# Patient Record
Sex: MALE | Race: ASIAN | Employment: OTHER | URBAN - METROPOLITAN AREA
[De-identification: names, ages, dates, MRNs, and addresses within clinical notes are randomized per-mention and may not be internally consistent; named-entity substitution may affect disease eponyms.]

---

## 2017-03-16 ENCOUNTER — ANESTHESIA EVENT (OUTPATIENT)
Dept: SURGERY | Age: 67
End: 2017-03-16
Payer: MEDICARE

## 2017-03-17 ENCOUNTER — ANESTHESIA (OUTPATIENT)
Dept: SURGERY | Age: 67
End: 2017-03-17
Payer: MEDICARE

## 2017-03-17 ENCOUNTER — SURGERY (OUTPATIENT)
Age: 67
End: 2017-03-17

## 2017-03-17 ENCOUNTER — HOSPITAL ENCOUNTER (OUTPATIENT)
Age: 67
Setting detail: OUTPATIENT SURGERY
Discharge: HOME | End: 2017-03-17
Attending: ORTHOPAEDIC SURGERY | Admitting: ORTHOPAEDIC SURGERY
Payer: MEDICARE

## 2017-03-17 ENCOUNTER — APPOINTMENT (OUTPATIENT)
Dept: GENERAL RADIOLOGY | Age: 67
End: 2017-03-17
Attending: ORTHOPAEDIC SURGERY
Payer: MEDICARE

## 2017-03-17 VITALS
HEIGHT: 73 IN | WEIGHT: 237.66 LBS | RESPIRATION RATE: 23 BRPM | HEART RATE: 112 BPM | SYSTOLIC BLOOD PRESSURE: 149 MMHG | TEMPERATURE: 98 F | DIASTOLIC BLOOD PRESSURE: 86 MMHG | BODY MASS INDEX: 31.5 KG/M2 | OXYGEN SATURATION: 97 %

## 2017-03-17 PROCEDURE — 77030020268 HC MISC GENERAL SUPPLY: Performed by: ORTHOPAEDIC SURGERY

## 2017-03-17 PROCEDURE — 76060000062 HC AMB SURG ANES 1 TO 1.5 HR: Performed by: ORTHOPAEDIC SURGERY

## 2017-03-17 PROCEDURE — 77030031383 HC PEG RAD LOK GEMNS SKEL -B: Performed by: ORTHOPAEDIC SURGERY

## 2017-03-17 PROCEDURE — 77030035363 HC PEG THRD LOK GEMNS TI SKEL -B: Performed by: ORTHOPAEDIC SURGERY

## 2017-03-17 PROCEDURE — 74011000250 HC RX REV CODE- 250

## 2017-03-17 PROCEDURE — 77030035360 HC BIT DRL SKEL -B: Performed by: ORTHOPAEDIC SURGERY

## 2017-03-17 PROCEDURE — 74011250636 HC RX REV CODE- 250/636: Performed by: ANESTHESIOLOGY

## 2017-03-17 PROCEDURE — C1769 GUIDE WIRE: HCPCS | Performed by: ORTHOPAEDIC SURGERY

## 2017-03-17 PROCEDURE — 77030031388 HC WRE K SKEL -B: Performed by: ORTHOPAEDIC SURGERY

## 2017-03-17 PROCEDURE — 74011250636 HC RX REV CODE- 250/636: Performed by: ORTHOPAEDIC SURGERY

## 2017-03-17 PROCEDURE — 77030010777 HC CRDL FT DERY -B: Performed by: ORTHOPAEDIC SURGERY

## 2017-03-17 PROCEDURE — 77030035361 HC BIT DRL SLD PA GEMNS SKEL -B: Performed by: ORTHOPAEDIC SURGERY

## 2017-03-17 PROCEDURE — 74011250636 HC RX REV CODE- 250/636

## 2017-03-17 PROCEDURE — 77030020782 HC GWN BAIR PAWS FLX 3M -B

## 2017-03-17 PROCEDURE — 77030034343 HC PEG THRD NLOK GEMNS TI SKEL -B: Performed by: ORTHOPAEDIC SURGERY

## 2017-03-17 PROCEDURE — 76210000034 HC AMBSU PH I REC 0.5 TO 1 HR: Performed by: ORTHOPAEDIC SURGERY

## 2017-03-17 PROCEDURE — 77030003601 HC NDL NRV BLK BBMI -A

## 2017-03-17 PROCEDURE — 77030031139 HC SUT VCRL2 J&J -A: Performed by: ORTHOPAEDIC SURGERY

## 2017-03-17 PROCEDURE — 76000 FLUOROSCOPY <1 HR PHYS/QHP: CPT

## 2017-03-17 PROCEDURE — 77030002933 HC SUT MCRYL J&J -A: Performed by: ORTHOPAEDIC SURGERY

## 2017-03-17 PROCEDURE — 77030020754 HC CUF TRNQT 2BLA STRY -B: Performed by: ORTHOPAEDIC SURGERY

## 2017-03-17 PROCEDURE — 77030021122 HC SPLNT MAT FST BSNM -A: Performed by: ORTHOPAEDIC SURGERY

## 2017-03-17 PROCEDURE — 77030032490 HC SLV COMPR SCD KNE COVD -B: Performed by: ORTHOPAEDIC SURGERY

## 2017-03-17 PROCEDURE — 77030031381 HC PLT RAD VOLR GEMNS SKEL -F: Performed by: ORTHOPAEDIC SURGERY

## 2017-03-17 PROCEDURE — 77030036550 HC SLNG ARM PCH S2SG -A

## 2017-03-17 PROCEDURE — 77030020143 HC AIRWY LARYN INTUB CGAS -A: Performed by: ANESTHESIOLOGY

## 2017-03-17 PROCEDURE — 76030000019 HC AMB SURG 1 TO 1.5 HR INTENSV-TIER 1: Performed by: ORTHOPAEDIC SURGERY

## 2017-03-17 PROCEDURE — 76210000050 HC AMBSU PH II REC 0.5 TO 1 HR: Performed by: ORTHOPAEDIC SURGERY

## 2017-03-17 PROCEDURE — 77030035133 HC SCR VOLR CORT NLOK TI SKEL -B: Performed by: ORTHOPAEDIC SURGERY

## 2017-03-17 DEVICE — PEG BNE FIX L22MM DIA2MM DST RAD TI SMOOTH FOR VOLAR: Type: IMPLANTABLE DEVICE | Site: WRIST | Status: FUNCTIONAL

## 2017-03-17 DEVICE — SCR BNE CORT NLCK 3.5X11MM -- GEMINUS: Type: IMPLANTABLE DEVICE | Site: WRIST | Status: FUNCTIONAL

## 2017-03-17 DEVICE — IMPLANTABLE DEVICE: Type: IMPLANTABLE DEVICE | Site: WRIST | Status: FUNCTIONAL

## 2017-03-17 DEVICE — PEG BNE FIX L16MM DIA2MM DST RAD TI SMOOTH FOR VOLAR: Type: IMPLANTABLE DEVICE | Site: WRIST | Status: FUNCTIONAL

## 2017-03-17 DEVICE — SCREW BNE L13MM DIA3.5MM CORT DST RAD VOLAR TI NONLOCKING: Type: IMPLANTABLE DEVICE | Site: WRIST | Status: FUNCTIONAL

## 2017-03-17 DEVICE — PEG BNE FIX L19MM DIA2MM DST RAD TI SMOOTH FOR VOLAR: Type: IMPLANTABLE DEVICE | Site: WRIST | Status: FUNCTIONAL

## 2017-03-17 DEVICE — PEG BNE FIX L20MM DIA2MM DST RAD TI SMOOTH FOR VOLAR: Type: IMPLANTABLE DEVICE | Site: WRIST | Status: FUNCTIONAL

## 2017-03-17 RX ORDER — CETIRIZINE HYDROCHLORIDE 5 MG/1
5 TABLET ORAL 2 TIMES DAILY
COMMUNITY
End: 2017-03-28

## 2017-03-17 RX ORDER — HYDROMORPHONE HYDROCHLORIDE 1 MG/ML
.25-1 INJECTION, SOLUTION INTRAMUSCULAR; INTRAVENOUS; SUBCUTANEOUS
Status: DISCONTINUED | OUTPATIENT
Start: 2017-03-17 | End: 2017-03-17 | Stop reason: HOSPADM

## 2017-03-17 RX ORDER — SODIUM CHLORIDE 0.9 % (FLUSH) 0.9 %
5-10 SYRINGE (ML) INJECTION AS NEEDED
Status: DISCONTINUED | OUTPATIENT
Start: 2017-03-17 | End: 2017-03-17 | Stop reason: HOSPADM

## 2017-03-17 RX ORDER — LIDOCAINE HYDROCHLORIDE 10 MG/ML
0.1 INJECTION, SOLUTION EPIDURAL; INFILTRATION; INTRACAUDAL; PERINEURAL AS NEEDED
Status: DISCONTINUED | OUTPATIENT
Start: 2017-03-17 | End: 2017-03-17 | Stop reason: HOSPADM

## 2017-03-17 RX ORDER — ONDANSETRON 2 MG/ML
INJECTION INTRAMUSCULAR; INTRAVENOUS AS NEEDED
Status: DISCONTINUED | OUTPATIENT
Start: 2017-03-17 | End: 2017-03-17 | Stop reason: HOSPADM

## 2017-03-17 RX ORDER — OXYCODONE AND ACETAMINOPHEN 5; 325 MG/1; MG/1
TABLET ORAL
Qty: 100 TAB | Refills: 0 | Status: SHIPPED | OUTPATIENT
Start: 2017-03-17 | End: 2017-03-28

## 2017-03-17 RX ORDER — ONDANSETRON 8 MG/1
8 TABLET, ORALLY DISINTEGRATING ORAL
Qty: 20 TAB | Refills: 2 | Status: SHIPPED | OUTPATIENT
Start: 2017-03-17 | End: 2017-03-28

## 2017-03-17 RX ORDER — LIDOCAINE HYDROCHLORIDE 20 MG/ML
INJECTION, SOLUTION EPIDURAL; INFILTRATION; INTRACAUDAL; PERINEURAL AS NEEDED
Status: DISCONTINUED | OUTPATIENT
Start: 2017-03-17 | End: 2017-03-17 | Stop reason: HOSPADM

## 2017-03-17 RX ORDER — SODIUM CHLORIDE 0.9 % (FLUSH) 0.9 %
5-10 SYRINGE (ML) INJECTION EVERY 8 HOURS
Status: DISCONTINUED | OUTPATIENT
Start: 2017-03-17 | End: 2017-03-17 | Stop reason: HOSPADM

## 2017-03-17 RX ORDER — HYDROCODONE BITARTRATE AND ACETAMINOPHEN 5; 325 MG/1; MG/1
1 TABLET ORAL
COMMUNITY
End: 2017-03-17

## 2017-03-17 RX ORDER — CEFAZOLIN SODIUM IN 0.9 % NACL 2 G/50 ML
2 INTRAVENOUS SOLUTION, PIGGYBACK (ML) INTRAVENOUS ONCE
Status: COMPLETED | OUTPATIENT
Start: 2017-03-17 | End: 2017-03-17

## 2017-03-17 RX ORDER — ROPIVACAINE HYDROCHLORIDE 5 MG/ML
INJECTION, SOLUTION EPIDURAL; INFILTRATION; PERINEURAL AS NEEDED
Status: DISCONTINUED | OUTPATIENT
Start: 2017-03-17 | End: 2017-03-17 | Stop reason: HOSPADM

## 2017-03-17 RX ORDER — ASCORBIC ACID 500 MG
500 TABLET ORAL DAILY
Qty: 42 TAB | Refills: 0 | Status: SHIPPED | OUTPATIENT
Start: 2017-03-17 | End: 2017-03-28

## 2017-03-17 RX ORDER — SODIUM CHLORIDE, SODIUM LACTATE, POTASSIUM CHLORIDE, CALCIUM CHLORIDE 600; 310; 30; 20 MG/100ML; MG/100ML; MG/100ML; MG/100ML
100 INJECTION, SOLUTION INTRAVENOUS CONTINUOUS
Status: DISCONTINUED | OUTPATIENT
Start: 2017-03-17 | End: 2017-03-17 | Stop reason: HOSPADM

## 2017-03-17 RX ORDER — DEXAMETHASONE SODIUM PHOSPHATE 4 MG/ML
INJECTION, SOLUTION INTRA-ARTICULAR; INTRALESIONAL; INTRAMUSCULAR; INTRAVENOUS; SOFT TISSUE AS NEEDED
Status: DISCONTINUED | OUTPATIENT
Start: 2017-03-17 | End: 2017-03-17 | Stop reason: HOSPADM

## 2017-03-17 RX ORDER — MIDAZOLAM HYDROCHLORIDE 1 MG/ML
INJECTION, SOLUTION INTRAMUSCULAR; INTRAVENOUS AS NEEDED
Status: DISCONTINUED | OUTPATIENT
Start: 2017-03-17 | End: 2017-03-17 | Stop reason: HOSPADM

## 2017-03-17 RX ORDER — FENTANYL CITRATE 50 UG/ML
INJECTION, SOLUTION INTRAMUSCULAR; INTRAVENOUS AS NEEDED
Status: DISCONTINUED | OUTPATIENT
Start: 2017-03-17 | End: 2017-03-17 | Stop reason: HOSPADM

## 2017-03-17 RX ORDER — PROPOFOL 10 MG/ML
INJECTION, EMULSION INTRAVENOUS AS NEEDED
Status: DISCONTINUED | OUTPATIENT
Start: 2017-03-17 | End: 2017-03-17 | Stop reason: HOSPADM

## 2017-03-17 RX ADMIN — DEXAMETHASONE SODIUM PHOSPHATE 8 MG: 4 INJECTION, SOLUTION INTRA-ARTICULAR; INTRALESIONAL; INTRAMUSCULAR; INTRAVENOUS; SOFT TISSUE at 11:29

## 2017-03-17 RX ADMIN — SODIUM CHLORIDE, POTASSIUM CHLORIDE, SODIUM LACTATE AND CALCIUM CHLORIDE: 600; 310; 30; 20 INJECTION, SOLUTION INTRAVENOUS at 12:13

## 2017-03-17 RX ADMIN — ONDANSETRON 4 MG: 2 INJECTION INTRAMUSCULAR; INTRAVENOUS at 11:29

## 2017-03-17 RX ADMIN — FENTANYL CITRATE 50 MCG: 50 INJECTION, SOLUTION INTRAMUSCULAR; INTRAVENOUS at 09:45

## 2017-03-17 RX ADMIN — MIDAZOLAM HYDROCHLORIDE 2 MG: 1 INJECTION, SOLUTION INTRAMUSCULAR; INTRAVENOUS at 11:21

## 2017-03-17 RX ADMIN — FENTANYL CITRATE 50 MCG: 50 INJECTION, SOLUTION INTRAMUSCULAR; INTRAVENOUS at 11:21

## 2017-03-17 RX ADMIN — CEFAZOLIN 2 G: 1 INJECTION, POWDER, FOR SOLUTION INTRAMUSCULAR; INTRAVENOUS; PARENTERAL at 11:28

## 2017-03-17 RX ADMIN — LIDOCAINE HYDROCHLORIDE 80 MG: 20 INJECTION, SOLUTION EPIDURAL; INFILTRATION; INTRACAUDAL; PERINEURAL at 11:21

## 2017-03-17 RX ADMIN — MIDAZOLAM HYDROCHLORIDE 1 MG: 1 INJECTION, SOLUTION INTRAMUSCULAR; INTRAVENOUS at 09:47

## 2017-03-17 RX ADMIN — SODIUM CHLORIDE, POTASSIUM CHLORIDE, SODIUM LACTATE AND CALCIUM CHLORIDE: 600; 310; 30; 20 INJECTION, SOLUTION INTRAVENOUS at 11:18

## 2017-03-17 RX ADMIN — ROPIVACAINE HYDROCHLORIDE 30 ML: 5 INJECTION, SOLUTION EPIDURAL; INFILTRATION; PERINEURAL at 09:53

## 2017-03-17 RX ADMIN — MIDAZOLAM HYDROCHLORIDE 2 MG: 1 INJECTION, SOLUTION INTRAMUSCULAR; INTRAVENOUS at 09:45

## 2017-03-17 RX ADMIN — PROPOFOL 200 MG: 10 INJECTION, EMULSION INTRAVENOUS at 11:21

## 2017-03-17 NOTE — IP AVS SNAPSHOT
Lashaegarrison Sellers 
 
 
 [address] [facility] [address] [facility] 
[phone] Patient: Mikaela Chavez MRN: [id number] H:[birth year] You are allergic to the following Allergen Reactions Chantix (Varenicline) Rash Very itchy rash on back Recent Documentation Height Weight BMI Smoking Status 1.854 m 107.8 kg 31.35 kg/m2 Current Every Day Smoker Emergency Contacts Name Discharge Info Relation Home Work Mobile [address] [facility] CAREGIVER [3] Spouse [3] [phone] About your hospitalization You were admitted on:  March 17, 2017 You last received care in the:  [facility] You were discharged on:  March 17, 2017 Unit phone number:  [phone] Why you were hospitalized Your primary diagnosis was:  Not on File Providers Seen During Your Hospitalizations Provider Role Specialty Primary office phone Awilda Tai MD Attending Provider Orthopedic Surgery [phone] Your Primary Care Physician (PCP) Primary Care Physician Office Phone Office Fax OTHER, PHYS ** None ** ** None ** Follow-up Information Follow up With Details Comments Contact Info Awilda Tai MD Follow up in 1 week(s)  [address] [facility] [address] 
[phone] Demetrio Grove MD   Patient can only remember the practice name and not the physician Current Discharge Medication List  
  
START taking these medications Dose & Instructions Dispensing Information Comments Morning Noon Evening Bedtime  
 ascorbic acid (vitamin C) 500 mg tablet Commonly known as:  VITAMIN C Your last dose was: Your next dose is:    
   
   
 Dose:  500 mg Take 1 Tab by mouth daily for 42 days. Quantity:  42 Tab Refills:  0  
     
   
   
   
  
 docusate sodium 50 mg capsule Commonly known as:  Derek Pierce  
   
 Your last dose was: Your next dose is: Take two tabs twice daily for two weeks, then twice a day as needed thereafter. Hold for loose stools. Quantity:  60 Cap Refills:  2  
     
   
   
   
  
 ondansetron 8 mg disintegrating tablet Commonly known as:  ZOFRAN ODT Your last dose was: Your next dose is:    
   
   
 Dose:  8 mg Take 1 Tab by mouth every eight (8) hours as needed for Nausea. Quantity:  20 Tab Refills:  2  
     
   
   
   
  
 oxyCODONE-acetaminophen 5-325 mg per tablet Commonly known as:  PERCOCET Your last dose was: Your next dose is:    
   
   
 1-2 tabs every 4hrs as needed for pain. Maximum daily dose 12 tablets. Quantity:  100 Tab Refills:  0 CONTINUE these medications which have NOT CHANGED Dose & Instructions Dispensing Information Comments Morning Noon Evening Bedtime  
 cetirizine 5 mg tablet Commonly known as:  ZYRTEC Your last dose was: Your next dose is:    
   
   
 Dose:  5 mg Take 5 mg by mouth two (2) times a day. Refills:  0 STOP taking these medications HYDROcodone-acetaminophen 5-325 mg per tablet Commonly known as:  Minda Arauz Where to Get Your Medications Information on where to get these meds will be given to you by the nurse or doctor. ! Ask your nurse or doctor about these medications  
  ascorbic acid (vitamin C) 500 mg tablet  
 docusate sodium 50 mg capsule  
 ondansetron 8 mg disintegrating tablet  
 oxyCODONE-acetaminophen 5-325 mg per tablet Discharge Instructions Upper Extremity Surgery Discharge Instructions Dr. Sade Abbott Please take the time to review the following instructions before you leave the hospital and use them as guidelines during your recovery from surgery. If you have any questions, you may contact my office at [phone]. Wound Care / Dressing Change Do NOT remove your dressing or get them wet. Meggan Beasley / Deloris Gomez May bathe/shower as long as dressing/splint/cast is kept dry. Sling You are not required to wear a sling and should do so only as needed for comfort. You may remove your sling once the block wears off, which may be anywhere from 8-48 hrs after surgery. Activity Please begin using fingers immediately after surgery, working to improve motion of straightening and flexing your fingers several times per day. No lifting with your affected hand. Otherwise, you may proceed with activity as tolerated. No driving until you receive further notice otherwise. Ice and Elevation Keep your hand elevated continuously for 48 hours after surgery using the pillow provided. Your hand/wrist should always be above the level of your heart. Sleep with your arm elevated to minimize swelling and discomfort. Continue ice consistently for 48 hours after surgery. After 48 hours, you should ice 3 times per day for 20 minutes at a time for the next 5 days. After 1 week from surgery, you may use ice as needed for pain. Diet You may advance your regular diet as tolerated. Increase your clear liquid intake for the next 2-3 days. Medications 1. You will be given prescriptions for pain medication, inflammation, and nausea when you are discharged from the hospital. Please use the medications as prescribed. Pain medications may cause constipation  over the counter Colace or Milk of Magnesia may be used as needed. Other possible side effects of pain medications are dizziness, headache, nausea, vomiting, and urinary retention. Discontinue the pain medication if you develop itching, rash, shortness of breath, or difficulties swallowing.  If these symptoms become severe or arent relieved by discontinuing the medication, you should seek immediate medical attention. 2. Refills of pain medication are authorized during office hours only (8AM  5PM Monday through Friday) 3. If you were prescribed Percocet/Oxycodone or Dilaudid/Hydromorphone you must have a written prescription. These medications cannot be leagally called into the pharmacy. 4. Do not take Tylenol/Acetaminophen in addition to your pain medication, as most pain medications already contain this. Do not exceed 4000mg of Tylenol/Acetaminophen per day. 5. You may resume the medication you were taking prior to your surgery. Pain medication may change the effects of any antidepressant medication you may be taking. If you have any questions about possible interactions between your regular medication and the pain medication, you should consult the physician who prescribes your regular medications. 6. You were prescribed a nausea medication. It is only necessary to fill this if you are experiencing nausea. Please call the office at [phone] if you have any increasing numbness or tingling, increasing drainage on your dressing, fever greater than 100.5 degrees F or pain not controlled by medications. If you are experiencing chest pain or shortness of breath, please alert your primary care physician immediately. DISCHARGE SUMMARY from Your Nurse PATIENT INSTRUCTIONS: 
 
AFTER ANESTHESIA & SEDATION, and WHILE TAKING PAIN MEDICINE After general anesthesia / intravenous sedation and the 24 hours following, and/or while taking prescription Opiates: · Limit your activities · Do not drive and operate hazardous machinery until you have been of all narcotics and sedatives for over 24 hours · Do not make important personal or business decisions · Do  not drink alcoholic beverages · If you have not urinated within 8 hours after discharge, please contact your surgeon on call.  
 
 
SIGNS OF INFECTION 
 Report the following Signs of Infection or General Problems after surgery to your surgeon: 
· Excessive pain, swelling, redness or odor of or around the surgical area · Temperature over 101; Temperature over 100 if on medications that affect your ability to fight infections · Nausea and vomiting lasting longer than 4 hours or if unable to take medications · Any signs of decreased circulation or nerve impairment to extremity: change in color, persistent  numbness, tingling, coldness or increase pain · If you have any questions. [address] Breathing deep and coughing are very important exercises to do after surgery. Deep breathing and coughing open the little air tubes and air sacks in your lungs. You take deep breaths every day. You may not even notice - it is just something you do when you sigh or yawn. It is a natural exercise you do to keep these air passages open. After surgery, take deep breaths and cough, on purpose. Coughing and deep breathing help prevent bronchitis and pneumonia after surgery. If you had chest or belly surgery, use a pillow as a \"hug jose antonio\" and hold it tightly to your chest or belly when you cough. DIRECTIONS: 
1. Take 10 to 15 slow deep breaths every hour while awake. 2. Breathe in deeply, and hold it for 2 seconds. 3. Exhale slowly through puckered lips, like blowing up a balloon. 4. After every 4th or 5th deep breath, hug your pillow to your chest or belly and give a hard, deep cough. Yes, it will probably hurt. But doing this exercise is very important part of healing after surgery. Take your pain medicine to help you do this exercise without too much pain. ANKLE PUMPS Ankle pumps increase the circulation of oxygenated blood to your lower extremities and decrease your risk for circulation problems such as blood clots.  They also stretch the muscles, tendons and ligaments in your foot and ankle, and prevent joint contracture in the ankle and foot, especially after surgeries on the legs. It is important to do ankle pump exercises regularly after surgery because immobility increases your risk for developing a blood clot. Your doctor may also have you take an Aspirin for the next few days as well. If your doctor did not ask you to take an Aspirin, consult with him before starting Aspirin therapy on your own. The exercise is quite simple. · Slowly point your foot forward, feeling the muscles on the top of your lower leg stretch, and hold this position for 5 seconds. · Next, pull your foot back toward you as far as possible, stretching the calf muscles, and hold that position for 5 seconds. · Repeat with the other foot. · Perform 10 repetitions every hour while awake for both ankles if possible (down and then up with the foot once is one repetition). You should feel gentle stretching of the muscles in your lower leg when doing this exercise. If you feel pain, or your range of motion is limited, don't push too hard. Only go the limit your joint and muscles will let you go. If you have increasing pain, progressively worsening leg warmth or swelling, STOP the exercise and call your doctor. Other Wound Care information: 
 
 
 
               
Below is information on the medication(s) your doctor is prescribing for you: The maximum daily dose of acetaminophen was discussed with the patient. He was encouraged not to exceed 3,000 mg of acetaminophen during a 24 hour period and was asked to keep in mind that acetaminophen can also be found in many over-the-counter cold medications as well as narcotics that may be given for pain. The patient expresses understanding of these issues and questions were answered. 4 THINGS ABOUT PAIN MEDICINE I ALWAYS TALK ABOUT: 
There are 4 side effects I always talk about for pain medications. 1. They make you sleepy and drowsy. Do not drive a car or operate machines while taking pain medication. Do not make any major decisions. Take a nap. Relax. Let your body recover from the affects of anesthesia and surgery. 2. Some people have quite a problem with itching and. .. 3. Nausea or vomiting. I mention these together because research tends to suggest there is a gene-related issue. While some have a hard time with these problems, others do not. These are expected and know side effects. Over-the-counter Benadryl® (the drug name is diphenhydramine) can help with the itching. Your doctor may also have given you some medicine for nausea. IF HE DID NOT, CALL HIM/HER. 4. Last but not least is the problem of constipation. All pain medicine can slow down the movement of food through the gut. The slower it goes, the worse it can be. Frankly, this means very hard poop. Hard poop just adds insult to the injury of surgery. And if you had tummy surgery, like having your gall bladder removed or a hernia repair, YOU DO NOT WANT THIS PROBLEM. There are 4 things I recommend. · Drink lots of fluids. For healthy people with no heart problems, this means at least 64 ounces of liquids or more per day. For example, a \"Big Gulp®\" from [employer] is 32 ounces. So you need to drink at least 2  \"Big Gulp®\" worth of fluids every day. If you have heart problems you may not be able to do this. Talk to your doctor about what you should do to prevent constipation. · Drinking fruit juice like apple, pear, or prune juice gives you extra \"BANG\" for your beverage. These drinks are high in natural fiber. If you are a diabetic, drink sugar-free fluids with fiber additives (see next 2 points.)  Avoid drinking extra fruit juice unless this is a regular part of your diet plan. · Eat extra fresh fruits and vegetables. · Add extra fiber-products.   Fiber products like Otila Pacini or Benefiber® can help. These products are over-the-counter and you do not need a prescription from your doctor. If you have followed these recommendations and still have some difficulty having a good poop, take and over-the-counter stimulant like Dulcolax® (biscodyl)  or Senokot® (senna concentrate). These may help get things moving. [address] EFFECT GUIDE The [address] EFFECT GUIDE was provided to the PATIENT AND CARE PROVIDER. Information provided includes instruction about drug purpose and common side effects for the following medications:  
· Percocet · Zofran · Vitamin C 
· Docusate Medication information added to discharge record on March 17, 2017 at 12:58 PM. Going Home After A Peripheral Nerve Block Patient Information The anesthesiology team has provided for your pain control through a technique called regional anesthesia. As the name implies, anesthesia (decreased or no pain, sensation, or motor control) has been provided to a specific region, whether that be an arm or a leg. How does this happen?  you might ask. While you were sleepy, the anesthesia provider provided medicine to a specific group of nerves either in the neck/shoulder region or in the groin and/or buttock region, similar to the way a dentist might numb a tooth (teeth.)  They typically use an ultrasound machine to know where the medicine is placed in relation to the nerves they wish to numb up or block.   What this means is that for the next few hours, you should expect to have a numb limb. Below are some things we wish for you to read and be familiar with concerning your anesthetized limb. Caring For a Blocked Body Part General Considerations: ? The numbness may last up to 24 hours ? You must protect your arm or leg. The blocked extremity is numb, weak, and difficult for your brain to locate and communicate with.   To do this you should: 
o Pay attention to the position of the blocked limb at all times. o Be very careful when placing hot or cod items on a numb limb. You could cause tissue damage like burns or frostbite if you are unable to feel temperature. Carefully follow your discharge instructions regarding the use of these therapies in you post-operative care. o Carefully pad the affected limb. Normally we continually move and adjust the position of our bodies without thinking about it. This movement and continuous repositioning helps to prevent injuries from immobility. When a limb is numb it still requires this care 
o Be extremely careful not to bump or hit the numb body part. This can result in an unrecognized injury, at lease until the blocked limb wakes up. It can also result in worse pain of your already post-surgical limb. Arm/Shoulder Blocks: 
? You may experience a droopy eyelid, nasal stuffiness, and redness of the eye after receiving an arm/shoulder block. This is called Marquiss Syndrome, and is very common. There is no need for concern. You may also experience mild hoarseness, but all of these symptoms should resolve within 24 hours. ? Some patients may experience mild shortness of breath. A sitting position will help alleviate this and it should resolve within 24 hours. If you experience significant or progressive worsening of the shortness of breath, seek medical attention immediately. Contact Phone Numbers CALL 911 IN CASE OF AN EMERGENCY. For all other non-emergency inquiries call the [facility]  at [phone] and ask for the anesthesiologist on call to be paged. Some information we wish all of our patients to be familiar with and General Information for Healthy Lifestyle choices: · Make a list of your current medications with your Primary Care Provider.  
· Update this list whenever your medications are discontinued, doses are changed, or new medications (including over-the-counter products like ibuprofen, vitamins, or herbal remedies) are added. · Carry medication information at all times in case of emergency situations No smoking / No tobacco products / Avoid exposure to second hand smoke Surgeon General's Warning:  Quitting smoking now greatly reduces serious risk to your health. Obesity, smoking, and sedentary lifestyle greatly increases your risk for illness. A healthy diet, regular physical exercise & weight monitoring are important for maintaining a healthy lifestyle. A Note About Congestive Heart Failure: You may be retaining fluid if you have a history of heart failure or if you experience any of the following symptoms:   
 
· Weight gain of 3 pounds or more overnight or 5 pounds in a week · Increased swelling in our hands or feet · Shortness of breath while lying flat in bed Please call your doctor as soon as you notice any of these symptoms; do not wait until your next office visit. A Note About Strokes: 
Recognize signs and symptoms of STROKE. The simple mnemonic, F.A.S.T., can help you remember signs of a stroke and what to do if you suspect a stroke is occuring to you or someone you are with: 
 
F - Face looks uneven A - Arms unable to move, or move evenly S - Speech is slurred or non-existent T - Time - CALL 911 as soon as signs and symptoms begin - DO NOT go to bed or wait to see if you get better - TIME IS BRAIN. Warning Signs of HEART ATTACK Call 911 if you have these symptoms: 
 
? Chest discomfort. Most heart attacks involve discomfort in the center of the chest that lasts more than a few minutes, or that goes away and comes back. It can feel like uncomfortable pressure, squeezing, fullness, or pain. ? Discomfort in other areas of the upper body. Symptoms can include pain or discomfort in one or both arms, the back, neck, jaw, or stomach. ? Shortness of breath with or without chest discomfort. ? Other signs may include breaking out in a cold sweat, nausea, or lightheadedness. Don't wait more than five minutes to call [address]! Fast action can save your life. Calling 911 is almost always the fastest way to get lifesaving treatment. Emergency Medical Services staff can begin treatment when they arrive  up to an hour sooner than if someone gets to the hospital by car. AT THE COMPLETION OF DISCHARGE INSTRUCTION REVIEW, WE VERIFY: 
The discharge information has been reviewed with the patient and caregiver. Questions have been asked and answered meeting patient and caregiver expectations. The patient and caregiver verbalized understanding. Your discharge nurse was Svetlana RESTREPO, RN-BC Board Certified - Pain Management Other information found in your discharge envelope: PRESCRIPTIONS PHYSICAL THERAPY PRESCRIPTION 
     APPOINTMENT CARDS Regional Anesthesia Pamphlet for block or block with On-Q Catheter from Anesthesia  Service Medical device information sheets/pamphlets from their  School/work excuse note. /parent work excuse note. The following personal items collected during your admission for safe keeping are returned to you:  
 
Dental Appliance: Dental Appliances: Uppers Vi luis eduardo:   
Hearing Aid:   
Jewelry:   
Clothing: Clothing:  (clothing to locker. denies valuables) Other Valuables:   
Valuables sent to safe:   
 
 
Discharge Orders None Introducing [facility]! Melody Alfaro introduces [employer] patient portal. Now you can access parts of your medical record, email your doctor's office, and request medication refills online. 1. In your internet browser, go to [email or web address] [email or web address]. "[email or web address] 2. Click on the First Time User? Click Here link in the Sign In box. You will see the New Member Sign Up page. 3. Enter your [employer] Access Code exactly as it appears below. You will not need to use this code after youve completed the sign-up process. If you do not sign up before the expiration date, you must request a new code. · [employer] Access Code: [email or web address] Expires: 6/13/2017  2:59 PM 
 
4. Enter the last four digits of your Social Security Number (xxxx) and Date of Birth (mm/dd/yyyy) as indicated and click Submit. You will be taken to the next sign-up page. 5. Create a [employer] ID. This will be your [employer] login ID and cannot be changed, so think of one that is secure and easy to remember. 6. Create a [employer] password. You can change your password at any time. 7. Enter your Password Reset Question and Answer. This can be used at a later time if you forget your password. 8. Enter your e-mail address. You will receive e-mail notification when new information is available in [address] 9. Click Sign Up. You can now view and download portions of your medical record. 10. Click the Download Summary menu link to download a portable copy of your medical information. If you have questions, please visit the Frequently Asked Questions section of the [employer] website. Remember, [employer] is NOT to be used for urgent needs. For medical emergencies, dial 911. Now available from your iPhone and Android! General Information Please provide this summary of care documentation to your next provider. Patient Signature:  ____________________________________________________________ Date:  ____________________________________________________________  
  
Adelaida Dozier Provider Signature:  ____________________________________________________________ Date:  ____________________________________________________________

## 2017-03-17 NOTE — DISCHARGE INSTRUCTIONS
Upper Extremity Surgery Discharge Instructions  Dr. Jennifer Armstrong    Please take the time to review the following instructions before you leave the hospital and use them as guidelines during your recovery from surgery. If you have any questions, you may contact my office at [phone]. Wound Care / Dressing Change    Do NOT remove your dressing or get them wet. [place] / Bathing    May bathe/shower as long as dressing/splint/cast is kept dry. Sling    You are not required to wear a sling and should do so only as needed for comfort. You may remove your sling once the block wears off, which may be anywhere from 8-48 hrs after surgery. Activity    Please begin using fingers immediately after surgery, working to improve motion of straightening and flexing your fingers several times per day. No lifting with your affected hand. Otherwise, you may proceed with activity as tolerated. No driving until you receive further notice otherwise. Ice and Elevation    Keep your hand elevated continuously for 48 hours after surgery using the pillow provided. Your hand/wrist should always be above the level of your heart. Sleep with your arm elevated to minimize swelling and discomfort. Continue ice consistently for 48 hours after surgery. After 48 hours, you should ice 3 times per day for 20 minutes at a time for the next 5 days. After 1 week from surgery, you may use ice as needed for pain. Diet    You may advance your regular diet as tolerated. Increase your clear liquid intake for the next 2-3 days. Medications    1. You will be given prescriptions for pain medication, inflammation, and nausea when you are discharged from the hospital. Please use the medications as prescribed. Pain medications may cause constipation - over the counter Colace or Milk of Magnesia may be used as needed.  Other possible side effects of pain medications are dizziness, headache, nausea, vomiting, and urinary retention. Discontinue the pain medication if you develop itching, rash, shortness of breath, or difficulties swallowing. If these symptoms become severe or arent relieved by discontinuing the medication, you should seek immediate medical attention. 2. Refills of pain medication are authorized during office hours only (8AM - 5PM Monday through Friday)  3. If you were prescribed Percocet/Oxycodone or Dilaudid/Hydromorphone you must have a written prescription. These medications cannot be leagally called into the pharmacy. 4. Do not take Tylenol/Acetaminophen in addition to your pain medication, as most pain medications already contain this. Do not exceed 4000mg of Tylenol/Acetaminophen per day. 5. You may resume the medication you were taking prior to your surgery. Pain medication may change the effects of any antidepressant medication you may be taking. If you have any questions about possible interactions between your regular medication and the pain medication, you should consult the physician who prescribes your regular medications. 6. You were prescribed a nausea medication. It is only necessary to fill this if you are experiencing nausea. Please call the office at [phone] if you have any increasing numbness or tingling, increasing drainage on your dressing, fever greater than 100.5 degrees F or pain not controlled by medications. If you are experiencing chest pain or shortness of breath, please alert your primary care physician immediately.             DISCHARGE SUMMARY from Your Nurse    PATIENT INSTRUCTIONS:    AFTER ANESTHESIA & SEDATION, and WHILE TAKING PAIN MEDICINE  After general anesthesia / intravenous sedation and the 24 hours following, and/or while taking prescription Opiates:  · Limit your activities  · Do not drive and operate hazardous machinery until you have been of all narcotics and sedatives for over 24 hours  · Do not make important personal or business decisions  · Do  not drink alcoholic beverages  · If you have not urinated within 8 hours after discharge, please contact your surgeon on call. SIGNS OF INFECTION  Report the following Signs of Infection or General Problems after surgery to your surgeon:  · Excessive pain, swelling, redness or odor of or around the surgical area  · Temperature over 101; Temperature over 100 if on medications that affect your ability to fight infections  · Nausea and vomiting lasting longer than 4 hours or if unable to take medications  · Any signs of decreased circulation or nerve impairment to extremity: change in color, persistent  numbness, tingling, coldness or increase pain  · If you have any questions. COUGH AND DEEP BREATHE  Breathing deep and coughing are very important exercises to do after surgery. Deep breathing and coughing open the little air tubes and air sacks in your lungs. You take deep breaths every day. You may not even notice - it is just something you do when you sigh or yawn. It is a natural exercise you do to keep these air passages open. After surgery, take deep breaths and cough, on purpose. Coughing and deep breathing help prevent bronchitis and pneumonia after surgery. If you had chest or belly surgery, use a pillow as a \"hug buddy\" and hold it tightly to your chest or belly when you cough. DIRECTIONS:  1. Take 10 to 15 slow deep breaths every hour while awake. 2. Breathe in deeply, and hold it for 2 seconds. 3. Exhale slowly through puckered lips, like blowing up a balloon. 4. After every 4th or 5th deep breath, hug your pillow to your chest or belly and give a hard, deep cough. Yes, it will probably hurt. But doing this exercise is very important part of healing after surgery. Take your pain medicine to help you do this exercise without too much pain.       ANKLE PUMPS    Ankle pumps increase the circulation of oxygenated blood to your lower extremities and decrease your risk for circulation problems such as blood clots. They also stretch the muscles, tendons and ligaments in your foot and ankle, and prevent joint contracture in the ankle and foot, especially after surgeries on the legs. It is important to do ankle pump exercises regularly after surgery because immobility increases your risk for developing a blood clot. Your doctor may also have you take an Aspirin for the next few days as well. If your doctor did not ask you to take an Aspirin, consult with him before starting Aspirin therapy on your own. The exercise is quite simple. · Slowly point your foot forward, feeling the muscles on the top of your lower leg stretch, and hold this position for 5 seconds. · Next, pull your foot back toward you as far as possible, stretching the calf muscles, and hold that position for 5 seconds. · Repeat with the other foot. · Perform 10 repetitions every hour while awake for both ankles if possible (down and then up with the foot once is one repetition). You should feel gentle stretching of the muscles in your lower leg when doing this exercise. If you feel pain, or your range of motion is limited, don't push too hard. Only go the limit your joint and muscles will let you go. If you have increasing pain, progressively worsening leg warmth or swelling, STOP the exercise and call your doctor. Other Wound Care information:                        Below is information on the medication(s) your doctor is prescribing for you: The maximum daily dose of acetaminophen was discussed with the patient. He was encouraged not to exceed 3,000 mg of acetaminophen during a 24 hour period and was asked to keep in mind that acetaminophen can also be found in many over-the-counter cold medications as well as narcotics that may be given for pain.  The patient expresses understanding of these issues and questions were answered. 4 THINGS ABOUT PAIN MEDICINE I ALWAYS TALK ABOUT:  There are 4 side effects I always talk about for pain medications. 1. They make you sleepy and drowsy. Do not drive a car or operate machines while taking pain medication. Do not make any major decisions. Take a nap. Relax. Let your body recover from the affects of anesthesia and surgery. 2. Some people have quite a problem with itching and. ..  3. Nausea or vomiting. I mention these together because research tends to suggest there is a gene-related issue. While some have a hard time with these problems, others do not. These are expected and know side effects. Over-the-counter Benadryl® (the drug name is diphenhydramine) can help with the itching. Your doctor may also have given you some medicine for nausea. IF HE DID NOT, CALL HIM/HER. 4. Last but not least is the problem of constipation. All pain medicine can slow down the movement of food through the gut. The slower it goes, the worse it can be. Frankly, this means very hard poop. Hard poop just adds insult to the injury of surgery. And if you had tummy surgery, like having your gall bladder removed or a hernia repair, YOU DO NOT WANT THIS PROBLEM. There are 4 things I recommend. · Drink lots of fluids. For healthy people with no heart problems, this means at least 64 ounces of liquids or more per day. For example, a \"Big Gulp®\" from [employer] is 32 ounces. So you need to drink at least 2  \"Big Gulp®\" worth of fluids every day. If you have heart problems you may not be able to do this. Talk to your doctor about what you should do to prevent constipation. · Drinking fruit juice like apple, pear, or prune juice gives you extra \"BANG\" for your beverage. These drinks are high in natural fiber.   If you are a diabetic, drink sugar-free fluids with fiber additives (see next 2 points.)  Avoid drinking extra fruit juice unless this is a regular part of your diet plan.    · Eat extra fresh fruits and vegetables. · Add extra fiber-products. Fiber products like Metamucil®, Citrucel® or Benefiber® can help. These products are over-the-counter and you do not need a prescription from your doctor. If you have followed these recommendations and still have some difficulty having a good poop, take and over-the-counter stimulant like Dulcolax® (biscodyl)  or Senokot® (senna concentrate). These may help get things moving. Abhishek Daily MEDICATION AND   SIDE EFFECT GUIDE    The [facility] MEDICATION AND SIDE EFFECT GUIDE was provided to the PATIENT AND CARE PROVIDER. Information provided includes instruction about drug purpose and common side effects for the following medications:   · Percocet  · Zofran  · Vitamin C  · Docusate        Medication information added to discharge record on March 17, 2017 at 12:58 PM.                 Going Home After A  Peripheral Nerve Block    Patient Information    The anesthesiology team has provided for your pain control through a technique called regional anesthesia. As the name implies, anesthesia (decreased or no pain, sensation, or motor control) has been provided to a specific region, whether that be an arm or a leg. How does this happen?  you might ask. While you were sleepy, the anesthesia provider provided medicine to a specific group of nerves either in the neck/shoulder region or in the groin and/or buttock region, similar to the way a dentist might numb a tooth (teeth.)  They typically use an ultrasound machine to know where the medicine is placed in relation to the nerves they wish to numb up or block.   What this means is that for the next few hours, you should expect to have a numb limb. Below are some things we wish for you to read and be familiar with concerning your anesthetized limb.     Caring For a Blocked Body Part    General Considerations:   The numbness may last up to 24 hours   You must protect your arm or leg. The blocked extremity is numb, weak, and difficult for your brain to locate and communicate with. To do this you should:  o Pay attention to the position of the blocked limb at all times. o Be very careful when placing hot or cod items on a numb limb. You could cause tissue damage like burns or frostbite if you are unable to feel temperature. Carefully follow your discharge instructions regarding the use of these therapies in you post-operative care. o Carefully pad the affected limb. Normally we continually move and adjust the position of our bodies without thinking about it. This movement and continuous repositioning helps to prevent injuries from immobility. When a limb is numb it still requires this care  o Be extremely careful not to bump or hit the numb body part. This can result in an unrecognized injury, at lease until the blocked limb wakes up. It can also result in worse pain of your already post-surgical limb. Arm/Shoulder Blocks:   You may experience a droopy eyelid, nasal stuffiness, and redness of the eye after receiving an arm/shoulder block. This is called Marquiss Syndrome, and is very common. There is no need for concern. You may also experience mild hoarseness, but all of these symptoms should resolve within 24 hours.  Some patients may experience mild shortness of breath. A sitting position will help alleviate this and it should resolve within 24 hours. If you experience significant or progressive worsening of the shortness of breath, seek medical attention immediately. Contact Phone Numbers    CALL 911 IN CASE OF AN EMERGENCY. For all other non-emergency inquiries call the [facility]  at [phone] and ask for the anesthesiologist on call to be paged.         Some information we wish all of our patients to be familiar with and General Information for Healthy Lifestyle choices:    · Make a list of your current medications with your Primary Care Provider. · Update this list whenever your medications are discontinued, doses are changed, or new medications (including over-the-counter products like ibuprofen, vitamins, or herbal remedies) are added. · Carry medication information at all times in case of emergency situations      No smoking / No tobacco products / Avoid exposure to second hand smoke    Surgeon General's Warning:  Quitting smoking now greatly reduces serious risk to your health. Obesity, smoking, and sedentary lifestyle greatly increases your risk for illness. A healthy diet, regular physical exercise & weight monitoring are important for maintaining a healthy lifestyle. A Note About Congestive Heart Failure: You may be retaining fluid if you have a history of heart failure or if you experience any of the following symptoms:      · Weight gain of 3 pounds or more overnight or 5 pounds in a week  · Increased swelling in our hands or feet  · Shortness of breath while lying flat in bed      Please call your doctor as soon as you notice any of these symptoms; do not wait until your next office visit. A Note About Strokes:  Recognize signs and symptoms of STROKE. The simple mnemonic, F.A.S.T., can help you remember signs of a stroke and what to do if you suspect a stroke is occuring to you or someone you are with:    F - Face looks uneven  A - Arms unable to move, or move evenly  S - Speech is slurred or non-existent  T - Time - CALL 911 as soon as signs and symptoms begin - DO NOT go to bed or wait to see if you get better - TIME IS BRAIN. Warning Signs of HEART ATTACK   Call 911 if you have these symptoms:     Chest discomfort. Most heart attacks involve discomfort in the center of the chest that lasts more than a few minutes, or that goes away and comes back. It can feel like uncomfortable pressure, squeezing, fullness, or pain.  Discomfort in other areas of the upper body.  Symptoms can include pain or discomfort in one or both arms, the back, neck, jaw, or stomach.  Shortness of breath with or without chest discomfort.  Other signs may include breaking out in a cold sweat, nausea, or lightheadedness. Don't wait more than five minutes to call 911 - MINUTES MATTER! Fast action can save your life. Calling 911 is almost always the fastest way to get lifesaving treatment. Emergency Medical Services staff can begin treatment when they arrive -- up to an hour sooner than if someone gets to the hospital by car. AT THE COMPLETION OF DISCHARGE INSTRUCTION REVIEW, WE VERIFY:  The discharge information has been reviewed with the patient and caregiver. Questions have been asked and answered meeting patient and caregiver expectations. The patient and caregiver verbalized understanding. Your discharge nurse was Lashaun RESTREPO, RN-BC       Board Certified - Pain Management      Other information found in your discharge envelope:  [x]     PRESCRIPTIONS  []     PHYSICAL THERAPY PRESCRIPTION  []     APPOINTMENT CARDS  []     Regional Anesthesia Pamphlet for block or block with On-Q Catheter from Anesthesia  Service  []     Medical device information sheets/pamphlets from their    []     School/work excuse note. []     /parent work excuse note. The following personal items collected during your admission for safe keeping are returned to you:     Dental Appliance: Dental Appliances: Uppers  Vi luis eduardo:    Hearing Aid:    Jewelry:    Clothing: Clothing:  (clothing to locker.  denies valuables)  Other Valuables:    Valuables sent to safe:

## 2017-03-17 NOTE — ANESTHESIA PROCEDURE NOTES
Peripheral Block    Start time: 3/17/2017 9:45 AM  End time: 3/17/2017 9:53 AM  Performed by: Pallavi Shelby  Authorized by: Pallavi Shelby       Pre-procedure:    Indications: at surgeon's request and primary anesthetic    Preanesthetic Checklist: patient identified, risks and benefits discussed, site marked, timeout performed, anesthesia consent given and patient being monitored    Timeout Time: 09:45          Block Type:   Block Type:  Supraclavicular  Laterality:  Left  Monitoring:  Continuous pulse ox, frequent vital sign checks, heart rate, responsive to questions and oxygen  Injection Technique:  Single shot  Procedures: ultrasound guided and nerve stimulator    Patient Position: supine  Prep: chlorhexidine    Location:  Supraclavicular  Needle Type:  Stimuplex  Needle Gauge:  22 G  Needle Localization:  Anatomical landmarks and ultrasound guidance  Medication Injected:  0.5%  ropivacaine  Volume (mL):  30    Assessment:  Number of attempts:  1  Injection Assessment:  Incremental injection every 5 mL, local visualized surrounding nerve on ultrasound, negative aspiration for blood, no paresthesia and no intravascular symptoms  Patient tolerance:  Patient tolerated the procedure well with no immediate complications

## 2017-03-17 NOTE — PROGRESS NOTES
POST ANESTHESIA CARE DISCHARGE NOTE    Dionicio Benitez was   discharged     via    wheel chair     To  private vehicle    . Patient was escorted by    nurse  . Patient verbalized   appreciation and was very pleased with care received throughout their stay. Patient was discharged in   pleasant mood     Pain at discharge/transfer was    0 /10. Discharge, medication and follow-up instructions were verbalized as understood prior to discharge. All personal belongings have been returned to patient, and patient/family verbally confirm receiving belongings as all present.     Rik LIUN RN-BC

## 2017-03-17 NOTE — OP NOTES
OPERATIVE NOTE    PREOPERATIVE DIAGNOSIS: Left comminuted intra-articular distal radius fracture     POSTOPERATIVE DIAGNOSIS: Left comminuted intra-articular distal radius fracture    PROCEDURE:   1. Open reduction internal fixation of left comminuted intra-articular distal radius fracture  2. Left brachioradialis lengthening    IMPLANT: [employer] Geminus system    SURGEON: Jennifer Armstrong MD    ANESTHESIA: Regional    BLOOD LOSS: Minimal.     COMPLICATIONS: None. SPECIMENS: None. INDICATIONS: The patient is a 75 year olf male who presented with a  Left  distal radius fracture that was unstable based on criteria addressed in history and physical. After discussion of risks and benefits, including risks of bleeding, infection, damage to surrounding structures, failure to heal, further fracture, persistent pain, stiffness, and loss of function, risks of anesthesia, and other risks, the patient  elected to proceed with the above procedure and signed operative consent. DESCRIPTION OF PROCEDURE:  The patient was seen and identified in the preanesthesia care unit. The operative site was marked. Preoperative questions were invited and answered. Risks and benefits of the procedure were again reviewed. The patient was then evaluated by the anesthesia team and brought to the operative suite on a stretcher and transferred to the operating room table in the supine position. Light sedation was induced. A well padded tourniquet was then placed high on the patient's operative extremity. The patient was then prepped and draped in the usual sterile fashion. Preoperative [ Yesenia Saver ] was given. A timeout was completed confirming the appropriate site, side, and procedure. DVT prophylaxis was not needed intraoperatively secondary to the duration of the case. The operative extremity was then exsanguinated using an Esmarch bandage, and the tourniquet was inflated to 250 mmHg.  A standard volar approach to the wrist was performed. A longitudinal incision was made over the FCR tendon, angled distally toward the radial styloid. The FCR sheath was identified, and this was divided longitudinally with care taken to place incision over the radial aspect of the tendon sheath. The FCR tendon was retracted ulnarly, and the floor of the sheath was similarly divided. Flexor tendons and median nerve were retracted ulnarly and protected. Pronator quadratus muscle was released from distal radius in standard L fashion. Fracture lines were visualized. The brachioradialis was then divided in a step-cut fashion in order to obtain reduction. Fracture was reduced and provisionally stabilized . Satisfactory reduction was then confirmed using fluoroscopy. An appropriately sized volar locking plate was applied to the volar surface of the distal radius and after appropriate position was confirmed after placement of the distal K wires through the plate/K-wire guide, this was secured to the plate using a cortical screw. After appropriate position and trajectory of the distal locking fixation was confirmed using the distal jig and K-wires, the distal fixation was inserted. The remaining diaphyseal fixation was then inserted. Provisional fixation was then removed. Satisfactory reduction and position of the hardware was then confirmed using fluoroscopy, and multiple views were taken including 20 degree lateral view confirming absence of screws in the radiocarpal and distal radioulnar joints. Tourniquet deflated, and hemostasis was achieved using bipolar cautery. Brachioradialis was repaired in the lengthened fashion using 0 Vicryl. The wound was irrigated. Incision closed with vicryl in the subcutaneous layer and [ ]monocryl in the skin. Sterile dressing was applied. Wrist immobilized in a well padded plaster splint. The patient was transferred to the recovery room in stable condition after all counts were correct.     POSTOPERATIVE PLAN: The patient will return for wound check and transition to aremovable splint. Anticipate beginning wrist ROM at two weeks post op. In the meantime, the patient will starting aggressive finger range of motion exercises without resistance. Vitamin C for six weeks post-operatively.

## 2017-03-17 NOTE — H&P
Orthopedic Admission History and Physical        NAME: Donna Hopkins       :  1950       MRN:  [id number]      Subjective:     Patient is a 75 y.o. male who presents with history of L DRF. Presents today for surgical treatment. There are no active problems to display for this patient. Past Medical History:   Diagnosis Date    Ill-defined condition     Eczema      History reviewed. No pertinent surgical history. Prior to Admission medications    Medication Sig Start Date End Date Taking? Authorizing Provider   HYDROcodone-acetaminophen (NORCO) 5-325 mg per tablet Take 1 Tab by mouth every six (6) hours as needed for Pain. Yes Historical Provider   cetirizine (ZYRTEC) 5 mg tablet Take 5 mg by mouth two (2) times a day.    Yes Historical Provider     Current Facility-Administered Medications   Medication Dose Route Frequency    lidocaine (PF) (XYLOCAINE) 10 mg/mL (1 %) injection 0.1 mL  0.1 mL SubCUTAneous PRN    lactated ringers infusion  100 mL/hr IntraVENous CONTINUOUS    sodium chloride (NS) flush 5-10 mL  5-10 mL IntraVENous Q8H    sodium chloride (NS) flush 5-10 mL  5-10 mL IntraVENous PRN    sodium chloride (NS) flush 5-10 mL  5-10 mL IntraVENous Q8H    sodium chloride (NS) flush 5-10 mL  5-10 mL IntraVENous PRN    ceFAZolin in 0.9% NS (ANCEF) IVPB soln 2 g  2 g IntraVENous ONCE     Facility-Administered Medications Ordered in Other Encounters   Medication Dose Route Frequency    midazolam (VERSED) injection   IntraVENous PRN    fentaNYL citrate (PF) injection    PRN    ropivacaine (PF) (NAROPIN) 5 mg/mL (0.5 %) injection   Peripheral Nerve Block PRN      Allergies   Allergen Reactions    Chantix [Varenicline] Rash     Very itchy rash on back      Social History   Substance Use Topics    Smoking status: Current Every Day Smoker     Packs/day: 0.50     Years: 50.00    Smokeless tobacco: Former User     Quit date: 2017      Comment: cold turkey 2.5 wks ago after attempt Chantix developed allergy    Alcohol use Not on file      Comment: around holidatys      History reviewed. No pertinent family history. Review of Systems  A comprehensive review of systems was negative except for that written in the HPI. Objective:     Patient Vitals for the past 8 hrs:   BP Temp Pulse Resp SpO2 Height Weight   17 1033 (!) (P) 161/98 - (P) 99 (P) 23 (P) 100 % - -   17 1028 (!) 140/102 - - - - - -   17 1023 109/85 - 92 19 98 % - -   17 1016 145/90 - - - - - -   17 1012 (!) 151/93 - 94 19 97 % - -   17 1010 (!) 163/108 - 96 19 96 % - -   17 0935 - - 99 - - - -   17 0900 (!) 188/97 98.3 °F (36.8 °C) - 19 100 % - -   17 0845 - - - - - 6' 1\" (1.854 m) 107.8 kg (237 lb 10.5 oz)     Temp (24hrs), Av.3 °F (36.8 °C), Min:98.3 °F (36.8 °C), Max:98.3 °F (36.8 °C)      Physical Exam:  General appearance: alert, cooperative, no distress, appears stated age  Lungs: No use of accessory breathing muscles. Breathing unlabored. Cardiac: Regular rate. Abdomen: soft, non-tender, non-distended  Extremities: As per prior exam.     Labs: No results found for this or any previous visit (from the past 24 hour(s)). Assessment:   No medical contraindications to proceeding with planned surgery. Please see initial office note for full discussion of risks, benefits, and alternatives to surgery. There are no active problems to display for this patient. Plan:   Proceed with surgery  Pt. stable  Pt.  NPO x meds

## 2017-03-17 NOTE — ANESTHESIA POSTPROCEDURE EVALUATION
Post-Anesthesia Evaluation and Assessment    Patient: Mohini Rueda MRN: [id number]  SSN: [id number]    YOB: 1950  Age: 75 y.o. Sex: male       Cardiovascular Function/Vital Signs  Visit Vitals    BP (!) 171/91    Pulse (!) 106    Temp 36.6 °C (97.8 °F)    Resp 19    Ht 6' 1\" (1.854 m)    Wt 107.8 kg (237 lb 10.5 oz)    SpO2 100%    BMI 31.35 kg/m2       Patient is status post general anesthesia for Procedure(s):  ORIF LEFT DISTAL RADIUS FRACTURE. Nausea/Vomiting: None    Postoperative hydration reviewed and adequate. Pain:  Pain Scale 1: Adult Nonverbal Pain Scale (03/17/17 1240)  Pain Intensity 1: 0 (03/17/17 1240)   Managed    Neurological Status:   Neuro (WDL): Exceptions to WDL (03/17/17 1240)  Neuro  Neurologic State: Drowsy; Appropriate for age (03/17/17 1253)   At baseline    Mental Status and Level of Consciousness: Arousable    Pulmonary Status:   O2 Device: Nasal cannula (03/17/17 1247)   Adequate oxygenation and airway patent    Complications related to anesthesia: None    Post-anesthesia assessment completed. Spoke to patient about need for follow-up with PCP re: HTN.     Signed By: Austin Kussmaul, MD     March 17, 2017

## 2017-03-17 NOTE — ANESTHESIA PREPROCEDURE EVALUATION
Anesthetic History   No history of anesthetic complications            Review of Systems / Medical History  Patient summary reviewed    Pulmonary          Smoker         Neuro/Psych   Within defined limits           Cardiovascular  Within defined limits                Exercise tolerance: >4 METS     GI/Hepatic/Renal  Within defined limits              Endo/Other  Within defined limits           Other Findings            Physical Exam    Airway  Mallampati: III  TM Distance: 4 - 6 cm  Neck ROM: decreased range of motion   Mouth opening: Normal     Cardiovascular    Rhythm: regular  Rate: normal         Dental    Dentition: Full upper dentures     Pulmonary  Breath sounds clear to auscultation               Abdominal         Other Findings            Anesthetic Plan    ASA: 2  Anesthesia type: general      Post-op pain plan if not by surgeon: peripheral nerve block single      Anesthetic plan and risks discussed with: Patient

## 2017-03-17 NOTE — PERIOP NOTES
PACU IN REPORT FROM ANESTHESIA    Verbal report received from [address] [place] [address]   following Regional for Procedure(s) (LRB):  ORIF LEFT DISTAL RADIUS FRACTURE (Left). Note the anesthesia record for medications given intraoperatively. Brief Initial Visual Assessment:    Airway is:   Patent   Respiratory pattern is:    Even, Non-labored. Patient is: alert and oriented x 0 (Sedated.)   Skin is:   Pink, Warm and Dry. Membranes are:    Pink and Moist.    Patient is in:    No Acute Discomfort. 0 /10 pain using   A.N.V. Scale. - Note E-MAR for medications administered. Note assessments documented in flowsheets;any assessment variants to be found in comments or narrative perioperative nurse notes.        Post-anesthesia care now assumed, record signed by Veronica RESTREPO, RN-BC

## 2017-03-28 ENCOUNTER — APPOINTMENT (OUTPATIENT)
Dept: CT IMAGING | Age: 67
DRG: 061 | End: 2017-03-28
Attending: EMERGENCY MEDICINE
Payer: MEDICARE

## 2017-03-28 ENCOUNTER — HOSPITAL ENCOUNTER (INPATIENT)
Age: 67
LOS: 7 days | Discharge: NURSING, REHAB OR LONG-TERM CARE | DRG: 061 | End: 2017-04-04
Attending: EMERGENCY MEDICINE | Admitting: INTERNAL MEDICINE
Payer: MEDICARE

## 2017-03-28 ENCOUNTER — APPOINTMENT (OUTPATIENT)
Dept: CT IMAGING | Age: 67
DRG: 061 | End: 2017-03-28
Attending: INTERNAL MEDICINE
Payer: MEDICARE

## 2017-03-28 DIAGNOSIS — R47.9 SPEECH DISTURBANCE, UNSPECIFIED TYPE: Primary | ICD-10-CM

## 2017-03-28 DIAGNOSIS — I10 ESSENTIAL HYPERTENSION: ICD-10-CM

## 2017-03-28 PROBLEM — I63.9 CVA (CEREBRAL VASCULAR ACCIDENT) (HCC): Status: ACTIVE | Noted: 2017-03-28

## 2017-03-28 PROBLEM — R47.01 APHASIA: Status: ACTIVE | Noted: 2017-03-28

## 2017-03-28 PROBLEM — F17.200 TOBACCO USE DISORDER: Status: ACTIVE | Noted: 2017-03-28

## 2017-03-28 PROBLEM — G93.40 ACUTE ENCEPHALOPATHY: Status: ACTIVE | Noted: 2017-03-28

## 2017-03-28 PROBLEM — S52.502A FRACTURE OF RADIUS, DISTAL, LEFT, CLOSED: Status: ACTIVE | Noted: 2017-03-28

## 2017-03-28 PROBLEM — Z92.82 RECEIVED INTRAVENOUS TISSUE PLASMINOGEN ACTIVATOR (TPA) IN EMERGENCY DEPARTMENT: Status: ACTIVE | Noted: 2017-03-28

## 2017-03-28 PROBLEM — I61.9: Status: ACTIVE | Noted: 2017-03-28

## 2017-03-28 LAB
ABO + RH BLD: NORMAL
ALBUMIN SERPL BCP-MCNC: 3.7 G/DL (ref 3.5–5)
ALBUMIN/GLOB SERPL: 1 {RATIO} (ref 1.1–2.2)
ALP SERPL-CCNC: 85 U/L (ref 45–117)
ALT SERPL-CCNC: 36 U/L (ref 12–78)
ANION GAP BLD CALC-SCNC: 9 MMOL/L (ref 5–15)
APTT PPP: 26.6 SEC (ref 22.1–32.5)
AST SERPL W P-5'-P-CCNC: 19 U/L (ref 15–37)
BASOPHILS # BLD AUTO: 0 K/UL (ref 0–0.1)
BASOPHILS # BLD: 1 % (ref 0–1)
BILIRUB SERPL-MCNC: 0.4 MG/DL (ref 0.2–1)
BLOOD GROUP ANTIBODIES SERPL: NORMAL
BUN SERPL-MCNC: 12 MG/DL (ref 6–20)
BUN/CREAT SERPL: 12 (ref 12–20)
CALCIUM SERPL-MCNC: 8.9 MG/DL (ref 8.5–10.1)
CHLORIDE SERPL-SCNC: 103 MMOL/L (ref 97–108)
CHOLEST SERPL-MCNC: 191 MG/DL
CO2 SERPL-SCNC: 27 MMOL/L (ref 21–32)
CREAT SERPL-MCNC: 1.02 MG/DL (ref 0.7–1.3)
EOSINOPHIL # BLD: 0.4 K/UL (ref 0–0.4)
EOSINOPHIL NFR BLD: 5 % (ref 0–7)
ERYTHROCYTE [DISTWIDTH] IN BLOOD BY AUTOMATED COUNT: 13.1 % (ref 11.5–14.5)
EST. AVERAGE GLUCOSE BLD GHB EST-MCNC: 177 MG/DL
FIBRINOGEN PPP-MCNC: 349 MG/DL (ref 200–475)
GLOBULIN SER CALC-MCNC: 3.6 G/DL (ref 2–4)
GLUCOSE BLD STRIP.AUTO-MCNC: 179 MG/DL (ref 65–100)
GLUCOSE SERPL-MCNC: 202 MG/DL (ref 65–100)
HBA1C MFR BLD: 7.8 % (ref 4.2–6.3)
HCT VFR BLD AUTO: 43.9 % (ref 36.6–50.3)
HDLC SERPL-MCNC: 42 MG/DL
HDLC SERPL: 4.5 {RATIO} (ref 0–5)
HGB BLD-MCNC: 15 G/DL (ref 12.1–17)
INR PPP: 1 (ref 0.9–1.1)
INR PPP: 1.1 (ref 0.9–1.1)
LDLC SERPL CALC-MCNC: 92.6 MG/DL (ref 0–100)
LIPID PROFILE,FLP: ABNORMAL
LYMPHOCYTES # BLD AUTO: 30 % (ref 12–49)
LYMPHOCYTES # BLD: 2.3 K/UL (ref 0.8–3.5)
MCH RBC QN AUTO: 30.2 PG (ref 26–34)
MCHC RBC AUTO-ENTMCNC: 34.2 G/DL (ref 30–36.5)
MCV RBC AUTO: 88.5 FL (ref 80–99)
MONOCYTES # BLD: 0.7 K/UL (ref 0–1)
MONOCYTES NFR BLD AUTO: 9 % (ref 5–13)
NEUTS SEG # BLD: 4.4 K/UL (ref 1.8–8)
NEUTS SEG NFR BLD AUTO: 55 % (ref 32–75)
PLATELET # BLD AUTO: 289 K/UL (ref 150–400)
POTASSIUM SERPL-SCNC: 3.6 MMOL/L (ref 3.5–5.1)
PROT SERPL-MCNC: 7.3 G/DL (ref 6.4–8.2)
PROTHROMBIN TIME: 10.7 SEC (ref 9–11.1)
PROTHROMBIN TIME: 9.8 SEC (ref 9–11.1)
RBC # BLD AUTO: 4.96 M/UL (ref 4.1–5.7)
SERVICE CMNT-IMP: ABNORMAL
SODIUM SERPL-SCNC: 139 MMOL/L (ref 136–145)
SPECIMEN EXP DATE BLD: NORMAL
THERAPEUTIC RANGE,PTTT: NORMAL SECS (ref 58–77)
TRIGL SERPL-MCNC: 282 MG/DL (ref ?–150)
TROPONIN I SERPL-MCNC: <0.04 NG/ML
VLDLC SERPL CALC-MCNC: 56.4 MG/DL
WBC # BLD AUTO: 7.8 K/UL (ref 4.1–11.1)

## 2017-03-28 PROCEDURE — 74011250636 HC RX REV CODE- 250/636: Performed by: NURSE PRACTITIONER

## 2017-03-28 PROCEDURE — 70450 CT HEAD/BRAIN W/O DYE: CPT

## 2017-03-28 PROCEDURE — P9012 CRYOPRECIPITATE EACH UNIT: HCPCS | Performed by: PSYCHIATRY & NEUROLOGY

## 2017-03-28 PROCEDURE — 74011250636 HC RX REV CODE- 250/636: Performed by: INTERNAL MEDICINE

## 2017-03-28 PROCEDURE — 77030029131 HC ADMN ST IV BLD N DEHP ICUM -B

## 2017-03-28 PROCEDURE — 85730 THROMBOPLASTIN TIME PARTIAL: CPT | Performed by: PSYCHIATRY & NEUROLOGY

## 2017-03-28 PROCEDURE — 85384 FIBRINOGEN ACTIVITY: CPT | Performed by: PSYCHIATRY & NEUROLOGY

## 2017-03-28 PROCEDURE — 74011000250 HC RX REV CODE- 250: Performed by: NURSE PRACTITIONER

## 2017-03-28 PROCEDURE — 99285 EMERGENCY DEPT VISIT HI MDM: CPT

## 2017-03-28 PROCEDURE — 36415 COLL VENOUS BLD VENIPUNCTURE: CPT | Performed by: INTERNAL MEDICINE

## 2017-03-28 PROCEDURE — 74011000250 HC RX REV CODE- 250: Performed by: EMERGENCY MEDICINE

## 2017-03-28 PROCEDURE — 84484 ASSAY OF TROPONIN QUANT: CPT | Performed by: NURSE PRACTITIONER

## 2017-03-28 PROCEDURE — 30233R1 TRANSFUSION OF NONAUTOLOGOUS PLATELETS INTO PERIPHERAL VEIN, PERCUTANEOUS APPROACH: ICD-10-PCS | Performed by: PSYCHIATRY & NEUROLOGY

## 2017-03-28 PROCEDURE — 74011000250 HC RX REV CODE- 250: Performed by: PSYCHIATRY & NEUROLOGY

## 2017-03-28 PROCEDURE — 74011250636 HC RX REV CODE- 250/636: Performed by: PSYCHIATRY & NEUROLOGY

## 2017-03-28 PROCEDURE — 86900 BLOOD TYPING SEROLOGIC ABO: CPT | Performed by: INTERNAL MEDICINE

## 2017-03-28 PROCEDURE — 82962 GLUCOSE BLOOD TEST: CPT

## 2017-03-28 PROCEDURE — 85610 PROTHROMBIN TIME: CPT | Performed by: NURSE PRACTITIONER

## 2017-03-28 PROCEDURE — 83036 HEMOGLOBIN GLYCOSYLATED A1C: CPT | Performed by: NURSE PRACTITIONER

## 2017-03-28 PROCEDURE — 36430 TRANSFUSION BLD/BLD COMPNT: CPT

## 2017-03-28 PROCEDURE — 74011000258 HC RX REV CODE- 258: Performed by: EMERGENCY MEDICINE

## 2017-03-28 PROCEDURE — 85025 COMPLETE CBC W/AUTO DIFF WBC: CPT | Performed by: EMERGENCY MEDICINE

## 2017-03-28 PROCEDURE — 74011250636 HC RX REV CODE- 250/636: Performed by: EMERGENCY MEDICINE

## 2017-03-28 PROCEDURE — 65610000006 HC RM INTENSIVE CARE

## 2017-03-28 PROCEDURE — 96361 HYDRATE IV INFUSION ADD-ON: CPT

## 2017-03-28 PROCEDURE — 74011636320 HC RX REV CODE- 636/320

## 2017-03-28 PROCEDURE — P9035 PLATELET PHERES LEUKOREDUCED: HCPCS | Performed by: PSYCHIATRY & NEUROLOGY

## 2017-03-28 PROCEDURE — 96374 THER/PROPH/DIAG INJ IV PUSH: CPT

## 2017-03-28 PROCEDURE — 96375 TX/PRO/DX INJ NEW DRUG ADDON: CPT

## 2017-03-28 PROCEDURE — 74011250636 HC RX REV CODE- 250/636

## 2017-03-28 PROCEDURE — 93005 ELECTROCARDIOGRAM TRACING: CPT

## 2017-03-28 PROCEDURE — 80061 LIPID PANEL: CPT | Performed by: NURSE PRACTITIONER

## 2017-03-28 PROCEDURE — 3E03317 INTRODUCTION OF OTHER THROMBOLYTIC INTO PERIPHERAL VEIN, PERCUTANEOUS APPROACH: ICD-10-PCS | Performed by: EMERGENCY MEDICINE

## 2017-03-28 PROCEDURE — 70496 CT ANGIOGRAPHY HEAD: CPT

## 2017-03-28 PROCEDURE — 80053 COMPREHEN METABOLIC PANEL: CPT | Performed by: NURSE PRACTITIONER

## 2017-03-28 RX ORDER — PROCHLORPERAZINE EDISYLATE 5 MG/ML
10 INJECTION INTRAMUSCULAR; INTRAVENOUS
Status: DISCONTINUED | OUTPATIENT
Start: 2017-03-28 | End: 2017-04-04 | Stop reason: HOSPADM

## 2017-03-28 RX ORDER — SODIUM CHLORIDE 0.9 % (FLUSH) 0.9 %
5-10 SYRINGE (ML) INJECTION EVERY 8 HOURS
Status: DISCONTINUED | OUTPATIENT
Start: 2017-03-28 | End: 2017-04-04 | Stop reason: HOSPADM

## 2017-03-28 RX ORDER — ONDANSETRON 2 MG/ML
4 INJECTION INTRAMUSCULAR; INTRAVENOUS
Status: DISCONTINUED | OUTPATIENT
Start: 2017-03-28 | End: 2017-04-04 | Stop reason: HOSPADM

## 2017-03-28 RX ORDER — DIPHENHYDRAMINE HYDROCHLORIDE 50 MG/ML
25 INJECTION, SOLUTION INTRAMUSCULAR; INTRAVENOUS
Status: ACTIVE | OUTPATIENT
Start: 2017-03-28 | End: 2017-03-29

## 2017-03-28 RX ORDER — ONDANSETRON 2 MG/ML
INJECTION INTRAMUSCULAR; INTRAVENOUS
Status: COMPLETED
Start: 2017-03-28 | End: 2017-03-28

## 2017-03-28 RX ORDER — SODIUM CHLORIDE 0.9 % (FLUSH) 0.9 %
5 SYRINGE (ML) INJECTION EVERY 8 HOURS
Status: DISCONTINUED | OUTPATIENT
Start: 2017-03-28 | End: 2017-03-31 | Stop reason: SDUPTHER

## 2017-03-28 RX ORDER — SODIUM CHLORIDE 9 MG/ML
250 INJECTION, SOLUTION INTRAVENOUS AS NEEDED
Status: DISCONTINUED | OUTPATIENT
Start: 2017-03-28 | End: 2017-04-04 | Stop reason: HOSPADM

## 2017-03-28 RX ORDER — SODIUM CHLORIDE 0.9 % (FLUSH) 0.9 %
5-10 SYRINGE (ML) INJECTION AS NEEDED
Status: DISCONTINUED | OUTPATIENT
Start: 2017-03-28 | End: 2017-04-04 | Stop reason: HOSPADM

## 2017-03-28 RX ORDER — LABETALOL HYDROCHLORIDE 5 MG/ML
10 INJECTION, SOLUTION INTRAVENOUS
Status: DISCONTINUED | OUTPATIENT
Start: 2017-03-28 | End: 2017-03-31

## 2017-03-28 RX ORDER — LABETALOL HYDROCHLORIDE 5 MG/ML
20 INJECTION, SOLUTION INTRAVENOUS
Status: COMPLETED | OUTPATIENT
Start: 2017-03-28 | End: 2017-03-28

## 2017-03-28 RX ORDER — SODIUM CHLORIDE 0.9 % (FLUSH) 0.9 %
5 SYRINGE (ML) INJECTION AS NEEDED
Status: DISCONTINUED | OUTPATIENT
Start: 2017-03-28 | End: 2017-03-31 | Stop reason: SDUPTHER

## 2017-03-28 RX ADMIN — ONDANSETRON 4 MG: 2 INJECTION INTRAMUSCULAR; INTRAVENOUS at 17:54

## 2017-03-28 RX ADMIN — ALTEPLASE 9 MG: KIT at 15:28

## 2017-03-28 RX ADMIN — LABETALOL HYDROCHLORIDE 20 MG: 5 INJECTION, SOLUTION INTRAVENOUS at 15:09

## 2017-03-28 RX ADMIN — SODIUM CHLORIDE 5 MG/MIN: 900 INJECTION, SOLUTION INTRAVENOUS at 19:18

## 2017-03-28 RX ADMIN — SODIUM CHLORIDE 5 MG/HR: 900 INJECTION, SOLUTION INTRAVENOUS at 19:21

## 2017-03-28 RX ADMIN — SODIUM CHLORIDE 2 MG/MIN: 900 INJECTION, SOLUTION INTRAVENOUS at 20:18

## 2017-03-28 RX ADMIN — LABETALOL HYDROCHLORIDE 10 MG: 5 INJECTION, SOLUTION INTRAVENOUS at 15:53

## 2017-03-28 RX ADMIN — SODIUM CHLORIDE 2 MG/MIN: 900 INJECTION, SOLUTION INTRAVENOUS at 17:18

## 2017-03-28 RX ADMIN — IOPAMIDOL 97 ML: 755 INJECTION, SOLUTION INTRAVENOUS at 16:11

## 2017-03-28 RX ADMIN — Medication 10 ML: at 21:39

## 2017-03-28 RX ADMIN — ALTEPLASE 81 MG: KIT at 15:37

## 2017-03-28 RX ADMIN — SODIUM CHLORIDE 1000 ML: 900 INJECTION, SOLUTION INTRAVENOUS at 16:15

## 2017-03-28 RX ADMIN — Medication 5 ML: at 21:39

## 2017-03-28 RX ADMIN — PROCHLORPERAZINE EDISYLATE 10 MG: 5 INJECTION INTRAMUSCULAR; INTRAVENOUS at 18:33

## 2017-03-28 NOTE — ED PROVIDER NOTES
HPI Comments: The pt is a 75year old male who presents for evaluation having been referred by Orthopedic Specialist for difficulty following directions, expressing himself, and generalized altered mental status during PT this afternoon. His wife states that he was in usual state of health when he awoke at noon. She states that she first noticed a change around 1230 when he was unable to determine how to turn off the bathroom sink. He then became more confused during transport to PT. Wife states that he has no PMH but he has not been to see a PCP in 20 years. He was employed as a [employer]  but is currently retired. She denies pt complaining of fevers, chills, night sweats, chest pain, pressure, SOB, GONZALEZ, PND, orthopnea, abdominal pain, n/v/d, melena, hematuria, dysuria, constipation, HA, dizziness, and syncope prior to onset of current symptoms. He has been working with PT for left wrist fracture after he fell. Past Medical History:  No date: Ill-defined condition      Comment: Eczema    No past surgical history on file. Patient is a 75 y.o. male presenting with altered mental status. Altered mental status           Past Medical History:   Diagnosis Date    Ill-defined condition     Eczema       No past surgical history on file. No family history on file. Social History     Social History    Marital status:      Spouse name: N/A    Number of children: N/A    Years of education: N/A     Occupational History    Not on file.      Social History Main Topics    Smoking status: Current Every Day Smoker     Packs/day: 0.50     Years: 50.00    Smokeless tobacco: Former User     Quit date: 2/28/2017      Comment: cold turkey 2.5 wks ago after attempt Chantix developed allergy    Alcohol use Not on file      Comment: around holidatys    Drug use: No    Sexual activity: Not on file     Other Topics Concern    Not on file     Social History Narrative         ALLERGIES: Chantix [varenicline]    Review of Systems   Unable to perform ROS: Mental status change       Vitals:    03/28/17 1424   BP: (!) 177/95   Pulse: 94   Resp: 16   Temp: 98 °F (36.7 °C)   SpO2: 94%   Weight: 104.3 kg (230 lb)   Height: 6' (1.829 m)            Physical Exam   Constitutional: He appears well-developed and well-nourished. No distress. HENT:   Head: Atraumatic. Nose: Nose normal.   Mouth/Throat: No oropharyngeal exudate. Eyes: Conjunctivae and EOM are normal. Right eye exhibits no discharge. Left eye exhibits no discharge. No scleral icterus. Neck: Normal range of motion. Neck supple. No JVD present. No tracheal deviation present. No thyromegaly present. Cardiovascular: Normal rate, regular rhythm, normal heart sounds, intact distal pulses and normal pulses. PMI is not displaced. Exam reveals no gallop and no friction rub. No murmur heard. Pulmonary/Chest: Breath sounds normal. No accessory muscle usage or stridor. No respiratory distress. He has no decreased breath sounds. He has no wheezes. He has no rhonchi. He has no rales. He exhibits no tenderness. Abdominal: Soft. Bowel sounds are normal. He exhibits no distension and no mass. There is no hepatosplenomegaly. There is no tenderness. There is no rigidity, no rebound, no guarding, no CVA tenderness, no tenderness at McBurney's point and negative Silver's sign. Musculoskeletal: Normal range of motion. He exhibits no edema or tenderness. Lymphadenopathy:     He has no cervical adenopathy. Neurological: He is alert. He has normal strength. No cranial nerve deficit or sensory deficit. He displays a negative Romberg sign. Coordination normal. GCS eye subscore is 4. GCS verbal subscore is 4. GCS motor subscore is 6. Cranial nerves II - XII are grossly intact  Sensation is intact  Plantars are downgoing  Pt ambulatory without gait disturbance  + aphasia    Skin: Skin is warm and dry. He is not diaphoretic.    Psychiatric: He has a normal mood and affect. His behavior is normal.   Nursing note and vitals reviewed. MDM  Number of Diagnoses or Management Options  Speech disturbance, unspecified type:   Diagnosis management comments:    * Routine laboratory data and UA   * Ct head   * EKG   * Consult to Neurology   * Labetalol   * Labetalol infusion   * tPA   * Consult to Hospitalist        Amount and/or Complexity of Data Reviewed  Clinical lab tests: ordered and reviewed  Tests in the radiology section of CPT®: ordered and reviewed  Discussion of test results with the performing providers: yes  Review and summarize past medical records: yes  Discuss the patient with other providers: yes    Risk of Complications, Morbidity, and/or Mortality  General comments:    - stable, ambulatory pt in NAD   - Total critical care time spend exclusive of procedures:  60 minutes    Critical Care  Total time providing critical care: 30-74 minutes    Patient Progress  Patient progress: stable    ED Course       Procedures    Chief Complaint   Patient presents with    Altered mental status       3:27 PM  The patients presenting problems have been discussed, and they are in agreement with the care plan formulated and outlined with them. I have encouraged them to ask questions as they arise throughout their visit.     MEDICATIONS GIVEN:  Medications   sodium chloride 0.9 % bolus infusion 1,000 mL (not administered)   sodium chloride (NS) flush 5 mL (not administered)   sodium chloride (NS) flush 5 mL (not administered)   alteplase (ACTIVASE) bolus dose (not administered)   alteplase (ACTIVASE) infusion 81 mg (not administered)   labetalol (NORMODYNE;TRANDATE) injection 10 mg (not administered)   labetalol (NORMODYNE;TRANDATE) injection 20 mg (20 mg IntraVENous Given 3/28/17 1509)       LABS REVIEWED:  Labs Reviewed   METABOLIC PANEL, COMPREHENSIVE - Abnormal; Notable for the following:        Result Value    Glucose 202 (*)     A-G Ratio 1.0 (*)     All other components within normal limits   GLUCOSE, POC - Abnormal; Notable for the following:     Glucose (POC) 179 (*)     All other components within normal limits   CBC WITH AUTOMATED DIFF   SAMPLES BEING HELD   TROPONIN I   PROTHROMBIN TIME + INR   LIPID PANEL   HEMOGLOBIN A1C WITH EAG   URINALYSIS W/MICROSCOPIC   POC GLUCOSE   POC PT/INR       RADIOLOGY RESULTS:  The following have been ordered and reviewed:  _____________________________________________________________________  _____________________________________________________________________    EKG interpretation: (Preliminary)  Rhythm: normal sinus rhythm; and regular . Rate (approx.): 88; Axis: normal; P wave: normal; QRS interval: normal ; ST/T wave: normal; Negative acute significant segmental elevations      CONSULT NOTE:   2:45 PM  Frank John NP spoke with Dr. Leonid Cain MD,   Specialty: TeleNeurology  Discussed pt's hx, disposition, and available diagnostic and imaging results. Reviewed care plans. Consultant agrees with plans as outlined. tPA then CTA head and neck. Keep SBP < 180. Frank John NP      CONSULT NOTE:   5:03 PM  Frank John NP spoke with Dr. Jessica Hahn MD,   Specialty: Hospitalist  Discussed pt's hx, disposition, and available diagnostic and imaging results. Reviewed care plans. Consultant agrees with plans as outlined. Admit to inpatient. Frank John NP        5:05 PM  Patient is being admitted to the hospital.  The results of their tests and reasons for their admission have been discussed with them and/or available family. They convey agreement and understanding for the need to be admitted and for their admission diagnosis. Consultation has been made with the inpatient physician specialist for hospitalization.     LABORATORY TESTS:  Recent Results (from the past 12 hour(s))   GLUCOSE, POC    Collection Time: 03/28/17  2:31 PM   Result Value Ref Range    Glucose (POC) 179 (H) 65 - 100 mg/dL    Performed by Sariah Banda    CBC WITH AUTOMATED DIFF    Collection Time: 03/28/17  2:52 PM   Result Value Ref Range    WBC 7.8 4.1 - 11.1 K/uL    RBC 4.96 4.10 - 5.70 M/uL    HGB 15.0 12.1 - 17.0 g/dL    HCT 43.9 36.6 - 50.3 %    MCV 88.5 80.0 - 99.0 FL    MCH 30.2 26.0 - 34.0 PG    MCHC 34.2 30.0 - 36.5 g/dL    RDW 13.1 11.5 - 14.5 %    PLATELET 861 108 - 102 K/uL    NEUTROPHILS 55 32 - 75 %    LYMPHOCYTES 30 12 - 49 %    MONOCYTES 9 5 - 13 %    EOSINOPHILS 5 0 - 7 %    BASOPHILS 1 0 - 1 %    ABS. NEUTROPHILS 4.4 1.8 - 8.0 K/UL    ABS. LYMPHOCYTES 2.3 0.8 - 3.5 K/UL    ABS. MONOCYTES 0.7 0.0 - 1.0 K/UL    ABS. EOSINOPHILS 0.4 0.0 - 0.4 K/UL    ABS. BASOPHILS 0.0 0.0 - 0.1 K/UL   TROPONIN I    Collection Time: 03/28/17  2:52 PM   Result Value Ref Range    Troponin-I, Qt. <0.04 <5.16 ng/mL   METABOLIC PANEL, COMPREHENSIVE    Collection Time: 03/28/17  2:52 PM   Result Value Ref Range    Sodium 139 136 - 145 mmol/L    Potassium 3.6 3.5 - 5.1 mmol/L    Chloride 103 97 - 108 mmol/L    CO2 27 21 - 32 mmol/L    Anion gap 9 5 - 15 mmol/L    Glucose 202 (H) 65 - 100 mg/dL    BUN 12 6 - 20 MG/DL    Creatinine 1.02 0.70 - 1.30 MG/DL    BUN/Creatinine ratio 12 12 - 20      GFR est AA >60 >60 ml/min/1.73m2    GFR est non-AA >60 >60 ml/min/1.73m2    Calcium 8.9 8.5 - 10.1 MG/DL    Bilirubin, total 0.4 0.2 - 1.0 MG/DL    ALT (SGPT) 36 12 - 78 U/L    AST (SGOT) 19 15 - 37 U/L    Alk.  phosphatase 85 45 - 117 U/L    Protein, total 7.3 6.4 - 8.2 g/dL    Albumin 3.7 3.5 - 5.0 g/dL    Globulin 3.6 2.0 - 4.0 g/dL    A-G Ratio 1.0 (L) 1.1 - 2.2     PROTHROMBIN TIME + INR    Collection Time: 03/28/17  2:52 PM   Result Value Ref Range    INR 1.0 0.9 - 1.1      Prothrombin time 9.8 9.0 - 11.1 sec       IMAGING RESULTS:  CTA CODE NEURO HEAD AND NECK W CONT   Final Result      CT CODE NEURO HEAD WO CONTRAST   Final Result        Cta Code Neuro Head And Neck W Cont    Result Date: 3/28/2017  CLINICAL HISTORY: Aphasia, status post TPA INDICATION: Aphasia, status post TPA EXAMINATION:  CT ANGIOGRAPHY HEAD AND NECK COMPARISON: Correlation with CT head Noncontrast head CT was performed. TECHNIQUE:  Following the uneventful administration of Isovue-370 contrast material, axial CT angiography of the head and neck was performed. Coronal and sagittal reconstructions were obtained. Manual postprocessing of images was performed. 3-D  Sagittal maximal intensity projection images were obtained. 3-D Coronal maximal intensity projections were obtained. CT dose reduction was achieved through use of a standardized protocol tailored for this examination and automatic exposure control for dose modulation. FINDINGS: Small remote lacunar infarction in the left basal ganglia. Scattered hypodensities in the cerebral white matter consistent with chronic microvascular ischemic change. Mild sulcal and ventricular prominence. There is no acute intra or extra-axial hemorrhage. .  The basal cisterns are clear. The paranasal sinuses are clear. Multilevel canal and foraminal stenoses in the cervical spine. CTA NECK: There is 0% stenosis in the right internal carotid artery utilizing NASCET criteria. There is 0% stenosis in the right internal carotid artery utilizing NASCET criteria. There is conventional three vessel arch anatomy. The origins of the innominate, left common carotid and left subclavian arteries are normal.  The common carotid arteries are normal.  The right internal carotid artery is normal.  The left internal carotid artery is normal.  The right vertebral artery is patent. The left vertebral artery is patent. The vertebral arteries are codominant. The soft tissues of the neck are unremarkable. There are degenerative changes of the cervical spine. The lung apices are clear. CTA HEAD: The vertebral arteries are codominant. The basilar artery and its branches are normal. The internal carotid, anterior cerebral, and middle cerebral arteries are patent.  There is no flow-limiting intracranial stenosis. There is no aneurysm. There is a small left posterior communicating arteries. IMPRESSION: [No aneurysm, dissection, major vessel occlussion or significant stenosis] No acute intracranial process is identified. Chronic microvascular ischemic change is mild as is cerebral atrophy. Results were discussed with Dr. Kayley Bliss. Ct Code Neuro Head Wo Contrast    Result Date: 3/28/2017  HEAD CT WITHOUT CONTRAST: 3/28/2017 2:38 PM INDICATION: Code stroke. HISTORY (per electronic medical record): Altered mental status: Woke up normally today at noon and has since been experiencing confusion, difficulty with directions, and repeating words. COMPARISON: None. PROCEDURE: Axial images of the head were obtained without contrast. Coronal and sagittal reformats were performed. CT dose reduction was achieved through use of a standardized protocol tailored for this examination and automatic exposure control for dose modulation. FINDINGS: Incidental note is made of small lacunar infarctions or enlarged perivascular spaces in the left internal capsule and right corona radiata immediately superior to the internal capsule. The ventricles and sulci are appropriate in size and configuration for age. No loss of gray-white differentiation to suggest late acute or early subacute infarction. No mass effect or intracranial hemorrhage. IMPRESSION: No acute intracranial abnormality.       MEDICATIONS GIVEN:  Medications   sodium chloride 0.9 % bolus infusion 1,000 mL (1,000 mL IntraVENous New Bag 3/28/17 1615)   sodium chloride (NS) flush 5 mL ( IntraVENous Canceled Entry 3/28/17 1513)   sodium chloride (NS) flush 5 mL (not administered)   labetalol (NORMODYNE;TRANDATE) injection 10 mg (10 mg IntraVENous Given 3/28/17 1553)   alteplase (ACTIVASE) 100 mg infusion (not administered)   diphenhydrAMINE (BENADRYL) injection 25 mg (not administered)   labetalol (NORMODYNE;TRANDATE) 300 mg in 0.9% sodium chloride 150 mL (2 mg / 1 mL) infusion (not administered)   labetalol (NORMODYNE;TRANDATE) injection 20 mg (20 mg IntraVENous Given 3/28/17 1509)   alteplase (ACTIVASE) bolus dose (9 mg IntraVENous Given 3/28/17 1528)   alteplase (ACTIVASE) infusion 81 mg (81 mg IntraVENous Given 3/28/17 1537)   iopamidol (ISOVUE-370) 76 % injection (97 mL  Given 3/28/17 1611)       IMPRESSION:  1. Speech disturbance, unspecified type    2. Essential hypertension        PLAN:  1.  Admit to [address] [place] [address], NP  5:05 PM

## 2017-03-28 NOTE — PROGRESS NOTES
[facility]: MED RECONCILIATION    Comments/Recommendations:   Per nurse Sandro Oreilly)- patient does not take any medications. Patient stopped his Chantix about 2 weeks ago, and has resumed smoking since then. Information obtained from: Spouse to RN    Significant PMH/Disease States:   Past Medical History:   Diagnosis Date    Ill-defined condition     Eczema       Chief Complaint for this Admission:   Chief Complaint   Patient presents with    Altered mental status         Allergies: Chantix [varenicline]    Prior to Admission Medications:     Medication Documentation Review Audit       Reviewed by Angelika Hare PHARMD (Pharmacist) on 03/28/17 at 1724         Medication Sig Documenting Provider Last Dose Status Taking?                **No Medications to Display**                                      Angelika Hare [place]   Contact: [phone]

## 2017-03-28 NOTE — ED TRIAGE NOTES
Pt brought to ER by Roxana Michael for AMS. Wife states that patient woke up normally today at noon and has since started to experience confusion, difficulty with directions, and repeating words. CODE S called at 1430. FSBS obtained by charge RN and then taken directly to CT. Wife at bedside.

## 2017-03-28 NOTE — ED NOTES
TRANSFER - OUT REPORT:    Verbal report given to [facility] (name) on Raquel Chandler  being transferred to ICU - 12 (unit) for routine progression of care       Report consisted of patients Situation, Background, Assessment and   Recommendations(SBAR). Information from the following report(s) SBAR, ED Summary, Procedure Summary, Intake/Output, MAR, Recent Results and Cardiac Rhythm NSR was reviewed with the receiving nurse. Lines:   Peripheral IV 03/28/17 Right Hand (Active)   Site Assessment Clean, dry, & intact 3/28/2017  3:05 PM   Phlebitis Assessment 0 3/28/2017  3:05 PM   Infiltration Assessment 0 3/28/2017  3:05 PM   Dressing Status Clean, dry, & intact; New 3/28/2017  3:05 PM   Dressing Type Transparent 3/28/2017  3:05 PM   Hub Color/Line Status Pink;Patent 3/28/2017  3:05 PM   Action Taken Blood drawn 3/28/2017  3:05 PM       Peripheral IV 03/28/17 Right; Inner; Upper Forearm (Active)   Site Assessment Clean, dry, & intact 3/28/2017  3:45 PM   Phlebitis Assessment 0 3/28/2017  3:45 PM   Infiltration Assessment 0 3/28/2017  3:45 PM   Dressing Status Clean, dry, & intact; New 3/28/2017  3:45 PM   Dressing Type Transparent 3/28/2017  3:45 PM   Hub Color/Line Status Pink;Flushed 3/28/2017  3:45 PM   Action Taken Blood drawn 3/28/2017  3:45 PM        Opportunity for questions and clarification was provided.       Patient transported with:   Monitor  Registered Nurse

## 2017-03-28 NOTE — ED NOTES
Bedside and Verbal shift change report given to Efren Taylor (oncoming nurse) by Martina Hopson (offgoing nurse). Report included the following information SBAR, ED Summary, Intake/Output, MAR and Recent Results.

## 2017-03-28 NOTE — IP AVS SNAPSHOT
[address] 
[phone] Patient: Mimi Roberts MRN: [id number] ZRD:5[birth year]3/[birth year]778 You are allergic to the following Allergen Reactions Chantix (Varenicline) Rash Very itchy rash on back Recent Documentation Height Weight BMI Smoking Status 1.829 m 106.5 kg 31.84 kg/m2 Current Every Day Smoker Emergency Contacts Name Discharge Info Relation Home Work Mobile [address] [facility] CAREGIVER [3] Spouse [3] [phone] About your hospitalization You were admitted on:  March 28, 2017 You last received care in the:  [facility] 4M POST SURG ORT 2 You were discharged on:  April 4, 2017 Unit phone number:  [phone] Why you were hospitalized Your primary diagnosis was:  Not on File Your diagnoses also included:  Acute Encephalopathy, Aphasia, Htn (Hypertension), Malignant, Fracture Of Radius, Distal, Left, Closed, Tobacco Use Disorder, Received Intravenous Tissue Plasminogen Activator (Tpa) In Emergency Department, Hemorrhage, Intracerebral (Hcc), Type Ii Diabetes Mellitus (Hcc) Providers Seen During Your Hospitalizations Provider Role Specialty Primary office phone Cassidy Miller MD Attending Provider Emergency Medicine [phone] Brando Dewey MD Attending Provider Internal Medicine [phone] Jessica Pereira DO Attending Provider Internal Medicine [phone] Your Primary Care Physician (PCP) Primary Care Physician Office Phone Office Fax OTHER, PHYS ** None ** ** None ** Follow-up Information Follow up With Details Comments Contact Info Demetrio Grove MD   Patient can only remember the practice name and not the physician Demario Muñiz MD Schedule an appointment as soon as possible for a visit in 4 weeks  [address] [facility] 99 [phone] Current Discharge Medication List  
  
START taking these medications Dose & Instructions Dispensing Information Comments Morning Noon Evening Bedtime  
 amLODIPine 10 mg tablet Commonly known as:  Barabara Puls Your next dose is:  17 at 9am  
   
 Dose:  10 mg Take 1 Tab by mouth daily. Quantity:  30 Tab Refills:  0  
     
   
   
   
  
 famotidine 20 mg tablet Commonly known as:  PEPCID Your next dose is:  17 at 6pm  
   
 Dose:  20 mg Take 1 Tab by mouth two (2) times a day. Quantity:  30 Tab Refills:  0  
     
   
   
   
  
 lisinopril 20 mg tablet Commonly known as:  Luis Leys Your next dose is:  17 at 9am  
   
 Dose:  20 mg Take 1 Tab by mouth daily. Quantity:  30 Tab Refills:  0  
     
   
   
   
  
 metFORMIN 500 mg tablet Commonly known as:  GLUCOPHAGE Your next dose is:  17 at 5pm  
   
 Dose:  500 mg Take 1 Tab by mouth two (2) times daily (with meals). Quantity:  60 Tab Refills:  0  
     
   
   
   
  
 pravastatin 40 mg tablet Commonly known as:  PRAVACHOL Your next dose is:  17 at 10pm  
   
 Dose:  40 mg Take 1 Tab by mouth nightly. Indications: prevention of cerebrovascular accident Quantity:  30 Tab Refills:  0 Where to Get Your Medications Information on where to get these meds will be given to you by the nurse or doctor. ! Ask your nurse or doctor about these medications  
  amLODIPine 10 mg tablet  
 famotidine 20 mg tablet  
 lisinopril 20 mg tablet  
 metFORMIN 500 mg tablet  
 pravastatin 40 mg tablet Discharge Instructions Patient Discharge Instructions Denia Garcia / [id number] : 1950 Admitted 3/28/2017 Discharged: 2017 Primary Diagnoses Problem List as of 2017  Date Reviewed: 3/29/2017 Codes Class Noted - Resolved Type II diabetes mellitus ([facility] ICD-10-CM: E11.9 ICD-9-CM: 250.00  3/29/2017 - Present Acute encephalopathy ICD-10-CM: G93.40 ICD-9-CM: 348.30  3/28/2017 - Present Aphasia ICD-10-CM: R47.01 
ICD-9-CM: 784.3  3/28/2017 - Present HTN (hypertension), malignant ICD-10-CM: I10 
ICD-9-CM: 401.0  3/28/2017 - Present Fracture of radius, distal, left, closed ICD-10-CM: B75.756M ICD-9-CM: 813.42  3/28/2017 - Present Tobacco use disorder ICD-10-CM: F17.200 ICD-9-CM: 305.1  3/28/2017 - Present Received intravenous tissue plasminogen activator (tPA) in emergency department ICD-10-CM: V15.08 ICD-9-CM: V45.88  3/28/2017 - Present Hemorrhage, intracerebral ([facility] ICD-10-CM: I61.9 ICD-9-CM: 457  3/28/2017 - Present Take Home Medications · It is important that you take the medication exactly as they are prescribed. · Keep your medication in the bottles provided by the pharmacist and keep a list of the medication names, dosages, and times to be taken in your wallet. · Do not take other medications without consulting your doctor. What to do at [facility] Recommended diet: Mechanical Soft, Thin Liquids per SLP Recommended activity: Activity as tolerated If you experience facial drooping, slurred speech, or focal weakness, please follow up with nearest ER. Follow-up with your PCP in 2 week and with Neurology within 4 weeks Information obtained by : 
I understand that if any problems occur once I am at home I am to contact my physician. I understand and acknowledge receipt of the instructions indicated above. Physician's or R.N.'s Signature                                                                  Date/Time Patient or Representative Signature                                                          Date/Time Learning About Certified Diabetes Educators What is a certified diabetes educator? Certified diabetes educators are health professionals who have special training to help you manage your diabetes. They may be: · Registered nurses. · Registered dietitians. · Pharmacists. · Social workers. · Doctors. Your diabetes educator will give you tips and help with daily diabetes care. He or she also may teach classes. These classes give you a chance to learn from and connect with others who have diabetes. Why see a diabetes educator? Your doctor wants you to get the personal support and help that a diabetes educator can give. And most insurance plans will cover part or all of the cost. 
Learning is a key part of living with diabetes. A diabetes educator teaches about the most important parts of your care. You will learn about: 
· Eating healthy meals. · Being active. · Taking medicine. · Checking your blood sugar. · Dealing with your feelings about having diabetes. He or she can help you find ways to live better with diabetes. And your diabetes educator can show you small changes that can make a big difference in your daily routine and your health. If you need to make a big change, he or she will be able to answer your questions and guide you though each step. When should you see one? It can be helpful to see a diabetes educator at certain points in your care. He or she can: · Get you started when you're first diagnosed with diabetes. · Check in once a year for a review of your health and daily routine. · Show you how to handle a new health problem along with your diabetes. · Help you work with a new health care team. 
Follow-up care is a key part of your treatment and safety.  Be sure to make and go to all appointments, and call your doctor if you are having problems. It's also a good idea to know your test results and keep a list of the medicines you take. Where can you learn more? Go to http://veto-jose.info/. Enter Z597 in the search box to learn more about \"Learning About Certified Diabetes Educators. \" Current as of: October 26, 2016 Content Version: 11.2 © 0761-0704 [employer]-Commerce. Care instructions adapted under license by [employer] (which disclaims liability or warranty for this information). If you have questions about a medical condition or this instruction, always ask your healthcare professional. [place] [address] any warranty or liability for your use of this information. Learning About Metformin for Type 2 Diabetes Introduction Metformin (such as Glucophage) is a medicine used to treat type 2 diabetes. It helps keep blood sugar levels on target. You may have tried to eat a healthy diet, lose weight, and get more exercise to keep your blood sugar in your target range. If those things do not help, you may take a medicine called metformin. It helps your body use insulin. This can help you control your blood sugar. You might take it on its own or with other medicines. When taken on its own, metformin should not cause low blood sugar or weight gain. Example · Metformin (Glucophage) Possible side effects Common side effects include: · Short-term nausea. · Not feeling hungry. · Diarrhea. · Increased gas in your belly. · A metallic taste. You may have side effects or reactions not listed here. Check the information that comes with your medicine. What to know about taking this medicine · Metformin does not usually cause low blood sugar. But you may get a low blood sugar when you take metformin and you exercise hard, drink alcohol, or you do not eat enough food. · Sometimes metformin is combined with other diabetes medicine. Some of these can cause low blood sugar. · If you need a test that uses a dye or you need to have surgery, be sure to tell all of your doctors that you take metformin. You may have to stop taking it before and after the test or surgery. · Be safe with medicines. Take your medicines exactly as prescribed. Call your doctor if you think you are having a problem with your medicine. · Check with your doctor or pharmacist before you use any other medicines. This includes over-the-counter medicines. Make sure your doctor knows all of the medicines, vitamins, herbal products, and supplements you take. Taking some medicines together can cause problems. Where can you learn more? Go to http://veto-jose.info/. Enter Paola Dubin in the search box to learn more about \"Learning About Metformin for Type 2 Diabetes. \" Current as of: August 3, 2016 Content Version: 11.2 © 5697-2688 [employer]UM. Care instructions adapted under license by [employer] (which disclaims liability or warranty for this information). If you have questions about a medical condition or this instruction, always ask your healthcare professional. [place] [address] any warranty or liability for your use of this information. Discharge Instructions Attachments/References STROKE (ENGLISH) STROKE REHABILITATION: GENERAL INFO (ENGLISH) TPA FOR STROKE: FIBRINOLYTIC THERAPY: GENERAL INFO (ENGLISH) Discharge Orders Procedure Order Date Status Priority Quantity Spec Type Associated Dx CT HEAD WO CONT 04/03/17 1010 Future Routine 1 Comments:  Perform if in Rehab or before Neuro appt Bring order to CT scan Schedule Instructions:  --> Call [phone] to schedule CT head if pt discharged from rehab by date when CT head is needed. Questions: Is Patient Allergic to Contrast Dye?:  Unknown MyChart Announcement  We are excited to announce that we are making your provider's discharge notes available to you in Terressentia. You will see these notes when they are completed and signed by the physician that discharged you from your recent hospital stay. If you have any questions or concerns about any information you see in Terressentia, please call the Health Information Department where you were seen or reach out to your Primary Care Provider for more information about your plan of care. Introducing [facility] [place] [facility]! [facility] introduces Terressentia patient portal. Now you can access parts of your medical record, email your doctor's office, and request medication refills online. 1. In your internet browser, go to [email or web address] 2. Click on the First Time User? Click Here link in the Sign In box. You will see the New Member Sign Up page. 3. Enter your Terressentia Access Code exactly as it appears below. You will not need to use this code after youve completed the sign-up process. If you do not sign up before the expiration date, you must request a new code. · Terressentia Access Code: [email or web address] Expires: 6/13/2017  2:59 PM 
 
4. Enter the last four digits of your Social Security Number (xxxx) and Date of Birth (mm/dd/yyyy) as indicated and click Submit. You will be taken to the next sign-up page. 5. Create a Terressentia ID. This will be your Terressentia login ID and cannot be changed, so think of one that is secure and easy to remember. 6. Create a Terressentia password. You can change your password at any time. 7. Enter your Password Reset Question and Answer. This can be used at a later time if you forget your password. 8. Enter your e-mail address. You will receive e-mail notification when new information is available in [address] 9. Click Sign Up. You can now view and download portions of your medical record. 10. Click the Download Summary menu link to download a portable copy of your medical information. If you have questions, please visit the Frequently Asked Questions section of the RentMonitor website. Remember, RentMonitor is NOT to be used for urgent needs. For medical emergencies, dial 911. Now available from your iPhone and Android! General Information Please provide this summary of care documentation to your next provider. Patient Signature:  ____________________________________________________________ Date:  ____________________________________________________________  
  
Chandler Khan Provider Signature:  ____________________________________________________________ Date:  ____________________________________________________________ More Information Stroke: Care Instructions Your Care Instructions You have had a stroke. This means that the blood flow to a part of your brain was blocked for some time, which damages the nerve cells in that part of the brain. The part of your body controlled by that part of your brain may not function properly now. The brain is an amazing organ that can heal itself to some degree. The stroke you had damaged part of your brain. But other parts of your brain may take over in some way for the damaged areas. You have already started this process. Your doctor will talk with you about what you can do to prevent another stroke. High blood pressure, high cholesterol, and diabetes are all risk factors for stroke. If you have any of these conditions, work with your doctor to make sure they are under control. Other risk factors for stroke include being overweight, smoking, and not getting regular exercise. Going home may be hard for you and your family. The more you can try to do for yourself, the better. Remember to take each day one at a time. Follow-up care is a key part of your treatment and safety.  Be sure to make and go to all appointments, and call your doctor if you are having problems. It's also a good idea to know your test results and keep a list of the medicines you take. How can you care for yourself at home? · Enter a stroke rehabilitation (rehab) program, if your doctor recommends it. Physical, speech, and occupational therapies can help you manage bathing, dressing, eating, and other basics of daily living. · Do not drive until your doctor says it is okay. · It is normal to feel sad or depressed after a stroke. If these feelings last, talk to your doctor. · If you are having problems with urine leakage, go to the bathroom at regular times, including when you first wake up and at bedtime. Also, limit fluids after dinner. · If you are constipated, drink plenty of fluids, enough so that your urine is light yellow or clear like water. If you have kidney, heart, or liver disease and have to limit fluids, talk with your doctor before you increase the amount of fluids you drink. Set up a regular time for using the toilet. If you continue to have constipation, your doctor may suggest using a bulking agent, such as Metamucil, or a stool softener, laxative, or enema. Medicines · Take your medicines exactly as prescribed. Call your doctor if you think you are having a problem with your medicine. You may be taking several medicines. ACE (angiotensin-converting enzyme) inhibitors, angiotensin II receptor blockers (ARBs), beta-blockers, diuretics (water pills), and calcium channel blockers control your blood pressure. Statins help lower cholesterol. Your doctor may also prescribe medicines for depression, pain, sleep problems, anxiety, or agitation. · If your doctor has given you a blood thinner to prevent another stroke, be sure you get instructions about how to take your medicine safely. Blood thinners can cause serious bleeding problems.  
· Do not take any over-the-counter medicines or herbal products without talking to your doctor first. 
 · If you take birth control pills or hormone therapy, talk to your doctor about whether they are right for you. For family members and caregivers · Make the home safe. Set up a room so that your loved one does not have to climb stairs. Be sure the bathroom is on the same floor. Move throw rugs and furniture that could cause falls. Make sure that the lighting is good. Put grab bars and seats in tubs and showers. · Find out what your loved one can do and what he or she needs help with. Try not to do things for your loved one that your loved one can do on his or her own. Help him or her learn and practice new skills. · Visit and talk with your loved one often. Try doing activities together that you both enjoy, such as playing cards or board games. Keep in touch with your loved one's friends as much as you can. Encourage them to visit. · Take care of yourself. Do not try to do everything yourself. Ask other family members to help. Eat well, get enough rest, and take time to do things that you enjoy. Keep up with your own doctor visits, and make sure to take your medicines regularly. Get out of the house as much as you can. Join a local support group. Find out if you qualify for home health care visits to help with rehab or for adult day care. When should you call for help? Call 911 anytime you think you may need emergency care. For example, call if: 
· You have signs of another stroke. These may include: 
¨ Sudden numbness, tingling, weakness, or loss of movement in your face, arm, or leg, especially on only one side of your body. ¨ Sudden vision changes. ¨ Sudden trouble speaking. ¨ Sudden confusion or trouble understanding simple statements. ¨ Sudden problems with walking or balance. ¨ A sudden, severe headache that is different from past headaches. Call 911 even if these symptoms go away in a few minutes. Call your doctor now or seek immediate medical care if: · You have new symptoms that may be related to your stroke, such as falls or trouble swallowing. Watch closely for changes in your health, and be sure to contact your doctor if you have any problems. Where can you learn more? Go to http://veto-jose.info/. Enter V677 in the search box to learn more about \"Stroke: Care Instructions. \" Current as of: June 4, 2016 Content Version: 11.2 © 7571-5320 Clinkle. Care instructions adapted under license by [employer] (which disclaims liability or warranty for this information). If you have questions about a medical condition or this instruction, always ask your healthcare professional. [address] any warranty or liability for your use of this information. Learning About Stroke Rehabilitation What is stroke rehabilitation? Stroke rehabilitation (rehab) is training and therapy to help you relearn to do everyday things you have not been able to do since your stroke. The focus will depend on how the stroke has affected your ability to do the things you want and need to do. Rehab begins in the hospital. It starts as soon as you are able. You will have a team of doctors, nurses, and therapists to help you relearn daily activities. This can include eating, bathing, and dressing. You also may need help to learn how to walk or talk again. If the stroke damaged your memory, you will learn ways to improve it. You will get better faster if you begin rehab soon after the stroke. But even with rehab, you may not be able to do all the things you could before the stroke. You may recover the most in the first few weeks or months after your stroke. But you can keep getting better for years. It just may happen more slowly. And it may take a long time and a lot of hard work. Don't give up hope.  
After the hospital, you may go to a rehab facility or a nursing home for a while. Or you may go home. Wherever you go, keep working on your rehab and do a little every day. It's going to be important for you to get the support you need. Let your loved ones help you. Involve them in your treatment. Talk to others who have had a stroke, and find out how they handled ups and downs. How can stroke rehab specialists help? Your stroke rehab team will include doctors and nurses who specialize in stroke rehab, as well as other professionals. Each team member will help you in specific ways. The team may include the following professionals. Rehab doctor A rehab doctor is a specialist in charge of your rehab program. He or she may also work on special problems, such as muscle cramps and spasms. Rehab nurses Rehab nurses can help you relearn basic activities of daily life. For example, they can help you learn how to: · Take care of your health, including a schedule for medicine. · Get from your bed to a wheelchair. · Bathe. · Control your bowels or bladder. Physical therapist 
A stroke often takes away a person's ability to move in certain ways. A physical therapist helps you get back as much movement, balance, and coordination as possible. Physical therapy usually includes exercises. The exercises can help you get back your ability to walk and move as much as possible. It's important to practice these exercises over and over again. Your therapist may also help you learn to use a wheelchair or walker. And he or she may teach you how to use stairs safely. Occupational therapist 
An occupational therapist helps you practice daily tasks like eating, bathing, dressing, and writing. For example, he or she may help you learn how to: · Prepare meals and clean your house. · Drive your car. · Use tools and devices that can help if you no longer have full use of both hands. For example, velcro can replace buttons on clothing. · Get grab bars for your bathroom. · Make your home safe if you have strength, balance, or vision problems. Speech therapist 
A speech therapist can help you relearn how to talk or find new ways to express yourself. Swallowing is sometimes a problem after a stroke. This therapist can help you improve your ability to swallow. A speech therapist can also help you work on reading and writing skills. Psychologist or counselor Emotions like fear, anxiety, anger, sadness, frustration, and grief are common after a stroke. A psychologist or counselor can help you deal with your emotions. They can also help you get treatment if you have depression. Vocational counselor Stroke can leave you with disabilities that make it hard to do your job. A vocational counselor can help you return to your job or find a new one. He or she can help you: 
· Identify your current skills and prepare a new resume. · Search for a job. · Understand the laws that protect disabled workers. Recreational therapist 
A recreational therapist helps you return to doing things you enjoy. This may include the arts, hobbies, sports, or leisure activities. Follow-up care is a key part of your treatment and safety. Be sure to make and go to all appointments, and call your doctor if you are having problems. It's also a good idea to know your test results and keep a list of the medicines you take. Where can you learn more? Go to http://veto-jose.info/. Enter H049 in the search box to learn more about \"Learning About Stroke Rehabilitation. \" Current as of: November 10, 2016 Content Version: 11.2 © 7802-8092 [employer]. Care instructions adapted under license by [employer] (which disclaims liability or warranty for this information). If you have questions about a medical condition or this instruction, always ask your healthcare professional. [address] any warranty or liability for your use of this information. Learning About TPA Treatment for Stroke What is TPA? Tissue plasminogen activator, or TPA, is a medicine that dissolves blood clots fast. Most strokes are caused by a blood clot. This kind of stroke is called an ischemic (say \"lox-KST-iawk\") stroke. For an ischemic stroke, TPA can dissolve the clot quickly and help blood to flow normally again. This can help limit damage to the brain. TPA is given through a vein in your hand or arm. It travels through the bloodstream to the clot. This treatment is most often done in the hospital. 
Doctors try to give TPA within 3 hours after stroke symptoms start. Some people may be helped if they are able to get this medicine within 4½ hours of their first symptoms. How does TPA help during a stroke? When a blood clot moves to a blood vessel in the brain, it blocks blood flow to that area and causes a stroke. Without blood and the oxygen it carries, that part of the brain starts to die. If blood supply isn't restored, permanent damage usually occurs. The body parts controlled by those damaged cells can't work, or function, as they should. This loss of function may be mild or severe. It may be short-term or lifelong. Treatment with TPA can improve recovery from a stroke, especially if it's given as soon as possible after the stroke happens. It can limit brain damage from a stroke by dissolving the blood clot. Without TPA to dissolve it, a blood clot in your brain is more likely to cause serious brain damage. In general, the less damage there is to the brain tissue, the less disability a stroke causes. And with less damage to brain tissue, you are more likely to recover from the stroke. No treatment can guarantee a full recovery from a stroke. But TPA does improve your chances of having less or no disability after a stroke. What are the risks of TPA? The main risk of TPA is that it can cause serious bleeding in the body.  If bleeding happens in the brain, it can cause worse stroke symptoms or even death. Doctors are very careful about using TPA. For example, a brain scan is done before TPA treatment to make sure you have no signs of bleeding. (Treatment with TPA would make any bleeding worse.) And after treatment, you are closely watched for some time to make sure you have no internal bleeding. Is there another treatment choice? When your doctor tells you that TPA treatment is a choice for you, it's because he or she believes that TPA offers you the best chance of recovery from stroke. But because TPA also has risks, it is up to you to choose it or not. Based on your condition, your doctor will explain what other choices you have for treatment. They may include other medicines that stop the clot from getting bigger. With or without TPA treatment, you will have care to help you get better. And you'll have medicine to prevent blood clots and control symptoms. Having a stroke can make it hard to make quick and complex decisions. Feeling afraid or anxious can make it even harder to think clearly. Your hospital staff understands this. They will explain your choices, answer your questions, and help you decide about your treatment. Follow-up care is a key part of your treatment and safety. Be sure to make and go to all appointments, and call your doctor if you are having problems. It's also a good idea to know your test results and keep a list of the medicines you take. Where can you learn more? Go to http://veto-jose.info/. Enter A956 in the search box to learn more about \"Learning About TPA Treatment for Stroke. \" Current as of: August 22, 2016 Content Version: 11.2 © 6279-2763 [employer]. Care instructions adapted under license by [employer] (which disclaims liability or warranty for this information).  If you have questions about a medical condition or this instruction, always ask your healthcare professional. [place] [address] any warranty or liability for your use of this information.

## 2017-03-28 NOTE — ED NOTES
Patient vomited large amt yellowish-tan emesis x 3. Hygiene provided, linens changed, and patient turned and repositioned on stretcher.

## 2017-03-28 NOTE — IP AVS SNAPSHOT
Current Discharge Medication List  
  
START taking these medications Dose & Instructions Dispensing Information Comments Morning Noon Evening Bedtime  
 amLODIPine 10 mg tablet Commonly known as:  Heydi Fast Your next dose is:  4/5/17 at 9am  
   
 Dose:  10 mg Take 1 Tab by mouth daily. Quantity:  30 Tab Refills:  0  
     
   
   
   
  
 famotidine 20 mg tablet Commonly known as:  PEPCID Your next dose is:  4/4/17 at 6pm  
   
 Dose:  20 mg Take 1 Tab by mouth two (2) times a day. Quantity:  30 Tab Refills:  0  
     
   
   
   
  
 lisinopril 20 mg tablet Commonly known as:  [place] Phillip Your next dose is:  4/5/17 at 9am  
   
 Dose:  20 mg Take 1 Tab by mouth daily. Quantity:  30 Tab Refills:  0  
     
   
   
   
  
 metFORMIN 500 mg tablet Commonly known as:  GLUCOPHAGE Your next dose is:  4/4/17 at 5pm  
   
 Dose:  500 mg Take 1 Tab by mouth two (2) times daily (with meals). Quantity:  60 Tab Refills:  0  
     
   
   
   
  
 pravastatin 40 mg tablet Commonly known as:  PRAVACHOL Your next dose is:  4/4/17 at 10pm  
   
 Dose:  40 mg Take 1 Tab by mouth nightly. Indications: prevention of cerebrovascular accident Quantity:  30 Tab Refills:  0 Where to Get Your Medications Information on where to get these meds will be given to you by the nurse or doctor. ! Ask your nurse or doctor about these medications  
  amLODIPine 10 mg tablet  
 famotidine 20 mg tablet  
 lisinopril 20 mg tablet  
 metFORMIN 500 mg tablet  
 pravastatin 40 mg tablet

## 2017-03-28 NOTE — H&P
[address]  [phone]    Admission History and Physical      NAME:              Carissa Che   :   1950   MRN:  [id number]     PCP:  Wing Lynne MD     Date:     3/28/2017     Chief  Complaint: Acute confusion    History Of Presenting Illness:       Mr. Andres Watson is a 75 y.o. male who is being admitted for a suspected CVA. Mr. Andres Watson presented to our Emergency Department today after he developed a progressive confusion earlier in the afternoon. He had been brought to the orthopedic physician's office for a follow up after a recent surgery where he was noted to be confused, he could not find their car knob to open. Earlier, while at home, he could not recall on how to operate a bathroom sink. At the ED, he had a stat head CT head which was normal. He was reviewed by tele neurology who upon review recommended tPA. A post infusion head CT was also unremarkable. At the time of review, he was minimally responsive but could open his eyes and move his extremities. His wife says he was recently found to have an elevated BP and was due to see Dr Cornelia Comer as a PCP and had not been started on any medications. At the ED, his initial BP was 177/95 but eren to 208/135. At the time of review, he was beginning to receive a labetalol gtt. He will be admitted to the ICU for further management. Allergies   Allergen Reactions    Chantix [Varenicline] Rash     Very itchy rash on back       Prior to Admission medications    None       Past Medical History:   Diagnosis Date    Ill-defined condition     Eczema      No past surgical history on file.     Social History   Substance Use Topics    Smoking status: Current Every Day Smoker     Packs/day: 0.50     Years: 50.00    Smokeless tobacco: Former User     Quit date: 2017      Comment: cold turkey 2.5 wks ago after attempt Chantix developed allergy    Alcohol use No      Comment: around Hospitals in [place]        Family History   Problem Relation Age of Onset    Diabetes Mother     Cancer Father       Review of Systems: as per the wife    Constitutional ROS: no fever, chills, rigors or night sweats  Respiratory ROS: no cough, sputum, hemoptysis, dyspnea or pleuritic pain. Cardiovascular ROS: no chest pain, palpitations, orthopnea, PND or syncope  Endocrine ROS: no polydispsia, polyuria, heat or cold intolerance or major weight change. Gastrointestinal ROS: no dysphagia, abdominal pain, nausea, vomiting, diarrhea or any bleeding   Genito-Urinary ROS: no dysuria, frequency, hematuria, retention or flank pain  Musculoskeletal ROS: no joint pain, swelling or muscular tenderness  Neurological ROS: + agitation and restless but no focal weakness   Psychiatric ROS: no depression, anxiety, mood swings  Dermatological ROS: no rash, pruritis, or urticaria    Examination:    Vital signs:    Visit Vitals    BP (!) 180/95    Pulse 90    Temp 98 °F (36.7 °C)    Resp 26    Ht 6' (1.829 m)    Wt 104.3 kg (230 lb)    SpO2 92%    BMI 31.19 kg/m2         Physical Examination:    General:  Weak and ill looking patient in no acute distress  Eyes: Pink conjunctivae, PERRLA with no discharge. Ear, Nose & Throat: No ottorrhea, rhinorrhea and has dry mucous membranes  Respiratory:  No accessory muscle use, clear breath sounds without crackles or wheezes  Cardiovascular:  No JVD or murmurs, regular and normal S1, S2 without thrills, bruits or peripheral edema  GI & :  Soft abdomen, non-tender, non-distended, normoactive bowel sounds with no palpable organomegaly  Musculoskeletal:  No cyanosis, clubbing, atrophy or deformities. Splinted left wrist  Skin:  No rashes, bruising or ulcers   Neurological: Lethargic but opens eyes to commands. No facial droop. Moving all extremities, Has expressive aphasia.  Pupils symmetrical.   Psychiatric:  Has little insight to illness   ________________________________________________________________________    Data Review:    Labs:    Recent Labs      03/28/17   1452   WBC  7.8   HGB  15.0   HCT  43.9   PLT  289     Recent Labs      03/28/17   1452   NA  139   K  3.6   CL  103   CO2  27   GLU  202*   BUN  12   CREA  1.02   CA  8.9   ALB  3.7   SGOT  19   ALT  36     No components found for: GLPOC  No results for input(s): PH, PCO2, PO2, HCO3, FIO2 in the last 72 hours. Recent Labs      03/28/17   1452   INR  1.0       Radiological Studies: All radiological studies have been reviewed    Other Medical tests:    Personally reviewed EKG: Normal rate, rhythm, axis and intervals. and No acute changes suggestive of ischemia    I have reviewed old medical records available. Assessment & Impression:     Mr. Tab Huerta is a 75 y.o. male being evaluated for:     Active Problems:    CVA (cerebral vascular accident) ([facility] [address] (3/28/2017)      Acute encephalopathy (3/28/2017)      Aphasia (3/28/2017)      HTN (hypertension), malignant (3/28/2017)      Fracture of radius, distal, left, closed (3/28/2017)      Tobacco use disorder (3/28/2017)       Plan of management:    Acute encephalopathy (3/28/2017)/ ? CVA (cerebral vascular accident) ([facility] (3/28/2017)/ Aphasia (3/28/2017): seen by tele neurology. Initial CT head unremarkable and he is sp tPA. Admit to ICU. Start post tPA stroke protocol. Follow up head CT tomorrow unless indicated earlier. I have discussed with Dr Nisreen Herrera who will review him later today. Echo, lipid panel, PT, OT when stable. Wife understands severity and critical nature of his illness and risk of bleeding. HTN (hypertension), malignant (3/28/2017): likely has hx of HTN. Not been on medications. Start IV labetalol gtt    Hyperglycemia POA: no prior hx of DM. Will check A1c when appropriate    Fracture of radius, distal, left, closed (3/28/2017): recently had surgery.  Out patient follow up    Tobacco use disorder (3/28/2017): will need counseling once more awake and stable     Code Status:  Full    Surrogate decision maker: Family    Risk of deterioration: high      Total time spent for the care of the patient: 75 Kushal Shah 1951 discussed with: Patient, Family, Nursing Staff , ED physician and Dr Amol Perez    Discussed:  Code Status, Care Plan and D/C Planning    Prophylaxis:  not indictaed for next 24 hrs    Probable Disposition:   PT, OT, RN vs [facility]/Rehab           ___________________________________________________    Attending Physician: Nichol Urbina MD

## 2017-03-29 ENCOUNTER — APPOINTMENT (OUTPATIENT)
Dept: CT IMAGING | Age: 67
DRG: 061 | End: 2017-03-29
Attending: INTERNAL MEDICINE
Payer: MEDICARE

## 2017-03-29 PROBLEM — I61.9 HEMORRHAGIC STROKE (HCC): Status: ACTIVE | Noted: 2017-03-28

## 2017-03-29 PROBLEM — E11.9 TYPE II DIABETES MELLITUS (HCC): Status: ACTIVE | Noted: 2017-03-29

## 2017-03-29 LAB
APPEARANCE UR: CLEAR
ATRIAL RATE: 86 BPM
BACTERIA SPEC CULT: NORMAL
BACTERIA SPEC CULT: NORMAL
BACTERIA URNS QL MICRO: NEGATIVE /HPF
BILIRUB UR QL: NEGATIVE
BLD PROD TYP BPU: NORMAL
BPU ID: NORMAL
CALCULATED P AXIS, ECG09: 53 DEGREES
CALCULATED R AXIS, ECG10: 41 DEGREES
CALCULATED T AXIS, ECG11: 35 DEGREES
COLOR UR: ABNORMAL
DIAGNOSIS, 93000: NORMAL
EPITH CASTS URNS QL MICRO: ABNORMAL /LPF
GLUCOSE UR STRIP.AUTO-MCNC: 250 MG/DL
HGB UR QL STRIP: NEGATIVE
HYALINE CASTS URNS QL MICRO: ABNORMAL /LPF (ref 0–5)
KETONES UR QL STRIP.AUTO: NEGATIVE MG/DL
LEUKOCYTE ESTERASE UR QL STRIP.AUTO: NEGATIVE
NITRITE UR QL STRIP.AUTO: NEGATIVE
P-R INTERVAL, ECG05: 140 MS
PH UR STRIP: 6.5 [PH] (ref 5–8)
PROT UR STRIP-MCNC: NEGATIVE MG/DL
Q-T INTERVAL, ECG07: 364 MS
QRS DURATION, ECG06: 72 MS
QTC CALCULATION (BEZET), ECG08: 435 MS
RBC #/AREA URNS HPF: ABNORMAL /HPF (ref 0–5)
SERVICE CMNT-IMP: NORMAL
SP GR UR REFRACTOMETRY: 1.01 (ref 1–1.03)
STATUS OF UNIT,%ST: NORMAL
UNIT DIVISION, %UDIV: 0
UROBILINOGEN UR QL STRIP.AUTO: 1 EU/DL (ref 0.2–1)
VENTRICULAR RATE, ECG03: 86 BPM
WBC URNS QL MICRO: ABNORMAL /HPF (ref 0–4)

## 2017-03-29 PROCEDURE — 74011000250 HC RX REV CODE- 250: Performed by: PSYCHIATRY & NEUROLOGY

## 2017-03-29 PROCEDURE — 70450 CT HEAD/BRAIN W/O DYE: CPT

## 2017-03-29 PROCEDURE — 81001 URINALYSIS AUTO W/SCOPE: CPT | Performed by: NURSE PRACTITIONER

## 2017-03-29 PROCEDURE — 93880 EXTRACRANIAL BILAT STUDY: CPT

## 2017-03-29 PROCEDURE — 74011250636 HC RX REV CODE- 250/636: Performed by: PSYCHIATRY & NEUROLOGY

## 2017-03-29 PROCEDURE — 51798 US URINE CAPACITY MEASURE: CPT

## 2017-03-29 PROCEDURE — 92610 EVALUATE SWALLOWING FUNCTION: CPT | Performed by: SPEECH-LANGUAGE PATHOLOGIST

## 2017-03-29 PROCEDURE — 93306 TTE W/DOPPLER COMPLETE: CPT

## 2017-03-29 PROCEDURE — 65610000006 HC RM INTENSIVE CARE

## 2017-03-29 PROCEDURE — 77010033678 HC OXYGEN DAILY

## 2017-03-29 PROCEDURE — 74011000250 HC RX REV CODE- 250: Performed by: INTERNAL MEDICINE

## 2017-03-29 RX ORDER — GUAIFENESIN 100 MG/5ML
81 LIQUID (ML) ORAL DAILY
Status: DISCONTINUED | OUTPATIENT
Start: 2017-03-29 | End: 2017-03-29 | Stop reason: ALTCHOICE

## 2017-03-29 RX ORDER — PRAVASTATIN SODIUM 20 MG/1
40 TABLET ORAL
Status: DISCONTINUED | OUTPATIENT
Start: 2017-03-29 | End: 2017-04-04 | Stop reason: HOSPADM

## 2017-03-29 RX ADMIN — Medication 5 ML: at 21:23

## 2017-03-29 RX ADMIN — SODIUM CHLORIDE 9 MG/HR: 900 INJECTION, SOLUTION INTRAVENOUS at 09:33

## 2017-03-29 RX ADMIN — Medication 5 ML: at 05:34

## 2017-03-29 RX ADMIN — SODIUM CHLORIDE 5 MG/HR: 900 INJECTION, SOLUTION INTRAVENOUS at 00:17

## 2017-03-29 RX ADMIN — Medication 10 ML: at 13:08

## 2017-03-29 RX ADMIN — Medication 10 ML: at 05:34

## 2017-03-29 RX ADMIN — Medication 10 ML: at 21:24

## 2017-03-29 RX ADMIN — SODIUM CHLORIDE 9 MG/HR: 900 INJECTION, SOLUTION INTRAVENOUS at 16:57

## 2017-03-29 RX ADMIN — SODIUM CHLORIDE 15 MG/HR: 900 INJECTION, SOLUTION INTRAVENOUS at 23:46

## 2017-03-29 RX ADMIN — SODIUM CHLORIDE 7 MG/HR: 900 INJECTION, SOLUTION INTRAVENOUS at 05:53

## 2017-03-29 RX ADMIN — FAMOTIDINE 20 MG: 10 INJECTION, SOLUTION INTRAVENOUS at 21:22

## 2017-03-29 RX ADMIN — FAMOTIDINE 20 MG: 10 INJECTION, SOLUTION INTRAVENOUS at 11:40

## 2017-03-29 RX ADMIN — SODIUM CHLORIDE 13 MG/HR: 900 INJECTION, SOLUTION INTRAVENOUS at 19:32

## 2017-03-29 RX ADMIN — Medication 5 ML: at 13:08

## 2017-03-29 RX ADMIN — SODIUM CHLORIDE 15 MG/HR: 900 INJECTION, SOLUTION INTRAVENOUS at 21:54

## 2017-03-29 RX ADMIN — SODIUM CHLORIDE 7 MG/HR: 900 INJECTION, SOLUTION INTRAVENOUS at 13:11

## 2017-03-29 NOTE — PROGRESS NOTES
TRANSFER - IN REPORT:    Verbal report received from Rachid Courser [facility] (name) on Yasmeen Frost  being received from ED (unit) for routine progression of care      Report consisted of patients Situation, Background, Assessment and   Recommendations(SBAR). Information from the following report(s) SBAR, Intake/Output, MAR and Recent Results was reviewed with the receiving nurse. Opportunity for questions and clarification was provided. Assessment completed upon patients arrival to unit and care assumed. 2000: Patient arrived to unit. Cardene infusing at 5 mg/min and Labetolol 2 mg/min. VS checked and within normal limits. Patient assessed at this time. Primary Nurse Edie Batista RN and Rosalia Muller RN performed a dual skin assessment on this patient Impairment noted- Left wrist broken previously with cast.  Joseph score is 15     2100: Spoke to DR. Macario about stopping Labetolol due to lower blood pressure. He said it was okay as long as blood pressure stays below 195 systolicly. At this time he said it was okay to go ahead and give 20 of cryo and recheck labs an hour post.  2147: 20 Units of Cryo infused. VS remain stable. 2245: 1 Unit of platelet infused. VS remain stable. Patient oriented to self but disoriented to situation and time. 0490: Patient attempting to get out of bed to void. Assisted patient back to bed. Patient unable to void. Patient bladder scanned for >500 ml. Called Dr. Dimitri Payne and spoke to her about patient's inability to pee and need for a sitter. No rowland order at this time due to Post tPA. Obtained order for sitter. Sitter at the bedside. VS remain stable. 0130: Every 30 minute Vitals completed. VS remain stable. Patient no able to form clear words, and follows commands. 8530: Patient very restless and attempting to get out of bed. Patient keeps stating \"I have to pee\", but is unable to go. Called and spoke to Dr. Dimitri Payne who said it was okay to place a rowland at this time.   0400: Rowland Catheter inserted at this time. Patient immediately voided 700 ml of clear yellow urine. No signs of bleeding at this time. Patient stated \"feel better\" after rowland catheter placement. 0630: Dr. Johnson at bedside. Patient able to follow some commands, but not all. Bedside and Verbal shift change report given to Tess Murphy RN (oncoming nurse) by Ronni Dominique RN (offgoing nurse). Report included the following information SBAR, Kardex, Intake/Output, MAR and Recent Results.

## 2017-03-29 NOTE — PROCEDURES
[facility]  *** FINAL REPORT ***    Name: Julia Bnaks  MRN: [id number]    Inpatient  : 1950  HIS Order #: [id number]  [address] [facility] Visit #: [id number]  Date: 29 Mar 2017    TYPE OF TEST: Cerebrovascular Duplex    REASON FOR TEST  Cerebrovascular accident    Right Carotid:-             Proximal               Mid                 Distal  cm/s  Systolic  Diastolic  Systolic  Diastolic  Systolic  Diastolic  CCA:     68.5       8.5                            83.6       9.8  Bulb:  ECA:     13.4       7.4  ICA:     78.5       7.4       53.7      11.0       50.3      10.1  ICA/CCA:  0.9       0.8    ICA Stenosis: Normal    Right Vertebral:-  Finding: Antegrade  Sys:       44.1  Daniella:        7.5    Right Subclavian:    Left Carotid:-            Proximal                Mid                 Distal  cm/s  Systolic  Diastolic  Systolic  Diastolic  Systolic  Diastolic  CCA:    306.9      13.0                           139.2       9.1  Bulb:  ECA:    154.8       0.0  ICA:     87.5       7.2       55.4       7.7       53.7       9.1  ICA/CCA:  0.6       0.8    ICA Stenosis: <50%    Left Vertebral:-  Finding: Not visualized  Sys:  Daniella:    Left Subclavian:    INTERPRETATION/FINDINGS  PROCEDURE:  Evaluation of the extracranial cerebrovascular arteries  with ultrasound (B-mode imaging, pulsed Doppler, color Doppler). Includes the common carotid, internal carotid, external carotid, and  vertebral arteries. FINDINGS: Technically difficult study due to patients altered mental  status. Intimal thickening observed in the CCA bilaterally. Hypoechoic   plaque noted in the left bulb and ICA. IMPRESSION: Findings are consistent with 0-49% stenosis of the right  internal carotid and 0-49% stenosis of the left internal carotid. Vertebrals are patent with antegrade flow. ADDITIONAL COMMENTS    I have personally reviewed the data relevant to the interpretation of  this  study.     TECHNOLOGIST: Adenike Salvador, RDCS  Signed: 3/29/2017 10:27:19 AM    PHYSICIAN: Michela Christy.  Lisandro Mahan MD  Signed: 3/30/2017 11:44:53 AM

## 2017-03-29 NOTE — PROGRESS NOTES
0700 - Bedside and Verbal shift change report given to Merna Gomez RN (oncoming nurse) by Shaan Muñiz RN (offgoing nurse). Report included the following information SBAR, Kardex, Intake/Output, MAR, Recent Results and Cardiac Rhythm sinus rhythm. 0730 - Dr. Charis Crowell at bedside and updated. 6052 - GREG Cuba NP at bedside and updated. Neuro assessments performed, intermittent agitation/irritation noted. 8236 - Speech Therapist at bedside and swallow eval completed. ST recommendations for full liquid diet. 1032 - Dr. Joe Loo at bedside and updated. 1130 - Echo completed at bedside. 1204 - PT & OT at bedside and updated. Deferment to eval pt r/t agitation and not following commands. Will eval tomorrow. 1420 - Stroke Education provided to spouse/SO and the following topics were discussed    1. Patients personal risk factors for stroke are smoking    2. Warning signs of Stroke:        * Sudden numbness or weakness of the face, arm or leg, especially on one side of          The body            * Sudden confusion, trouble speaking or understanding        * Sudden trouble seeing in one or both eyes        * Sudden trouble walking, dizziness, loss of balance or coordination        * Sudden severe headache with no known cause      3. Importance of activation Emergency Medical Services ( 9-1-1 ) immediately if experience any warning signs of stroke. 4. Be sure and schedule a follow-up appointment with your primary care doctor or any specialists as instructed. 5. You must take medicine every day to treat your risk factors for stroke. Be sure to take your medicines exactly as your doctor tells you: no more, no less. Know what your medicines are for , what they do. Anti-thrombotics /anticoagulants can help prevent strokes. You are taking the following medicine(s)      6.  Smoking and second-hand smoke greatly increase your risk of stroke, cardiovascular disease and death.  Smoking history cigarettes,1 pack per day    7. Information provided was [facility] Stroke Education Binder, Stroke Handouts or Verbal Education    8. Documentation of teaching completed in Patient Education Activity and on Care Plan with teaching response noted? yes      1532 - Telephone readback order placed for MRI brain without contrast by Minnie Burroughs NP. MRI screening form reviewed and completed. Pt's wife answered questions and signed on pt's behalf.  [phone] - 24hr post TPA NIH score - 15. Hourly neuro checks per protocol have been completed at 1730.  1905 - Bedside and Verbal shift change report given to Prateek Holly RN (oncoming nurse) by Luciano Humphreys RN (offgoing nurse). Report included the following information SBAR, Kardex, Intake/Output, MAR, Recent Results, Med Rec Status and Cardiac Rhythm sinus tach.

## 2017-03-29 NOTE — PROGRESS NOTES
[facility]  [address] [facility], [address] [place] [address]  [phone]      Medical Progress Note      NAME:         Bijal Mascorro   :        1950  MRM:        [id number]    Date:          3/29/2017      Subjective: Patient has been seen and examined. Chart, labs, diagnostics reviewed. He is more alert but still lethargic. He says he has headaches. Generally feeling weak. Has had episodes of diarrhea which is non bloody. Had urinary retention needing a rowland cath insertion. I have discussed with Shaan Escobar, his night niurse for collaborative hx    Objective:    Vital Signs:    Visit Vitals    /82    Pulse (!) 110    Temp 98.2 °F (36.8 °C)    Resp 18    Ht 6' (1.829 m)    Wt 108.7 kg (239 lb 9.6 oz)    SpO2 96%    BMI 32.5 kg/m2        Intake/Output Summary (Last 24 hours) at 17 0731  Last data filed at 17 0414   Gross per 24 hour   Intake              683 ml   Output              700 ml   Net              -17 ml        Physical Examination:    General:   Weak and still critically ill male patient, not in distress   Eyes:   pink conjunctivae, PERRLA with no discharge. ENT:   no ottorrhea or rhinorrhea with dry mucous membranes  Neck: no masses, thyroid non-tender and trachea central.  Pulm:  no accessory muscle use, clear breath sounds without crackles or wheezes  Card:  no JVD or murmurs, has regular and normal S1, S2 without thrills, bruits or peripheral edema  Abd:  Soft, non-tender, non-distended, normoactive bowel sounds with no palpable organomegaly  Musc:  No cyanosis, clubbing, atrophy or deformities. Skin:  No rashes, bruising or ulcers. Neuro: Awake and alert. No facial asymmetry. Moves al extremities with generalized decreased power. Does follow some commands.    Psych:  Has little insight to illness     Current Facility-Administered Medications   Medication Dose Route Frequency    sodium chloride (NS) flush 5 mL  5 mL IntraVENous Q8H    sodium chloride (NS) flush 5 mL  5 mL IntraVENous PRN    labetalol (NORMODYNE;TRANDATE) injection 10 mg  10 mg IntraVENous Q10MIN PRN    sodium chloride (NS) flush 5-10 mL  5-10 mL IntraVENous Q8H    sodium chloride (NS) flush 5-10 mL  5-10 mL IntraVENous PRN    ondansetron (ZOFRAN) injection 4 mg  4 mg IntraVENous Q6H PRN    prochlorperazine (COMPAZINE) injection 10 mg  10 mg IntraVENous Q4H PRN    0.9% sodium chloride infusion 250 mL  250 mL IntraVENous PRN    niCARdipine (CARDENE) 25 mg in 0.9% sodium chloride 250 mL infusion  0-15 mg/hr IntraVENous TITRATE        Laboratory data and review:    Recent Labs      03/28/17   1452   WBC  7.8   HGB  15.0   HCT  43.9   PLT  289     Recent Labs      03/28/17   2250  03/28/17   1452   NA   --   139   K   --   3.6   CL   --   103   CO2   --   27   GLU   --   202*   BUN   --   12   CREA   --   1.02   CA   --   8.9   ALB   --   3.7   SGOT   --   19   ALT   --   36   INR  1.1  1.0     No components found for: [place]    Other Diagnostics:    CT head - post lytics - Development of acute left corona radiata/thalamic hemorrhage and small amount of  hemorrhage either in the occipital horn of the lateral ventricle or the adjacent parenchyma/sulci of the left occipital lobe.     Telemetry reviewed:   normal sinus rhythm    Assessment and Plan:    Hemorrhage, intracerebral ([address] (3/28/2017): occurred post tPA for an acute ischemic CVA. I had discussed with Dr Nanette Gould 3/28 after discussing with radiology and he recommended administering IV Cryo and platelets. He recommended keeping Mr Anabela Colbert here rather than transferring him to [facility]. His team is primarily managing this. Follow up head CT later today. Acute CVA (cerebral vascular accident) ([address] (3/28/2017)/ Aphasia (3/28/2017): seen by tele neurology in the ED. Initial CT head unremarkable and he is sp tPA given 3/28. Complicated by [address] as above.  Complete work up for CVA    HTN (hypertension), malignant (3/28/2017): likely has hx of HTN. Not been on medications. BP well controlled now. Continue IV Cardene     Hyperglycemia POA: no prior hx of DM. Will check A1c when appropriate     Fracture of radius, distal, left, closed (3/28/2017): recently had surgery. Out patient follow up    Diarrhea: new. He is afebrile. Non bloody. Monitor for now    Acute urinary retention: likely related to CVA. Had a rowland catheter inserted. Non bloody. Monitor     Tobacco use disorder (3/28/2017): will need counseling once more awake and stable CVA (cerebral vascular accident) ([facility] (3/28/2017)    Total time spent for the patient's care: 35 Minutes Critical care.                  Care Plan discussed with: Patient, Nursing Staff and Consultant/Specialist    Discussed:  Care Plan    Prophylaxis:  SCD's    Anticipated Disposition:  [facility]/Rehab           ___________________________________________________    Attending Physician:   Juni Felder MD

## 2017-03-29 NOTE — PROGRESS NOTES
[address]       INTENSIVIST DAILY PROGRESS NOTE  Name: Raquel Chandler   : 1950   MRN: [id number]   Date: 3/29/2017 10:49 AM     I have reviewed the flowsheet and previous days notes. The patient is unable to give any meaningful history or review of systems because the patient is critically ill with encephalopathy. Wife notes that he woke up and was able to make some one word verbal responses with some appropriate words, some inappropriate words. Attempted to get out of bed, was redirected. Overnight events reviewed:  · Nicardipine drip 7mg/hr, BP at goal  · Received PLT x1, Cryo x2  · Fibrinogen 339  · UA neg for bacteria  · Carotid duplex this AM with <49% stenoses bl    Vital Signs:    Visit Vitals    /67    Pulse (!) 114    Temp 98.2 °F (36.8 °C)    Resp 22    Ht 6' (1.829 m)    Wt 239 lb 9.6 oz (108.7 kg)    SpO2 97%    BMI 32.5 kg/m2       O2 Device: Nasal cannula   O2 Flow Rate (L/min): 2 l/min   Temp (24hrs), Av.9 °F (36.6 °C), Min:97.3 °F (36.3 °C), Max:98.3 °F (36.8 °C)       Intake/Output:   Last shift:         Last 3 shifts:  1901 -  0700  In: 683 [I.V.:683]  Out: 700 [Urine:700]    Intake/Output Summary (Last 24 hours) at 17 1049  Last data filed at 17 0414   Gross per 24 hour   Intake              683 ml   Output              700 ml   Net              -17 ml          Physical Exam:  General:  Sleeping comofrtably   Head:  Normocephalic, without obvious abnormality, atraumatic. Eyes:  Conjunctivae/corneas clear. PERRL. Nose: Nares normal. Septum midline. Mucosa normal. No drainage. Throat: Lips, mucosa, and tongue normal. Teeth and gums normal.   Neck: Supple, symmetrical, trachea midline, no adenopathy, no carotid bruit, and no JVD   Lungs:   Clear to auscultation bilaterally. Heart:  Regular rate and rhythm, S1, S2 normal, no murmur, click, rub or gallop. Abdomen:   Soft, non-tender.  Bowel sounds normal. No masses,  No organomegaly. Extremities: Extremities normal, atraumatic, no cyanosis or edema. Skin: Skin color, texture, turgor normal.   Neurologic: Grossly nonfocal     DATA:   Current Facility-Administered Medications   Medication Dose Route Frequency    pravastatin (PRAVACHOL) tablet 40 mg  40 mg Oral QHS    famotidine (PF) (PEPCID) 20 mg in sodium chloride 0.9 % 10 mL injection  20 mg IntraVENous Q12H    sodium chloride (NS) flush 5 mL  5 mL IntraVENous Q8H    sodium chloride (NS) flush 5 mL  5 mL IntraVENous PRN    labetalol (NORMODYNE;TRANDATE) injection 10 mg  10 mg IntraVENous Q10MIN PRN    sodium chloride (NS) flush 5-10 mL  5-10 mL IntraVENous Q8H    sodium chloride (NS) flush 5-10 mL  5-10 mL IntraVENous PRN    ondansetron (ZOFRAN) injection 4 mg  4 mg IntraVENous Q6H PRN    prochlorperazine (COMPAZINE) injection 10 mg  10 mg IntraVENous Q4H PRN    0.9% sodium chloride infusion 250 mL  250 mL IntraVENous PRN    niCARdipine (CARDENE) 25 mg in 0.9% sodium chloride 250 mL infusion  0-15 mg/hr IntraVENous TITRATE             Labs:  Recent Labs      03/28/17   1452   WBC  7.8   HGB  15.0   HCT  43.9   PLT  289     Recent Labs      03/28/17   2250  03/28/17   1452   NA   --   139   K   --   3.6   CL   --   103   CO2   --   27   GLU   --   202*   BUN   --   12   CREA   --   1.02   CA   --   8.9   ALB   --   3.7   SGOT   --   19   ALT   --   36   INR  1.1  1.0     No results for input(s): PH, PCO2, PO2, HCO3, FIO2 in the last 72 hours. Imaging:  I have personally reviewed the patients radiographs and reports.        IMPRESSION:   · ICH s/p tPA for ischemic stroke presenting w/ aphasia  · Hypertension  · Encephalopathy  · Diabetes T2  · L wrist fracture PTA   PLAN:   · Nicardipine drip to titrate BP, goal < 140/90  · Watch fibrinogen, goal > 100  · Repeat CT head today 1400  · Replete electrolytes as needed  · Incentive spirometry  · Daily PT/OT  · Tight glycemic control  · Nutrition: full liquids per Speech   GLOBAL ISSUES:   · Head of bed elevated  · GI Prophylaxis: none currently  · DVT Prophylaxis: SCDs  · Lines: 20G R hand, 20G R forearm  · Medical Decision Maker: wife currently     The pt is critically ill.     See my orders for details    My assessment/plan was discussed with: Dr. Taylor Alexis (attending physician), nurse, pt's family      Juarez Bermudez MD  Family Medicine Resident

## 2017-03-29 NOTE — PROGRESS NOTES
Physical Therapy:  Orders acknowledged, chart reviewed, and spoke with team in rounds. Patient currently having ECHO done at this time and further testing scheduled for today. Patient is restless in bed, pulling at lines, and not following commands. MD recommending OT/PT hold evaluations today and continue to follow.   Romel Armando PT,DPT

## 2017-03-29 NOTE — PROGRESS NOTES
[facility]  [address] [phone]     Inpatient Neurology Progress Note  VERONICA RushingP-BC    Name:   Elizabeth Beckford   Medical record #: [id number]  Admission Date: 3/28/2017  Date:   03/29/17  _____________________________________________________________________________    Subjective:  CC:  Pt cannot provide ROS with acuity of illness  ·  Cardene gtt continues 7mcg/hr  · Off and on agitation  · No overnight events  Denies:   Couldn't provide ROS  _____________________________________________________________________________  Objective  Patient Vitals for the past 12 hrs:   Temp Pulse Resp BP SpO2   03/29/17 0730 - (!) 116 - 144/79 97 %   03/29/17 0700 98.2 °F (36.8 °C) - - - -   03/29/17 0630 - (!) 110 - 136/82 -   03/29/17 0530 - (!) 114 - 143/71 96 %   03/29/17 0430 - 92 - 130/68 -   03/29/17 0337 - 92 - 128/86 -   03/29/17 0230 - 93 - 128/89 -   03/29/17 0130 - 94 - 114/62 -   03/29/17 0100 - 92 - 108/56 -   03/29/17 0030 - - - 110/52 -   03/29/17 0000 98.3 °F (36.8 °C) 96 18 118/62 94 %   03/28/17 2334 - 98 - - -   03/28/17 2330 - 96 - 108/56 -   03/28/17 2300 98.3 °F (36.8 °C) - - 98/62 -   03/28/17 2230 - 96 - 122/86 -   03/28/17 2226 97.7 °F (36.5 °C) 96 18 114/74 96 %   03/28/17 2211 97.7 °F (36.5 °C) 96 18 114/72 96 %   03/28/17 2200 - 96 18 114/72 96 %   03/28/17 2141 97.8 °F (36.6 °C) 96 25 110/70 -   03/28/17 2130 - 93 - 110/70 -   03/28/17 2115 - 93 - 115/74 -   03/28/17 2100 97.7 °F (36.5 °C) 87 19 107/67 91 %   03/28/17 2030 - 90 - 140/62 -     Allergies:    Allergies   Allergen Reactions    Chantix [Varenicline] Rash     Very itchy rash on back     Inpatient Meds    Current Facility-Administered Medications:     pravastatin (PRAVACHOL) tablet 40 mg, 40 mg, Oral, QHS, Tanmay Vallejo NP    aspirin chewable tablet 81 mg, 81 mg, Oral, DAILY, Tanmay Vallejo NP    sodium chloride (NS) flush 5 mL, 5 mL, IntraVENous, Q8H, Jaymie Merritt MD, 5 mL at 03/29/17 0534    sodium chloride (NS) flush 5 mL, 5 mL, IntraVENous, PRN, Isabela Ventura MD    labetalol (NORMODYNE;TRANDATE) injection 10 mg, 10 mg, IntraVENous, Q10MIN PRN, Isabela Ventura MD, 10 mg at 03/28/17 1553    sodium chloride (NS) flush 5-10 mL, 5-10 mL, IntraVENous, Q8H, Kateryna Phillip MD, 10 mL at 03/29/17 0534    sodium chloride (NS) flush 5-10 mL, 5-10 mL, IntraVENous, PRN, Kateryna Phillip MD    ondansetron [facility]) injection 4 mg, 4 mg, IntraVENous, Q6H PRN, Kateryna Phillip MD, 4 mg at 03/28/17 1754    prochlorperazine (COMPAZINE) injection 10 mg, 10 mg, IntraVENous, Q4H PRN, Kateryna Phillip MD, 10 mg at 03/28/17 1833    0.9% sodium chloride infusion 250 mL, 250 mL, IntraVENous, PRN, Madelyn Rodriguez MD    niCARdipine (CARDENE) 25 mg in 0.9% sodium chloride 250 mL infusion, 0-15 mg/hr, IntraVENous, TITRATE, Madelyn Rodriguez MD, Last Rate: 90 mL/hr at 03/29/17 0743, 9 mg/hr at 03/29/17 0743  Labs Reviewed  Recent Results (from the past 12 hour(s))   FIBRINOGEN    Collection Time: 03/28/17 10:50 PM   Result Value Ref Range    Fibrinogen 349 200 - 475 mg/dL   PROTHROMBIN TIME + INR    Collection Time: 03/28/17 10:50 PM   Result Value Ref Range    INR 1.1 0.9 - 1.1      Prothrombin time 10.7 9.0 - 11.1 sec   PTT    Collection Time: 03/28/17 10:50 PM   Result Value Ref Range    aPTT 26.6 22.1 - 32.5 sec    aPTT, therapeutic range     58.0 - 77.0 SECS   URINALYSIS W/MICROSCOPIC    Collection Time: 03/29/17  3:59 AM   Result Value Ref Range    Color YELLOW/STRAW      Appearance CLEAR CLEAR      Specific gravity 1.010 1.003 - 1.030      pH (UA) 6.5 5.0 - 8.0      Protein NEGATIVE  NEG mg/dL    Glucose 250 (A) NEG mg/dL    Ketone NEGATIVE  NEG mg/dL    Bilirubin NEGATIVE  NEG      Blood NEGATIVE  NEG      Urobilinogen 1.0 0.2 - 1.0 EU/dL    Nitrites NEGATIVE  NEG      Leukocyte Esterase NEGATIVE  NEG      WBC 0-4 0 - 4 /hpf    RBC 0-5 0 - 5 /hpf    Epithelial cells FEW FEW /lpf    Bacteria NEGATIVE NEG /hpf    Hyaline cast 0-2 0 - 5 /lpf       Imaging  Reviewed:   CT Results (recent):    Results from Hospital Encounter encounter on 03/28/17   CT HEAD WO CONT   Narrative INDICATION:   Change in clinical condition post tPA    EXAM:  HEAD CT WITHOUT CONTRAST    COMPARISON:  CT earlier today    TECHNIQUE:  Routine noncontrast axial head CT was performed. Sagittal and  coronal reconstructions were generated. CT dose reduction was achieved through use of a standardized protocol tailored  for this examination and automatic exposure control for dose modulation. FINDINGS:    Ventricles:  Midline, no hydrocephalus. Intracranial Hemorrhage:  Development of a acute left corona radiata/thalamic  hematoma which measures 2.0 x 1.6 x 1.6 cm (volume approximately 6 cubic cm). Small amount of extension into the dependent portion of the left lateral  ventricle versus in the left occipital lobe parenchyma/sulci. There is small  amount of IV contrast material present related to CT enterography earlier today. Brain Parenchyma/Brainstem:  Patchy supratentorial white matter hypodensities. Basal Cisterns:  Normal.   Paranasal Sinuses:  Visualized sinuses are clear. Additional Comments:  N/A. Impression IMPRESSION:    Development of acute left corona radiata/thalamic hemorrhage and small amount of  hemorrhage either in the occipital horn of the lateral ventricle or the adjacent  parenchyma/sulci of the left occipital lobe. Findings discussed wall patient was on the table with Dr. Brielle Kilgore 1900 hours. MRI Results (recent):  No results found for this or any previous visit.     Physical Exam  General:   male in NAD  Chest:  Regular rhythm and tachycardic rate, clear BBS  Neurologic:  Appears tired, LOZANO passively, reflexes throughout +2  Eyes:  Would not open eyes for NP, pupils pinpoint NR  Speech:   Minimal verbal output, stating words \"yes, cold, no, go away\"  Mentation:  Drowsy and agitated with more verbal stimulation  Commands: Follows minimal commands, 1 step only  Strength: Equal bilat.,  proximally & distally  5/5 other than --->Reduced R proximally  4/5  Tremor: none  Sensation:  Equal bilaterally, proximally and distally with LT and temp     _____________________________________________________________________________  Assessment:   1.  L hemispheric intracerebral hemorrhage s/p TPA: L almeida Radiata  2. Encephalopathy  3. HTN  4. Tobacco abuse    Plan  · Repeat head CT at 1600 today and carotids as well  · BP goals < 140/90---continue Cardene gtt  · ST saw and recommended thin liquids if alert enough to eat  · Does better with less stimulation in room  · SCDS, LDL goal < 70  ·  Testing results discussed with patient and/or family --- any questions were answered. My collaborating care team physician may have further recommendations.     On DVT Prophylaxis yes no   Continue  SCDs while inpatient x      Care Plan discussed with:  Patient x   Family- wife x   RN x   Care Manager    Consultant/Specialist:     Patient will be discussed with Dr. Sarah Her Problems  Date Reviewed: 3/29/2017          Codes Class Noted POA    Type II diabetes mellitus ([facility] ICD-10-CM: E11.9  ICD-9-CM: 250.00  3/29/2017 Yes        Acute encephalopathy ICD-10-CM: G93.40  ICD-9-CM: 348.30  3/28/2017 Yes        Aphasia ICD-10-CM: R47.01  ICD-9-CM: 784.3  3/28/2017 Yes        HTN (hypertension), malignant ICD-10-CM: I10  ICD-9-CM: 401.0  3/28/2017 Yes        Fracture of radius, distal, left, closed ICD-10-CM: S52.502A  ICD-9-CM: 813.42  3/28/2017 Yes        Tobacco use disorder ICD-10-CM: F17.200  ICD-9-CM: 305.1  3/28/2017 Yes        Received intravenous tissue plasminogen activator (tPA) in emergency department ICD-10-CM: Z92.82  ICD-9-CM: V45.88  3/28/2017 Yes        Hemorrhage, intracerebral ([address] ICD-10-CM: I61.9  ICD-9-CM: 620  3/28/2017 Yes            ________________________________________________  - Lashae Naidu [facility]  03/29/17  ================================================    ADDENDUM--> Collaborating Care Team Physician:

## 2017-03-29 NOTE — PROGRESS NOTES
2100: Spoke to DR. Macario about stopping Labetolol due to lower blood pressure. He said it was okay as long as blood pressure stays below 416 systolicly. At this time he said it was okay to go ahead and give 20 of cryo and recheck labs an hour post.  2147: 20 Units of Cryo infused. VS remain stable. 2245: 1 Unit of platelet infused. VS remain stable. Patient oriented to self but disoriented to situation and time.

## 2017-03-29 NOTE — PROGRESS NOTES
Problem: Dysphagia (Adult)  Goal: *Acute Goals and Plan of Care (Insert Text)  Initiated 3/29/2017  1. Patient will tolerate full liquid diet, free of s/s of aspiration within 7 days. 2. Patient will tolerate solid trials free of difficulty within 7 days. SPEECH LANGUAGE PATHOLOGY BEDSIDE SWALLOW EVALUATION  Patient: Lara Rae (45 y.o. male)  Date: 3/29/2017  Primary Diagnosis: CVA (cerebral vascular accident) [facility])        Precautions: fall         ASSESSMENT :  Based on the objective data described below, the patient presents with moderate oral dysphagia and suspected functional pharyngeal swallow. Oral swallow limited by cognitive status versus apraxia. Patient awake and intermittently responding verbally, though he did not follow commands. Occasional eye opening, but not on command. Intermittent agitation, yelling and trying to get out of bed. When calm, he was unable to suck via straw, but did close lips around the end of the straw to passively accept a bolus. No oral leakage with sips of thins via cup. He did not attempt to bite a solid or masticate it. Solid was removed from his mouth. No oral residue with thins or purees. Educated patient's wife regarding aspiration risks and diet recommendations. She verbalized understanding. Patient will benefit from skilled intervention to address the above impairments. Patients rehabilitation potential is considered to be Fair  Factors which may influence rehabilitation potential include:   [ ]            None noted  [X]            Mental ability/status  [X]            Medical condition  [ ]            Home/family situation and support systems  [ ]            Safety awareness  [ ]            Pain tolerance/management  [ ]            Other:        PLAN :  Recommendations and Planned Interventions:  Cautiously recommend full liquid diet, medications crushed in puree  Given mental status, would not expect him to take in full nutrition via PO.    Will continue to follow for diet tolerance/advancement/family education. Frequency/Duration: Patient will be followed by speech-language pathology 4 times a week to address goals. Discharge Recommendations: To Be Determined       SUBJECTIVE:   Patient stated No, no, no Intermittently agitated. OBJECTIVE:       Past Medical History:   Diagnosis Date    Hemorrhagic stroke ([facility] 3/28/2017     S/p TPA    Ill-defined condition       Eczema    Type II diabetes mellitus ([facility] 3/29/2017   No past surgical history on file. Prior Level of Function/Home Situation:    Home Situation  Home Environment: Private residence  # Steps to Enter: 3  One/Two Story Residence: Two story  # of Interior Steps: 13  Living Alone: No  Support Systems: Family member(s)  Patient Expects to be Discharged to[de-identified] Private residence  Current DME Used/Available at Home: None  Diet prior to admission: regular/thins  Current Diet:  NPO   Cognitive and Communication Status:  Neurologic State: Drowsy, Irritable  Orientation Level: Disoriented to place, Disoriented to situation, Disoriented to time, Oriented to person  Cognition: Decreased command following     Perseveration: No perseveration noted     Oral Assessment:  Oral Assessment  Labial:  (difficult to assess given command following)  Dentition: Intact; Natural  Oral Hygiene: dry  Lingual:  (difficult to assess)  Velum: No impairment  Mandible: No impairment  P.O. Trials:  Patient Position: upright in bed  Vocal quality prior to P.O.: No impairment  Consistency Presented: Thin liquid; Solid;Puree  How Presented: SLP-fed/presented;Cup/sip;Spoon;Straw     Bolus Acceptance: Impaired (unable to suck via straw, no biting of solid)  Bolus Formation/Control: Impaired (absent manipulation of solid)     Propulsion: Delayed (# of seconds); Absent  Oral Residue: None  Initiation of Swallow: No impairment  Laryngeal Elevation: Functional  Aspiration Signs/Symptoms: None                 Oral Phase Severity: Moderate  Pharyngeal Phase Severity : No impairment     NOMS:   The NOMS functional outcome measure was used to quantify this patient's level of swallowing impairment. Based on the NOMS, the patient was determined to be at level 3 for swallow function      G Codes: In compliance with CMSs Claims Based Outcome Reporting, the following G-code set was chosen for this patient based the use of the NOMS functional outcome to quantify this patient's level of swallowing impairment. Using the NOMS, the patient was determined to be at level 3 for swallow function which correlates with the CL= 60-79% level of severity. Based on the objective assessment provided within this note, the current, goal, and discharge g-codes are as follows:     Swallow  Swallowing:   Swallow Current Status CL= 60-79%   Swallow Goal Status CJ= 20-39%         NOMS Swallowing Levels:  Level 1 (CN): NPO  Level 2 (CM): NPO but takes consistency in therapy  Level 3 (CL): Takes less than 50% of nutrition p.o. and continues with nonoral feedings; and/or safe with mod cues; and/or max diet restriction  Level 4 (CK): Safe swallow but needs mod cues; and/or mod diet restriction; and/or still requires some nonoral feeding/supplements  Level 5 (CJ): Safe swallow with min diet restriction; and/or needs min cues  Level 6 (CI): Independent with p.o.; rare cues; usually self cues; may need to avoid some foods or needs extra time  Level 7 ([address] [place] [address]): Independent for all p.o.  LUKAS. (2003). National Outcomes Measurement System (NOMS): Adult Speech-Language Pathology User's Guide.            Pain:  Pain Scale 1: Numeric (0 - 10)  Pain Intensity 1: 0     After treatment:   [ ]            Patient left in no apparent distress sitting up in chair  [X]            Patient left in no apparent distress in bed  [X]            Call bell left within reach  [X]            Nursing notified  [X]            Caregiver present  [ ]            Bed alarm activated COMMUNICATION/EDUCATION:   The patients plan of care including recommendations, planned interventions, and recommended diet changes were discussed with: Registered Nurse and NP. Patient was educated regarding SLP recommendations and plan for management of diet. [ ]            Posted safety precautions in patient's room. [ ]            Patient/family have participated as able in goal setting and plan of care. [X]            family agree to work toward stated goals and plan of care. [ ]            Patient understands intent and goals of therapy, but is neutral about his/her participation. [X]            Patient is unable to participate in goal setting and plan of care.      Thank you for this referral.  PRIYANK Kelly  Time Calculation: 15 mins

## 2017-03-29 NOTE — PROGRESS NOTES
Occupational Therapy Note:  Orders acknowledged, chart reviewed, and spoke with team in rounds. Patient currently having ECHO done at this time and further testing scheduled for today. Patient is restless in bed, pulling at lines, and not following commands. MD recommending OT/PT hold evaluations today and continue to follow.   Marge Haddad, OTR/L

## 2017-03-29 NOTE — PROGRESS NOTES
I met with pt 's son as an introductory visit to begin discharge disposition planning. When wife returns to the unit,I will meet with her also. I gave son the Stroke Program Patient Education book to give to pt.'s wife. Pt resides with his wife @ the address on demographics. He recently had surgery (left arm) in the outpatient setting. CVA has impacted his right side. Per neuro,pt had a left hemispheric intracerebral hemorrhage s/p TPA ,left corona radiata. At times,pt is restless and agitated. He is currently sleeping. I discussed with son the likely need for inpatient rehab. 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 Clinicals faxed to [facility]. Once PT and OT work with pt ,I will notify [facility]. Thank you.   Karen Waggoner

## 2017-03-29 NOTE — CONSULTS
Sonny Zambrano MD Physician Signed Neurology Consults Date of Service: 17         []Hide copied text  []Hover for attribution information  [address]   [address] [facility], [facility]  Name: Jace Hall   MR#: [id number]   : 1950   Account #: [de-identified] Date of Consultation: 2017   Date of Adm: 2017         REQUESTING PHYSICIAN: Dr. Laure Kruger: Thanks for asking us to see the   patient in neurologic consultation regarding acute stroke. I have   discussed the case with Dr. Amber Marinelli and also with Dr. Christelle Costa in the   ED. I discussed with his wife at the bedside          ____________________________________________________________________    *   Please refer to the consult performed, at above date and time.       CHATO Tatum  ~ Neurology NP ~

## 2017-03-29 NOTE — CONSULTS
[address] [address]   [address] [facility], [address]   [address] [facility] [address]       Name:  Anita Solano   MR#:  [id number]   :  1950   Account #:  [de-identified]    Date of Consultation:  2017   Date of Adm:  2017       REQUESTING PHYSICIAN: Dr. Shoshana Zapata: Thanks for asking us to see the   patient in neurologic consultation regarding acute stroke. I have   discussed the case with Dr. Adam Kern and also with Dr. Nelly Payne in the   ED. I discussed with his wife at the bedside. HISTORY OF PRESENT ILLNESS: The patient is a 26-year-old   gentleman who was getting therapy as I understand it today for his   broken wrist. He apparently was in his usual state and around 12:30,   she notes that he became confused while going to PT. When he   arrived at PT, he was felt to be confused and basically was not giving   any verbal output. He was taken to the emergency department, had a   head CT that demonstrated nothing acute. He was seen by Julia Damon of Teleneurology, who felt that he was aphasic. He was   within the window for tPA. His blood pressure was a little high and this   was brought into range for tPA administration. He was given tPA. A   little while after, the patient started to vomit. He was taken to head CT,   where he had an emergent noncontrast head CT demonstrating a left   thalamic corona radiata hematoma. He has had some elevated   pressures and those have been treated with labetalol and I have   ordered Cardene. I have also ordered cryo and platelets to reverse the   tPA. Discussion was had with Dr. Adam Kern and Dr. Nelly Payne about   maintaining the patient here at [address], given that this is a deep   bleed and certainly we have our comprehensive center at [facility]   where he could be transferred should he need neurosurgical   intervention. PAST MEDICAL HISTORY: Ilsa Samuelsns that he has actually been a very   healthy gentleman.  The only surgery he had was after he broke his   wrist. He lost his balance and fell a few weeks ago. MEDICATIONS: He takes no medicines at home. ALLERGIES: CHANTIX. SOCIAL HISTORY: Finds he stopped smoking in February. No other   substances. He is . Has a son that lives down in [place]. REVIEW OF SYSTEMS: Unable. PHYSICAL EXAMINATION   GENERAL: On exam, he is lying on a stretcher, comfortable   appearing. VITAL SIGNS: He has a temperature 97.3, pulse 92, blood pressure   140/58, oxygen saturation 93% on 2 liters. HEENT: Anicteric. HEART: Regular. I do not hear a bruit. EXTREMITIES: No edema. NEUROLOGIC: He is awake. He has his eyes closed. He will open   them to voice. He will mumble a few unintelligible words. He does not   follow commands. He will not protrude his tongue. Does not track. He   will move his upper and lower extremities volitionally. Again, does not   follow command to do so. Reflexes are without asymmetry and his   toes withdraw bilaterally. Head CT again as stated. He had a CTA of the head and neck which   did not demonstrate any significant stenosis. LABORATORY DATA: Lipid panel was done showing an LDL of 92.6. Coags fine. Metabolic panel unremarkable. CBC unremarkable. Glucose on arrival 179. IMPRESSION: A 59-year-old gentleman with left hemispheric infarct,   status post tissue plasminogen activator with post-tissue plasminogen   activator bleed, thalamic and corona radiata. This is deep. At this point,   I think he would be safe to stay here at [facility], control his blood   pressure. Certainly, if he decompensates or has intraventricular   extension, then we could certainly transfer him to our comprehensive   center. I have ordered 10 bags of cryoprecipitate as well as 1 pack of   platelets. An hour after the cryoprecipitate, he needs to have another   fibrinogen checked and the goal is to keep fibrinogen over 100.  If it is   less than 100, we will give him 10 more bags of cryoprecipitate. Judicious blood pressure control. I have ordered a Cardene drip and   we will need to keep his pressure under 140. Discussed with nursing   staff. Discussed with physicians as stated above. Discussed with his   wife. I explained the bleed to her. Explained that she did make the right   decision, as she was beating herself somewhat about this. Group will continue to follow. Thanks for your request for consultation.         MD HIREN Goddard / Herlinda Crook   D:  03/28/2017   20:01   T:  03/28/2017   21:41   Job #:  [id number]

## 2017-03-29 NOTE — DIABETES MGMT
DTC Progress Note    Recommendations/ Comments: If appropriate, please consider adding insulin correction scale and POC glucoses after TPA restrictions lifted. Pt's A1c is elevated and no DM meds PTA listed. May benefit from Metformin on discharge. Chart reviewed on Carissa Che. Patient is a 75 y.o. male with known  Type 2 Diabetes on diet at home. A1c:   Lab Results   Component Value Date/Time    Hemoglobin A1c 7.8 03/28/2017 02:52 PM       Recent Glucose Results:   Lab Results   Component Value Date/Time     (H) 03/28/2017 02:52 PM    GLUCPOC 179 (H) 03/28/2017 02:31 PM        Lab Results   Component Value Date/Time    Creatinine 1.02 03/28/2017 02:52 PM       Active Orders   Diet    DIET NPO        PO intake: No data found. Current hospital DM medication: none    Will continue to follow as needed.     Thank you    Paramjit Key RD, CDE

## 2017-03-29 NOTE — PROGRESS NOTES
Problem: Falls - Risk of  Goal: *Absence of falls  Outcome: Progressing Towards Goal  Variance: Patient Condition  Comments: Bed alarm on. Sitter at bedside. Side Rails x 3.

## 2017-03-29 NOTE — PROGRESS NOTES
[facility] [address]   [place] [address]   [place], [address]    [id number]    Fax         Acute CVA with aphasia s/p TPA with post TPA ICH left thalamus    D/w Nurse Ileana events of last pm  S/p 1 unit plt and cryo  Fibrinogen 349  INR nml  req rowland  More awake this am  On Cardene gtt    Visit Vitals    /71    Pulse (!) 114    Temp 98.3 °F (36.8 °C)    Resp 18    Ht 6' (1.829 m)    Wt 108.7 kg (239 lb 9.6 oz)    SpO2 96%    BMI 32.5 kg/m2     Awake, and more alert  Mixed aphasia  Moves all extrems     Imp/plan  Cont close monitoring  Check doppler  SCD  Repeat CT head this pm    Team to follow up later this am

## 2017-03-29 NOTE — CONSULTS
[facility]  Pulmonary, Critical Care, and Sleep Medicine  Name: Julianne Fritz MRN: [id number]   : 1950 Hospital: [address] [facility]   Date: 3/29/2017        Impression Plan   1. Encephalopathy  2. Stroke like sxs s/p TPA  3. Acute head bleed  4. HTN  5. S/p left arm fracture?- seeing Ortho  6. Tobacco abuse               · Nicardipine gtt with tight parameters on BP  · q1h Neuro checks  · Neuro following  · No AC  · SCDs  · Famotidine  · NPO       Pt is critically ill and at risk for neurologic decompensation. Critical care time spent with pt exclusive of procedures was 37 minutes    Radiology  ( personally reviewed) No chest imaging   ABG No results for input(s): PHI, PO2I, PCO2I in the last 72 hours. Subjective     Patient is a 75 y.o. male with PMHx of wrist fracture presenting with acute onset confusion when visiting his orthpedic office. Code Neuro initiated and TPA given. Yesterday evening had increasing headaches and vomiting. HCT repeated and now showing acute bleed. Pt altered and answers yes to every question. Does not follow commands. Review of Systems:  Review of systems not obtained due to patient factors. Past Medical History:   Diagnosis Date    Hemorrhagic stroke ([facility] 3/28/2017    S/p TPA    Ill-defined condition     Eczema    Type II diabetes mellitus ([facility] 3/29/2017      No past surgical history on file.    Prior to Admission medications    Not on File     Current Facility-Administered Medications   Medication Dose Route Frequency    pravastatin (PRAVACHOL) tablet 40 mg  40 mg Oral QHS    sodium chloride (NS) flush 5 mL  5 mL IntraVENous Q8H    sodium chloride (NS) flush 5-10 mL  5-10 mL IntraVENous Q8H    niCARdipine (CARDENE) 25 mg in 0.9% sodium chloride 250 mL infusion  0-15 mg/hr IntraVENous TITRATE     Allergies   Allergen Reactions    Chantix [Varenicline] Rash     Very itchy rash on back      Social History   Substance Use Topics    Smoking status: Current Every Day Smoker     Packs/day: 0.50     Years: 50.00    Smokeless tobacco: Former User     Quit date: 2/28/2017      Comment: cold turkey 2.5 wks ago after attempt Chantix developed allergy    Alcohol use No      Comment: around puneet      Family History   Problem Relation Age of Onset    Diabetes Mother     Cancer Father           Laboratory: I have personally reviewed the critical care flowsheet and labs. Recent Labs      03/28/17   1452   WBC  7.8   HGB  15.0   HCT  43.9   PLT  289     Recent Labs      03/28/17   2250  03/28/17   1452   NA   --   139   K   --   3.6   CL   --   103   CO2   --   27   GLU   --   202*   BUN   --   12   CREA   --   1.02   CA   --   8.9   ALB   --   3.7   SGOT   --   19   ALT   --   36   INR  1.1  1.0       Objective:     Mode Rate Tidal Volume Pressure FiO2 PEEP                    Vital Signs:     TMAX(24)      Intake/Output:   Last shift:         Last 3 shifts:  RRIOLAST3  Intake/Output Summary (Last 24 hours) at 03/29/17 1033  Last data filed at 03/29/17 0414   Gross per 24 hour   Intake              683 ml   Output              700 ml   Net              -17 ml     EXAM:   GENERAL: eyes closed, agitated and moving all extremeties, HEENT:  PERRL, EOMI, no alar flaring or epistaxis, oral mucosa moist without cyanosis, NECK:  no jugular vein distention, no retractions, no thyromegaly or masses, LUNGS: CTA, no w/r/r HEART:  Regular rate and rhythm with no MGR; no edema is present, ABDOMEN:  soft with no tenderness, bowel sounds present, EXTREMITIES:  warm with no cyanosis, SKIN:  no jaundice or ecchymosis and NEUROLOGIC:  Sleepy, moving all extremeites. Answering \"yes\" to every question.     Carlita Duarte MD  [facility]

## 2017-03-29 NOTE — PROGRESS NOTES
Problem: Falls - Risk of  Goal: *Knowledge of fall prevention  Outcome: Progressing Towards Goal  Variance: Patient slowly responding  Comments: Sitter at bedside. Bed alarm on. Side rails time X 3.

## 2017-03-29 NOTE — PROGRESS NOTES
D/w nursing  1 hour after cryo infused check fibrinogen  If fibrinogen is less than 50 give 20 more bags  If greater than 50 and less than 100, then give another 10 bags cryo  Platelets coming  BP doing well

## 2017-03-30 LAB
ANION GAP BLD CALC-SCNC: 11 MMOL/L (ref 5–15)
BASOPHILS # BLD AUTO: 0 K/UL (ref 0–0.1)
BASOPHILS # BLD: 0 % (ref 0–1)
BUN SERPL-MCNC: 19 MG/DL (ref 6–20)
BUN/CREAT SERPL: 19 (ref 12–20)
CALCIUM SERPL-MCNC: 8.4 MG/DL (ref 8.5–10.1)
CHLORIDE SERPL-SCNC: 105 MMOL/L (ref 97–108)
CO2 SERPL-SCNC: 23 MMOL/L (ref 21–32)
CREAT SERPL-MCNC: 0.98 MG/DL (ref 0.7–1.3)
EOSINOPHIL # BLD: 0 K/UL (ref 0–0.4)
EOSINOPHIL NFR BLD: 0 % (ref 0–7)
ERYTHROCYTE [DISTWIDTH] IN BLOOD BY AUTOMATED COUNT: 13.5 % (ref 11.5–14.5)
GLUCOSE BLD STRIP.AUTO-MCNC: 155 MG/DL (ref 65–100)
GLUCOSE BLD STRIP.AUTO-MCNC: 226 MG/DL (ref 65–100)
GLUCOSE BLD STRIP.AUTO-MCNC: 228 MG/DL (ref 65–100)
GLUCOSE SERPL-MCNC: 205 MG/DL (ref 65–100)
HCT VFR BLD AUTO: 37.5 % (ref 36.6–50.3)
HGB BLD-MCNC: 13.5 G/DL (ref 12.1–17)
LYMPHOCYTES # BLD AUTO: 13 % (ref 12–49)
LYMPHOCYTES # BLD: 1.6 K/UL (ref 0.8–3.5)
MCH RBC QN AUTO: 32 PG (ref 26–34)
MCHC RBC AUTO-ENTMCNC: 36 G/DL (ref 30–36.5)
MCV RBC AUTO: 88.9 FL (ref 80–99)
MONOCYTES # BLD: 0.7 K/UL (ref 0–1)
MONOCYTES NFR BLD AUTO: 5 % (ref 5–13)
NEUTS SEG # BLD: 10.7 K/UL (ref 1.8–8)
NEUTS SEG NFR BLD AUTO: 82 % (ref 32–75)
PLATELET # BLD AUTO: 284 K/UL (ref 150–400)
POTASSIUM SERPL-SCNC: 3.5 MMOL/L (ref 3.5–5.1)
RBC # BLD AUTO: 4.22 M/UL (ref 4.1–5.7)
SERVICE CMNT-IMP: ABNORMAL
SODIUM SERPL-SCNC: 139 MMOL/L (ref 136–145)
TSH SERPL DL<=0.05 MIU/L-ACNC: 0.04 UIU/ML (ref 0.36–3.74)
WBC # BLD AUTO: 13 K/UL (ref 4.1–11.1)

## 2017-03-30 PROCEDURE — 84443 ASSAY THYROID STIM HORMONE: CPT | Performed by: INTERNAL MEDICINE

## 2017-03-30 PROCEDURE — 83036 HEMOGLOBIN GLYCOSYLATED A1C: CPT | Performed by: INTERNAL MEDICINE

## 2017-03-30 PROCEDURE — 97530 THERAPEUTIC ACTIVITIES: CPT

## 2017-03-30 PROCEDURE — 74011250636 HC RX REV CODE- 250/636: Performed by: PSYCHIATRY & NEUROLOGY

## 2017-03-30 PROCEDURE — 74011636637 HC RX REV CODE- 636/637: Performed by: INTERNAL MEDICINE

## 2017-03-30 PROCEDURE — 85025 COMPLETE CBC W/AUTO DIFF WBC: CPT | Performed by: INTERNAL MEDICINE

## 2017-03-30 PROCEDURE — 36415 COLL VENOUS BLD VENIPUNCTURE: CPT | Performed by: INTERNAL MEDICINE

## 2017-03-30 PROCEDURE — 97163 PT EVAL HIGH COMPLEX 45 MIN: CPT

## 2017-03-30 PROCEDURE — 80048 BASIC METABOLIC PNL TOTAL CA: CPT | Performed by: INTERNAL MEDICINE

## 2017-03-30 PROCEDURE — 77010033678 HC OXYGEN DAILY

## 2017-03-30 PROCEDURE — 65610000006 HC RM INTENSIVE CARE

## 2017-03-30 PROCEDURE — C1758 CATHETER, URETERAL: HCPCS

## 2017-03-30 PROCEDURE — 74011250637 HC RX REV CODE- 250/637: Performed by: INTERNAL MEDICINE

## 2017-03-30 PROCEDURE — 74011250637 HC RX REV CODE- 250/637: Performed by: NURSE PRACTITIONER

## 2017-03-30 PROCEDURE — 74011000250 HC RX REV CODE- 250: Performed by: PSYCHIATRY & NEUROLOGY

## 2017-03-30 PROCEDURE — 97165 OT EVAL LOW COMPLEX 30 MIN: CPT

## 2017-03-30 PROCEDURE — 92526 ORAL FUNCTION THERAPY: CPT | Performed by: SPEECH-LANGUAGE PATHOLOGIST

## 2017-03-30 PROCEDURE — 82962 GLUCOSE BLOOD TEST: CPT

## 2017-03-30 PROCEDURE — 74011000250 HC RX REV CODE- 250: Performed by: INTERNAL MEDICINE

## 2017-03-30 RX ORDER — MAGNESIUM SULFATE 100 %
4 CRYSTALS MISCELLANEOUS AS NEEDED
Status: DISCONTINUED | OUTPATIENT
Start: 2017-03-30 | End: 2017-04-04 | Stop reason: HOSPADM

## 2017-03-30 RX ORDER — INSULIN LISPRO 100 [IU]/ML
INJECTION, SOLUTION INTRAVENOUS; SUBCUTANEOUS EVERY 6 HOURS
Status: DISCONTINUED | OUTPATIENT
Start: 2017-03-30 | End: 2017-03-30

## 2017-03-30 RX ORDER — AMLODIPINE BESYLATE 5 MG/1
5 TABLET ORAL DAILY
Status: DISCONTINUED | OUTPATIENT
Start: 2017-03-30 | End: 2017-03-31

## 2017-03-30 RX ORDER — INSULIN LISPRO 100 [IU]/ML
INJECTION, SOLUTION INTRAVENOUS; SUBCUTANEOUS
Status: DISCONTINUED | OUTPATIENT
Start: 2017-03-30 | End: 2017-04-04 | Stop reason: HOSPADM

## 2017-03-30 RX ORDER — DEXTROSE 50 % IN WATER (D50W) INTRAVENOUS SYRINGE
12.5-25 AS NEEDED
Status: DISCONTINUED | OUTPATIENT
Start: 2017-03-30 | End: 2017-04-04 | Stop reason: HOSPADM

## 2017-03-30 RX ADMIN — SODIUM CHLORIDE 13 MG/HR: 900 INJECTION, SOLUTION INTRAVENOUS at 05:59

## 2017-03-30 RX ADMIN — Medication 10 ML: at 21:01

## 2017-03-30 RX ADMIN — SODIUM CHLORIDE 11 MG/HR: 900 INJECTION, SOLUTION INTRAVENOUS at 08:10

## 2017-03-30 RX ADMIN — Medication 5 ML: at 07:04

## 2017-03-30 RX ADMIN — SODIUM CHLORIDE 15 MG/HR: 900 INJECTION, SOLUTION INTRAVENOUS at 04:07

## 2017-03-30 RX ADMIN — Medication 5 ML: at 13:26

## 2017-03-30 RX ADMIN — FAMOTIDINE 20 MG: 10 INJECTION, SOLUTION INTRAVENOUS at 21:00

## 2017-03-30 RX ADMIN — Medication 10 ML: at 13:26

## 2017-03-30 RX ADMIN — SODIUM CHLORIDE 11 MG/HR: 900 INJECTION, SOLUTION INTRAVENOUS at 01:45

## 2017-03-30 RX ADMIN — SODIUM CHLORIDE 7.5 MG/HR: 900 INJECTION, SOLUTION INTRAVENOUS at 08:34

## 2017-03-30 RX ADMIN — AMLODIPINE BESYLATE 5 MG: 5 TABLET ORAL at 10:55

## 2017-03-30 RX ADMIN — SODIUM CHLORIDE 5 MG/HR: 900 INJECTION, SOLUTION INTRAVENOUS at 08:46

## 2017-03-30 RX ADMIN — FAMOTIDINE 20 MG: 10 INJECTION, SOLUTION INTRAVENOUS at 09:45

## 2017-03-30 RX ADMIN — PRAVASTATIN SODIUM 40 MG: 20 TABLET ORAL at 21:00

## 2017-03-30 RX ADMIN — INSULIN LISPRO 3 UNITS: 100 INJECTION, SOLUTION INTRAVENOUS; SUBCUTANEOUS at 17:41

## 2017-03-30 RX ADMIN — Medication 10 ML: at 07:04

## 2017-03-30 NOTE — PROGRESS NOTES
Bedside and Verbal shift change report given to Yazmin Washington, RN & Mari Gale RN  (oncoming nurse) by Ivana Marin RN (offgoing nurse). Report included the following information SBAR, Kardex, Intake/Output, MAR and Recent Results. 1930: Spoke to Dr. Dilia Garza, and he said to go ahead and cancel MRI and go on with CT of the head without contrast.   2000: Patient taken down to CT with RN and Student. 2200: VS remain stable. Cardene adjusted to maintain BP below 284 Systolic per MD.  6489: VS stable. Patient turns self. Patient oriented to himself but not to situation, place, or time. Patient showing no signs of pain or discomfort at this time. 0300: Labs drawn. VS Stable. No change. 0500: VS Stable. Cardene titrated to maintain BP. No signs or symptoms of discomfort at this time. Bedside and Verbal shift change report given to Willard Quintero RN  (oncoming nurse) by Yazmin Washington RN & Juan Wilson RN  (offgoing nurse). Report included the following information SBAR, Kardex, Intake/Output, MAR and Recent Results.

## 2017-03-30 NOTE — PROGRESS NOTES
Problem: Falls - Risk of  Goal: *Absence of falls  In progress. Goal: *Knowledge of fall prevention  In progress. Family aware        Problem: Patient Education: Go to Patient Education Activity  Goal: Patient/Family Education  Pt and wife educated'    Problem: Pressure Ulcer - Risk of  Goal: *Prevention of pressure ulcer  In progress. Problem: Patient Education: Go to Patient Education Activity  Goal: Patient/Family Education  In progress. Problem: Patient Education: Go to Patient Education Activity  Goal: Patient/Family Education  In progress. Problem: Ischemic Stroke: 0-24 hours  Goal: Activity/Safety  In progress. Goal: Consults, if ordered  In progress. Goal: Diagnostic Test/Procedures  In progress. Goal: Nutrition/Diet  In progress. Goal: Discharge Planning  In progress. Goal: Medications  In progress. Goal: Respiratory  In progress. Goal: Treatments/Interventions/Procedures  In progress. Goal: Psychosocial  In progress. Goal: *Hemodynamically stable  In progress. Goal: *Neurologically stable  Absence of additional neurological deficits   In progress. Goal: *Absence of Signs of Aspiration on Current Diet  In progress. Goal: *Absence of deep venous thrombosis signs and symptoms(Stroke Metric)  In progress. Goal: *Ability to perform ADLs and demonstrates progressive mobility and function  In progress. Goal: *Patient received antithrombotic(Stroke Metric)  In progress. Goal: *Stroke education started(Stroke Metric)  In progress. Goal: *Dysphagia screen performed(Stroke Metric)  In progress. Goal: *Rehab consulted(Stroke Metric)  In progress. Problem: Ischemic Stroke: Day 2  Goal: Activity/Safety  In progress. Goal: Diagnostic Test/Procedures  In progress. Goal: Nutrition/Diet  In progress. Goal: Discharge Planning  In progress. Goal: Medications  In progress. Goal: Respiratory  In progress. Goal: Treatments/Interventions/Procedures  In progress.   Goal: Psychosocial  In progress. Goal: *Verbalizes anxiety and depression are reduced or absent  In progress. Goal: *Absence of aspiration  In progress. Goal: *Absence of deep venous thrombosis signs and symptoms(Stroke Metric)  In progress. Goal: *Optimal pain control at patients stated goal  In progress. Goal: *Tolerating diet  In progress. Goal: *Ability to perform ADLs and demonstrates progressive mobility and function  In progress. Goal: *Patient received antithrombotic(Stroke Metric)  In progress. Goal: *Stroke education continued(Stroke Metric)  In progress.

## 2017-03-30 NOTE — PROGRESS NOTES
I received notification from pt.'s outpatient  that pt is out of network for inpatient rehab @ [facility]. He is in-network for inpatient rehab @ [facility]. For SNF choices,pt is in-network with [facility] [facility]. I met with pt.'s wife to inform her of the above. A relative was in [employer] and did very well @ their rehab. Wife is very pleased with this choice for inpatient rehabs. In talking with PT and OT,their recommendations are for inpatient rehab. Clinicals faxed to inpatient rehab @ [facility].   Kaila Rodriguez

## 2017-03-30 NOTE — PROGRESS NOTES
Problem: Self Care Deficits Care Plan (Adult)  Goal: *Acute Goals and Plan of Care (Insert Text)  Occupational Therapy Goals  Initiated 3/30/2017  1. Patient will perform grooming with minimum assistance using B UE with best technique within 7 day(s). 2. Patient will perform self feeding with supervision/setup using B UE with best technique within 7 days. 3. Patient will perform upper body dressing and bathing with minimum assistance with best technique within 7 day(s). 4. Patient will perform lower body dressing and bathing with maximum assistance with best technique within 7 day(s). 5. Patient will perform toilet transfers with moderate assistance to [facility] using best technique within 7 day(s). 6. Patient will participate in upper extremity therapeutic exercise/activities with minimal assistance with focus on B UE strength and coordination within 7 day(s). 7. Patient will use R, dominant, UE 50% of the time as fine manipulator with no cuing to increase independence with all ADL tasks within 7 days. 8. Patient will follow 75% of simple, one-step commands accurately during functional tasks within 7 days. 9.  Patient will engage/participate in full Fugl-Stevenson Assessment of Upper Extremity within 7 days. OCCUPATIONAL THERAPY EVALUATION  Patient: Raquel Chandler (79 y.o. male)  Date: 3/30/2017  Primary Diagnosis: CVA (cerebral vascular accident) ([facility] [address]        Precautions:  Fall,NWB Left wrist,ICH BP paramaters      ASSESSMENT :  Based on the objective data described below, the patient presents with significantly decreased activity tolerance in completing ADLs and functional mobility. Patient was admitted on 3/28 for slurred speech and confusion. Patient was sent to the ED after onset of symptoms occurred during outpatient PT tx for L distal radius fracture + repair (due to fall).   After admission, patient received tPA and neuro work-up revealed left intercranial hemorrhage at thalamus and small subarachnoid hemorrhage in the occipital lobe (repeat CT). Patient was received in bed initially lethargic however able to maintain arousal with constant stimuli. Patient attempts to follow ~50% of commands, although accuracy limited by multiple factors (apraxia + aphasia). Patient tends to physically initiate with non-dominant, L UE, indicating inattention to R (dominant). Additionally, patient is limited by B UE/LE weakness (R>L), impaired B UE/LE coordination/motor apraxia, global aphasia, impaired balance, and limited use of L UE distally due to prior radial fx (splint in place). Patient required mod A x2 for both bed mobility and OOB transfers; Heolerated sit-stand transfer and able to take several steps to [facility] today. Patient required direct assistance with midline orientation + weight shifting to take several steps to [facility]. Patient currently requires min A for feeding, max A for grooming and dressing, and total A for toileting and bathing. Patient's wife was present to provide background information. PTA, patient was independent with all self care and did not require equipment for mobility; She does reference several falls, including fall that resulted in wrist fracture, mostly occurring while toileting or within kitchen. Educated wife on how to provide adequate stimulus throughout the day, proper cuing (verbal + tactile) and facilitate safe, controlled movements as able. Patient responds best to simple, one step verbal commands or tactile cuing for improved carryover. Patient would benefit from continued skilled OT to progress towards goals and improve overall independence. Unable to perform full Fugl-Stevenson Assessment of Upper Extremity due to apraxia + global aphasia. Will continue to attempt in future OT txs. Patient will benefit from skilled intervention to address the above impairments.   Patients rehabilitation potential is considered to be Good  Factors which may influence rehabilitation potential include:   [ ]             None noted  [ ]             Mental ability/status  [X]             Medical condition  [ ]             Home/family situation and support systems  [ ]             Safety awareness  [ ]             Pain tolerance/management  [ ]             Other:        PLAN :  Recommendations and Planned Interventions:  [X]               Self Care Training                  [X]        Therapeutic Activities  [X]               Functional Mobility Training    [X]        Cognitive Retraining  [X]               Therapeutic Exercises           [X]        Endurance Activities  [X]               Balance Training                   [X]        Neuromuscular Re-Education  [X]               Visual/Perceptual Training     [X]   Home Safety Training  [X]               Patient Education                 [X]        Family Training/Education  [ ]               Other (comment):     Frequency/Duration: Patient will be followed by occupational therapy 5-6 times a week to address goals. Discharge Recommendations: Inpatient Rehab  Further Equipment Recommendations for Discharge: none at this time       SUBJECTIVE:   Patient stated Lencho son. ..son...son. ...   Patient attempting to verbalize his name. Patient's wife reports his middle name is \"Son\" and indicates she feels he is attempting to say Joshua Oliva; Patient's first and middle name. OBJECTIVE DATA SUMMARY:   HISTORY:   Past Medical History:   Diagnosis Date    Hemorrhagic stroke ([facility] [address] 3/28/2017     S/p TPA    Ill-defined condition       Eczema    Type II diabetes mellitus ([facility] [address] 3/29/2017   No past surgical history on file. Prior Level of Function/Home Situation: Patient lives with his wife. See assessment section for PLOF information.      Home Situation  Home Environment: Private residence  # Steps to Enter: 4  Rails to Enter: Yes  Hand Rails : Bilateral  One/Two Story Residence: Two story  # of Interior Steps: 12  Lift Chair Available: No  Living Alone: No  Support Systems: Family member(s)  Patient Expects to be Discharged to[de-identified] Private residence  Current DME Used/Available at Home: None  [X]  Right hand dominant             [ ]  Left hand dominant     EXAMINATION OF PERFORMANCE DEFICITS:  Cognitive/Behavioral Status:  Neurologic State:  (initially lethargic;arousal maintained w/constant stimuli)  Orientation Level:  (unable to accurately able to verbalize-aphasia)  Cognition: Decreased command following;Decreased attention/concentration;Poor safety awareness (attempts to follow ~50% of commands)  Perception: Tactile;Verbal;Visual  Perseveration: No perseveration noted  Safety/Judgement: Decreased awareness of environment     Skin: Intact in the uppers     Edema: None noted in the uppers     Hearing: Auditory  Auditory Impairment: None     Vision/Perceptual:    Tracking: Unable to test secondary due to decreased visual attention (able to track movement R + L across laterally)    Saccades: Unable to test secondary to decreased visual attention    Convergence: Unable to test secondary due to decreased visual attention       Range of Motion:  AROM: Generally decreased, functional with formal assessment however displayed full range with unintentional movements  PROM: Within functional limits     Strength:  Strength: Generally decreased, functional in the uppers     Coordination:  Coordination: Grossly decreased, non-functional  Fine Motor Skills-Upper: Left Impaired;Right Impaired    Gross Motor Skills-Upper: Left Impaired;Right Impaired     Tone & Sensation:  Tone: Normal  Sensation: Intact      Balance:  Sitting: Impaired;High guard  Sitting - Static: Fair (occasional)  Sitting - Dynamic: Fair (occasional)  Standing: Impaired  Standing - Static: Poor  Standing - Dynamic : Poor     Functional Mobility and Transfers for ADLs:  Bed Mobility:  Supine to Sit: Moderate assistance  Sit to Supine: Moderate assistance;Assist x2     Transfers:  Sit to Stand:  Moderate assistance;Assist x2  Stand to Sit: Moderate assistance;Assist x2     ADL Assessment:  Feeding: Minimum assistance     Oral Facial Hygiene/Grooming: Maximum assistance     Bathing: Total assistance     Upper Body Dressing: Maximum assistance     Lower Body Dressing: Maximum assistance     Toileting: Total assistance     Cognitive Retraining  Safety/Judgement: Decreased awareness of environment        Functional Measure:  Barthel Index:      Bathin  Bladder: 0  Bowels: 5  Groomin  Dressin  Feedin  Mobility: 0  Stairs: 0  Toilet Use: 0  Transfer (Bed to Chair and Back): 5  Total: 15         Barthel and G-code impairment scale:  Percentage of impairment CH  0% CI  1-19% CJ  20-39% CK  40-59% CL  60-79% CM  80-99% CN  100%   Barthel Score 0-100 100 99-80 79-60 59-40 20-39 1-19    0   Barthel Score 0-20 20 17-19 13-16 9-12 5-8 1-4 0      The Barthel ADL Index: Guidelines  1. The index should be used as a record of what a patient does, not as a record of what a patient could do. 2. The main aim is to establish degree of independence from any help, physical or verbal, however minor and for whatever reason. 3. The need for supervision renders the patient not independent. 4. A patient's performance should be established using the best available evidence. Asking the patient, friends/relatives and nurses are the usual sources, but direct observation and common sense are also important. However direct testing is not needed. 5. Usually the patient's performance over the preceding 24-48 hours is important, but occasionally longer periods will be relevant. 6. Middle categories imply that the patient supplies over 50 per cent of the effort. 7. Use of aids to be independent is allowed. Cassia Burns., Barthel, D.W. (1352). Functional evaluation: the Barthel Index. [facility] (14)2. DILSHAD Rdz, Karthik Bella., Fabian Johnson., Richa Mosquera, [address] [place] [address] ().  Measuring the change indisability after inpatient rehabilitation; comparison of the responsiveness of the Barthel Index and Functional [place] Measure. Journal of Neurology, Neurosurgery, and Psychiatry, 66(4), 989-936. CATHERINE Littlejohn.A, ANIG Govea, & Alta Quispe M.A. (2004.) Assessment of post-stroke quality of life in cost-effectiveness studies: The usefulness of the Barthel Index and the EuroQoL-5D. Quality of Life Research, 13, 373-05         G codes: In compliance with CMSs Claims Based Outcome Reporting, the following G-code set was chosen for this patient based on their primary functional limitation being treated: The outcome measure chosen to determine the severity of the functional limitation was the Barthel Index with a score of 15/100 which was correlated with the impairment scale. · Self Care:               - CURRENT STATUS:    CM - 80%-99% impaired, limited or restricted               - GOAL STATUS:           CL - 60%-79% impaired, limited or restricted               - D/C STATUS:                       ---------------To be determined---------------      Occupational Therapy Evaluation Charge Determination   History Examination Decision-Making   LOW Complexity : Brief history review  HIGH Complexity : 5 or more performance deficits relating to physical, cognitive , or psychosocial skils that result in activity limitations and / or participation restrictions HIGH Complexity : Patient presents with comorbidities that affect occupational performance. Signifigant modification of tasks or assistance (eg, physical or verbal) with assessment (s) is necessary to enable patient to complete evaluation       Based on the above components, the patient evaluation is determined to be of the following complexity level: LOW   Pain:  Pain Scale 1: Adult Nonverbal Pain Scale  Pain Intensity 1: 0     Activity Tolerance:   Patient with fair activity tolerance.     Please refer to the flowsheet for vital signs taken during this treatment. After treatment:   [ ] Patient left in no apparent distress sitting up in chair  [X] Patient left in no apparent distress in bed in chair position   [X] Call bell left within reach  [X] Nursing notified  [X] Caregiver present-wife  [ ] Bed alarm activated      COMMUNICATION/EDUCATION:   The patients plan of care was discussed with: Physical Therapist, Registered Nurse and patient and wife.  [X] Home safety education was provided and the patient/caregiver indicated understanding. [X] Patient/family have participated as able in goal setting and plan of care. [X] Patient/family agree to work toward stated goals and plan of care. [ ] Patient understands intent and goals of therapy, but is neutral about his/her participation. [ ] Patient is unable to participate in goal setting and plan of care. This patients plan of care is appropriate for delegation to [facility].      Thank you for this referral.  Melquiades Cole, OTR/L  Time Calculation: 44 mins

## 2017-03-30 NOTE — PROGRESS NOTES
[facility]rology  [address] [place] [address]  [phone]     Inpatient Neurology Progress Note  ERENDIRA Dennison    Name:   Libby Wei record #: [id number]  Admission Date: 3/28/2017  Date:   03/30/17  _____________________________________________________________________________    Subjective:  CC:  Pt cannot provide ROS with acuity of illness  ·  Cardene gtt being weaned and oral agents started  · Less anxious today  · What is your name ? Warm pt stated  · No overnight events  Denies:   Couldn't provide ROS  _____________________________________________________________________________  Objective  Patient Vitals for the past 12 hrs:   Temp Pulse Resp BP SpO2   03/30/17 0715 98.7 °F (37.1 °C) - - - -   03/30/17 0701 - (!) 106 - - -   03/30/17 0645 - (!) 103 18 138/68 95 %   03/30/17 0600 - (!) 114 8 119/55 97 %   03/30/17 0530 - (!) 112 21 144/71 95 %   03/30/17 0500 - (!) 106 17 152/71 94 %   03/30/17 0430 - (!) 105 (!) 7 133/65 95 %   03/30/17 0400 - (!) 113 30 173/77 95 %   03/30/17 0300 - (!) 110 9 149/81 -   03/30/17 0200 - (!) 111 20 139/49 -   03/30/17 0100 - (!) 112 19 135/49 95 %   03/30/17 0000 98.7 °F (37.1 °C) (!) 107 12 134/55 93 %   03/29/17 2300 - (!) 113 21 121/45 97 %   03/29/17 2258 - (!) 115 - - -   03/29/17 2200 - (!) 110 16 153/75 97 %   03/29/17 2130 98.8 °F (37.1 °C) - - - -   03/29/17 2100 - (!) 109 30 140/69 96 %     Allergies:    Allergies   Allergen Reactions    Chantix [Varenicline] Rash     Very itchy rash on back     Inpatient Meds    Current Facility-Administered Medications:     pravastatin (PRAVACHOL) tablet 40 mg, 40 mg, Oral, QHS, Lashae Naidu NP    famotidine (PF) (PEPCID) 20 mg in sodium chloride 0.9 % 10 mL injection, 20 mg, IntraVENous, Q12H, Ghanshyam Jordan MD, 20 mg at 03/29/17 2122    sodium chloride (NS) flush 5 mL, 5 mL, IntraVENous, Q8H, Selma Sullivan MD, 5 mL at 03/30/17 0704    sodium chloride (NS) flush 5 mL, 5 mL, IntraVENous, PRN, Cassidy Miller MD    labetalol (NORMODYNE;TRANDATE) injection 10 mg, 10 mg, IntraVENous, Q10MIN PRN, Cassidy Miller MD, 10 mg at 03/28/17 1553    sodium chloride (NS) flush 5-10 mL, 5-10 mL, IntraVENous, Q8H, Brando Dewey MD, 10 mL at 03/30/17 0704    sodium chloride (NS) flush 5-10 mL, 5-10 mL, IntraVENous, PRN, Brando Dewey MD    ondansetron [facility]) injection 4 mg, 4 mg, IntraVENous, Q6H PRN, Brando Dewey MD, 4 mg at 03/28/17 1754    prochlorperazine (COMPAZINE) injection 10 mg, 10 mg, IntraVENous, Q4H PRN, Brando Dewey MD, 10 mg at 03/28/17 1833    0.9% sodium chloride infusion 250 mL, 250 mL, IntraVENous, PRN, Demario Muñiz MD    niCARdipine (CARDENE) 25 mg in 0.9% sodium chloride 250 mL infusion, 0-15 mg/hr, IntraVENous, TITRATE, Demario Muñiz MD, Last Rate: 110 mL/hr at 03/30/17 0810, 11 mg/hr at 03/30/17 0810  Labs Reviewed  Recent Results (from the past 12 hour(s))   CBC WITH AUTOMATED DIFF    Collection Time: 03/30/17  2:47 AM   Result Value Ref Range    WBC 13.0 (H) 4.1 - 11.1 K/uL    RBC 4.22 4.10 - 5.70 M/uL    HGB 13.5 12.1 - 17.0 g/dL    HCT 37.5 36.6 - 50.3 %    MCV 88.9 80.0 - 99.0 FL    MCH 32.0 26.0 - 34.0 PG    MCHC 36.0 30.0 - 36.5 g/dL    RDW 13.5 11.5 - 14.5 %    PLATELET 572 824 - 905 K/uL    NEUTROPHILS 82 (H) 32 - 75 %    LYMPHOCYTES 13 12 - 49 %    MONOCYTES 5 5 - 13 %    EOSINOPHILS 0 0 - 7 %    BASOPHILS 0 0 - 1 %    ABS. NEUTROPHILS 10.7 (H) 1.8 - 8.0 K/UL    ABS. LYMPHOCYTES 1.6 0.8 - 3.5 K/UL    ABS. MONOCYTES 0.7 0.0 - 1.0 K/UL    ABS. EOSINOPHILS 0.0 0.0 - 0.4 K/UL    ABS.  BASOPHILS 0.0 0.0 - 0.1 K/UL   METABOLIC PANEL, BASIC    Collection Time: 03/30/17  2:47 AM   Result Value Ref Range    Sodium 139 136 - 145 mmol/L    Potassium 3.5 3.5 - 5.1 mmol/L    Chloride 105 97 - 108 mmol/L    CO2 23 21 - 32 mmol/L    Anion gap 11 5 - 15 mmol/L    Glucose 205 (H) 65 - 100 mg/dL    BUN 19 6 - 20 MG/DL    Creatinine 0.98 0.70 - 1.30 MG/DL BUN/Creatinine ratio 19 12 - 20      GFR est AA >60 >60 ml/min/1.73m2    GFR est non-AA >60 >60 ml/min/1.73m2    Calcium 8.4 (L) 8.5 - 10.1 MG/DL   TSH 3RD GENERATION    Collection Time: 03/30/17  2:47 AM   Result Value Ref Range    TSH 0.04 (L) 0.36 - 3.74 uIU/mL       Imaging  Reviewed:   CT Results (recent):    Results from [place] encounter on 03/28/17   CT HEAD WO CONT   Narrative EXAM:  CT HEAD WO CONT    INDICATION:   Acute intracranial hemorrhage. Evaluate interval change    COMPARISON: None. TECHNIQUE: Unenhanced CT of the head was performed using 5 mm images. Brain and  bone windows were generated. CT dose reduction was achieved through use of a  standardized protocol tailored for this examination and automatic exposure  control for dose modulation. FINDINGS:  Left thalamic hemorrhage is again noted now measuring 2.4 x 1.0 x 1.8 cm (volume  1.4 cc) small amount of interventricular hemorrhage is again noted. This has  increased slightly. Ventricular size unchanged. Low density within the  periventricular mild matter unchanged. Small amount of subarachnoid hemorrhage  overlies the occipital lobes. .         Impression IMPRESSION:     Left thalamic hemorrhage with minimal increase in intraventricular and  subarachnoid hemorrhage. MRI Results (recent):  No results found for this or any previous visit. Physical Exam  General:   male in NAD  Chest:  Regular rhythm and tachycardic rate, clear BBS  Neurologic:  Appears tired, LOZANO passively, reflexes throughout +2  Eyes:  Pinpoint pupils NR, eyes opening but not tracking with EOM, making eye contact, R eye ptosis  Speech:   Aphasia-expressive/receptive and apraxia (global)  Is your name Mr. Spangler All? Pt:  Warm  Is this your wife, yes or no? Pt:  warm  Mentation:  More arousable and cooperative today    Commands:   Follows minimal commands, 1 step only  Strength: LOZANO passively, R hand seems slightly weaker, moving L leg more than right  Sensation:  States warm then \"Brrr\" and shivers when arms and legs touched (with cold hands)    _____________________________________________________________________________  Assessment:   1.  L hemispheric intracerebral hemorrhage s/p TPA: L corona Radiata  2. Encephalopathy:  Post ICH and likely CVA  3. HTN  4. DMT2- new dx  5. Aphasia:  Expressive and receptive-- post ICH and likely CVA  6. Apraxia  7. Tobacco abuse    Plan  · Repeat head CT with small change since previous day but stable---strict BP control< 140/90---wean Cardene gtt and start oral agents per attending  · Continue to hold ASA due to [address] post TPA---will repeat CT or MRI brain to eval for CVA/admitting s/s in next day or 2 when patient less anxious  · Wait on transfer until tomorrow please    · Continue pravastatin  ·  results discussed with pt and wife --- questions were answered. My collaborating care team physician may have further recommendations.     On DVT Prophylaxis yes no   Continue  SCDs while inpatient x      Care Plan discussed with:  Patient x   Family- wife x   RN x   Care Manager    Consultant/Specialist:  x   Patient will be discussed with Dr. Emiliana Freitas Problems  Date Reviewed: 3/29/2017          Codes Class Noted POA    Type II diabetes mellitus ([facility] ICD-10-CM: E11.9  ICD-9-CM: 250.00  3/29/2017 Yes        Acute encephalopathy ICD-10-CM: G93.40  ICD-9-CM: 348.30  3/28/2017 Yes        Aphasia ICD-10-CM: R47.01  ICD-9-CM: 784.3  3/28/2017 Yes        HTN (hypertension), malignant ICD-10-CM: I10  ICD-9-CM: 401.0  3/28/2017 Yes        Fracture of radius, distal, left, closed ICD-10-CM: S52.502A  ICD-9-CM: 813.42  3/28/2017 Yes        Tobacco use disorder ICD-10-CM: F17.200  ICD-9-CM: 305.1  3/28/2017 Yes        Received intravenous tissue plasminogen activator (tPA) in emergency department ICD-10-CM: Z92.82  ICD-9-CM: V45.88  3/28/2017 Yes        Hemorrhage, intracerebral ([facility] [address] ICD-10-CM: I61.9  ICD-9-CM: 559 3/28/2017 Yes            ________________________________________________  - Enrrique Siu [facility]-BC  03/30/17  ================================================    ADDENDUM--> Collaborating Care Team Physician:                                              I have reviewed the documentation provided by the nurse practitioner, Ms. Tameka Garcia, and we have discussed her findings and the clinical impression. I have formulated with her the proposed management plans for this patient. Additionally,  I have personally evaluated the patient to verify the history and to confirm physical findings. Below are my additional comments:    Started on norvasc and pravachol today  No new issue  Speech cleared for clears  D/w nursing  BP has been doing well overall  Off cardene    Awake, and well appearing  Will attend  Says good   Mixed global aphasia  Moves all extrems    Continue with support  If BP remains controlled under 279 systolic overnight tfer to [facility] in am  Will f/u tomorrow    ART Macario MD

## 2017-03-30 NOTE — PROGRESS NOTES
CT reviewed  No significant change  Pt has been clinically stable  Cont support, BP control    ART Macario MD

## 2017-03-30 NOTE — PROGRESS NOTES
[facility]       INTENSIVIST DAILY PROGRESS NOTE  Name: Anne Quispe   : 1950   MRN: [id number]   Date: 3/30/2017 10:49 AM     I have reviewed the flowsheet and previous days notes. The patient is awake and noted to be interactive with his wife. She notes that he is recognizing her, holding her hand, and he kissed her this AM.  Still not speaking in long sentences, and also mostly resting his eyes, per his wife. Overnight events reviewed:  · Nicardipine drip 5mg/hr, BP at goal  · Repeat head CT shows 6cc hematoma--> 1.4cc, clinically insignificant increase per Neuro      Vital Signs:    Visit Vitals    /53    Pulse (!) 112    Temp 98.7 °F (37.1 °C)    Resp 15    Ht 6' (1.829 m)    Wt 238 lb 3.2 oz (108 kg)    SpO2 96%    BMI 32.31 kg/m2       O2 Device: Room air   O2 Flow Rate (L/min): 2 l/min   Temp (24hrs), Av °F (37.2 °C), Min:98.5 °F (36.9 °C), Max:100.6 °F (38.1 °C)       Intake/Output:   Last shift:       0701 -  1900  In: 146.7 [I.V.:146.7]  Out: 900 [Urine:900]  Last 3 shifts:  190 -  0700  In: 3411.3 [I.V.:3411.3]  Out: 1500 [Urine:1500]    Intake/Output Summary (Last 24 hours) at 17 0857  Last data filed at 17 0800   Gross per 24 hour   Intake             2627 ml   Output             1700 ml   Net              927 ml          Physical Exam:  General:  Awake, resting, NAD   Head:  Normocephalic, without obvious abnormality, atraumatic. Eyes:  Conjunctivae/corneas clear. PERRL. Nose: Nares normal. Septum midline. Mucosa normal. No drainage. Throat: Lips, mucosa, and tongue normal. Teeth and gums normal.   Neck: Supple, symmetrical, trachea midline, no adenopathy, no carotid bruit, and no JVD   Lungs:   Clear to auscultation bilaterally. Heart:  Regular rate and rhythm, S1, S2 normal, no murmur, click, rub or gallop. Abdomen:   Soft, non-tender. Bowel sounds normal. No masses,  No organomegaly. Extremities: Extremities normal, atraumatic, no cyanosis or edema. Skin: Skin color, texture, turgor normal.   Neurologic: Observe to purposefully move UE scratching face, holding wife's hand. Turns head and focuses eyes on examiner, tracking to voice. He is able to speak some words in mixed [place] and English to his wife. Spontaneously moving both LE. DATA:   Current Facility-Administered Medications   Medication Dose Route Frequency    pravastatin (PRAVACHOL) tablet 40 mg  40 mg Oral QHS    famotidine (PF) (PEPCID) 20 mg in sodium chloride 0.9 % 10 mL injection  20 mg IntraVENous Q12H    sodium chloride (NS) flush 5 mL  5 mL IntraVENous Q8H    sodium chloride (NS) flush 5 mL  5 mL IntraVENous PRN    labetalol (NORMODYNE;TRANDATE) injection 10 mg  10 mg IntraVENous Q10MIN PRN    sodium chloride (NS) flush 5-10 mL  5-10 mL IntraVENous Q8H    sodium chloride (NS) flush 5-10 mL  5-10 mL IntraVENous PRN    ondansetron (ZOFRAN) injection 4 mg  4 mg IntraVENous Q6H PRN    prochlorperazine (COMPAZINE) injection 10 mg  10 mg IntraVENous Q4H PRN    0.9% sodium chloride infusion 250 mL  250 mL IntraVENous PRN    niCARdipine (CARDENE) 25 mg in 0.9% sodium chloride 250 mL infusion  0-15 mg/hr IntraVENous TITRATE             Labs:  Recent Labs      03/30/17   0247  03/28/17   1452   WBC  13.0*  7.8   HGB  13.5  15.0   HCT  37.5  43.9   PLT  284  289     Recent Labs      03/30/17   0247  03/28/17   2250  03/28/17   1452   NA  139   --   139   K  3.5   --   3.6   CL  105   --   103   CO2  23   --   27   GLU  205*   --   202*   BUN  19   --   12   CREA  0.98   --   1.02   CA  8.4*   --   8.9   ALB   --    --   3.7   SGOT   --    --   19   ALT   --    --   36   INR   --   1.1  1.0     No results for input(s): PH, PCO2, PO2, HCO3, FIO2 in the last 72 hours. Imaging:  I have personally reviewed the patients radiographs and reports.        IMPRESSION:   · ICH s/p tPA for ischemic stroke presenting w/ aphasia  · Hypertension  · Encephalopathy  · Diabetes T2  · L wrist fracture PTA   PLAN:   · Nicardipine drip to titrate BP, goal < 140/90  · Replete electrolytes as needed  · Incentive spirometry  · Daily PT/OT  · Tight glycemic control  · Nutrition: full liquids per Speech   GLOBAL ISSUES:   · Head of bed elevated  · GI Prophylaxis: none currently  · DVT Prophylaxis: SCDs  · Lines: 20G R hand  · Medical Decision Maker: wife currently     The pt is critically ill.     See my orders for details    My assessment/plan was discussed with: Dr. Alanna Nicholas (attending physician), nurse, pt's family      Breanne Young MD  Family Medicine Resident

## 2017-03-30 NOTE — PROGRESS NOTES
Problem: Mobility Impaired (Adult and Pediatric)  Goal: *Acute Goals and Plan of Care (Insert Text)  Physical Therapy Goals  Initiated 3/30/2017  1. Patient will move from supine to sit and sit to supine in bed with minimal assistance/contact guard assist within 7 day(s). 2. Patient will transfer from bed to chair and chair to bed with minimal assistance/contact guard assist using the least restrictive device within 7 day(s). 3. Patient will perform sit to stand with moderate assistance x1 within 7 day(s). 4. Patient will ambulate with moderate assistance for 50 feet with the least restrictive device within 7 day(s). 5. Patient will improve Irving Balance score by 7 points within 7 days. PHYSICAL THERAPY EVALUATION- NEURO POPULATION     Patient: Shelia Martell (65 y.o. male)  Date: 3/30/2017  Primary Diagnosis: CVA (cerebral vascular accident) [facility])        Precautions:   Fall, Other (comment) (NWB Left wrist,ICH BP paramaters)      ASSESSMENT :  Based on the objective data described below, the patient presents with motor apraxia, decreased coordination and motor planning, decreased balance and right sided strength following admission for CVA. Patient was at an outpatient therapy session due to recent Left distal radius fracture with resulting surgical repair. Patient with slurred speech and sent to the ER. Patient received tPA, repeat CT shows Left intercranial hemorrhage at thalamus and small subarachnoid hemorrhage in the occipital lobe. Patient currently benefit from a low stimulus environment, is highly distractible, is able to follow 1-step commands with 50% accuracy. He has increased motor apraxia with increased perseveration at times. Patient favors the Left side, which is his non-dominant. He has continued decreased verbalization, minimal vocalization of yes and no. Continues to present with aphasia.  Patient demonstrated fair sitting balance, no loss of balance in static sitting, with dynamic sitting and increased posterior lean but not loss of balance. In standing required MOD A x2 for stabilization, no incidence of knee buckling, does have increased sequencing difficulties due to the motor sequencing/planning deficits and requires max tactile cuing at the Right LE for advancement and initiation of movement. MAX A for weight shift to stimulate automatic movement, but is able to carry over for subsequent steps with less tactile asstistance at the Leg (MOD A) Still required MOD A x2 for trunk stability and hand held assistance. Vitals were stable throughout all upright activity and within ICH parameters, see below  Patient would most benefit from skilled PT to address the above listed deficits and to maximize their functional independence with treatment interventions focused on neuroplasticity with increased repetition, intensity, and specificity to deficits. At discharge for continuing carryover of these principles and for maximal recovery of deficits recommend inpatient rehab. .     Patient will benefit from skilled intervention to address the above impairments.   Patients rehabilitation potential is considered to be Fair  Factors which may influence rehabilitation potential include:   [ ]           None noted  [X]           Mental ability/status  [X]           Medical condition  [ ]           Home/family situation and support systems  [ ]           Safety awareness  [ ]           Pain tolerance/management  [ ]           Other:        PLAN :  Recommendations and Planned Interventions:  [X]             Bed Mobility Training             [X]      Neuromuscular Re-Education  [X]             Transfer Training                   [ ]      Orthotic/Prosthetic Training  [X]             Gait Training                         [X]      Modalities  [X]             Therapeutic Exercises           [ ]      Edema Management/Control  [X]             Therapeutic Activities            [X]      Patient and Family Training/Education  [ ]             Other (comment):  Frequency/Duration: Patient will be followed by physical therapy 5 times a week to address goals. Discharge Recommendations: Inpatient Rehab  Further Equipment Recommendations for Discharge: TBD at rehab       SUBJECTIVE:   Patient stated bye.       OBJECTIVE DATA SUMMARY:   HISTORY:    Past Medical History:   Diagnosis Date    Hemorrhagic stroke ([facility] [address] 3/28/2017     S/p TPA    Ill-defined condition       Eczema    Type II diabetes mellitus ([facility] [address] 3/29/2017   No past surgical history on file. Prior Level of Function/Home Situation: see above  Personal factors and/or comorbidities impacting plan of care:      Home Situation  Home Environment: Private residence  # Steps to Enter: 4  Rails to Enter: Yes  Hand Rails : Bilateral  One/Two Story Residence: Two story  # of Interior Steps: 12  Lift Chair Available: No  Living Alone: No  Support Systems: Family member(s)  Patient Expects to be Discharged to[de-identified] Private residence  Current DME Used/Available at Home: None     . ptacm     EXAMINATION/PRESENTATION/DECISION MAKING:   Critical Behavior:  Neurologic State: Drowsy, Other (Comment) (opened eyes to command)  Orientation Level: Disoriented X4  Cognition: Decreased attention/concentration, Decreased command following  Safety/Judgement: Lack of insight into deficits, Decreased awareness of need for assistance, Decreased awareness of environment, Decreased awareness of need for safety  Hearing: Auditory  Auditory Impairment: None  Skin:  Left wrist in hard splint at distal radius, incision clean and dry without drainage.   Bruising noted at posterior proximal forearm from recent wrist surgery  Edema: none noted  Range Of Motion:  AROM: Generally decreased, functional           PROM: Within functional limits           Strength:    Strength: Generally decreased, functional        RLE Strength  R Hip Flexion: 3+  R Knee Flexion: 3+  R Knee Extension: 2+  R Ankle Dorsiflexion: 3  R Ankle Plantar Flexion: 3        LLE Strength  L Hip Flexion: 3+  L Knee Flexion: 3+  L Knee Extension: 3+  L Ankle Dorsiflexion: 3+  L Ankle Plantar Flexion: 3+  Tone & Sensation:   Tone: Normal              Sensation: Intact     RLE Sensation  Light Touch: No apparent deficit     LLE Sensation  Light Touch: No apparent deficit   Coordination:  Coordination: Grossly decreased, non-functional   Vitals:              Blood pressure heart rate (bpm)  Initial    138/73              104  Sitting   135/80              110  Post activity 143/70       106  Vision:      Functional Mobility:  Bed Mobility:     Supine to Sit: Moderate assistance  Sit to Supine: Moderate assistance;Assist x2     Transfers:  Sit to Stand: Moderate assistance;Assist x2  Stand to Sit: Moderate assistance;Assist x2                       Balance:   Sitting: Intact  Standing: Impaired  Standing - Static: Constant support  Standing - Dynamic : Poor  Ambulation/Gait Training:  Distance (ft): 3 Feet (ft)  Assistive Device: Gait belt (hand held assist)  Ambulation - Level of Assistance:  Moderate assistance;Assist x2     Gait Description (WDL): Exceptions to [facility]                                                    Functional Measure  Irving Balance Test:      Sitting to Standin  Standing Unsupported: 0  Sitting with Back Unsupported: 2  Standing to Sittin  Transfers: 0  Standing Unsupported with Eyes Closed: 0  Standing Unsupported with Feet Together: 0  Reach Forward with Outstretched Arm: 0   Object: 0  Turn to Look Over Shoulders: 0  Turn 360 Degrees: 0  Alternate Foot on Step/Stool: 0  Standing Unsupported One Foot in Front: 0  Stand on One Le  Total: 2             56=Maximum possible score;   0-20=High fall risk  21-40=Moderate fall risk   41-56=Low fall risk      Irving Balance Test and G-code impairment scale:  Percentage of Impairment CH     0%    CI     1-19% CJ     20-39% CK     40-59% CL     60-79% CM     80-99% CN      100%   Hatch   Score 0-56 56 45-55 34-44 23-33 12-22 1-11 0         G codes: In compliance with CMSs Claims Based Outcome Reporting, the following G-code set was chosen for this patient based on their primary functional limitation being treated: The outcome measure chosen to determine the severity of the functional limitation was the HATCH with a score of 2/56 which was correlated with the impairment scale. · Mobility - Walking and Moving Around:               - CURRENT STATUS:    CM - 80%-99% impaired, limited or restricted               - GOAL STATUS:           CL - 60%-79% impaired, limited or restricted               - D/C STATUS:                       ---------------To be determined---------------       Physical Therapy Evaluation Charge Determination   History Examination Presentation Decision-Making   HIGH Complexity :3+ comorbidities / personal factors will impact the outcome/ POC  HIGH Complexity : 4+ Standardized tests and measures addressing body structure, function, activity limitation and / or participation in recreation  HIGH Complexity : Unstable and unpredictable characteristics  Other outcome measures HATCH  HIGH       Based on the above components, the patient evaluation is determined to be of the following complexity level: HIGH      Therapeutic Exercises:      Pain:  Pain Scale 1: Adult Nonverbal Pain Scale  Pain Intensity 1: 0              Activity Tolerance:   Good- no compilcations  Please refer to the flowsheet for vital signs taken during this treatment. After treatment:   [ ]     Patient left in no apparent distress sitting up in chair  [X]     Patient left in no apparent distress in bed- in chair position  [X]     Call bell left within reach  [X]     Nursing notified  [X]     Caregiver present  [X]     Bed alarm activated      COMMUNICATION/EDUCATION:   The patients plan of care was discussed with: Registered Nurse.      Patient was educated regarding His deficit(s) of sequencing and coordination as this relates to His diagnosis of CVA. He demonstrated Fair understanding as evidenced by educated wife due to patients cognitive status. [X]  Fall prevention education was provided and the patient/caregiver indicated understanding. [X]  Patient/family have participated as able in goal setting and plan of care. [X]  Patient/family agree to work toward stated goals and plan of care. [ ]  Patient understands intent and goals of therapy, but is neutral about his/her participation. [ ]  Patient is unable to participate in goal setting and plan of care.      Thank you for this referral.  Dinesh Rebolledo, PT, DPT   Time Calculation: 38 mins

## 2017-03-30 NOTE — PROGRESS NOTES
Bedside and Verbal shift change report received from [facility]. Report received with SBAR, Kardex, ED Summary, OR Summary, Procedure Summary, Intake/Output, MAR, Accordion and Recent Results. Pt received asleep lying in the bed. Pt breaths well on room air. abd obese but soft. Faint bs noted. Pt does not follow any requested commonds, however Active Range of Motion seen on all four extremities. RFA, IV d/c by Nurse Brennan Klein. Cardene infusing at [facility] at 13mg. See doc flow sheet for detail assessments. 0830 NEw iv started by [facility] rn. Cardene weaning in progress. 0831  at the bed side. 0930 pt's wife at the bed side. Pt resting at this time. 0945 Pt feed by the nurse. Pt tolerated well. No problem with chewing or swallowing noted. 1030 PT/OT at the bed side. 1100 Participated in the IDRs. 1130 cardene weaned off at this time per MD order (Margarita Cameron). 1230 rowland cath removed, condom cath applied, pt tolerated well. Wife remains at the bed side. 1330 pt resting at this time. No facial droop noted. Wife remains at the bed side. [address] feeding the pt. Pt able to swallow food without difficulty. 1500 Condom cath came off, new condom cath applied. 1615 pt's  Wife remains at the bed side. 1800 pt's condom cath came off, linens are wet. Skin care done, linens changed. Purivac applied to monitor urine output.  at the bed side. Updates given. 1830 pt is resting well at this time. 1900 Bedside and Verbal shift change report given to Kathy Vasquez 44 (oncoming nurse) by cjp (offgoing nurse). Report given with SBAR, Kardex, ED Summary, OR Summary, Procedure Summary, Intake/Output, MAR, Accordion and Recent Results.

## 2017-03-30 NOTE — PROGRESS NOTES
Leonidas Lepe [facility]  [address] [facility], [place], [address] [place] [address]  [phone]      Medical Progress Note      NAME:         Ricardo Amin   :        1950  MRM:        [id number]    Date:          3/30/2017      Subjective: Patient has been seen and examined. Chart, labs, diagnostics reviewed. Mr Edgar Foote remains intermittently restless. No new symptoms. No fever or chills. BP overall labile but stable. I have discussed with his nurse for collaborative Hx. Objective:    Vital Signs:    Visit Vitals    /68    Pulse (!) 103    Temp 98.7 °F (37.1 °C)    Resp 18    Ht 6' (1.829 m)    Wt 108 kg (238 lb 3.2 oz)    SpO2 95%    BMI 32.31 kg/m2          Intake/Output Summary (Last 24 hours) at 17 0746  Last data filed at 17 0711   Gross per 24 hour   Intake              840 ml   Output             1500 ml   Net             -660 ml        Physical Examination:    General:   Weak and remains critical but overall stable. Eyes:   pink conjunctivae, PERRLA with no discharge. ENT:   no ottorrhea or rhinorrhea with dry mucous membranes  Neck: no masses, thyroid non-tender and trachea central.  Pulm: clear breath sounds without crackles or wheezes  Card:  no JVD or murmurs, has regular and normal S1, S2 without thrills, bruits or peripheral edema  Abd:  Soft, non-tender, non-distended, normoactive bowel sounds   Musc:  No cyanosis, clubbing, atrophy or deformities. Skin:  No rashes, bruising or ulcers. Neuro: Somnolent, opens eyes to commands and moves extremities to pain. Global decreased muscle power and tone.     Psych:  Has little insight to illness     Current Facility-Administered Medications   Medication Dose Route Frequency    pravastatin (PRAVACHOL) tablet 40 mg  40 mg Oral QHS    famotidine (PF) (PEPCID) 20 mg in sodium chloride 0.9 % 10 mL injection  20 mg IntraVENous Q12H    sodium chloride (NS) flush 5 mL  5 mL IntraVENous Q8H    sodium chloride (NS) flush 5 mL  5 mL IntraVENous PRN    labetalol (NORMODYNE;TRANDATE) injection 10 mg  10 mg IntraVENous Q10MIN PRN    sodium chloride (NS) flush 5-10 mL  5-10 mL IntraVENous Q8H    sodium chloride (NS) flush 5-10 mL  5-10 mL IntraVENous PRN    ondansetron (ZOFRAN) injection 4 mg  4 mg IntraVENous Q6H PRN    prochlorperazine (COMPAZINE) injection 10 mg  10 mg IntraVENous Q4H PRN    0.9% sodium chloride infusion 250 mL  250 mL IntraVENous PRN    niCARdipine (CARDENE) 25 mg in 0.9% sodium chloride 250 mL infusion  0-15 mg/hr IntraVENous TITRATE        Laboratory data and review:    Recent Labs      03/30/17 0247 03/28/17   1452   WBC  13.0*  7.8   HGB  13.5  15.0   HCT  37.5  43.9   PLT  284  289     Recent Labs      03/30/17   0247 03/28/17   2250  03/28/17   1452   NA  139   --   139   K  3.5   --   3.6   CL  105   --   103   CO2  23   --   27   GLU  205*   --   202*   BUN  19   --   12   CREA  0.98   --   1.02   CA  8.4*   --   8.9   ALB   --    --   3.7   SGOT   --    --   19   ALT   --    --   36   INR   --   1.1  1.0     No components found for: [place]    Other Diagnostics:    CT head - post lytics - Development of acute left corona radiata/thalamic hemorrhage and small amount of  hemorrhage either in the occipital horn of the lateral ventricle or the adjacent parenchyma/sulci of the left occipital lobe.     CT scan head - 24 hr post lytics - Left thalamic hemorrhage with minimal increase in intraventricular and  subarachnoid hemorrhage.     Telemetry reviewed:   normal sinus rhythm    Assessment and Plan:    Hemorrhage, intracerebral ([facility]) (3/28/2017): occurred post tPA for an acute ischemic CVA. Clinically unchanged since yesterday. Continue to monitor. Optimize BP control. Acute CVA (cerebral vascular accident) ([facility]) (3/28/2017)/ Aphasia (3/28/2017): seen by tele neurology in the ED. Initial CT head unremarkable and he is sp tPA given 3/28.  Complicated by ICH as above. LDL 92. On a liquid diet now. Start Lipitor when able to take orally. PT, PT once more stable     HTN (hypertension), malignant (3/28/2017): likely has hx of HTN. Not been on medications. BP variable but overall at goal. Continue IV Cardene until able to tolerate oral medications     Hyperglycemia POA: no prior hx of DM. A1c 7.8. Diabetic diet when able. Would hold off SSI until we determine he can take safely orally      Fracture of radius, distal, left, closed (3/28/2017): recently had surgery. Out patient follow up    Diarrhea: new. He is afebrile. Non bloody. Resolving. Acute urinary retention: likely related to CVA. Had a rowland catheter inserted. Non bloody. Continue to follow. DC it once more alert and ambuant     Tobacco use disorder (3/28/2017): will need counseling once more awake and stable CVA (cerebral vascular accident) ([address] (3/28/2017)    Total time spent for the patient's care: 30 Minutes Critical care.                  Care Plan discussed with: Patient, Nursing Staff and Consultant/Specialist    Discussed:  Care Plan    Prophylaxis:  SCD's    Anticipated Disposition:  [facility]/Rehab           ___________________________________________________    Attending Physician:   Jayshree Whaley MD

## 2017-03-30 NOTE — PROGRESS NOTES
Stroke Education provided to spouse/SO and the following topics were discussed    1. Patients personal risk factors for stroke are diabetes mellitus    2. Warning signs of Stroke:        * Sudden numbness or weakness of the face, arm or leg, especially on one side of          The body            * Sudden confusion, trouble speaking or understanding        * Sudden trouble seeing in one or both eyes        * Sudden trouble walking, dizziness, loss of balance or coordination        * Sudden severe headache with no known cause      3. Importance of activation Emergency Medical Services ( 9-1-1 ) immediately if experience any warning signs of stroke. 4. Be sure and schedule a follow-up appointment with your primary care doctor or any specialists as instructed. 5. You must take medicine every day to treat your risk factors for stroke. Be sure to take your medicines exactly as your doctor tells you: no more, no less. Know what your medicines are for , what they do. Anti-thrombotics /anticoagulants can help prevent strokes. You are taking the following medicine(s)  Amlodine, pravastatin. 6.  Smoking and second-hand smoke greatly increase your risk of stroke, cardiovascular disease and death. Smoking history never    7. Information provided was [facility] Stroke Education Binder    8. Documentation of teaching completed in Patient Education Activity and on Care Plan with teaching response noted?   yes

## 2017-03-30 NOTE — PROGRESS NOTES
[facility] [place]  Pulmonary, Critical Care, and Sleep Medicine  Name: Raquel Chandler MRN: [id number]   : 1950 Hospital: [facility]   Date: 3/30/2017        Impression Plan   1. Encephalopathy  2. Stroke sxs s/p TPA  3. Acute head bleed  4. HTN  5. Leukocytosis secondary to steroids  6. S/p left wrist fracture- seeing Ortho  7. Tobacco abuse               · Nicardipine gtt off  · frequent Neuro checks  · Neuro following  · No AC  · SCDs  · Famotidine  · NPO  · Will keep in ICU until tomorrow morning       Radiology  ( personally reviewed) No chest imaging   ABG No results for input(s): PHI, PO2I, PCO2I in the last 72 hours. Subjective     Overnight events:  Mental status unchanged. Answers \"yes\" to every question. Moving all extremeties. Past Medical History:   Diagnosis Date    Hemorrhagic stroke ([facility] 3/28/2017    S/p TPA    Ill-defined condition     Eczema    Type II diabetes mellitus ([facility] 3/29/2017      No past surgical history on file.    Prior to Admission medications    Not on File     Current Facility-Administered Medications   Medication Dose Route Frequency    pravastatin (PRAVACHOL) tablet 40 mg  40 mg Oral QHS    famotidine (PF) (PEPCID) 20 mg in sodium chloride 0.9 % 10 mL injection  20 mg IntraVENous Q12H    sodium chloride (NS) flush 5 mL  5 mL IntraVENous Q8H    sodium chloride (NS) flush 5-10 mL  5-10 mL IntraVENous Q8H    niCARdipine (CARDENE) 25 mg in 0.9% sodium chloride 250 mL infusion  0-15 mg/hr IntraVENous TITRATE     Allergies   Allergen Reactions    Chantix [Varenicline] Rash     Very itchy rash on back      Social History   Substance Use Topics    Smoking status: Current Every Day Smoker     Packs/day: 0.50     Years: 50.00    Smokeless tobacco: Former User     Quit date: 2017      Comment: cold turkey 2.5 wks ago after attempt Chantix developed allergy    Alcohol use No      Comment: around holidatys      Family History Problem Relation Age of Onset    Diabetes Mother     Cancer Father           Laboratory: I have personally reviewed the critical care flowsheet and labs. Recent Labs      03/30/17   0247  03/28/17   1452   WBC  13.0*  7.8   HGB  13.5  15.0   HCT  37.5  43.9   PLT  284  289     Recent Labs      03/30/17   0247  03/28/17   2250  03/28/17   1452   NA  139   --   139   K  3.5   --   3.6   CL  105   --   103   CO2  23   --   27   GLU  205*   --   202*   BUN  19   --   12   CREA  0.98   --   1.02   CA  8.4*   --   8.9   ALB   --    --   3.7   SGOT   --    --   19   ALT   --    --   36   INR   --   1.1  1.0       Objective:     Mode Rate Tidal Volume Pressure FiO2 PEEP                    Vital Signs:     TMAX(24)      Intake/Output:   Last shift:         Last 3 shifts: 03/30 0701 - 03/30 1900  In: 524.2 [P.O.:240; I.V.:284.2]  Out: 950 [Urine:950]RRIOLAST3    Intake/Output Summary (Last 24 hours) at 03/30/17 1019  Last data filed at 03/30/17 1000   Gross per 24 hour   Intake           3004.5 ml   Output             1750 ml   Net           1254.5 ml     EXAM:   GENERAL: eyes closed, protecting airway, HEENT:  PERRL, EOMI, no alar flaring or epistaxis, oral mucosa moist without cyanosis, NECK:  no jugular vein distention, no retractions, no thyromegaly or masses, LUNGS: CTA, no w/r/r HEART:  Regular rate and rhythm with no MGR; no edema is present, ABDOMEN:  soft with no tenderness, bowel sounds present, EXTREMITIES:  warm with no cyanosis, SKIN:  no jaundice or ecchymosis and NEUROLOGIC:  Sleepy, moving all extremeites. Answering \"yes\" to every question.     Jamee Nolen MD  [facility]

## 2017-03-30 NOTE — PROGRESS NOTES
Problem: Dysphagia (Adult)  Goal: *Acute Goals and Plan of Care (Insert Text)  Initiated 3/29/2017  1. Patient will tolerate full liquid diet, free of s/s of aspiration within 7 days. 2. Patient will tolerate solid trials free of difficulty within 7 days. SPEECH LANGUAGE PATHOLOGY DYSPHAGIA TREATMENT  Patient: Miguel Johnson (89 y.o. male)  Date: 3/30/2017  Diagnosis: CVA (cerebral vascular accident) [facility]) <principal problem not specified>       Precautions: Aspiration      ASSESSMENT:  Patient more alert and interactive today per RN and neurology. Patient's wife present. He initially did not need any cues to keep eyes open, but as tx went on, he needed cues to open eyes occasionally. Patient initially did not respond orally to presence of spoon, straw or cup. After multiple trials and tactile/verbal cues, then he only needed minimal cues for bolus acceptance with cup, spoon and straw. He consumed puree and thin liquids via cup/straw with no signs of pharyngeal dysphagia and good airway protection. There was delayed oral manipulation/propulsion but no oral residue. There was no oral manipulation of small ice chip. There was minimal verbal expression/following commands. Expression was perseverative and nodded head yes to all questions. He would benefit from communication evaluation when more appropriate. Progression toward goals:  [ ]         Improving appropriately and progressing toward goals  [X]         Improving slowly and progressing toward goals  [ ]         Not making progress toward goals and plan of care will be adjusted       PLAN:  Recommendations and Planned Interventions:  Cautiously recommend full liquid diet, medications crushed in puree  Given mental status, would not expect him to take in full nutrition via PO. Will continue to follow for diet tolerance/advancement/family education. Patient continues to benefit from skilled intervention to address the above impairments.   Continue treatment per established plan of care. Discharge Recommendations: To Be Determined       SUBJECTIVE:   Patient nodded head yes to all yes/no questions. OBJECTIVE:   Cognitive and Communication Status:  Neurologic State: Drowsy, Other (Comment) (opened eyes to command)  Orientation Level: Disoriented X4  Cognition: Decreased attention/concentration, Decreased command following     Perseveration: No perseveration noted  Safety/Judgement: Lack of insight into deficits, Decreased awareness of need for assistance, Decreased awareness of environment, Decreased awareness of need for safety  Dysphagia Treatment:  Oral Assessment:     P.O. Trials:  Patient Position: upright in bed  Vocal quality prior to P.O.: No impairment  Consistency Presented: Thin liquid;Puree; Ice chips  How Presented: Straw;Spoon;Cup/sip;SLP-fed/presented     Bolus Acceptance: Impaired (max cues initally, then minimal cues)  Bolus Formation/Control: Impaired (absent manipulation of solid)     Propulsion: Delayed (# of seconds)  Oral Residue: None  Initiation of Swallow: No impairment  Laryngeal Elevation: Functional  Aspiration Signs/Symptoms: None                 Oral Phase Severity: Moderate  Pharyngeal Phase Severity : No impairment  Exercises:  Laryngeal Exercises:                                                                                                                                   Pain:  Pain Scale 1: Adult Nonverbal Pain Scale  Pain Intensity 1: 0     After treatment:   [ ]              Patient left in no apparent distress sitting up in chair  [X]              Patient left in no apparent distress in bed  [X]              Call bell left within reach  [X]              Nursing notified  [X]              Caregiver present  [ ]              Bed alarm activated      COMMUNICATION/EDUCATION:   Patient was educated regarding His deficit(s) of dysphagia as this relates to His diagnosis of CVA.   He demonstrated Guarded understanding as evidenced by inability to communicate understanding of simple information/questions. The patients plan of care including recommendations, planned interventions, and recommended diet changes were discussed with: Registered Nurse. [ ]              Posted safety precautions in patient's room.      PRIYANK Austin  Time Calculation: 22 mins

## 2017-03-31 LAB
ALBUMIN SERPL BCP-MCNC: 3.6 G/DL (ref 3.5–5)
ALBUMIN/GLOB SERPL: 0.9 {RATIO} (ref 1.1–2.2)
ALP SERPL-CCNC: 71 U/L (ref 45–117)
ALT SERPL-CCNC: 25 U/L (ref 12–78)
ANION GAP BLD CALC-SCNC: 10 MMOL/L (ref 5–15)
APTT PPP: 28 SEC (ref 22.1–32.5)
AST SERPL W P-5'-P-CCNC: 18 U/L (ref 15–37)
BASOPHILS # BLD AUTO: 0 K/UL (ref 0–0.1)
BASOPHILS # BLD: 0 % (ref 0–1)
BILIRUB SERPL-MCNC: 0.7 MG/DL (ref 0.2–1)
BUN SERPL-MCNC: 13 MG/DL (ref 6–20)
BUN/CREAT SERPL: 17 (ref 12–20)
CALCIUM SERPL-MCNC: 8.9 MG/DL (ref 8.5–10.1)
CHLORIDE SERPL-SCNC: 102 MMOL/L (ref 97–108)
CO2 SERPL-SCNC: 27 MMOL/L (ref 21–32)
CREAT SERPL-MCNC: 0.78 MG/DL (ref 0.7–1.3)
EOSINOPHIL # BLD: 0.2 K/UL (ref 0–0.4)
EOSINOPHIL NFR BLD: 2 % (ref 0–7)
ERYTHROCYTE [DISTWIDTH] IN BLOOD BY AUTOMATED COUNT: 13.4 % (ref 11.5–14.5)
EST. AVERAGE GLUCOSE BLD GHB EST-MCNC: 186 MG/DL
FIBRINOGEN PPP-MCNC: 431 MG/DL (ref 200–475)
GLOBULIN SER CALC-MCNC: 3.8 G/DL (ref 2–4)
GLUCOSE BLD STRIP.AUTO-MCNC: 139 MG/DL (ref 65–100)
GLUCOSE BLD STRIP.AUTO-MCNC: 144 MG/DL (ref 65–100)
GLUCOSE BLD STRIP.AUTO-MCNC: 155 MG/DL (ref 65–100)
GLUCOSE BLD STRIP.AUTO-MCNC: 183 MG/DL (ref 65–100)
GLUCOSE SERPL-MCNC: 157 MG/DL (ref 65–100)
HBA1C MFR BLD: 8.1 % (ref 4.2–6.3)
HCT VFR BLD AUTO: 41.9 % (ref 36.6–50.3)
HGB BLD-MCNC: 14.3 G/DL (ref 12.1–17)
INR PPP: 1.1 (ref 0.9–1.1)
LYMPHOCYTES # BLD AUTO: 17 % (ref 12–49)
LYMPHOCYTES # BLD: 2.1 K/UL (ref 0.8–3.5)
MCH RBC QN AUTO: 30.7 PG (ref 26–34)
MCHC RBC AUTO-ENTMCNC: 34.1 G/DL (ref 30–36.5)
MCV RBC AUTO: 89.9 FL (ref 80–99)
MONOCYTES # BLD: 1 K/UL (ref 0–1)
MONOCYTES NFR BLD AUTO: 7 % (ref 5–13)
NEUTS SEG # BLD: 9.5 K/UL (ref 1.8–8)
NEUTS SEG NFR BLD AUTO: 74 % (ref 32–75)
PLATELET # BLD AUTO: 287 K/UL (ref 150–400)
POTASSIUM SERPL-SCNC: 3.3 MMOL/L (ref 3.5–5.1)
PROT SERPL-MCNC: 7.4 G/DL (ref 6.4–8.2)
PROTHROMBIN TIME: 10.9 SEC (ref 9–11.1)
RBC # BLD AUTO: 4.66 M/UL (ref 4.1–5.7)
SERVICE CMNT-IMP: ABNORMAL
SODIUM SERPL-SCNC: 139 MMOL/L (ref 136–145)
THERAPEUTIC RANGE,PTTT: NORMAL SECS (ref 58–77)
WBC # BLD AUTO: 12.9 K/UL (ref 4.1–11.1)

## 2017-03-31 PROCEDURE — 97535 SELF CARE MNGMENT TRAINING: CPT

## 2017-03-31 PROCEDURE — 85610 PROTHROMBIN TIME: CPT | Performed by: INTERNAL MEDICINE

## 2017-03-31 PROCEDURE — 74011250637 HC RX REV CODE- 250/637: Performed by: INTERNAL MEDICINE

## 2017-03-31 PROCEDURE — 97112 NEUROMUSCULAR REEDUCATION: CPT

## 2017-03-31 PROCEDURE — 74011000250 HC RX REV CODE- 250: Performed by: INTERNAL MEDICINE

## 2017-03-31 PROCEDURE — C1758 CATHETER, URETERAL: HCPCS

## 2017-03-31 PROCEDURE — 92526 ORAL FUNCTION THERAPY: CPT

## 2017-03-31 PROCEDURE — 36415 COLL VENOUS BLD VENIPUNCTURE: CPT | Performed by: INTERNAL MEDICINE

## 2017-03-31 PROCEDURE — 80053 COMPREHEN METABOLIC PANEL: CPT | Performed by: INTERNAL MEDICINE

## 2017-03-31 PROCEDURE — 74011250636 HC RX REV CODE- 250/636: Performed by: INTERNAL MEDICINE

## 2017-03-31 PROCEDURE — 85025 COMPLETE CBC W/AUTO DIFF WBC: CPT | Performed by: INTERNAL MEDICINE

## 2017-03-31 PROCEDURE — 65660000000 HC RM CCU STEPDOWN

## 2017-03-31 PROCEDURE — 74011636637 HC RX REV CODE- 636/637: Performed by: INTERNAL MEDICINE

## 2017-03-31 PROCEDURE — 74011250637 HC RX REV CODE- 250/637: Performed by: NURSE PRACTITIONER

## 2017-03-31 PROCEDURE — 82962 GLUCOSE BLOOD TEST: CPT

## 2017-03-31 PROCEDURE — 97116 GAIT TRAINING THERAPY: CPT

## 2017-03-31 RX ORDER — FAMOTIDINE 20 MG/1
20 TABLET, FILM COATED ORAL 2 TIMES DAILY
Status: DISCONTINUED | OUTPATIENT
Start: 2017-03-31 | End: 2017-04-04 | Stop reason: HOSPADM

## 2017-03-31 RX ORDER — LABETALOL HYDROCHLORIDE 5 MG/ML
10 INJECTION, SOLUTION INTRAVENOUS
Status: DISCONTINUED | OUTPATIENT
Start: 2017-03-31 | End: 2017-04-04 | Stop reason: HOSPADM

## 2017-03-31 RX ORDER — AMLODIPINE BESYLATE 5 MG/1
10 TABLET ORAL DAILY
Status: DISCONTINUED | OUTPATIENT
Start: 2017-04-01 | End: 2017-04-04 | Stop reason: HOSPADM

## 2017-03-31 RX ORDER — AMLODIPINE BESYLATE 5 MG/1
5 TABLET ORAL ONCE
Status: COMPLETED | OUTPATIENT
Start: 2017-03-31 | End: 2017-03-31

## 2017-03-31 RX ADMIN — FAMOTIDINE 20 MG: 10 INJECTION, SOLUTION INTRAVENOUS at 08:25

## 2017-03-31 RX ADMIN — INSULIN LISPRO 2 UNITS: 100 INJECTION, SOLUTION INTRAVENOUS; SUBCUTANEOUS at 08:25

## 2017-03-31 RX ADMIN — PRAVASTATIN SODIUM 40 MG: 20 TABLET ORAL at 21:54

## 2017-03-31 RX ADMIN — Medication 10 ML: at 03:46

## 2017-03-31 RX ADMIN — AMLODIPINE BESYLATE 5 MG: 5 TABLET ORAL at 08:25

## 2017-03-31 RX ADMIN — FAMOTIDINE 20 MG: 20 TABLET, FILM COATED ORAL at 17:20

## 2017-03-31 RX ADMIN — AMLODIPINE BESYLATE 5 MG: 5 TABLET ORAL at 10:38

## 2017-03-31 RX ADMIN — INSULIN LISPRO 2 UNITS: 100 INJECTION, SOLUTION INTRAVENOUS; SUBCUTANEOUS at 11:30

## 2017-03-31 RX ADMIN — INSULIN LISPRO 2 UNITS: 100 INJECTION, SOLUTION INTRAVENOUS; SUBCUTANEOUS at 17:32

## 2017-03-31 RX ADMIN — Medication 10 ML: at 21:55

## 2017-03-31 RX ADMIN — Medication 10 ML: at 14:30

## 2017-03-31 RX ADMIN — PROCHLORPERAZINE EDISYLATE 10 MG: 5 INJECTION INTRAMUSCULAR; INTRAVENOUS at 10:48

## 2017-03-31 NOTE — PROGRESS NOTES
Problem: Mobility Impaired (Adult and Pediatric)  Goal: *Acute Goals and Plan of Care (Insert Text)  Physical Therapy Goals  Initiated 3/30/2017  1. Patient will move from supine to sit and sit to supine in bed with minimal assistance/contact guard assist within 7 day(s). 2. Patient will transfer from bed to chair and chair to bed with minimal assistance/contact guard assist using the least restrictive device within 7 day(s). 3. Patient will perform sit to stand with moderate assistance x1 within 7 day(s). 4. Patient will ambulate with moderate assistance for 50 feet with the least restrictive device within 7 day(s). 5. Patient will improve Irving Balance score by 7 points within 7 days. PHYSICAL THERAPY TREATMENT  Patient: Yasmeen Frost (37 y.o. male)  Date: 3/31/2017  Diagnosis: CVA (cerebral vascular accident) [facility]) <principal problem not specified>       Precautions: Fall, Other (comment) (NWB Left wrist,ICH BP paramaters)      ASSESSMENT:  Patient still initially lethargic, required increased tactile and verbal cuing for alertness. Patient still with increased motor sequencing and motor planning deficits, requires initiation of movement by therapist to stimulate movement when command following is not performed. He is still at about 50% for command following. He is favoring is non-dominant Left UE with forward reaching tasks, and with increased apraxia. He was able to increase his ambulation distance to 30 feet with MOD A for path negotiation. He is not agitated global aphasia present. Patient will still most benefit from inpatient rehab at discharge. Progression toward goals:  [ ]    Improving appropriately and progressing toward goals  [X]    Improving slowly and progressing toward goals  [ ]    Not making progress toward goals and plan of care will be adjusted       PLAN:  Patient continues to benefit from skilled intervention to address the above impairments.   Continue treatment per established plan of care. Discharge Recommendations:  Inpatient Rehab  Further Equipment Recommendations for Discharge:  TBD at rehab       SUBJECTIVE:   Patient stated [facility] you.       OBJECTIVE DATA SUMMARY:   Critical Behavior:  Neurologic State: Alert, Confused  Orientation Level: Unable to verbalize  Cognition:  (y/n response 60% due to perseveration, comprehension issues)  Safety/Judgement: Lack of insight into deficits      Vitals              Blood pressure  Heart rate           Supine  114/64              98  Sitting   161/94              95  Standing 152/86                        86  Post activity 165/91       95  Functional Mobility Training:  Bed Mobility:  Rolling: Moderate assistance  Supine to Sit: Moderate assistance              Transfers:  Sit to Stand: Minimum assistance  Stand to Sit: Minimum assistance        Bed to Chair: Minimum assistance                    Balance:  Sitting - Static: Good (unsupported)  Sitting - Dynamic: Fair (occasional)  Standing: With support  Standing - Static: Constant support  Standing - Dynamic : Poor  Ambulation/Gait Training:  Distance (ft): 35 Feet (ft)  Assistive Device: Gait belt  Ambulation - Level of Assistance: Moderate assistance                                                          Stairs:                       Neuro Re-Education:  Seated: forward reaching tasks, alternating Right and Left UE- 50% accuracy with right/left discrimination  Able to tolerate sitting with Supervision to occasional CGA due to posterior lean, able to respond to tactile cuing     Therapeutic Exercises:      Pain:  Pain Scale 1: Adult Nonverbal Pain Scale  Pain Intensity 1: 0              Activity Tolerance:   Fair- blood pressure stable, no complications with upright activity  Please refer to the flowsheet for vital signs taken during this treatment.   After treatment:   [ ]    Patient left in no apparent distress sitting up in chair  [X]    Patient left in no apparent distress in bed  [X] Call bell left within reach  [X]    Nursing notified  [ ]    Caregiver present  [X]    Bed alarm activated      COMMUNICATION/COLLABORATION:   The patients plan of care was discussed with: Occupational Therapist and Registered Nurse     Fely Dubose, PT, DPT   Time Calculation: 40 mins

## 2017-03-31 NOTE — PROGRESS NOTES
Occupational Therapy Note:  Chart reviewed. Patient is currently transferring out of ICU to [facility]. Will follow-up later once available.     Anita Wick, OTR/L

## 2017-03-31 NOTE — PROGRESS NOTES
Leonidas Lepe [facility] [place] [address]  [address], [place], [address] [facility]  [phone]      Medical Progress Note      NAME:         Davey Campa   :        1950  MRM:        [id number]    Date:          3/31/2017      Subjective: Patient has been seen and examined. Chart, labs, diagnostics reviewed. Mr Crys Rodríguez is much more alert today although still dysphasic. No new symptoms. Minimal headaches. No fever or chills. I have discussed with his nurse for collaborative Hx. Objective:    Vital Signs:    Visit Vitals    BP (!) 138/103    Pulse 94    Temp 98.1 °F (36.7 °C)    Resp 21    Ht 6' (1.829 m)    Wt 106.6 kg (235 lb)    SpO2 96%    BMI 31.87 kg/m2          Intake/Output Summary (Last 24 hours) at 17 0906  Last data filed at 17 0647   Gross per 24 hour   Intake              880 ml   Output             1690 ml   Net             -810 ml        Physical Examination:    General:   Weak but better this AM and not in much distress     Eyes:   pink conjunctivae, PERRLA with no discharge. ENT:   no ottorrhea or rhinorrhea with dry mucous membranes  Pulm: clear breath sounds without crackles or wheezes  Card:  no JVD or murmurs, has regular and normal S1, S2 without thrills, bruits or peripheral edema  Abd:  Soft, non-tender, non-distended, normoactive bowel sounds   Musc:  No cyanosis, clubbing, atrophy or deformities. Skin:  No rashes, bruising or ulcers. Neuro: Awake and alert. Follows commands. Decreased generalized muscle power.      Psych:  Has little insight to illness     Current Facility-Administered Medications   Medication Dose Route Frequency    [START ON 2017] amLODIPine (NORVASC) tablet 10 mg  10 mg Oral DAILY    famotidine (PEPCID) tablet 20 mg  20 mg Oral BID    glucose chewable tablet 16 g  4 Tab Oral PRN    dextrose (D50W) injection syrg 12.5-25 g  12.5-25 g IntraVENous PRN    glucagon (GLUCAGEN) injection 1 mg  1 mg IntraMUSCular PRN    insulin lispro (HUMALOG) injection   SubCUTAneous AC&HS    pravastatin (PRAVACHOL) tablet 40 mg  40 mg Oral QHS    sodium chloride (NS) flush 5 mL  5 mL IntraVENous Q8H    sodium chloride (NS) flush 5 mL  5 mL IntraVENous PRN    labetalol (NORMODYNE;TRANDATE) injection 10 mg  10 mg IntraVENous Q10MIN PRN    sodium chloride (NS) flush 5-10 mL  5-10 mL IntraVENous Q8H    sodium chloride (NS) flush 5-10 mL  5-10 mL IntraVENous PRN    ondansetron (ZOFRAN) injection 4 mg  4 mg IntraVENous Q6H PRN    prochlorperazine (COMPAZINE) injection 10 mg  10 mg IntraVENous Q4H PRN    0.9% sodium chloride infusion 250 mL  250 mL IntraVENous PRN        Laboratory data and review:    Recent Labs      03/31/17   0355  03/30/17   0247  03/28/17   1452   WBC  12.9*  13.0*  7.8   HGB  14.3  13.5  15.0   HCT  41.9  37.5  43.9   PLT  287  284  289     Recent Labs      03/31/17   0355  03/30/17   0247  03/28/17   2250  03/28/17   1452   NA  139  139   --   139   K  3.3*  3.5   --   3.6   CL  102  105   --   103   CO2  27  23   --   27   GLU  157*  205*   --   202*   BUN  13  19   --   12   CREA  0.78  0.98   --   1.02   CA  8.9  8.4*   --   8.9   ALB  3.6   --    --   3.7   SGOT  18   --    --   19   ALT  25   --    --   36   INR  1.1   --   1.1  1.0     No components found for: [place]    Other Diagnostics:    CT head - post lytics - Development of acute left corona radiata/thalamic hemorrhage and small amount of  hemorrhage either in the occipital horn of the lateral ventricle or the adjacent parenchyma/sulci of the left occipital lobe.     CT scan head - 24 hr post lytics - Left thalamic hemorrhage with minimal increase in intraventricular and  subarachnoid hemorrhage.     Telemetry reviewed:   normal sinus rhythm    Assessment and Plan:    Hemorrhage, intracerebral ([address] (3/28/2017): occurred post tPA for an acute ischemic CVA. Follow up CT done 3/29 showed no worsening of bleed.  He is sp transfusion with platelets and cryo. Much better this morning. OK for transfer to telemetry if OK with neurology. PT, OT. Acute CVA (cerebral vascular accident) ([address] (3/28/2017)/ Aphasia (3/28/2017): seen by tele neurology in the ED. Initial CT head unremarkable and he is sp tPA given 3/28. Complicated by [address] as above. LDL 92. On a liquid diet now. Continue pravachol, PT, PT . Will likely need rehab. Neuro to decide on timing of further imaging ie MRI studies     HTN (hypertension), malignant (3/28/2017): likely has hx of HTN. Not been on medications. BP better. Increase Amlodipine. Goal is sBP <140      Hyperglycemia POA: no prior hx of DM. A1c 7.8. Diabetic diet when able. SSI per protocol.       Fracture of radius, distal, left, closed (3/28/2017): recently had surgery. Out patient follow up    Diarrhea: new. He is afebrile. Non bloody. Resolved      Acute urinary retention: likely related to CVA. Had a rowland catheter inserted. Non bloody. Continue to follow.  DC rowland     Tobacco use disorder (3/28/2017): will need counseling once more awake and stable CVA (cerebral vascular accident) ([address] (3/28/2017)    Total time spent for the patient's care: [address] discussed with: Patient, Nursing Staff and Consultant/Specialist    Discussed:  Care Plan    Prophylaxis:  SCD's    Anticipated Disposition:  [facility]/Rehab           ___________________________________________________    Attending Physician:   Juni Felder MD

## 2017-03-31 NOTE — PROGRESS NOTES
[address]   [place] [address]   [facility]    [facility]   [phone] Fax         Acute CVA with aphasia s/p TPA and subsequent left thalamic bleed  Transferred to floor  Worked with therapies  On po BP meds and chol meds  New dx DM--on SSI  D/w Ms Jefferson Rodriguez case mgt and she is trying to get him into a rehab facility his ins will accept  Has had some nausea  Taking po sparingly  BP has had a couple of elevated readings out of range  Wife at bedside and w discussed    He is awake and calm  He is able to follow one step with cues and at times without--cues for more complex  Will show me 2 fingers  Will raise arms and protrude tongue   Repeats  Tells me name is Coleman Mckenna  Says born in [place]  He will read a sentence from the newspaper with minor error  Resists well    Imp/plan  Progressing s/p TPA and sec ICH  Cont support and therapies  CVA teaching for family  cont to attempt to get to rehab  Discussed with wife to bring in a large print book and spend a few minutes every hour having him read  Encouraged him to read out loud and to attempt to converse  Keep SBP under 140--PRN labetalol   Defer ASA due to [address]  SCDs for DVT prophylaxis  Cont statin  Will follow up

## 2017-03-31 NOTE — PROGRESS NOTES
Bedside and Verbal shift change report given to Cayetano Winkler (oncoming nurse) by Yony Barron (offgoing nurse). Report included the following information SBAR, Kardex, ED Summary, Procedure Summary, Intake/Output, MAR, Accordion, Recent Results, Med Rec Status and Cardiac Rhythm NSR.

## 2017-03-31 NOTE — PROGRESS NOTES
Problem: Dysphagia (Adult)  Goal: *Acute Goals and Plan of Care (Insert Text)  Initiated 3/29/2017  1. Patient will tolerate full liquid diet, free of s/s of aspiration within 7 days. 2. Patient will tolerate solid trials free of difficulty within 7 days. SPEECH LANGUAGE PATHOLOGY DYSPHAGIA TREATMENT  Patient: Anne Quispe (60 y.o. male)  Date: 3/31/2017  Diagnosis: CVA (cerebral vascular accident) ([facility] <principal problem not specified>       Precautions: aspiration Fall, Other (comment) (NWB Left wrist,ICH BP paramaters)      ASSESSMENT:  Patient more alert, but vomiting today when eating breakfast and some issues with airway protection. Will need to watch closely for s/s aspiration. MOderate risks. Severe speech apraxia and receptive-expressive aphasia. Communication is really nonfunctional at this time. Progression toward goals:  [ ]         Improving appropriately and progressing toward goals  [X]         Improving slowly and progressing toward goals  [ ]         Not making progress toward goals and plan of care will be adjusted       PLAN:  Recommendations and Planned Interventions:  Continue full or clear liquid diet until not vomiting. THen may consider dysphagia 1, thins. Will need to watch closely for s/s aspiration. Patient continues to benefit from skilled intervention to address the above impairments. Continue treatment per established plan of care. Discharge Recommendations:  Inpatient Rehab       SUBJECTIVE:   Patient's wife anxious at bedside. Patient with flat affect. .      OBJECTIVE:   Cognitive and Communication Status:  Neurologic State: Alert, Confused  Orientation Level: Unable to verbalize  Cognition:  (y/n response 60% due to perseveration, comprehension issues)  Perception: Tactile, Verbal, Visual  Perseveration: No perseveration noted  Safety/Judgement: Lack of insight into deficits  Dysphagia Treatment:  Oral Assessment:  Oral Assessment  Labial: No impairment  Dentition: Upper & lower dentures (wife states he refuses to take dentures out)  Oral Hygiene: mildly dry. he needs more oral care  P.O. Trials:  Patient Position: upright in bed  Vocal quality prior to P.O.:  (minimally verbal)  Consistency Presented: Thin liquid;Puree      ORAL PHASE:   Bolus Acceptance:  (wife fed)  Bolus Formation/Control: Impaired (mild a-p transit issues)  Type of Impairment: Delayed; Anterior;Posterior  Propulsion: Discoordination  Oral Residue: Less than 10% of bolus   PHARYNGEAL PHASE:   Initiation of Swallow: Delayed (# of seconds) (mild)  Laryngeal Elevation: Functional  Aspiration Signs/Symptoms:  (none until he vomited. then he has some issues protecting his airway. his throat clearing was weak.  )      Pateint has moderate amounts of clear vomit with flecks of oatmeal in it. Hooked up sx, but he had cleared by the time the sx was attached. Exercises:  Laryngeal Exercises:                                                                                                                                   Pain:  Pain Scale 1: Adult Nonverbal Pain Scale  Pain Intensity 1: 0     After treatment:   [ ]              Patient left in no apparent distress sitting up in chair  [ ]              Patient left in no apparent distress in bed  [X]              Call bell left within reach  [X]              Nursing notified  [X]              Caregiver present  [ ]              Bed alarm activated      COMMUNICATION/EDUCATION:   Patient was educated regarding His deficit(s) of dysphagia, aphasia  as this relates to His diagnosis of CVA. He demonstrated Guarded understanding as evidenced by severe aphasia. The patients plan of care including recommendations, planned interventions, and recommended diet changes were discussed with: Physical Therapist, Occupational Therapist and Registered Nurse. [ ]              Posted safety precautions in patient's room.      Coit Query, SLP  Time Calculation: 30 mins

## 2017-03-31 NOTE — PROGRESS NOTES
TRANSFER - IN REPORT:    Verbal report received from ICU (name) on Mimi Roberts  being received from malina RN(unit) for routine progression of care      Report consisted of patients Situation, Background, Assessment and   Recommendations(SBAR). Information from the following report(s) SBAR, Kardex and Recent Results was reviewed with the receiving nurse. Opportunity for questions and clarification was provided. Assessment completed upon patients arrival to unit and care assumed. Primary Nurse Dona Herrera, RN and NAHED RN, RN performed a dual skin assessment on this patient No impairment noted  Joseph score is 19. .  Admission  assessment  Done. Gilda Garcia Family @ bedside. . Pt is confused. Gilda Garcia Restless. . C/o nausea with movement. . Medicated. Gilda Garcia Resting. .    1400- VSS. Family @ bedside. . Pt is reating. Gilda Garcia Bp- 160/99. .    1600- Resting. .    1730- OT/PT @ bedside. .. Working with him. ..    1800- VSS. . C/O nausea. . Medicated. .    1900- Bedside and Verbal shift change report given to  Gilberto Elias (oncoming nurse) by Esdras Zayas (offgoing nurse). Report included the following information SBAR, Kardex and Intake/Output.

## 2017-03-31 NOTE — PROGRESS NOTES
0710 - Bedside and Verbal shift change report given to Jame Farrell RN (oncoming nurse) by Selin Adkins RN (offgoing nurse). Report included the following information SBAR, Kardex, Intake/Output, MAR, Recent Results and Cardiac Rhythm sinus rhythm. 0815 - Pt resting comfortably in bed, VSS.  0900 - Speech therapy at bedside and updated. ST working with pt and breakfast tray. 1005 - TRANSFER - OUT REPORT:    Verbal report given to BURTON Morse(name) on Isabella [place]  being transferred to [facility](unit) for routine progression of care       Report consisted of patients Situation, Background, Assessment and   Recommendations(SBAR). Information from the following report(s) SBAR, Intake/Output, MAR, Recent Results, Med Rec Status and Cardiac Rhythm sinus rhythm was reviewed with the receiving nurse. Lines:   Peripheral IV 03/28/17 Right Hand (Active)   Site Assessment Clean, dry, & intact 3/31/2017  7:58 AM   Phlebitis Assessment 0 3/31/2017  7:58 AM   Infiltration Assessment 0 3/31/2017  7:58 AM   Dressing Status Clean, dry, & intact 3/31/2017  7:58 AM   Dressing Type Transparent 3/31/2017  7:58 AM   Hub Color/Line Status Pink;Flushed;Capped 3/31/2017  7:58 AM   Action Taken Open ports on tubing capped 3/31/2017  7:58 AM   Alcohol Cap Used Yes 3/31/2017  7:58 AM       Peripheral IV 03/30/17 Right Arm (Active)   Site Assessment Clean, dry, & intact 3/31/2017  7:58 AM   Phlebitis Assessment 0 3/31/2017  7:58 AM   Infiltration Assessment 0 3/31/2017  7:58 AM   Dressing Status Clean, dry, & intact 3/31/2017  7:58 AM   Dressing Type Transparent 3/31/2017  7:58 AM   Hub Color/Line Status Pink;Flushed;Capped 3/31/2017  7:58 AM   Action Taken Open ports on tubing capped 3/31/2017  7:58 AM   Alcohol Cap Used Yes 3/31/2017  7:58 AM        Opportunity for questions and clarification was provided.       Patient transported with:   Monitor  Registered Nurse  Tech

## 2017-03-31 NOTE — PROGRESS NOTES
1930-Bedside and Verbal shift change report given to Meri Awad RN (oncoming nurse) by Teresa Sandoval RN (offgoing nurse). Report included the following information SBAR, Kardex, Procedure Summary, Intake/Output, MAR, Recent Results and Cardiac Rhythm ST.     [id number]-Zjppvlvlfg completed. Pt following some simple commands with encouragement but definitely appears to have both expressive and receptive aphasia, with expressive being more severe. Mostly nonverbal, occasional mumble, but mostly nods head \"yes\" to all questions. VSS. Continuing to monitor. 2100-Pt incont of large amt of urine. Pt bathed, linens changed. New condom cath applied. 2200-Condom cath now draining clear yellow urine. Pt remains dry. 2330-Reassessment completed w/ no acute neuro changes. Pts condom cath off, incont of urine. Bathed, linens changed. New cath applied, secured and draining. No SSI given for . VSS. Continuing to monitor. 0130-Condom cath continues to drain, pt remains dry. Pt resting quietly in bed. VSS.     0499-1860-Fs acute changes. Pt remains nonverbal but is cooperative and responds better to visual que's with commands vs verbal.Pt appears to be resting comfortably and remains in a safe environment w/ SR up and bed alarm on. No attempts to get OOB or other safety concerns/impulsivity. Condom cath intact, draining cl yellow urine. Pt remains dry. Spoke w/ Dr. Marco A Su and new orders rec'd for labs. AM labs drawn. VSS. Continuing to monitor. 0650-Condom cath remained intact and pt remained dry overnight with 1300ml urine in canister, canister changed. 0715-Bedside and Verbal shift change report given to Zurdo Vazquez RN (oncoming nurse) by Meri Awad RN (offgoing nurse).  Report included the following information SBAR, Kardex, Procedure Summary, Intake/Output, MAR, Recent Results and Cardiac Rhythm SR-ST.

## 2017-03-31 NOTE — PROGRESS NOTES
Problem: Self Care Deficits Care Plan (Adult)  Goal: *Acute Goals and Plan of Care (Insert Text)  Occupational Therapy Goals  Initiated 3/30/2017  1. Patient will perform grooming with minimum assistance using B UE with best technique within 7 day(s). 2. Patient will perform self feeding with supervision/setup using B UE with best technique within 7 days. 3. Patient will perform upper body dressing and bathing with minimum assistance with best technique within 7 day(s). 4. Patient will perform lower body dressing and bathing with maximum assistance with best technique within 7 day(s). 5. Patient will perform toilet transfers with moderate assistance to [facility] using best technique within 7 day(s). 6. Patient will participate in upper extremity therapeutic exercise/activities with minimal assistance with focus on B UE strength and coordination within 7 day(s). 7. Patient will use R, dominant, UE 50% of the time as fine manipulator with no cuing to increase independence with all ADL tasks within 7 days. 8. Patient will follow 75% of simple, one-step commands accurately during functional tasks within 7 days. 9. Patient will engage/participate in full Fugl-Stevenson Assessment of Upper Extremity within 7 days. OCCUPATIONAL THERAPY TREATMENT  Patient: Yasmeen Frost (12 y.o. male)  Date: 3/31/2017  Diagnosis: CVA (cerebral vascular accident) [facility]) <principal problem not specified>       Precautions: Fall, NWB Left wrist,ICH BP paramaters      ASSESSMENT:  Mr. Vivi Cronin was received after transfer from ICU to [facility]. Patient's BP stable and within parameters (see doc flowsheets) and patient with no c/o of nausea. Patient continues to attempt following ~50% of commands; He responds best to simple, one step verbal commands, visual cues, and tactile cuing for initiation. Additionally, patient with limitations in motor planning + aphasia.   Unsupported sitting activity focused on command following, mirroring simple movements (motor planning), and ADL tasks. Patient required mod A for bed mobility. Min A required for toilet transfers and max A needed to full thorough hygiene + clothing management. Standing activities focused on improving balance, attention to R side, improved fine motor control (holding + manipulating marker), and motor planning. Patient continues to reach for objects with L, non-dominant, UE indicating inattention to the R; With tactile cuing, able to improve. Patient returned to bed at end of treatment with no complaints. Patient would benefit from continued skilled OT to progress towards goals and improve overall independence. Would continue to highly recommend inpatient rehab at discharge. Progression toward goals:  [X]       IInpatient Rehabroving appropriately and progressing toward goals  [ ]       Improving slowly and progressing toward goals  [ ]       Not making progress toward goals and plan of care will be adjusted       PLAN:  Patient continues to benefit from skilled intervention to address the above impairments. Continue treatment per established plan of care. Discharge Recommendations:  Inpatient rehab  Further Equipment Recommendations for Discharge:  None at this time       SUBJECTIVE:   Patient stated Okay.       OBJECTIVE DATA SUMMARY:   Cognitive/Behavioral Status:  Neurologic State: Alert (initially lethargic;maintains arousal w/ constant stimuli)  Orientation Level: Unable to verbalize  Cognition: Decreased command following;Poor safety awareness (attempting to follow ~ 50%)  Perception: Cues to attend to right side of body  Perseveration: No perseveration noted  Safety/Judgement: Decreased awareness of environment     Functional Mobility and Transfers for ADLs:  Bed Mobility:  Rolling:  Moderate assistance  Supine to Sit: Moderate assistance     Transfers:  Sit to Stand: Minimum assistance  Functional Transfers  Toilet Transfer : Minimum assistance     Balance:  Sitting: Impaired  Sitting - Static: Good (unsupported)  Sitting - Dynamic: Fair (occasional)  Standing: Impaired  Standing - Static: Poor  Standing - Dynamic : Poor     ADL Intervention:  Upper Body Dressing Assistance  Dressing Assistance: Moderate assistance  Shirt simulation with hospital gown: Moderate assistance (most difficulty managing R sleeve)  Cues: Tactile cues provided;Verbal cues provided;Visual cues provided     Lower Body Dressing Assistance  Dressing Assistance: Maximum assistance  Socks: Maximum assistance  Leg Crossed Method Used: Yes (assist to initiate movement into position, able to hold)  Position Performed: Seated edge of bed  Cues: Verbal cues provided; Tactile cues provided;Don;Visual cues provided     Toileting  Toileting Assistance: Maximum assistance  Bladder Hygiene:  (condom catheter in place)  Bowel Hygiene: Maximum assistance  Clothing Management: Maximum assistance  Cues: Verbal cues provided; Tactile cues provided;Visual cues provided     Cognitive Retraining  Safety/Judgement: Decreased awareness of environment     Neuro Re-Education:  Unsupported sitting activity focused on command following, mirroring simple movements (motor planning), and ADL tasks. Standing activities focused on improving balance, attention to R side, improved fine motor control (holding + manipulating marker), and motor planning. Pain:  Pain Scale 1: Adult Nonverbal Pain Scale  Pain Intensity 1: 0              Activity Tolerance:   Patient tolerated tx well. Please refer to the flowsheet for vital signs taken during this treatment. After treatment:   [ ] Patient left in no apparent distress sitting up in chair  [X] Patient left in no apparent distress in bed  [X] Call bell left within reach  [X] Nursing notified  [ ] Caregiver present  [X] Bed alarm activated      COMMUNICATION/COLLABORATION:   The patients plan of care was discussed with: Physical Therapist, Registered Nurse and patient.      PINO De/VENECIA  Time Calculation: 33 mins

## 2017-03-31 NOTE — PROGRESS NOTES
[facility] - inpatient rehab did not accept the patient PMR physician recommended a SNF. I called pt's insurance cm with [employer] Cris Mercado [phone]. Cris Bers stated that she would reach out to her physicians and contact [address] [place] [address] to do a peer to peer. Will touch base with her on Monday.  Thanks JAYLA Gibbs

## 2017-04-01 LAB
GLUCOSE BLD STRIP.AUTO-MCNC: 140 MG/DL (ref 65–100)
GLUCOSE BLD STRIP.AUTO-MCNC: 142 MG/DL (ref 65–100)
GLUCOSE BLD STRIP.AUTO-MCNC: 144 MG/DL (ref 65–100)
GLUCOSE BLD STRIP.AUTO-MCNC: 152 MG/DL (ref 65–100)
SERVICE CMNT-IMP: ABNORMAL

## 2017-04-01 PROCEDURE — 74011250636 HC RX REV CODE- 250/636: Performed by: INTERNAL MEDICINE

## 2017-04-01 PROCEDURE — 65660000000 HC RM CCU STEPDOWN

## 2017-04-01 PROCEDURE — 74011250637 HC RX REV CODE- 250/637: Performed by: NURSE PRACTITIONER

## 2017-04-01 PROCEDURE — 74011250637 HC RX REV CODE- 250/637: Performed by: INTERNAL MEDICINE

## 2017-04-01 PROCEDURE — 74011000250 HC RX REV CODE- 250: Performed by: PSYCHIATRY & NEUROLOGY

## 2017-04-01 PROCEDURE — 82962 GLUCOSE BLOOD TEST: CPT

## 2017-04-01 PROCEDURE — 74011636637 HC RX REV CODE- 636/637: Performed by: INTERNAL MEDICINE

## 2017-04-01 RX ADMIN — AMLODIPINE BESYLATE 10 MG: 5 TABLET ORAL at 10:05

## 2017-04-01 RX ADMIN — PRAVASTATIN SODIUM 40 MG: 20 TABLET ORAL at 22:02

## 2017-04-01 RX ADMIN — Medication 10 ML: at 05:27

## 2017-04-01 RX ADMIN — LABETALOL HYDROCHLORIDE 10 MG: 5 INJECTION, SOLUTION INTRAVENOUS at 01:57

## 2017-04-01 RX ADMIN — PROCHLORPERAZINE EDISYLATE 10 MG: 5 INJECTION INTRAMUSCULAR; INTRAVENOUS at 10:04

## 2017-04-01 RX ADMIN — INSULIN LISPRO 2 UNITS: 100 INJECTION, SOLUTION INTRAVENOUS; SUBCUTANEOUS at 17:30

## 2017-04-01 RX ADMIN — LABETALOL HYDROCHLORIDE 10 MG: 5 INJECTION, SOLUTION INTRAVENOUS at 05:22

## 2017-04-01 RX ADMIN — FAMOTIDINE 20 MG: 20 TABLET, FILM COATED ORAL at 18:12

## 2017-04-01 RX ADMIN — INSULIN LISPRO 2 UNITS: 100 INJECTION, SOLUTION INTRAVENOUS; SUBCUTANEOUS at 09:04

## 2017-04-01 RX ADMIN — FAMOTIDINE 20 MG: 20 TABLET, FILM COATED ORAL at 10:05

## 2017-04-01 RX ADMIN — Medication 10 ML: at 13:13

## 2017-04-01 RX ADMIN — INSULIN LISPRO 2 UNITS: 100 INJECTION, SOLUTION INTRAVENOUS; SUBCUTANEOUS at 12:16

## 2017-04-01 RX ADMIN — LABETALOL HYDROCHLORIDE 10 MG: 5 INJECTION, SOLUTION INTRAVENOUS at 06:07

## 2017-04-01 RX ADMIN — Medication 10 ML: at 22:04

## 2017-04-01 NOTE — PROGRESS NOTES
I received notification that [facility] with whom pt is in network has declined pt. Their physiatrist is recommending SNF. Pt is out of network for [facility]. I tried to visit pt.'s room but his wife was not present. I left a VM on her phone [phone] requesting her to contact me regarding SNF placement. The three SNFs in network for pt are [address] [place] [address]. Once I receive wife's choices for SNF,I will submit clinicals to the SNF of her choice. Albin Pike

## 2017-04-01 NOTE — PROGRESS NOTES
Leonidas Lepe debi [place] [address]  [address], [address] [place] [address]  [phone]      Medical Progress Note      NAME: Parminder Alvarado   :  1950  MRM:  [id number]    Date/Time: 2017  1:55 PM       Assessment and Plan:     Hemorrhage, intracerebral ([facility] [address] (3/28/2017): occurred post-tPA for an acute ischemic CVA. Follow up CT done 3/29 showed no worsening of bleed. He is s/p transfusion with platelets and cryo. Continues to improve with therapies.      Acute CVA (cerebral vascular accident) ([address] (3/28/2017)/ Aphasia (3/28/2017): seen by tele neurology in the ED. Initial CT head unremarkable and he is sp tPA given 3/28. Complicated by [address] as above. LDL 92. On a liquid diet, advance diet per speech. Continue pravachol, PT, PT . Needs rehab. Neuro to decide on timing of further imaging ie MRI studies      HTN (hypertension), malignant (3/28/2017): likely has hx of HTN. Not been on medications. Amlodipine increased to 10 mg. Goal is sBP <140, PRN labetalol for now. Will add second agent if necessary       Hyperglycemia POA: no prior hx of DM. A1c 7.8. Diabetic diet when able. SSI per protocol.        Fracture of radius, distal, left, closed (3/28/2017): recently had surgery. Out patient follow up     Diarrhea: Resolved       Acute urinary retention: likely related to CVA. Had a [place] catheter inserted. Non bloody. Continue to follow and rowland DC      Tobacco use disorder (3/28/2017): Will need counseling before DC       Subjective:     Chief Complaint:  Seen and examined. Feels like his speech is getting better. Feels okay    ROS:  (bold if positive, if negative)      Tolerating PT  Tolerating Diet        Objective:     Last 24hrs VS reviewed since prior progress note.  Most recent are:    Visit Vitals    /66 (BP 1 Location: Left arm, BP Patient Position: At rest)    Pulse (!) 102    Temp 99 °F (37.2 °C)    Resp 19    Ht 6' (1.829 m)    Wt 106.5 kg (234 lb 12.6 oz)    SpO2 95%    BMI 31.84 kg/m2     SpO2 Readings from Last 6 Encounters:   04/01/17 95%   03/17/17 97%    O2 Flow Rate (L/min): 2 l/min     Intake/Output Summary (Last 24 hours) at 04/01/17 1355  Last data filed at 04/01/17 0800   Gross per 24 hour   Intake              600 ml   Output              500 ml   Net              100 ml        Physical Exam:    Gen:  Well-developed, well-nourished, in no acute distress  HEENT:  Pink conjunctivae, PERRL, hearing intact to voice, moist mucous membranes  Neck:  Supple, without masses, thyroid non-tender  Resp:  No accessory muscle use, clear breath sounds without wheezes rales or rhonchi  Card:  No murmurs, normal S1, S2 without thrills, bruits or peripheral edema  Abd:  Soft, non-tender, non-distended, normoactive bowel sounds are present, no palpable organomegaly and no detectable hernias  Lymph:  No cervical or inguinal adenopathy  Musc:  No cyanosis or clubbing  Skin:  No rashes or ulcers, skin turgor is good  Neuro:  Cranial nerves are grossly intact, no focal motor weakness, follows commands appropriately  Psych:  Good insight, oriented to person, place and time, alert    Telemetry reviewed:   normal sinus rhythm  __________________________________________________________________  Medications Reviewed: (see below)  Medications:     Current Facility-Administered Medications   Medication Dose Route Frequency    amLODIPine (NORVASC) tablet 10 mg  10 mg Oral DAILY    famotidine (PEPCID) tablet 20 mg  20 mg Oral BID    labetalol (NORMODYNE;TRANDATE) injection 10 mg  10 mg IntraVENous Q10MIN PRN    glucose chewable tablet 16 g  4 Tab Oral PRN    dextrose (D50W) injection syrg 12.5-25 g  12.5-25 g IntraVENous PRN    glucagon (GLUCAGEN) injection 1 mg  1 mg IntraMUSCular PRN    insulin lispro (HUMALOG) injection   SubCUTAneous AC&HS    pravastatin (PRAVACHOL) tablet 40 mg  40 mg Oral QHS    sodium chloride (NS) flush 5-10 mL  5-10 mL IntraVENous Q8H    sodium chloride (NS) flush 5-10 mL  5-10 mL IntraVENous PRN    ondansetron (ZOFRAN) injection 4 mg  4 mg IntraVENous Q6H PRN    prochlorperazine (COMPAZINE) injection 10 mg  10 mg IntraVENous Q4H PRN    0.9% sodium chloride infusion 250 mL  250 mL IntraVENous PRN        Lab Data Reviewed: (see below)  Lab Review:     Recent Labs      03/31/17 0355 03/30/17 0247   WBC  12.9*  13.0*   HGB  14.3  13.5   HCT  41.9  37.5   PLT  287  284     Recent Labs      03/31/17 0355 03/30/17 0247   NA  139  139   K  3.3*  3.5   CL  102  105   CO2  27  23   GLU  157*  205*   BUN  13  19   CREA  0.78  0.98   CA  8.9  8.4*   ALB  3.6   --    TBILI  0.7   --    SGOT  18   --    ALT  25   --    INR  1.1   --      Lab Results   Component Value Date/Time    Glucose (POC) 142 04/01/2017 11:53 AM    Glucose (POC) 152 04/01/2017 08:07 AM    Glucose (POC) 139 03/31/2017 08:43 PM    Glucose (POC) 155 03/31/2017 04:16 PM    Glucose (POC) 183 03/31/2017 11:39 AM     No results for input(s): PH, PCO2, PO2, HCO3, FIO2 in the last 72 hours. Recent Labs      03/31/17 0355   INR  1.1     All Micro Results     Procedure Component Value Units Date/Time    MRSA CULTURE [[id number]] Collected:  03/28/17 2024    Order Status:  Completed Specimen:  Nares Updated:  03/29/17 2729     Special Requests: NO SPECIAL REQUESTS        Culture result: MRSA NOT PRESENT                     Screening of patient nares for MRSA is for surveillance purposes and, if positive, to facilitate isolation considerations in high risk settings. It is not intended for automatic decolonization interventions per se as regimens are not sufficiently effective to warrant routine use. I have reviewed notes of prior 24hr.     Other pertinent lab: None    Total time spent with patient: 35 min                  Care Plan discussed with: Patient, Family, Nursing Staff and >50% of time spent in counseling and coordination of care    Discussed:  Care Plan    Prophylaxis:  SCD's    Disposition: [facility]/Rehab           ___________________________________________________    Attending Physician: Mony Faustin DO

## 2017-04-01 NOTE — PROGRESS NOTES
Bedside and Verbal shift change report given to [address] [place] [address] (oncoming nurse) by Danelle Schilder RN (offgoing nurse). Report included the following information SBAR, Kardex and Recent Results. Pt  Is alert/ CONFUSED. . Calling out/ holding call bell but does not know what he wants. . Little restless. . Oriented times one, nods head. . NOT SURE FOLLOWING  COMMANDS OR NOT, try to come out bed. . If keep it doing NEED SITTER. .Explained. . Not sure how much absorbing. [place] One[place] 0930- Family @ bedside. ..    1000- Alert/ less confused. .    1200 VSS. More alert. . Try to eat himself, family is helping. .     1600- VSS. .    1800- Transfer to [facility]. .     1900- Bedside and Verbal shift change report given to [address] [place] [address] (oncoming nurse) by Amber Gonzalez (offgoing nurse). Report included the following information SBAR, Kardex and Med Rec Status.

## 2017-04-02 LAB
ANION GAP BLD CALC-SCNC: 13 MMOL/L (ref 5–15)
BASOPHILS # BLD AUTO: 0 K/UL (ref 0–0.1)
BASOPHILS # BLD: 0 % (ref 0–1)
BUN SERPL-MCNC: 20 MG/DL (ref 6–20)
BUN/CREAT SERPL: 22 (ref 12–20)
CALCIUM SERPL-MCNC: 9.1 MG/DL (ref 8.5–10.1)
CHLORIDE SERPL-SCNC: 101 MMOL/L (ref 97–108)
CO2 SERPL-SCNC: 24 MMOL/L (ref 21–32)
CREAT SERPL-MCNC: 0.9 MG/DL (ref 0.7–1.3)
EOSINOPHIL # BLD: 0.2 K/UL (ref 0–0.4)
EOSINOPHIL NFR BLD: 2 % (ref 0–7)
ERYTHROCYTE [DISTWIDTH] IN BLOOD BY AUTOMATED COUNT: 12.8 % (ref 11.5–14.5)
GLUCOSE BLD STRIP.AUTO-MCNC: 131 MG/DL (ref 65–100)
GLUCOSE BLD STRIP.AUTO-MCNC: 139 MG/DL (ref 65–100)
GLUCOSE BLD STRIP.AUTO-MCNC: 139 MG/DL (ref 65–100)
GLUCOSE BLD STRIP.AUTO-MCNC: 155 MG/DL (ref 65–100)
GLUCOSE BLD STRIP.AUTO-MCNC: 203 MG/DL (ref 65–100)
GLUCOSE SERPL-MCNC: 152 MG/DL (ref 65–100)
HCT VFR BLD AUTO: 46.4 % (ref 36.6–50.3)
HGB BLD-MCNC: 16.8 G/DL (ref 12.1–17)
LYMPHOCYTES # BLD AUTO: 21 % (ref 12–49)
LYMPHOCYTES # BLD: 2.1 K/UL (ref 0.8–3.5)
MAGNESIUM SERPL-MCNC: 2 MG/DL (ref 1.6–2.4)
MCH RBC QN AUTO: 31.5 PG (ref 26–34)
MCHC RBC AUTO-ENTMCNC: 36.2 G/DL (ref 30–36.5)
MCV RBC AUTO: 87.1 FL (ref 80–99)
MONOCYTES # BLD: 0.9 K/UL (ref 0–1)
MONOCYTES NFR BLD AUTO: 8 % (ref 5–13)
NEUTS SEG # BLD: 7.1 K/UL (ref 1.8–8)
NEUTS SEG NFR BLD AUTO: 69 % (ref 32–75)
PHOSPHATE SERPL-MCNC: 3.5 MG/DL (ref 2.6–4.7)
PLATELET # BLD AUTO: 326 K/UL (ref 150–400)
POTASSIUM SERPL-SCNC: 3.6 MMOL/L (ref 3.5–5.1)
RBC # BLD AUTO: 5.33 M/UL (ref 4.1–5.7)
SERVICE CMNT-IMP: ABNORMAL
SODIUM SERPL-SCNC: 138 MMOL/L (ref 136–145)
WBC # BLD AUTO: 10.3 K/UL (ref 4.1–11.1)

## 2017-04-02 PROCEDURE — 74011636637 HC RX REV CODE- 636/637: Performed by: INTERNAL MEDICINE

## 2017-04-02 PROCEDURE — 83735 ASSAY OF MAGNESIUM: CPT | Performed by: INTERNAL MEDICINE

## 2017-04-02 PROCEDURE — 65660000000 HC RM CCU STEPDOWN

## 2017-04-02 PROCEDURE — 84100 ASSAY OF PHOSPHORUS: CPT | Performed by: INTERNAL MEDICINE

## 2017-04-02 PROCEDURE — 74011250637 HC RX REV CODE- 250/637: Performed by: INTERNAL MEDICINE

## 2017-04-02 PROCEDURE — 82962 GLUCOSE BLOOD TEST: CPT

## 2017-04-02 PROCEDURE — 74011250637 HC RX REV CODE- 250/637: Performed by: NURSE PRACTITIONER

## 2017-04-02 PROCEDURE — 36415 COLL VENOUS BLD VENIPUNCTURE: CPT | Performed by: INTERNAL MEDICINE

## 2017-04-02 PROCEDURE — 80048 BASIC METABOLIC PNL TOTAL CA: CPT | Performed by: INTERNAL MEDICINE

## 2017-04-02 PROCEDURE — 85025 COMPLETE CBC W/AUTO DIFF WBC: CPT | Performed by: INTERNAL MEDICINE

## 2017-04-02 RX ORDER — HYDROCHLOROTHIAZIDE 25 MG/1
25 TABLET ORAL DAILY
Status: DISCONTINUED | OUTPATIENT
Start: 2017-04-02 | End: 2017-04-03

## 2017-04-02 RX ORDER — METFORMIN HYDROCHLORIDE 500 MG/1
500 TABLET ORAL 2 TIMES DAILY WITH MEALS
Status: DISCONTINUED | OUTPATIENT
Start: 2017-04-02 | End: 2017-04-04 | Stop reason: HOSPADM

## 2017-04-02 RX ORDER — ACETAMINOPHEN 325 MG/1
650 TABLET ORAL
Status: DISCONTINUED | OUTPATIENT
Start: 2017-04-02 | End: 2017-04-04 | Stop reason: HOSPADM

## 2017-04-02 RX ADMIN — METFORMIN HYDROCHLORIDE 500 MG: 500 TABLET ORAL at 17:28

## 2017-04-02 RX ADMIN — FAMOTIDINE 20 MG: 20 TABLET, FILM COATED ORAL at 17:28

## 2017-04-02 RX ADMIN — PRAVASTATIN SODIUM 40 MG: 20 TABLET ORAL at 21:57

## 2017-04-02 RX ADMIN — METFORMIN HYDROCHLORIDE 500 MG: 500 TABLET ORAL at 09:01

## 2017-04-02 RX ADMIN — Medication 10 ML: at 21:57

## 2017-04-02 RX ADMIN — AMLODIPINE BESYLATE 10 MG: 5 TABLET ORAL at 09:01

## 2017-04-02 RX ADMIN — FAMOTIDINE 20 MG: 20 TABLET, FILM COATED ORAL at 09:01

## 2017-04-02 RX ADMIN — HYDROCHLOROTHIAZIDE 25 MG: 25 TABLET ORAL at 09:01

## 2017-04-02 RX ADMIN — Medication 10 ML: at 14:30

## 2017-04-02 RX ADMIN — INSULIN LISPRO 2 UNITS: 100 INJECTION, SOLUTION INTRAVENOUS; SUBCUTANEOUS at 16:02

## 2017-04-02 RX ADMIN — INSULIN LISPRO 3 UNITS: 100 INJECTION, SOLUTION INTRAVENOUS; SUBCUTANEOUS at 12:20

## 2017-04-02 RX ADMIN — ACETAMINOPHEN 650 MG: 325 TABLET ORAL at 13:30

## 2017-04-02 NOTE — PROGRESS NOTES
Bedside and Verbal shift change report given to Deborah Orta RN (oncoming nurse) by Pretty Oden RN (offgoing nurse). Report included the following information SBAR, Kardex and Recent Results     Pt is more alert vs confused. . Following commands. Eating himself. BED ALARM is on. . Family @ bedside. ... 1200- VSS. 1636- TRANSFER - OUT REPORT:    Verbal report given to [facility] RN(name) on Elizabeth Beckford  being transferred to [facility](unit) for routine progression of care       Report consisted of patients Situation, Background, Assessment and   Recommendations(SBAR). Information from the following report(s) SBAR, Kardex and Recent Results was reviewed with the receiving nurse. Lines:   Peripheral IV 03/30/17 Right Arm (Active)   Site Assessment Clean, dry, & intact 4/2/2017  8:00 AM   Phlebitis Assessment 0 4/2/2017  8:00 AM   Infiltration Assessment 0 4/2/2017  8:00 AM   Dressing Status Clean, dry, & intact 4/2/2017  8:00 AM   Dressing Type Transparent 4/2/2017  8:00 AM   Hub Color/Line Status Pink;Capped;Flushed 4/2/2017  8:00 AM   Action Taken Open ports on tubing capped 4/2/2017  8:00 AM   Alcohol Cap Used Yes 4/2/2017  8:00 AM        Opportunity for questions and clarification was provided. Patient transported with:   Registered Nurse with bed. Yunier Gomez

## 2017-04-02 NOTE — PROGRESS NOTES
I discussed pt with neurologist.I am resubmitting clinicals to [facility] as pt has made progress since [address] [place] [address] made their determination not to accept pt. stating pt is more SNF appropriate. I am resubmitting clinicals to [facility] to see if they will reconsider. I also talked with Kristy Marte with [facility] who wants me to submit the clinicals to [facility] in case [address] [place] [address] still feels pt is more SNF appropriate. I discussed all of this with pt and his wife. Pt is talking clearly now ,awake,and has progressed greatly.     Olinda Alexis

## 2017-04-02 NOTE — PROGRESS NOTES
Spiritual Care Assessment/Progress Notes    Elizabeth Beckford [id number]  [id number]    1950  75 y.o.  male    Patient Telephone Number: [phone] (home)   Faith Affiliation: Arley Anaya   Language: English   Extended Emergency Contact Information  Primary Emergency Contact: [address] [place] [address] Phone: [phone]  Mobile Phone: [phone]  Relation: Spouse   Patient Active Problem List    Diagnosis Date Noted    Type II diabetes mellitus ([facility] [address] 03/29/2017    Acute encephalopathy 03/28/2017    Aphasia 03/28/2017    HTN (hypertension), malignant 03/28/2017    Fracture of radius, distal, left, closed 03/28/2017    Tobacco use disorder 03/28/2017    Received intravenous tissue plasminogen activator (tPA) in emergency department 03/28/2017    Hemorrhage, intracerebral ([facility] 03/28/2017        Date: 4/2/2017       Level of Faith/Spiritual Activity:  [x]         Involved in willa tradition/spiritual practice    []         Not involved in willa tradition/spiritual practice  [x]         Spiritually oriented    []         Claims no spiritual orientation    []         seeking spiritual identity  []         Feels alienated from Sikh practice/tradition  []         Feels angry about Sikh practice/tradition  [x]         Spirituality/Sikh tradition is a resource for coping at this time.   []         Not able to assess due to medical condition    Services Provided Today:  []         crisis intervention    []         reading Scriptures  [x]         spiritual assessment    [x]         prayer  [x]         empathic listening/emotional support  []         rites and rituals (cite in comments)  [x]         life review     []         Sikh support  []         theological development   []         advocacy  []         ethical dialog     [x]         blessing  []         bereavement support    []         support to family  []         anticipatory grief support   [x]         help with AMD  []         spiritual guidance    []         meditation      Spiritual Care Needs  [x]         Emotional Support  [x]         Spiritual/Confucianist Care  []         Loss/Adjustment  []         Advocacy/Referral                /Ethics  []         No needs expressed at               this time  []         Other: (note in               comments)  Spiritual Care Plan  []         Follow up visits with               pt/family  []         Provide materials  []         Schedule sacraments  []         Contact Community               Clergy  [x]         Follow up as needed  []         Other: (note in               comments)     Comments: Request by patient, through in basket order, to assist with advance medical directive. Consulted with patients nurse. Explained document to patient and wife, who were present in the room. Pt is currently still having some concerns related to memory and cognition, per pt and wife. As such, pt is not fully appropriate for completion at the time.  explained document and process for completion. Additionally,  engaged pt and wife about their spiritual life. Both attend [employer] in [place], [place]. Pt was raised in [employer] school, and deeply appreciates the ritual and Taoist of [address] [place] [address]. They are both involved in their [place] [employer] and love their Hoahaoism family. Both love the some of the traditional aspects of their Hoahaoism's traditional service. Their , Julio Goldstein, will be coming to visit with them today. Many members have called, texted, and brought many meals for pt and his wife. They feel well supported and blessed by God and their Hoahaoism. This grants this a sense of meaning and hope, even in spite of pt's current medical circumstances. Also, pt & wife discussed how they met and processed through how they came together and were . Pt and wife have an exceptional support system and identify their spirituality as a big part of this.  offered spiritual support through active listening, affirmation, conversation and prayer.      [id number] Michelle Rogers M.Div, M.S, Andres Goff3 available at [phone]([phone])

## 2017-04-02 NOTE — PROGRESS NOTES
[address]   [place] [address]   [facility] [place] [facility]    ZSIR    Fax         Acute CVA s/p TPA with left thalamic ICH    Awaiting rehab  D/w case mgt and she is trying to get him to rehab  Issue is with patient's insurance    Workup has found dyslipidemia--on statin now  Off ASA due to [address] and intracranial vasculature good  Echo was good    Some elevated BP readings    Wife at bedside  Pt has been carrying on conversations  He has been reading  He described people in pictures    He tells me he feels great except they wont let him get out of bed    Visit Vitals    BP (!) 161/101 (BP 1 Location: Right arm, BP Patient Position: At rest)    Pulse (!) 104    Temp 98.9 °F (37.2 °C)    Resp 20    Ht 6' (1.829 m)    Wt 106.5 kg (234 lb 12.6 oz)    SpO2 98%    BMI 31.84 kg/m2     He looks good  He is awake and alert  He describes his conversation with the   Answers questions  Asked why he has to have BG checked    Imp/plan  Doing well post CVA/TPA/ICH  Cont support  Keep SBP under 140 and d/w Dr Crys Vasquez and he has already added another antihypertensive  Advancing diet  Repeat CT head in about a week  He needs rehab and staff working diligently to get him there    Will have team follow up [address]

## 2017-04-02 NOTE — ACP (ADVANCE CARE PLANNING)
Request by patient, through in basket order, to assist with advance medical directive. Consulted with patients nurse. Explained document to patient and wife, who were present in the room. Pt is currently still having some concerns related to memory and cognition, per pt and wife. As such, pt is not fully appropriate for completion at the time.  explained document and process for completion.      [id number] Michelle Rogers M.Div, M.S, Andres 604 available at [facility]([phone])

## 2017-04-02 NOTE — PROGRESS NOTES
Bedside and Verbal shift change report given to Tiffanie Samuels RN (oncoming nurse) by Amor Carrasco RN (offgoing nurse). Report included the following information SBAR, Kardex, ED Summary, Intake/Output, Recent Results and Med Rec Status.

## 2017-04-02 NOTE — PROGRESS NOTES
[facility]  [address] [facility], [address] [place] [address]  [phone]      Medical Progress Note      NAME: Aristeo Wagner   :  1950  MRM:  [id number]    Date/Time: 2017  1:55 PM       Assessment and Plan:     Hemorrhage, intracerebral ([facility] (3/28/2017): occurred post-tPA for an acute ischemic CVA. Follow up CT done 3/29 showed no worsening of bleed. He is s/p transfusion with platelets and cryo. Continues to improve with therapies. Per neuro, will likely need repeat imaging in 1 week and if no worsening of bleed, can consider ASA therapy for secondary CVA prevention.     Acute CVA (cerebral vascular accident) ([facility] (3/28/2017)/ Aphasia (3/28/2017): seen by tele neurology in the ED. Initial CT head unremarkable and he is sp tPA given 3/28. Complicated by [address] as above. On a Dysphagia 1 diet, advancing diet per speech. Continue pravachol, PT, PT . Needs rehab. Neuro to decide on timing of further imaging ie MRI studies      HTN (hypertension), malignant (3/28/2017): likely has hx of HTN. Not been on medications. Amlodipine increased to 10 mg. Still not at goal of <140,. Add full dose HCTZ. PRN labetalol    Hyperglycemia POA: no prior hx of DM. A1c 8.1 confirming dx of DM2. Will start metformin 500 mg PO BID and this can be titrated as outpatient.      Fracture of radius, distal, left, closed (3/28/2017): recently had surgery. Out patient follow up     Acute urinary retention: likely related to CVA. Had a rowland catheter inserted. Non bloody. Continue to follow and rowland DC      Tobacco use disorder (3/28/2017): Quit smoking 1.5 months ago, encouraged continue cessation. Subjective:     Chief Complaint:  Seen and examined. Feels much better today. Spoke with him and wife at length about risk factor reduction and plans for rehab    ROS:  (bold if positive, if negative)      Tolerating PT  Tolerating Diet        Objective:     Last 24hrs VS reviewed since prior progress note.  Most recent are:    Visit Vitals    /75 (BP 1 Location: Left arm, BP Patient Position: At rest)    Pulse (!) 107    Temp 99 °F (37.2 °C)    Resp 20    Ht 6' (1.829 m)    Wt 106.5 kg (234 lb 12.6 oz)    SpO2 96%    BMI 31.84 kg/m2     SpO2 Readings from Last 6 Encounters:   04/02/17 96%   03/17/17 97%    O2 Flow Rate (L/min): 2 l/min       Intake/Output Summary (Last 24 hours) at 04/02/17 1330  Last data filed at 04/02/17 0800   Gross per 24 hour   Intake              800 ml   Output                0 ml   Net              800 ml        Physical Exam:    Gen:  Well-developed, well-nourished, in no acute distress  HEENT:  Pink conjunctivae, PERRL, hearing intact to voice, moist mucous membranes  Neck:  Supple, without masses, thyroid non-tender  Resp:  No accessory muscle use, clear breath sounds without wheezes rales or rhonchi  Card:  No murmurs, normal S1, S2 without thrills, bruits or peripheral edema  Abd:  Soft, non-tender, non-distended, normoactive bowel sounds are present, no palpable organomegaly and no detectable hernias  Lymph:  No cervical or inguinal adenopathy  Musc:  No cyanosis or clubbing  Skin:  No rashes or ulcers, skin turgor is good  Neuro:  Cranial nerves are grossly intact, no focal motor weakness, follows commands appropriately  Psych:  Good insight, oriented to person, place and time, alert    Telemetry reviewed:   normal sinus rhythm  __________________________________________________________________  Medications Reviewed: (see below)  Medications:     Current Facility-Administered Medications   Medication Dose Route Frequency    hydroCHLOROthiazide (HYDRODIURIL) tablet 25 mg  25 mg Oral DAILY    metFORMIN (GLUCOPHAGE) tablet 500 mg  500 mg Oral BID WITH MEALS    acetaminophen (TYLENOL) tablet 650 mg  650 mg Oral Q6H PRN    amLODIPine (NORVASC) tablet 10 mg  10 mg Oral DAILY    famotidine (PEPCID) tablet 20 mg  20 mg Oral BID    labetalol (NORMODYNE;TRANDATE) injection 10 mg 10 mg IntraVENous Q10MIN PRN    glucose chewable tablet 16 g  4 Tab Oral PRN    dextrose (D50W) injection syrg 12.5-25 g  12.5-25 g IntraVENous PRN    glucagon (GLUCAGEN) injection 1 mg  1 mg IntraMUSCular PRN    insulin lispro (HUMALOG) injection   SubCUTAneous AC&HS    pravastatin (PRAVACHOL) tablet 40 mg  40 mg Oral QHS    sodium chloride (NS) flush 5-10 mL  5-10 mL IntraVENous Q8H    sodium chloride (NS) flush 5-10 mL  5-10 mL IntraVENous PRN    ondansetron (ZOFRAN) injection 4 mg  4 mg IntraVENous Q6H PRN    prochlorperazine (COMPAZINE) injection 10 mg  10 mg IntraVENous Q4H PRN    0.9% sodium chloride infusion 250 mL  250 mL IntraVENous PRN        Lab Data Reviewed: (see below)  Lab Review:     Recent Labs      04/02/17 0539 03/31/17 0355   WBC  10.3  12.9*   HGB  16.8  14.3   HCT  46.4  41.9   PLT  326  287     Recent Labs      04/02/17 0539  03/31/17 0355   NA  138  139   K  3.6  3.3*   CL  101  102   CO2  24  27   GLU  152*  157*   BUN  20  13   CREA  0.90  0.78   CA  9.1  8.9   MG  2.0   --    PHOS  3.5   --    ALB   --   3.6   TBILI   --   0.7   SGOT   --   18   ALT   --   25   INR   --   1.1     Lab Results   Component Value Date/Time    Glucose (POC) 203 04/02/2017 10:59 AM    Glucose (POC) 131 04/02/2017 08:20 AM    Glucose (POC) 139 04/02/2017 07:59 AM    Glucose (POC) 140 04/01/2017 08:54 PM    Glucose (POC) 144 04/01/2017 05:58 PM     No results for input(s): PH, PCO2, PO2, HCO3, FIO2 in the last 72 hours. Recent Labs      03/31/17 0355   INR  1.1     All Micro Results     Procedure Component Value Units Date/Time    MRSA CULTURE [[id number]] Collected:  03/28/17 2024    Order Status:  Completed Specimen:  Nares Updated:  03/29/17 2628     Special Requests: NO SPECIAL REQUESTS        Culture result: MRSA NOT PRESENT                     Screening of patient nares for MRSA is for surveillance purposes and, if positive, to facilitate isolation considerations in high risk settings. It is not intended for automatic decolonization interventions per se as regimens are not sufficiently effective to warrant routine use. I have reviewed notes of prior 24hr.     Other pertinent lab: None    Total time spent with patient: 45 min                  Care Plan discussed with: Patient, Family, Care Manager, Nursing Staff, Consultant/Specialist and >50% of time spent in counseling and coordination of care    Discussed:  Care Plan    Prophylaxis:  SCD's    Disposition:  [facility]/Rehab           ___________________________________________________    Attending Physician: Winston Fraser, DO

## 2017-04-03 LAB
ANION GAP BLD CALC-SCNC: 10 MMOL/L (ref 5–15)
BUN SERPL-MCNC: 23 MG/DL (ref 6–20)
BUN/CREAT SERPL: 26 (ref 12–20)
CALCIUM SERPL-MCNC: 9.6 MG/DL (ref 8.5–10.1)
CHLORIDE SERPL-SCNC: 97 MMOL/L (ref 97–108)
CO2 SERPL-SCNC: 28 MMOL/L (ref 21–32)
CREAT SERPL-MCNC: 0.9 MG/DL (ref 0.7–1.3)
GLUCOSE BLD STRIP.AUTO-MCNC: 133 MG/DL (ref 65–100)
GLUCOSE BLD STRIP.AUTO-MCNC: 154 MG/DL (ref 65–100)
GLUCOSE BLD STRIP.AUTO-MCNC: 172 MG/DL (ref 65–100)
GLUCOSE BLD STRIP.AUTO-MCNC: 184 MG/DL (ref 65–100)
GLUCOSE SERPL-MCNC: 159 MG/DL (ref 65–100)
MAGNESIUM SERPL-MCNC: 2.1 MG/DL (ref 1.6–2.4)
POTASSIUM SERPL-SCNC: 3.9 MMOL/L (ref 3.5–5.1)
SERVICE CMNT-IMP: ABNORMAL
SODIUM SERPL-SCNC: 135 MMOL/L (ref 136–145)

## 2017-04-03 PROCEDURE — 97530 THERAPEUTIC ACTIVITIES: CPT

## 2017-04-03 PROCEDURE — 97535 SELF CARE MNGMENT TRAINING: CPT

## 2017-04-03 PROCEDURE — 36415 COLL VENOUS BLD VENIPUNCTURE: CPT | Performed by: INTERNAL MEDICINE

## 2017-04-03 PROCEDURE — 74011000250 HC RX REV CODE- 250: Performed by: PSYCHIATRY & NEUROLOGY

## 2017-04-03 PROCEDURE — 97112 NEUROMUSCULAR REEDUCATION: CPT

## 2017-04-03 PROCEDURE — 74011250636 HC RX REV CODE- 250/636: Performed by: INTERNAL MEDICINE

## 2017-04-03 PROCEDURE — 92526 ORAL FUNCTION THERAPY: CPT

## 2017-04-03 PROCEDURE — 65660000000 HC RM CCU STEPDOWN

## 2017-04-03 PROCEDURE — 74011250637 HC RX REV CODE- 250/637: Performed by: NURSE PRACTITIONER

## 2017-04-03 PROCEDURE — 74011250637 HC RX REV CODE- 250/637: Performed by: INTERNAL MEDICINE

## 2017-04-03 PROCEDURE — 80048 BASIC METABOLIC PNL TOTAL CA: CPT | Performed by: INTERNAL MEDICINE

## 2017-04-03 PROCEDURE — 74011636637 HC RX REV CODE- 636/637: Performed by: INTERNAL MEDICINE

## 2017-04-03 PROCEDURE — 82962 GLUCOSE BLOOD TEST: CPT

## 2017-04-03 PROCEDURE — 83735 ASSAY OF MAGNESIUM: CPT | Performed by: INTERNAL MEDICINE

## 2017-04-03 RX ORDER — LISINOPRIL 20 MG/1
20 TABLET ORAL DAILY
Status: DISCONTINUED | OUTPATIENT
Start: 2017-04-03 | End: 2017-04-04 | Stop reason: HOSPADM

## 2017-04-03 RX ADMIN — PRAVASTATIN SODIUM 40 MG: 20 TABLET ORAL at 22:31

## 2017-04-03 RX ADMIN — LABETALOL HYDROCHLORIDE 10 MG: 5 INJECTION, SOLUTION INTRAVENOUS at 01:10

## 2017-04-03 RX ADMIN — AMLODIPINE BESYLATE 10 MG: 5 TABLET ORAL at 08:28

## 2017-04-03 RX ADMIN — LABETALOL HYDROCHLORIDE 10 MG: 5 INJECTION, SOLUTION INTRAVENOUS at 06:43

## 2017-04-03 RX ADMIN — LISINOPRIL 20 MG: 20 TABLET ORAL at 08:28

## 2017-04-03 RX ADMIN — INSULIN LISPRO 2 UNITS: 100 INJECTION, SOLUTION INTRAVENOUS; SUBCUTANEOUS at 12:01

## 2017-04-03 RX ADMIN — ONDANSETRON 4 MG: 2 INJECTION INTRAMUSCULAR; INTRAVENOUS at 15:03

## 2017-04-03 RX ADMIN — Medication 10 ML: at 22:31

## 2017-04-03 RX ADMIN — INSULIN LISPRO 2 UNITS: 100 INJECTION, SOLUTION INTRAVENOUS; SUBCUTANEOUS at 16:30

## 2017-04-03 RX ADMIN — FAMOTIDINE 20 MG: 20 TABLET, FILM COATED ORAL at 08:28

## 2017-04-03 RX ADMIN — Medication 10 ML: at 15:04

## 2017-04-03 RX ADMIN — METFORMIN HYDROCHLORIDE 500 MG: 500 TABLET ORAL at 08:28

## 2017-04-03 RX ADMIN — METFORMIN HYDROCHLORIDE 500 MG: 500 TABLET ORAL at 17:12

## 2017-04-03 RX ADMIN — INSULIN LISPRO 2 UNITS: 100 INJECTION, SOLUTION INTRAVENOUS; SUBCUTANEOUS at 08:27

## 2017-04-03 RX ADMIN — Medication 10 ML: at 05:25

## 2017-04-03 RX ADMIN — FAMOTIDINE 20 MG: 20 TABLET, FILM COATED ORAL at 17:12

## 2017-04-03 NOTE — PROGRESS NOTES
Nutrition Assessment:    RECOMMENDATIONS/INTERVENTION(S):   Continue current diet  Start Glucerna TID (hol[place]) until po intakes improve  Monitor po intake, BG, supplement tolerance  Diet texture per SLP    ASSESSMENT:   4/3:  Pt seen for dysphagia and LOS. Chart reviewed. 75 yom admitted for CVA, aphasia, radius fx. Pmhx includes DM, HTN. SLP following. Visited pt this morning - sleeping and wife requested not to wake. Wife states #, good appetite pts, 3 meals per day at home. Observed poor po intake of breakfast meal.  Is eating and asking for foods not usually liked/consumed, ie applesauce. Agreeable to ONS trial until appetite improves. Labs/meds reviewed. -[phone]; A1C 8.3%. DTC following. Skin: intact. SUBJECTIVE/OBJECTIVE:     Diet Order: Puree, Consistent carb (Low fat)  % Eaten:  Patient Vitals for the past 72 hrs:   % Diet Eaten   04/03/17 1202 25 %   04/03/17 0828 50 %     Pertinent Medications: [x] Reviewed - insulin, statin, Pepcid    Chemistries:  Lab Results   Component Value Date/Time    Sodium 135 04/03/2017 05:12 AM    Potassium 3.9 04/03/2017 05:12 AM    Chloride 97 04/03/2017 05:12 AM    CO2 28 04/03/2017 05:12 AM    Anion gap 10 04/03/2017 05:12 AM    Glucose 159 04/03/2017 05:12 AM    BUN 23 04/03/2017 05:12 AM    Creatinine 0.90 04/03/2017 05:12 AM    BUN/Creatinine ratio 26 04/03/2017 05:12 AM    GFR est AA >60 04/03/2017 05:12 AM    GFR est non-AA >60 04/03/2017 05:12 AM    Calcium 9.6 04/03/2017 05:12 AM    AST (SGOT) 18 03/31/2017 03:55 AM    Alk.  phosphatase 71 03/31/2017 03:55 AM    Protein, total 7.4 03/31/2017 03:55 AM    Albumin 3.6 03/31/2017 03:55 AM    Globulin 3.8 03/31/2017 03:55 AM    A-G Ratio 0.9 03/31/2017 03:55 AM    ALT (SGPT) 25 03/31/2017 03:55 AM      Anthropometrics: Height: 6' (182.9 cm) Weight: 106.5 kg (234 lb 12.6 oz)    IBW (%IBW): 80.9 kg (178 lb 5.6 oz) (131.64 %) UBW (%UBW): 104.3 kg (230 lb) (102.08 %)    BMI: Body mass index is 31.84 kg/(m^2). This BMI is indicative of:   [] Underweight    [] Normal    [x] Overweight - per advanced age   []  Obesity    []  Extreme Obesity (BMI>40)  Estimated Nutrition Needs (Based on): 2184 Kcals/day (RMR (1873) x 1.3 AF - 250) , 106 g (85-  (0.8-1.0 g/kg x actual body wt)) Protein  Carbohydrate: At Least 130 g/day  Fluids: 2200 mL/day    Last BM: 3/30   [x]Active     []Hyperactive  []Hypoactive       [] Absent   BS  Skin:    [x] Intact   [] Incision  [] Breakdown   [] DTI   [] Tears/Excoriation/Abrasion  []Edema [] Other:    Wt Readings from Last 30 Encounters:   04/03/17 106.5 kg (234 lb 12.6 oz)   03/29/17 108.7 kg (239 lb 10.2 oz)   03/17/17 107.8 kg (237 lb 10.5 oz)      NUTRITION DIAGNOSES:   Problem:  Inadequate protein-energy intake Altered nutrition-related lab values   Etiology: related to conditions associated with a dx endocrine dysfunction   Signs/Symptoms: as evidenced by observed poor-fair po intake of meals, swallowing difficulty elevated BG, A1C 8.3    NUTRITION INTERVENTIONS:  Meals/Snacks: General/healthful diet   Supplements: Commercial supplement              GOAL:   Pt will consume/tolerate >50% of meals and supplements in the next 2-4 days    Cultural, Lutheran, or Ethnic Dietary Needs: None     LEARNING NEEDS (Diet, Food/Nutrient-Drug Interaction):    [] None Identified   [] Identified and Education Provided/Documented   [x] Identified and Pt declined/was not appropriate      [] Interdisciplinary Care Plan Reviewed/Documented    [] Discharge Needs:   TBD   [] No Nutrition Related Discharge Needs    NUTRITION RISK:   Pt Is At Nutrition Risk  [x]     No Nutrition Risk Identified  []       PT SEEN FOR:    []  MD Consult: []Calorie Count      []Diabetic Diet Education        []Diet Education     []Electrolyte Management     []General Nutrition Management and Supplements     []Management of Tube Feeding     []TPN Recommendations    []  RN Referral:  []MST score >=2     []Enteral/Parenteral Nutrition PTA     []Pregnant: Gestational DM or Multigestation                 [] Pressure Ulcer    []  Low BMI      [x]  Length of Stay       [x] Dysphagia Diet         [] [address] [place] [address]   Pager [phone]

## 2017-04-03 NOTE — PROGRESS NOTES
Problem: Self Care Deficits Care Plan (Adult)  Goal: *Acute Goals and Plan of Care (Insert Text)  Occupational Therapy Goals  Initiated 3/30/2017  1. Patient will perform grooming with minimum assistance using B UE with best technique within 7 day(s). MET; upgrade (4/3) to modified independence, standing for >5 minutes   2. Patient will perform self feeding with supervision/setup using B UE with best technique within 7 days. 3. Patient will perform upper body dressing and bathing with minimum assistance with best technique within 7 day(s). 4. Patient will perform lower body dressing and bathing with maximum assistance with best technique within 7 day(s). 5. Patient will perform toilet transfers with moderate assistance to [facility] using best technique within 7 day(s). 6. Patient will participate in upper extremity therapeutic exercise/activities with minimal assistance with focus on B UE strength and coordination within 7 day(s). 7. Patient will use R, dominant, UE 50% of the time as fine manipulator with no cuing to increase independence with all ADL tasks within 7 days. MET; Upgrade (4/3) to 100% of the time  8. Patient will follow 75% of simple, one-step commands accurately during functional tasks within 7 days. MET (4/3)   9. Patient will engage/participate in full Fugl-Stevenson Assessment of Upper Extremity within 7 days. OCCUPATIONAL THERAPY TREATMENT  Patient: Shelia Martell (14 y.o. male)  Date: 4/3/2017  Diagnosis: CVA (cerebral vascular accident) [facility]) <principal problem not specified>       Precautions: Fall, NWB Left wrist, ICH BP paramaters      ASSESSMENT:  Mr. Nicky Kussmaul was received sitting EOB following PT tx agreeable to additional activity. Patient with improved verbalizations, expressing needs throughout tx, and able to respond (verbally) appropriately to most questions. Patient following 100% of commands and requiring less cuing during functional tasks.   Patient does continue to be limited by mild R side weakness (UE/LE) however inattention to the R significantly improved; Patient now initiating physically with R, dominant, UE during functional tasks with only minimal tactile cuing with <25% of tasks. Seated and standing tasks at the sink focused on improving ADL abilities, tolerance/endurance, processing, motor planning, sequencing, and multi-tasking. Patient tolerated 2 minutes at the most with standing ADL tasks, when fatiguing he requires cues for recognition and encouragement of sitting for continued activity. Patient returned to the chair and educated on simple exercises to complete for improved B UE strength (within L UE limitations due to distal radius fx), coordination, and overall motor planning + sequencing. Patient's BP within [address] parameters (keep systolic <415); HR elevated to 120 bpm only with bout of nausea (+heaving) following activity however improved to 89-97 bpm otherwise. Wife was present and supportive throughout treatment. Patient continues to make good progress daily, he is highly motivated, has needs from multiple disciplines (OT, PT, SLP), and continues to be an excellent inpatient rehab candidate. Will continue to benefit from OT for progression of goals for return to baseline level of independence. Would benefit from completion of Fugl-Stevenson Assessment of Upper Extremity outcome measure as able in future OT tx. Progression toward goals:  [X]       Improving appropriately and progressing toward goals  [ ]       Improving slowly and progressing toward goals  [ ]       Not making progress toward goals and plan of care will be adjusted       PLAN:  Patient continues to benefit from skilled intervention to address the above impairments. Continue treatment per established plan of care. Discharge Recommendations:  Inpatient Rehab  Further Equipment Recommendations for Discharge:  None at this time       SUBJECTIVE:   Patient stated I would like to shave.   Patient provided choices (2-3) when completing grooming tasks. He was able to make appropriate choices with good sequencing of tasks. OBJECTIVE DATA SUMMARY:   Cognitive/Behavioral Status:  Neurologic State: Alert  Orientation Level: Oriented to person;Oriented to place  Cognition: Follows commands  Perception: Appears intact  Perseveration: No perseveration noted  Safety/Judgement: Decreased insight into deficits     Functional Mobility and Transfers for ADLs:  Bed Mobility:  Supine to Sit: Minimum assistance     Transfers:  Sit to Stand: Minimum assistance     Balance:  Sitting - Static: Good (unsupported)  Sitting - Dynamic: Fair (occasional)  Standing - Static: Constant support  Standing - Dynamic : Fair     ADL Intervention/Neuro Re-education:  Grooming  Grooming Assistance: Supervision/set up  Washing Face: Supervision/set-up  Washing Hands: Supervision/set-up  Shaving: Supervision/set-up  Brushing/Combing Hair: Supervision/set-up  Cues: Verbal cues provided; Tactile cues provided;Mirror for visual feedback     Patient's wife present and confirms that patient is very thorough in his grooming tasks and completes all tasks daily. Patient required cues when shaving for completion of task; Patient leaving two small patches of hair outside midline, R side of face, that required verbal and visual cuing for recognition. When cued, patient able to complete task with supervision/setup. Patient completed task in sitting per request due to fatigue in standing for extended time; Cued for seated rest break. Patient fatigues easily, proximally (at shoulders); Encouraged patient to lean R UE on sink to provide stable, support for improved distal control with fatigue. Patient able to reach fully to back of head to wash and comb hair. Patient able to stand with washing hair and face, ~2 minutes. Combing activity completed in sitting. When fatiguing, B LE become flexed and patient slowly looses control.   He still requires verbal cuing for recognition of fatigue and for initiation of rest break. Wife reports patient typically blow drys hair. When asked if he wanted to attempt this he reports fatigue and agreeable for hair to air dry. Patient with increased UE fatigue, mostly noted proximally at shoulders for the UE. Standing ADL tasks additionally focused on midline orientation, dynamic target accuracy, as well as self correcting and regulating standing movements. Patient requires up to min A with fatigue and encouraged to transition to seated tasks when occurring-does require cues for improved awareness of fatigue. Cognitive Retraining  Organizing/Sequencing: Two step sequence  Attention to Task: Multi-task  Maintains Attention For (Time): 1 minute  Following Commands: Follows two step commands/directions  Safety/Judgement: Decreased insight into deficits  Cues: Tactile cues provided;Verbal cues provided      Patient was encouraged to complete simple B UE strength exercises, with focus on dominant, R UE due to mild weakness in comparison to the L, non-dominant UE. Patient educated on the importance of not only using but also drawing as much attention as able to the R side. Patient also instructed to visualize the limb when completing exercises for improved accuracy by providing visual input. The following exercises were performed with good tolerance: shoulder flexion/extension, shoulder ab/adduction, elbow flexion/extension, wrist flexion/extension, finger flexion/extension, forearm supination/pronation. Patient fatigues easily at which point he requires AAROM to complete full ROM proximally with shoulder ROM. Educated patient on how to provided active assist support by using L UE to move R UE through full range. He demonstrated independence x5 reps. Patient was educated on coordination exercises.  Patient was instructed to touch nose with R hand then reach out and touch knee; First visualizing, then to close eyes as a progression. Also, instructed to make target smaller for progression (i.e. Reaching for straw in cup on the table). x10 reps completed with each target + eyes close for nose + knee. Pain:  Pain Scale 1: Numeric (0 - 10)  Pain Intensity 1: 0     Activity Tolerance:   Patient tolerated tx well. Please refer to the flowsheet for vital signs taken during this treatment. After treatment:   [X] Patient left in no apparent distress sitting up in chair  [ ] Patient left in no apparent distress in bed  [X] Call bell left within reach  [X] Nursing notified  [X] Caregiver present-wife  [X] Chair alarm activated      COMMUNICATION/COLLABORATION:   The patients plan of care was discussed with: Physical Therapist, Registered Nurse and patient and wife.      Pardeep Segundo OTR/VENECIA  Time Calculation: 46 mins

## 2017-04-03 NOTE — PROGRESS NOTES
Problem: Mobility Impaired (Adult and Pediatric)  Goal: *Acute Goals and Plan of Care (Insert Text)  Physical Therapy Goals  Initiated 3/30/2017  1. Patient will move from supine to sit and sit to supine in bed with minimal assistance/contact guard assist within 7 day(s). 2. Patient will transfer from bed to chair and chair to bed with minimal assistance/contact guard assist using the least restrictive device within 7 day(s). 3. Patient will perform sit to stand with moderate assistance x1 within 7 day(s). 4. Patient will ambulate with moderate assistance for 50 feet with the least restrictive device within 7 day(s). 5. Patient will improve Irving Balance score by 7 points within 7 days. PHYSICAL THERAPY TREATMENT  Patient: Stephanie Al (10 y.o. male)  Date: 4/3/2017  Diagnosis: CVA (cerebral vascular accident) [facility]) <principal problem not specified>       Precautions: Fall, Other (comment) (NWB Left wrist,ICH BP paramaters)      ASSESSMENT:  Patient with improved alertness, is speaking more and able to initiate movements with verbal cuing. He is now following 1-step and 2-step commands with 100% accuracy. He is demonstrating improved Right sided attention and able to initiate activities with his dominant Right UE. Patient performed standing and seated tasks. Focus of today's session for motor sequencing and planning,  Standing balance, and command following. Patient able to participate with increased activities, still requires seated rest breaks between activities due to fatigue. Patient continues to be an excellent inpatient rehab candidate with improvements seen daily in his performance. He also has 3 discipline need (OT,PT,SLP). Vitals were stable throughout and within parameters for ICH.   Progression toward goals:  [X]    Improving appropriately and progressing toward goals  [ ]    Improving slowly and progressing toward goals  [ ]    Not making progress toward goals and plan of care will be adjusted PLAN:  Patient continues to benefit from skilled intervention to address the above impairments. Continue treatment per established plan of care. Discharge Recommendations:  Inpatient Rehab  Further Equipment Recommendations for Discharge:  TBD at rehab       SUBJECTIVE:   Patient stated i wear my glasses.       OBJECTIVE DATA SUMMARY:   Critical Behavior:  Neurologic State: Alert  Orientation Level: Oriented to person  Cognition: Decreased command following (slow word-retrieval. noted paraphasias at times. speech intelligiblity impacted moderately)  Safety/Judgement: Decreased insight into deficits   Vitals              Blood pressure  Heart rate (bpm)  Initial    128/71              91  With activity 115/56       103  Post activity 114/72       111     Functional Mobility Training:  Bed Mobility:     Supine to Sit: Minimum assistance              Transfers:  Sit to Stand: Minimum assistance  Stand to Sit: Minimum assistance        Bed to Chair: Moderate assistance                    Balance:  Sitting - Static: Good (unsupported)  Sitting - Dynamic: Fair (occasional)  Standing - Static: Constant support  Standing - Dynamic : Fair  Ambulation/Gait Training:  Distance (ft): 30 Feet (ft)  Assistive Device: Gait belt  Ambulation - Level of Assistance: Minimal assistance; Moderate assistance        Gait Abnormalities: Path deviations                               Stairs:                       Neuro Re-Education:  All activities performed with mirror due to apraxia and inattention to the Right sided with last weeks session, today with improved attention to the Right side  Standing: target stepping : Right and Left LE in isolation for 2 sets of 10 on each LE, alternating stepping 1 set of 10  Standing: forward reaching tasks: 1-step and then 2-step commands : to 3 colored post-it : able to carry over 100% for 1- and 2-step commands  Patient still with increased posterior lean and decreased anterior weight shift for correction   Patient fatigues easily, unable to vocalize, but presents with increased knee flexion with fatigue and requires return to sitting. 3 seated rest breaks between     Therapeutic Exercises:      Pain:  Pain Scale 1: Numeric (0 - 10)  Pain Intensity 1: 0              Activity Tolerance:   Good- improved command following and activity tolerance  Please refer to the flowsheet for vital signs taken during this treatment.   After treatment:   [X]    Patient left in no apparent distress sitting up in chair  [ ]    Patient left in no apparent distress in bed  [X]    Call bell left within reach  [X]    Nursing notified  [ ]    Caregiver present  [X]    Chair alarm activated      COMMUNICATION/COLLABORATION:   The patients plan of care was discussed with: Registered Nurse, Occupational Therapist     Kadi Herman, PT, DPT   Time Calculation: 40 mins

## 2017-04-03 NOTE — PROGRESS NOTES
[facility]: [address]  [address] [facility] [place] [address] [facility]  [phone]  CHATO Wu    * Inpatient Progress Note *    NAME:  Chito Edwards   :  1950  MRN:  [id number]  PCP:    Demetrio Other, MD  ______________________________________________________________________  *Labs, studies, notes and hospital records were reviewed. --CHATO Patterson  ______________________________________________________________________    · Note [facility] initiating insurance authorization for rehab. Discussed with Case Mgmt  · Discussed potential of going to [facility] vs.  rehab --wife prefers [address] rehab but understands she cannot pay for [address] rehab if it is not approved  · patient and wife to obtain OP CT head in 2 weeks to re-evaluate brain--order for CT head placed in chart.   · Significantly improved since last seen by myself Thursday with speech, conversation and command following    Will sign off  Stable per Neurology  ____________________________________________________________________    Collaborating care team physician, Dr. Lavelle Willis   ____________________________________________________________________    CHATO Wu-BC

## 2017-04-03 NOTE — PROGRESS NOTES
[facility] initiating insurance auth. MAUAR to follow. Thanks. Maryjane Gore LCSW    Addendum:  2:42pm  Updates sent to [facility] rehab as they will need to re-review before [facility] can be considered for insurance auth. CM to follow. Thanks. Maryjane Gore LCSW    Addendum: 3:50 pm  Call from Farooq Banuelos from [place] reporting that [facility] rehab has accepted pt. She will start on auth for [facility] transport. Spoke with pt's wife to inform. She is in agreement. Call to [facility] at [facility] admissions at [phone]. She requested RN to call report to [phone] and fax to [phone]. Arranged WC transport with [facility] for 11:00 with auth # [id number]. CM to follow. Thanks.   Maryjane Gore LCSW

## 2017-04-03 NOTE — DIABETES MGMT
[facility]    Elevated Blood Glucose Note (POCT Glucose >180 mg/dL)    Recommendations/ Comments: Chart reviewed for elevated blood glucose. Natanael Hernandez is a 75 y.o. male with a active problem list significant for hyperglycemia per Dr. Colbert Osler, MD's H&P dated 3/28/2017. Fasting glucose today: 159 mg/dL (per am lab). Required 7 units of correction in the last 24 hours. Noted metformin 500 mg twice a day with meals started 4/2/2017. Will continue to follow. Recent Glucose Results:   Lab Results   Component Value Date/Time     (H) 04/03/2017 05:12 AM    GLUCPOC 184 (H) 04/03/2017 07:40 AM    GLUCPOC 139 (H) 04/02/2017 09:56 PM    GLUCPOC 155 (H) 04/02/2017 04:01 PM        Hospital (inpatient) medications:  1. Correction Scale: Lispro (Humalog) Normal Sensitivity scale to cover for glucose > 139 mg/dL before meals and for glucose >199 at bedtime      2. Metformin 500 mg twice a day with meals     Prior to admission medications: per past medical records  None for DM     A1C:   Lab Results   Component Value Date/Time    Hemoglobin A1c 8.1 03/30/2017 02:47 AM    Hemoglobin A1c 7.8 03/28/2017 02:52 PM     Lab Results   Component Value Date/Time    Creatinine 0.90 04/03/2017 05:12 AM     Active Orders   Diet    DIET DYSPHAGIA PUREED (NDD1)  Consistent Carb 1800kcal; AHA-LOW-CHOL FAT        Thank you. Maxine Garland. NOE MejiaN, MPH  Diabetes [address] [place] [address]  [phone]    -For most hospitalized persons with hyperglycemia in the intensive care unit (ICU), a glucose range of 140 to 180 mg/dL is recommended, provided this target can be safely achieved. *  - For general medicine and surgery patients in non-ICU settings, a premeal glucose target <140 mg/dL and a random blood glucose <180 mg/dL are recommended. *    REGGIE Perez, Madan Oliver, Sharon Noss., ... & Julio Cesar Roberts ([id number]).  [facility] ENDOCRINOLOGISTS AND AMERICAN COLLEGE OF ENDOCRINOLOGY-CLINICAL PRACTICE GUIDELINES FOR DEVELOPING A DIABETES MELLITUS COMPREHENSIVE CARE PLAN-2015-EXECUTIVE SUMMARY: Complete guidelines are available at https://www. aace. com/publications/guidelines. Endocrine Practice, 214), [id number].

## 2017-04-03 NOTE — PROGRESS NOTES
Problem: Dysphagia (Adult)  Goal: *Acute Goals and Plan of Care (Insert Text)  Initiated 3/29/2017  1. Patient will tolerate full liquid diet, free of s/s of aspiration within 7 days. -met  2. Patient will tolerate solid trials free of difficulty within 7 days. ; new goal : tolerate mech soft, thins without s/s aspiration or pocketing by 4-7-17  SPEECH LANGUAGE PATHOLOGY DYSPHAGIA TREATMENT  Patient: Neo Garcia (58 y.o. male)  Date: 4/3/2017  Diagnosis: CVA (cerebral vascular accident) ([facility] [address] <principal problem not specified>       Precautions: aspiration Fall, Other (comment) (NWB Left wrist,ICH BP paramaters)      ASSESSMENT:  Patient with intermittant alertness. He ranges from sleepy to alert, but minimally verbal.  He has mild -moderate oral-pharyngeal dysphagia with delay and weakness. He is eating carefully and wife usually present. Ready for diet upgrade. He has mild-moderate receptive-expressive aphasia and speech apraxia. Progression toward goals:  [ ]         Improving appropriately and progressing toward goals  [X]         Improving slowly and progressing toward goals  [ ]         Not making progress toward goals and plan of care will be adjusted       PLAN:  Recommendations and Planned Interventions:  Upgrade diet to mech soft, thins  Patient continues to benefit from skilled intervention to address the above impairments. Continue treatment per established plan of care. Discharge Recommendations:  Inpatient Rehab       SUBJECTIVE:   Patient stated [facility].-wife reprots it is actually [facility]      OBJECTIVE:   Cognitive and Communication Status:  Neurologic State: Alert  Orientation Level: Oriented to person  Cognition: Decreased command following (slow word-retrieval. noted paraphasias at times.  speech intelligiblity impacted moderately)  Perception: Appears intact  Perseveration: No perseveration noted  Safety/Judgement: Decreased insight into deficits  Dysphagia Treatment:  Oral Assessment:  Oral Assessment  Labial: Right droop  Dentition: Natural  P.O. Trials:  Patient Position: upright in bed  Vocal quality prior to P.O.:  (low volume)  Consistency Presented: Thin liquid; Solid;Puree  How Presented: SLP-fed/presented;Spoon;Straw   ORAL PHASE:   Bolus Acceptance: No impairment  Bolus Formation/Control: Impaired (slow mastication, slow intake)  Type of Impairment: Mastication  Propulsion: Delayed (# of seconds)  Oral Residue: None -Checked. Recommend wife check after meals. PHARYNGEAL PHASE:   Initiation of Swallow: No impairment  Laryngeal Elevation: Functional  Aspiration Signs/Symptoms: None      noted effortful and concentrated swallows, especially with thins  Occasional delayed throat clearing                 Exercises:  Laryngeal Exercises:                                                                                                                                   Pain:  Pain Scale 1: Numeric (0 - 10)  Pain Intensity 1: 0     After treatment:   [ ]              Patient left in no apparent distress sitting up in chair  [X]              Patient left in no apparent distress in bed  [ ]              Call bell left within reach  [X]              Nursing notified  [ ]              Caregiver present  [ ]              Bed alarm activated      COMMUNICATION/EDUCATION:   Patient was educated regarding His deficit(s) of dysphagia  as this relates to His diagnosis of CVA. He demonstrated Guarded understanding as evidenced by aphasia. .     The patients plan of care including recommendations, planned interventions, and recommended diet changes were discussed with: Physical Therapist and Registered Nurse. [ ]              Posted safety precautions in patient's room.      PRIYANK Cantor  Time Calculation: 30 mins

## 2017-04-03 NOTE — PROGRESS NOTES
Leonidas Sherley [facility]  [address] [facility], [address] [place] [address]  [phone]      Medical Progress Note      NAME: Kellen Andersen   :  1950  MRM:  [id number]    Date/Time: 4/3/2017  1:55 PM       Assessment and Plan:     Hemorrhage, intracerebral ([facility] (3/28/2017): occurred post-tPA for an acute ischemic CVA. Follow up CT done 3/29 showed no worsening of bleed. He is s/p transfusion with platelets and cryo. Continues to improve with therapies. Per neuro, will likely need repeat imaging in 1 week and if no worsening of bleed, can consider ASA therapy for secondary CVA prevention.     Acute CVA (cerebral vascular accident) ([facility] (3/28/2017)/ Aphasia (3/28/2017): seen by tele neurology in the ED. Initial CT head unremarkable and he is sp tPA given 3/28. Complicated by [address] as above. On a Dysphagia 1 diet, advancing diet per speech. Continue pravachol, PT, PT . Needs rehab. Neuro to decide on timing of further imaging ie MRI studies      HTN (hypertension), malignant (3/28/2017): likely has hx of HTN. Not been on medications. Amlodipine increased to 10 mg. Still not at goal of <140,. Stop HCTZ and start lisinopril 20 mg for DM + HTN. PRN labetalol    Hyperglycemia POA: no prior hx of DM. A1c 8.1 confirming dx of DM2. Will start metformin 500 mg PO BID and this can be titrated as outpatient.      Fracture of radius, distal, left, closed (3/28/2017): recently had surgery. Out patient follow up     Acute urinary retention: likely related to CVA. Had a [place] catheter inserted. Non bloody. Continue to follow and [place] DC      Tobacco use disorder (3/28/2017): Quit smoking 1.5 months ago, encouraged continue cessation. Subjective:     Chief Complaint:  Seen and examined. Feel better. Eating well. Wants to go to rehab    ROS:  (bold if positive, if negative)      Tolerating PT  Tolerating Diet        Objective:     Last 24hrs VS reviewed since prior progress note.  Most recent are:    Visit Vitals  /85 (BP 1 Location: Left arm, BP Patient Position: At rest)    Pulse 94    Temp 98.5 °F (36.9 °C)    Resp 18    Ht 6' (1.829 m)    Wt 106.5 kg (234 lb 12.6 oz)    SpO2 97%    BMI 31.84 kg/m2     SpO2 Readings from Last 6 Encounters:   04/03/17 97%   03/17/17 97%    O2 Flow Rate (L/min): 2 l/min       Intake/Output Summary (Last 24 hours) at 04/03/17 1320  Last data filed at 04/03/17 1202   Gross per 24 hour   Intake              390 ml   Output                0 ml   Net              390 ml        Physical Exam:    Gen:  Well-developed, well-nourished, in no acute distress  HEENT:  Pink conjunctivae, PERRL, hearing intact to voice, moist mucous membranes  Neck:  Supple, without masses, thyroid non-tender  Resp:  No accessory muscle use, clear breath sounds without wheezes rales or rhonchi  Card:  No murmurs, normal S1, S2 without thrills, bruits or peripheral edema  Abd:  Soft, non-tender, non-distended, normoactive bowel sounds are present, no palpable organomegaly and no detectable hernias  Lymph:  No cervical or inguinal adenopathy  Musc:  No cyanosis or clubbing  Skin:  No rashes or ulcers, skin turgor is good  Neuro:  Cranial nerves are grossly intact, no focal motor weakness, follows commands appropriately  Psych:  Good insight, oriented to person, place and time, alert    Telemetry reviewed:   normal sinus rhythm  __________________________________________________________________  Medications Reviewed: (see below)  Medications:     Current Facility-Administered Medications   Medication Dose Route Frequency    lisinopril (PRINIVIL, ZESTRIL) tablet 20 mg  20 mg Oral DAILY    metFORMIN (GLUCOPHAGE) tablet 500 mg  500 mg Oral BID WITH MEALS    acetaminophen (TYLENOL) tablet 650 mg  650 mg Oral Q6H PRN    amLODIPine (NORVASC) tablet 10 mg  10 mg Oral DAILY    famotidine (PEPCID) tablet 20 mg  20 mg Oral BID    labetalol (NORMODYNE;TRANDATE) injection 10 mg  10 mg IntraVENous Q10MIN PRN    glucose chewable tablet 16 g  4 Tab Oral PRN    dextrose (D50W) injection syrg 12.5-25 g  12.5-25 g IntraVENous PRN    glucagon (GLUCAGEN) injection 1 mg  1 mg IntraMUSCular PRN    insulin lispro (HUMALOG) injection   SubCUTAneous AC&HS    pravastatin (PRAVACHOL) tablet 40 mg  40 mg Oral QHS    sodium chloride (NS) flush 5-10 mL  5-10 mL IntraVENous Q8H    sodium chloride (NS) flush 5-10 mL  5-10 mL IntraVENous PRN    ondansetron (ZOFRAN) injection 4 mg  4 mg IntraVENous Q6H PRN    prochlorperazine (COMPAZINE) injection 10 mg  10 mg IntraVENous Q4H PRN    0.9% sodium chloride infusion 250 mL  250 mL IntraVENous PRN        Lab Data Reviewed: (see below)  Lab Review:     Recent Labs      04/02/17   0539   WBC  10.3   HGB  16.8   HCT  46.4   PLT  326     Recent Labs      04/03/17   0512  04/02/17   0539   NA  135*  138   K  3.9  3.6   CL  97  101   CO2  28  24   GLU  159*  152*   BUN  23*  20   CREA  0.90  0.90   CA  9.6  9.1   MG  2.1  2.0   PHOS   --   3.5     Lab Results   Component Value Date/Time    Glucose (POC) 154 04/03/2017 11:45 AM    Glucose (POC) 184 04/03/2017 07:40 AM    Glucose (POC) 139 04/02/2017 09:56 PM    Glucose (POC) 155 04/02/2017 04:01 PM    Glucose (POC) 203 04/02/2017 10:59 AM     No results for input(s): PH, PCO2, PO2, HCO3, FIO2 in the last 72 hours. No results for input(s): INR in the last 72 hours. No lab exists for component: Oliver Redmond  All Micro Results     Procedure Component Value Units Date/Time    MRSA CULTURE [[id number]] Collected:  03/28/17 2024    Order Status:  Completed Specimen:  Nares Updated:  03/29/17 8799     Special Requests: NO SPECIAL REQUESTS        Culture result: MRSA NOT PRESENT                     Screening of patient nares for MRSA is for surveillance purposes and, if positive, to facilitate isolation considerations in high risk settings.  It is not intended for automatic decolonization interventions per se as regimens are not sufficiently effective to warrant routine use. I have reviewed notes of prior 24hr.     Other pertinent lab: None    Total time spent with patient: 35 min                  Care Plan discussed with: Patient, Family, Care Manager, Nursing Staff and >50% of time spent in counseling and coordination of care    Discussed:  Care Plan    Prophylaxis:  SCD's    Disposition:  [facility]/Rehab           ___________________________________________________    Attending Physician: Winston Fraser, DO

## 2017-04-04 VITALS
SYSTOLIC BLOOD PRESSURE: 93 MMHG | DIASTOLIC BLOOD PRESSURE: 57 MMHG | TEMPERATURE: 98.4 F | WEIGHT: 234.79 LBS | HEART RATE: 90 BPM | OXYGEN SATURATION: 94 % | HEIGHT: 72 IN | BODY MASS INDEX: 31.8 KG/M2 | RESPIRATION RATE: 18 BRPM

## 2017-04-04 LAB
GLUCOSE BLD STRIP.AUTO-MCNC: 136 MG/DL (ref 65–100)
SERVICE CMNT-IMP: ABNORMAL

## 2017-04-04 PROCEDURE — 82962 GLUCOSE BLOOD TEST: CPT

## 2017-04-04 PROCEDURE — 92526 ORAL FUNCTION THERAPY: CPT

## 2017-04-04 PROCEDURE — 74011250637 HC RX REV CODE- 250/637: Performed by: INTERNAL MEDICINE

## 2017-04-04 RX ORDER — AMLODIPINE BESYLATE 10 MG/1
10 TABLET ORAL DAILY
Qty: 30 TAB | Refills: 0 | Status: SHIPPED | OUTPATIENT
Start: 2017-04-04 | End: 2020-10-13

## 2017-04-04 RX ORDER — METFORMIN HYDROCHLORIDE 500 MG/1
500 TABLET ORAL 2 TIMES DAILY WITH MEALS
Qty: 60 TAB | Refills: 0 | Status: SHIPPED | OUTPATIENT
Start: 2017-04-04 | End: 2017-11-10

## 2017-04-04 RX ORDER — LISINOPRIL 20 MG/1
20 TABLET ORAL DAILY
Qty: 30 TAB | Refills: 0 | Status: SHIPPED | OUTPATIENT
Start: 2017-04-04 | End: 2017-11-10

## 2017-04-04 RX ORDER — FAMOTIDINE 20 MG/1
20 TABLET, FILM COATED ORAL 2 TIMES DAILY
Qty: 30 TAB | Refills: 0 | Status: SHIPPED
Start: 2017-04-04 | End: 2017-11-10

## 2017-04-04 RX ORDER — PRAVASTATIN SODIUM 40 MG/1
40 TABLET ORAL
Qty: 30 TAB | Refills: 0 | Status: SHIPPED | OUTPATIENT
Start: 2017-04-04

## 2017-04-04 RX ADMIN — LISINOPRIL 20 MG: 20 TABLET ORAL at 09:56

## 2017-04-04 RX ADMIN — METFORMIN HYDROCHLORIDE 500 MG: 500 TABLET ORAL at 08:14

## 2017-04-04 RX ADMIN — FAMOTIDINE 20 MG: 20 TABLET, FILM COATED ORAL at 08:14

## 2017-04-04 NOTE — PROGRESS NOTES
Wheelchair transport arranged for 11:00    Clinicals and dc info faxed to [facility] at [phone]. Call to Christianne Hawkins to confirm. RN:  Please call report to [phone]. He is going to room 542. Thanks.   ASAD ParisW

## 2017-04-04 NOTE — PROGRESS NOTES
Problem: Dysphagia (Adult)  Goal: *Acute Goals and Plan of Care (Insert Text)  Initiated 3/29/2017  1. Patient will tolerate full liquid diet, free of s/s of aspiration within 7 days. -met  2. Patient will tolerate solid trials free of difficulty within 7 days. ; new goal : tolerate mech soft, thins without s/s aspiration or pocketing by 4-7-17 : changed to toelrate dysphagia 1, thins and pills without s/s aspiration by 4-7-17  SPEECH LANGUAGE PATHOLOGY DYSPHAGIA TREATMENT  Patient: Coleman Mckenna (40 y.o. male)  Date: 4/4/2017  Diagnosis: CVA (cerebral vascular accident) ([facility] [address] <principal problem not specified>       Precautions: aspriation Fall, Other (comment) (NWB Left wrist,ICH BP paramaters)      ASSESSMENT:  Patient still with moderate oral dysphagia: reduced chew, oral holding, slow and uncoordinated a-p transit. Mild-moderate pharyngeal weakness. He appears to be mostly drinking his nutrition. He still has moderate receptive-expressive aphasia and dysarthria. Progression toward goals:  [ ]         Improving appropriately and progressing toward goals  [X]         Improving slowly and progressing toward goals  [ ]         Not making progress toward goals and plan of care will be adjusted       PLAN:  Recommendations and Planned Interventions:  Return to dysphagia 1, thins  Watch for pocketing of pills in mouth. Patient continues to benefit from skilled intervention to address the above impairments. Continue treatment per established plan of care. Discharge Recommendations:  Inpatient Rehab       SUBJECTIVE:   Patient stated No, not Chirag Zapata.       OBJECTIVE:   Cognitive and Communication Status:  Neurologic State: Alert  Orientation Level: Oriented to person  Cognition: Decreased command following (still with word-retrieval issues and paraphasias with only partial awareness)  Perception: Appears intact  Perseveration: No perseveration noted  Safety/Judgement: Decreased insight into deficits  Dysphagia Treatment:  Oral Assessment:  Oral Assessment  Labial: Right droop  P.O. Trials:  Patient Position: upright in bed  Vocal quality prior to P.O.:  (low volume)  Consistency Presented: Thin liquid; Solid;Puree;Pill/Tablet (with RN)      ORAL PHASE:   Reduced chew  Suspect he swallowed some solids whole  Oral holding of pills with max cues and liquids to clear  PHARYNGEAL PHASE:   Eventual gagging on pills and almost vomited. Moderate pharyngeal weakness. Suspect pharyngeal residue with solids, which is causing additional aspiration risk with reduced chew. Mild delay                                                   Exercises:  Laryngeal Exercises:                                                                                                                                   Pain:  Pain Scale 1: Numeric (0 - 10)  Pain Intensity 1: 0     After treatment:   [ ]              Patient left in no apparent distress sitting up in chair  [X]              Patient left in no apparent distress in bed  [ ]              Call bell left within reach  [X]              Nursing notified  [ ]              Caregiver present  [ ]              Bed alarm activated      COMMUNICATION/EDUCATION:   Patient was educated regarding His deficit(s) of dysphagia  as this relates to His diagnosis of CVA. He demonstrated Guarded understanding as evidenced by aphasia. The patients plan of care including recommendations, planned interventions, and recommended diet changes were discussed with: Physical Therapist, Occupational Therapist, Registered Nurse and . [ ]              Posted safety precautions in patient's room.      PRIYANK Powers  Time Calculation: 30 mins

## 2017-04-04 NOTE — DISCHARGE INSTRUCTIONS
Patient Discharge Instructions    Garcia Orozco / [id number] : 1950    Admitted 3/28/2017 Discharged: 2017     Primary Diagnoses  Problem List as of 2017  Date Reviewed: 3/29/2017          Codes Class Noted - Resolved    Type II diabetes mellitus ([facility] ICD-10-CM: E11.9  ICD-9-CM: 250.00  3/29/2017 - Present        Acute encephalopathy ICD-10-CM: G93.40  ICD-9-CM: 348.30  3/28/2017 - Present        Aphasia ICD-10-CM: R47.01  ICD-9-CM: 784.3  3/28/2017 - Present        HTN (hypertension), malignant ICD-10-CM: I10  ICD-9-CM: 401.0  3/28/2017 - Present        Fracture of radius, distal, left, closed ICD-10-CM: S52.502A  ICD-9-CM: 813.42  3/28/2017 - Present        Tobacco use disorder ICD-10-CM: F17.200  ICD-9-CM: 305.1  3/28/2017 - Present        Received intravenous tissue plasminogen activator (tPA) in emergency department ICD-10-CM: Z92.82  ICD-9-CM: V45.88  3/28/2017 - Present        Hemorrhage, intracerebral ([facility] ICD-10-CM: I61.9  ICD-9-CM: 261  3/28/2017 - Present              Take Home Medications        · It is important that you take the medication exactly as they are prescribed. · Keep your medication in the bottles provided by the pharmacist and keep a list of the medication names, dosages, and times to be taken in your wallet. · Do not take other medications without consulting your doctor. What to do at Home    Recommended diet: Mechanical Soft, Thin Liquids per SLP    Recommended activity: Activity as tolerated    If you experience facial drooping, slurred speech, or focal weakness, please follow up with nearest ER. Follow-up with your PCP in 2 week and with Neurology within 4 weeks        Information obtained by :  I understand that if any problems occur once I am at home I am to contact my physician. I understand and acknowledge receipt of the instructions indicated above. [de-identified] or R.N.'s Signature                                                                  Date/Time                                                                                                                                              Patient or Representative Signature                                                          Date/Time           Learning About Certified Diabetes Educators  What is a certified diabetes educator? Certified diabetes educators are health professionals who have special training to help you manage your diabetes. They may be:  · Registered nurses. · Registered dietitians. · Pharmacists. · Social workers. · Doctors. Your diabetes educator will give you tips and help with daily diabetes care. He or she also may teach classes. These classes give you a chance to learn from and connect with others who have diabetes. Why see a diabetes educator? Your doctor wants you to get the personal support and help that a diabetes educator can give. And most insurance plans will cover part or all of the cost.  Learning is a key part of living with diabetes. A diabetes educator teaches about the most important parts of your care. You will learn about:  · Eating healthy meals. · Being active. · Taking medicine. · Checking your blood sugar. · Dealing with your feelings about having diabetes. He or she can help you find ways to live better with diabetes. And your diabetes educator can show you small changes that can make a big difference in your daily routine and your health. If you need to make a big change, he or she will be able to answer your questions and guide you though each step. When should you see one? It can be helpful to see a diabetes educator at certain points in your care. He or she can:  · Get you started when you're first diagnosed with diabetes. · Check in once a year for a review of your health and daily routine.   · Show you how to handle a new health problem along with your diabetes. · Help you work with a new health care team.  Follow-up care is a key part of your treatment and safety. Be sure to make and go to all appointments, and call your doctor if you are having problems. It's also a good idea to know your test results and keep a list of the medicines you take. Where can you learn more? Go to http://veto-jose.info/. Enter H400 in the search box to learn more about \"Learning About Certified Diabetes Educators. \"  Current as of: October 26, 2016  Content Version: 11.2  © 2292-8469 [employer]. Care instructions adapted under license by [employer] (which disclaims liability or warranty for this information). If you have questions about a medical condition or this instruction, always ask your healthcare professional. [address] any warranty or liability for your use of this information. Learning About Metformin for Type 2 Diabetes  Introduction  Metformin (such as Glucophage) is a medicine used to treat type 2 diabetes. It helps keep blood sugar levels on target. You may have tried to eat a healthy diet, lose weight, and get more exercise to keep your blood sugar in your target range. If those things do not help, you may take a medicine called metformin. It helps your body use insulin. This can help you control your blood sugar. You might take it on its own or with other medicines. When taken on its own, metformin should not cause low blood sugar or weight gain. Example  · Metformin (Glucophage)  Possible side effects  Common side effects include:  · Short-term nausea. · Not feeling hungry. · Diarrhea. · Increased gas in your belly. · A metallic taste. You may have side effects or reactions not listed here. Check the information that comes with your medicine. What to know about taking this medicine  · Metformin does not usually cause low blood sugar. But you may get a low blood sugar when you take metformin and you exercise hard, drink alcohol, or you do not eat enough food. · Sometimes metformin is combined with other diabetes medicine. Some of these can cause low blood sugar. · If you need a test that uses a dye or you need to have surgery, be sure to tell all of your doctors that you take metformin. You may have to stop taking it before and after the test or surgery. · Be safe with medicines. Take your medicines exactly as prescribed. Call your doctor if you think you are having a problem with your medicine. · Check with your doctor or pharmacist before you use any other medicines. This includes over-the-counter medicines. Make sure your doctor knows all of the medicines, vitamins, herbal products, and supplements you take. Taking some medicines together can cause problems. Where can you learn more? Go to http://veto-jose.info/. Enter Dara Duke in the search box to learn more about \"Learning About Metformin for Type 2 Diabetes. \"  Current as of: August 3, 2016  Content Version: 11.2  © 6842-7117 [employer]. Care instructions adapted under license by [employer] (which disclaims liability or warranty for this information). If you have questions about a medical condition or this instruction, always ask your healthcare professional. [address] any warranty or liability for your use of this information.

## 2017-04-04 NOTE — PROGRESS NOTES
Bedside and Verbal shift change report given to Sherri (oncoming nurse) by [facility] (offgoing nurse). Report included the following information SBAR, Kardex, ED Summary, Intake/Output, MAR and Recent Results.

## 2017-04-04 NOTE — DISCHARGE SUMMARY
Physician Discharge Summary     Patient ID:  Juventino Moreno  [id number]  40 y.o.  1950    Admit date: 3/28/2017    Discharge date and time: 4/4/2017    Admission Diagnoses: CVA (cerebral vascular accident) [facility])    Discharge Diagnoses:    Principal Diagnosis   <principal problem not specified>                                             Other Diagnoses  Active Problems:    Acute encephalopathy (3/28/2017)      Aphasia (3/28/2017)      HTN (hypertension), malignant (3/28/2017)      Fracture of radius, distal, left, closed (3/28/2017)      Tobacco use disorder (3/28/2017)      Received intravenous tissue plasminogen activator (tPA) in emergency department (3/28/2017)      Hemorrhage, intracerebral ([address] (3/28/2017)      Type II diabetes mellitus ([facility] (3/29/2017)         Hospital Course:     Hemorrhage, intracerebral ([address] (3/28/2017): occurred post-tPA for an acute ischemic CVA. Follow up CT done 3/29 showed no worsening of bleed. He is s/p transfusion with platelets and cryo. Continues to improve with therapies. Per neuro, will need repeat imaging in 1 week and if no worsening of bleed, can consider ASA therapy for secondary CVA prevention.      Acute CVA (cerebral vascular accident) ([address] (3/28/2017)/ Aphasia (3/28/2017): seen by tele neurology in the ED. Initial CT head unremarkable and he is sp tPA given 3/28. Complicated by [address] as above. On a [facility] soft with thin liquids diet, advancing diet per speech. Continue pravachol. Needs rehab.      HTN (hypertension), malignant (3/28/2017): likely has hx of HTN. Not been on medications. Amlodipine 10 mg and lisinopril 20 mg for DM + HTN. BP better controlled     Hyperglycemia POA: no prior hx of DM. A1c 8.1 confirming dx of DM2. Started metformin 500 mg PO BID and this can be titrated as outpatient and will need DM training.      Fracture of radius, distal, left, closed (3/28/2017): recently had surgery.  Out patient follow up      Acute urinary retention: likely related to CVA. Resovled      Tobacco use disorder (3/28/2017): Quit smoking 1.5 months ago, encouraged continue cessation. PCP: Demetrio Grove, MD    Consults: Neurology    Significant Diagnostic Studies: See Hospital Course    Discharged home in improved condition. Discharge Exam:  Visit Vitals    BP 93/57 (BP 1 Location: Right arm, BP Patient Position: At rest)    Pulse 90    Temp 98.4 °F (36.9 °C)    Resp 18    Ht 6' (1.829 m)    Wt 106.5 kg (234 lb 12.6 oz)    SpO2 94%    BMI 31.84 kg/m2     Physical Exam:     Gen: Well-developed, well-nourished, in no acute distress  HEENT: Pink conjunctivae, PERRL, hearing intact to voice, moist mucous membranes  Neck: Supple, without masses, thyroid non-tender  Resp: No accessory muscle use, clear breath sounds without wheezes rales or rhonchi  Card: No murmurs, normal S1, S2 without thrills, bruits or peripheral edema  Abd: Soft, non-tender, non-distended, normoactive bowel sounds are present, no palpable organomegaly and no detectable hernias  Lymph: No cervical or inguinal adenopathy  Musc: No cyanosis or clubbing  Skin: No rashes or ulcers, skin turgor is good  Neuro: Cranial nerves are grossly intact, no focal motor weakness, follows commands appropriately  Psych: Good insight, oriented to person, place and time, alert    Disposition: Rehab    Patient Instructions:   Current Discharge Medication List      START taking these medications    Details   amLODIPine (NORVASC) 10 mg tablet Take 1 Tab by mouth daily. Qty: 30 Tab, Refills: 0      famotidine (PEPCID) 20 mg tablet Take 1 Tab by mouth two (2) times a day. Qty: 30 Tab, Refills: 0      lisinopril (PRINIVIL, ZESTRIL) 20 mg tablet Take 1 Tab by mouth daily. Qty: 30 Tab, Refills: 0      metFORMIN (GLUCOPHAGE) 500 mg tablet Take 1 Tab by mouth two (2) times daily (with meals). Qty: 60 Tab, Refills: 0      pravastatin (PRAVACHOL) 40 mg tablet Take 1 Tab by mouth nightly.  Indications: prevention of cerebrovascular accident  Qty: 30 Tab, Refills: 0           Activity: Activity as tolerated  Diet: Mech soft, thin liquids  Wound Care: None needed    Follow-up with PCP and Neurology in a few weeks.   Follow-up tests/labs: Repeat CT scan in 1 week with attention to Neurology    Signed:  Hevaenly Singh DO  4/4/2017  9:54 AM    Greater than 30 mins was spent in coordination, counseling and execution of this patient's discharge

## 2017-05-12 ENCOUNTER — OFFICE VISIT (OUTPATIENT)
Dept: NEUROLOGY | Age: 67
End: 2017-05-12

## 2017-05-12 VITALS
HEIGHT: 72 IN | WEIGHT: 215.5 LBS | RESPIRATION RATE: 20 BRPM | SYSTOLIC BLOOD PRESSURE: 118 MMHG | OXYGEN SATURATION: 99 % | BODY MASS INDEX: 29.19 KG/M2 | HEART RATE: 65 BPM | DIASTOLIC BLOOD PRESSURE: 66 MMHG

## 2017-05-12 DIAGNOSIS — I69.319 CVA, OLD, COGNITIVE DEFICITS: Primary | ICD-10-CM

## 2017-05-12 RX ORDER — ASPIRIN 81 MG/1
81 TABLET ORAL DAILY
COMMUNITY

## 2017-05-12 RX ORDER — METOPROLOL TARTRATE 50 MG/1
50 TABLET ORAL 2 TIMES DAILY
COMMUNITY
End: 2021-03-01 | Stop reason: ALTCHOICE

## 2017-05-12 NOTE — PATIENT INSTRUCTIONS

## 2017-05-12 NOTE — PROGRESS NOTES
Date:  17     Name:  Minna Meyer  :  1950  MRN:  [id number]     PCP:  Chato Bowen MD    Chief Complaint   Patient presents with    Neurologic Problem     stroke        HISTORY OF PRESENT ILLNESS: Hospital follow up s/p acute CVA with aphasia s/p TPA and subsequent left thalamic bleed. He presented to the ER from PT due to symptoms of difficulty following directions, expressing himself, and generalized altered mental status. He was admitted to [facility] for acute CVA. He was administered TPA and had a subsequent ICH. Upon discharge, he was alert and oriented, able to follow commands and speech was improved. Due to hypertension, he was discharged on Amlodipine 10 mg and lisinopril 20 mg, A1C 8.1 confirming dx of DM2. Started metformin 500 mg PO BID, LDL 92.6 and started on Pravachol. Following discharge from the hospital, [facility]PT [facility] for two weeks. Discharged from this . Still waiting for approval for outpatient therapy. He states that he is \"fine I guess. \" Strength and mobility is better. Wife indicates that his blood pressure and blood sugars have been very good since starting his medication. She has noted some issues with the memory. All hospital notes and testing was reviewed and his hospital course and testing results were discussed. Except as noted above, denies  fever, chills, cough. No CP or SOB. No dysuria, loss of bowel or bladder control. No Weight loss. Appetite good. Sleeping well. No sweats. No edema. No bruising or bleeding. No nausea or vomit. No diarrhea. No frequency, urgency, No depressive sxs. No anxiety. Denies sore throat, nasal congestion, nasal discharge, epistaxis, tinnitus, hearing loss, back pain, muscle pain, or joint pain. Current Outpatient Prescriptions   Medication Sig    metoprolol tartrate (LOPRESSOR) 50 mg tablet Take  by mouth two (2) times a day.     aspirin delayed-release 81 mg tablet Take  by mouth daily.  Cetirizine (ZYRTEC) 10 mg cap Take  by mouth.  amLODIPine (NORVASC) 10 mg tablet Take 1 Tab by mouth daily. (Patient taking differently: Take 5 mg by mouth daily.)    metFORMIN (GLUCOPHAGE) 500 mg tablet Take 1 Tab by mouth two (2) times daily (with meals).  pravastatin (PRAVACHOL) 40 mg tablet Take 1 Tab by mouth nightly. Indications: prevention of cerebrovascular accident    famotidine (PEPCID) 20 mg tablet Take 1 Tab by mouth two (2) times a day.  lisinopril (PRINIVIL, ZESTRIL) 20 mg tablet Take 1 Tab by mouth daily. No current facility-administered medications for this visit. Allergies   Allergen Reactions    Chantix [Varenicline] Rash     Very itchy rash on back     Past Medical History:   Diagnosis Date    Hemorrhagic stroke ([facility]UST [facility].) 3/28/2017    S/p TPA    Hypertension     Ill-defined condition     Eczema    Stroke ([facility].)     Type II diabetes mellitus ([facility].) 3/29/2017     Past Surgical History:   Procedure Laterality Date    HX ORTHOPAEDIC  03/2017    left wrist surgery     Social History     Social History    Marital status:      Spouse name: N/A    Number of children: N/A    Years of education: N/A     Occupational History    Not on file.      Social History Main Topics    Smoking status: Current Every Day Smoker     Packs/day: 0.50     Years: 50.00    Smokeless tobacco: Former User     Quit date: 2/28/2017      Comment: cold turkey 2.5 wks ago after attempt Chantix developed allergy    Alcohol use No      Comment: around holidatys    Drug use: No    Sexual activity: Not on file     Other Topics Concern    Not on file     Social History Narrative     Family History   Problem Relation Age of Onset    Diabetes Mother     Cancer Father     Headache Other        PHYSICAL EXAMINATION:    Visit Vitals    /66    Pulse 65    Resp 20    Ht 6' (1.829 m)    Wt 97.8 kg (215 lb 8 oz)    SpO2 99%    BMI 29.23 kg/m2     General: Well defined, nourished, and groomed individual in no acute distress. Neck: Supple, nontender, no bruits, no pain with resistance to active range of motion. Heart: Regular rate and rhythm, no murmurs, rub, or gallop. Normal S1S2. Lungs:  Clear to auscultation bilaterally with equal chest expansion, no cough, no wheeze  Musculoskeletal:  Extremities revealed no edema and had full range of motion of joints. Psych:  Good mood and bright affect    NEUROLOGICAL EXAMINATION:     Mental Status:   Alert and oriented to person, place, and time with recent and remote memory intact. Attention span and concentration are normal. Speech is fluent with a full fund of knowledge. Cranial Nerves:    II, III, IV, VI:  Visual acuity grossly intact. Visual fields are normal.    Pupils are equal, round, and reactive to light and accommodation. Extra-ocular movements are full and fluid. Fundoscopic exam was benign, no ptosis or nystagmus. V-XII: Hearing is grossly intact. Facial features are symmetric, with normal sensation and strength. The palate rises symmetrically and the tongue protrudes midline. Sternocleidomastoids 5/5. Motor Examination: Normal tone, bulk, and strength, 5/5 muscle strength throughout. No cogwheel rigidity or clonus present. Sensory exam:  Normal throughout to pinprick, temperature, and vibration sense. Normal proprioception. Coordination:  Heel-to-shin was smooth and symmetrical bilaterally. Finger to nose and rapid arm movement testing was normal.   No resting or intention tremor    Gait and Station:  Steady while walking on toes, heels, and with tandem walking. Normal arm swing. No Rhomberg or pronator drift. No muscle wasting or fasiculations noted. Reflexes:  DTRs 2+ throughout. Toes downgoing. ASSESSMENT AND PLAN    ICD-10-CM ICD-9-CM    1.  CVA, old, cognitive deficits I69.319 438.0       All hospital notes and testing was reviewed and his hospital course and testing results were discussed. Will need the CT scan repeated as this was not done two weeks after discharged as instructed likely due to being in inpatient rehab. Physically, he seems like he is at baseline. Cognitively, there are some deficits to include complaints of decreased focus and concentration, unable to perform previously. He will continue with OT/ST. Discussed secondary stroke prevention as well as signs and symptoms of stroke, BE-FAST, and when to call for EMS. Stressed importance of recognition and early intervention. LDL goal less than 70 per secondary stroke guidelines. Continue with ASA for secondary stroke prevention. Discussed lifestyle modification and importance of exercise and appropriate diet, weight management, and management of comorbid disease with appropriate follow up visits with primary care and other healthcare providers. More than 50% of today's 40+ minute office visit was spent in education and counseling. Follow up in six months. Gayathri Keys

## 2017-05-12 NOTE — PROGRESS NOTES
[facility] and then went to rehab after that at [address] has been home since April 19th   He was already at therapy for his wrist and he was having the stroke while at therapy [place] and he was admitted and treated for stroke   When he first got up and was at home, he was doing weird things, they left and went to therapy he was just sitting there and was not responding so they took him [place] and administered the TPA almost right away   No issues since being home, he has been taking medications, vitals have been good, ambulating well, he is still a little foggy but otherwise if doing extremely well per his wife   He is still doing therapy for his stroke at [place] [address] for speech,occupational and physical therapy currently just doing the physical therapy until insurance can approve the other 2

## 2017-05-12 NOTE — MR AVS SNAPSHOT
Visit Information Date & Time Provider Department Dept. Phone Encounter #  
 5/12/2017 10:00 AM Genaro Zayas NP [facility] [id number] Upcoming Health Maintenance Date Due Hepatitis C Screening 1950 FOOT EXAM Q1 1/4/1959 MICROALBUMIN Q1 1/4/1959 EYE EXAM RETINAL OR DILATED Q1 1/4/1959 DTaP/Tdap/Td series (1 - Tdap) 1/4/1970 FOBT Q 1 YEAR AGE 50-75 1/4/1999 ZOSTER VACCINE AGE 60> 1/4/2009 GLAUCOMA SCREENING Q2Y 1/4/2014 Pneumococcal 65+ Low/Medium Risk (1 of 2 - PCV13) 1/4/2014 MEDICARE YEARLY EXAM 1/4/2014 INFLUENZA AGE 9 TO ADULT 8/1/2017 HEMOGLOBIN A1C Q6M 9/30/2017 LIPID PANEL Q1 3/28/2018 Allergies as of 5/12/2017  Review Complete On: 5/12/2017 By: Luma Ventura LPN Severity Noted Reaction Type Reactions Chantix [Varenicline]  03/17/2017    Rash Very itchy rash on back Current Immunizations  Never Reviewed No immunizations on file. Not reviewed this visit You Were Diagnosed With   
  
 Codes Comments CVA, old, cognitive deficits    -  Primary ICD-10-CM: F61.869 ICD-9-CM: 438.0 Vitals BP Pulse Resp Height(growth percentile) Weight(growth percentile) SpO2  
 118/66 65 20 6' (1.829 m) 215 lb 8 oz (97.8 kg) 99% BMI Smoking Status 29.23 kg/m2 Current Every Day Smoker Vitals History BMI and BSA Data Body Mass Index Body Surface Area  
 29.23 kg/m 2 2.23 m 2 Your Updated Medication List  
  
   
This list is accurate as of: 5/12/17 10:51 AM.  Always use your most recent med list. amLODIPine 10 mg tablet Commonly known as:  [place] Take 1 Tab by mouth daily. aspirin delayed-release 81 mg tablet Take  by mouth daily. famotidine 20 mg tablet Commonly known as:  PEPCID Take 1 Tab by mouth two (2) times a day. lisinopril 20 mg tablet Commonly known as:  [place] Gear  
 Take 1 Tab by mouth daily. metFORMIN 500 mg tablet Commonly known as:  GLUCOPHAGE Take 1 Tab by mouth two (2) times daily (with meals). metoprolol tartrate 50 mg tablet Commonly known as:  LOPRESSOR Take  by mouth two (2) times a day. pravastatin 40 mg tablet Commonly known as:  PRAVACHOL Take 1 Tab by mouth nightly. Indications: prevention of cerebrovascular accident ZyrTEC 10 mg Cap Generic drug:  Cetirizine Take  by mouth. Patient Instructions A Healthy Lifestyle: Care Instructions Your Care Instructions A healthy lifestyle can help you feel good, stay at a healthy weight, and have plenty of energy for both work and play. A healthy lifestyle is something you can share with your whole family. A healthy lifestyle also can lower your risk for serious health problems, such as high blood pressure, heart disease, and diabetes. You can follow a few steps listed below to improve your health and the health of your family. Follow-up care is a key part of your treatment and safety. Be sure to make and go to all appointments, and call your doctor if you are having problems. Its also a good idea to know your test results and keep a list of the medicines you take. How can you care for yourself at home? · Do not eat too much sugar, fat, or fast foods. You can still have dessert and treats now and then. The goal is moderation. · Start small to improve your eating habits. Pay attention to portion sizes, drink less juice and soda pop, and eat more fruits and vegetables. ¨ Eat a healthy amount of food. A 3-ounce serving of meat, for example, is about the size of a deck of cards. Fill the rest of your plate with vegetables and whole grains. ¨ Limit the amount of soda and sports drinks you have every day. Drink more water when you are thirsty. ¨ Eat at least 5 servings of fruits and vegetables every day.  It may seem like a lot, but it is not hard to reach this goal. A serving or helping is 1 piece of fruit, 1 cup of vegetables, or 2 cups of leafy, raw vegetables. Have an apple or some carrot sticks as an afternoon snack instead of a candy bar. Try to have fruits and/or vegetables at every meal. 
· Make exercise part of your daily routine. You may want to start with simple activities, such as walking, bicycling, or slow swimming. Try to be active 30 to 60 minutes every day. You do not need to do all 30 to 60 minutes all at once. For example, you can exercise 3 times a day for 10 or 20 minutes. Moderate exercise is safe for most people, but it is always a good idea to talk to your doctor before starting an exercise program. 
· Keep moving. Brenda Darleendulce the lawn, work in the garden, or Faves. Take the stairs instead of the elevator at work. · If you smoke, quit. People who smoke have an increased risk for heart attack, stroke, cancer, and other lung illnesses. Quitting is hard, but there are ways to boost your chance of quitting tobacco for good. ¨ Use nicotine gum, patches, or lozenges. ¨ Ask your doctor about stop-smoking programs and medicines. ¨ Keep trying. In addition to reducing your risk of diseases in the future, you will notice some benefits soon after you stop using tobacco. If you have shortness of breath or asthma symptoms, they will likely get better within a few weeks after you quit. · Limit how much alcohol you drink. Moderate amounts of alcohol (up to 2 drinks a day for men, 1 drink a day for women) are okay. But drinking too much can lead to liver problems, high blood pressure, and other health problems. Family health If you have a family, there are many things you can do together to improve your health. · Eat meals together as a family as often as possible. · Eat healthy foods. This includes fruits, vegetables, lean meats and dairy, and whole grains. · Include your family in your fitness plan. Most people think of activities such as jogging or tennis as the way to fitness, but there are many ways you and your family can be more active. Anything that makes you breathe hard and gets your heart pumping is exercise. Here are some tips: 
¨ Walk to do errands or to take your child to school or the bus. ¨ Go for a family bike ride after dinner instead of watching TV. Where can you learn more? Go to http://veto-jose.info/. Enter H570 in the search box to learn more about \"A Healthy Lifestyle: Care Instructions. \" Current as of: July 26, 2016 Content Version: 11.2 © 7543-9866 [employer]. Care instructions adapted under license by [employer] (which disclaims liability or warranty for this information). If you have questions about a medical condition or this instruction, always ask your healthcare professional. [place] [address] any warranty or liability for your use of this information. Introducing \[facility]\"" & HEALTH SERVICES! Richy Ornelas introduces [employer] patient portal. Now you can access parts of your medical record, email your doctor's office, and request medication refills online. 1. In your internet browser, go to [email or web address] 2. Click on the First Time User? Click Here link in the Sign In box. You will see the New Member Sign Up page. 3. Enter your [employer] Access Code exactly as it appears below. You will not need to use this code after youve completed the sign-up process. If you do not sign up before the expiration date, you must request a new code. · [employer] Access Code: [email or web address] [facility] Expires: 6/13/2017  2:59 PM 
 
4. Enter the last four digits of your Social Security Number (xxxx) and Date of Birth (mm/dd/yyyy) as indicated and click Submit. You will be taken to the next sign-up page. 5. Create a [employer] ID. This will be your [employer] login ID and cannot be changed, so think of one that is secure and easy to remember. 6. Create a [employer] password. You can change your password at any time. 7. Enter your Password Reset Question and Answer. This can be used at a later time if you forget your password. 8. Enter your e-mail address. You will receive e-mail notification when new information is available in [address] 9. Click Sign Up. You can now view and download portions of your medical record. 10. Click the Download Summary menu link to download a portable copy of your medical information. If you have questions, please visit the Frequently Asked Questions section of the [employer] website. Remember, [employer] is NOT to be used for urgent needs. For medical emergencies, dial 911. Now available from your iPhone and Android! Please provide this summary of care documentation to your next provider. Your primary care clinician is listed as Gurdeep Davies. If you have any questions after today's visit, please call [phone].

## 2017-11-10 ENCOUNTER — OFFICE VISIT (OUTPATIENT)
Dept: NEUROLOGY | Age: 67
End: 2017-11-10

## 2017-11-10 VITALS
RESPIRATION RATE: 16 BRPM | OXYGEN SATURATION: 98 % | WEIGHT: 215 LBS | HEIGHT: 72 IN | BODY MASS INDEX: 29.12 KG/M2 | SYSTOLIC BLOOD PRESSURE: 110 MMHG | TEMPERATURE: 97.9 F | DIASTOLIC BLOOD PRESSURE: 62 MMHG | HEART RATE: 58 BPM

## 2017-11-10 DIAGNOSIS — I69.319 CVA, OLD, COGNITIVE DEFICITS: ICD-10-CM

## 2017-11-10 DIAGNOSIS — F06.31 DEPRESSION AS LATE EFFECT OF CEREBROVASCULAR ACCIDENT (CVA): ICD-10-CM

## 2017-11-10 DIAGNOSIS — I69.398 DEPRESSION AS LATE EFFECT OF CEREBROVASCULAR ACCIDENT (CVA): ICD-10-CM

## 2017-11-10 RX ORDER — SERTRALINE HYDROCHLORIDE 50 MG/1
50 TABLET, FILM COATED ORAL DAILY
Qty: 90 TAB | Refills: 3 | Status: SHIPPED | OUTPATIENT
Start: 2017-11-10 | End: 2017-12-22 | Stop reason: SINTOL

## 2017-11-10 NOTE — PROGRESS NOTES
Mai Correia is a 75 y.o. male  Chief Complaint   Patient presents with    Neurologic Problem     Patient is here for a 6 month follow up visit from a previous stroke      1. Have you been to the ER, urgent care clinic since your last visit? Hospitalized since your last visit? No    2. Have you seen or consulted any other health care providers outside of the [address] [place] [address] since your last visit? Include any pap smears or colon screening.  No         Visit Vitals    /62 (BP 1 Location: Left arm, BP Patient Position: Sitting)    Pulse (!) 58    Temp 97.9 °F (36.6 °C) (Oral)    Resp 16    Ht 6' (1.829 m)    Wt 215 lb (97.5 kg)    SpO2 98%    BMI 29.16 kg/m2

## 2017-11-10 NOTE — MR AVS SNAPSHOT
Visit Information Date & Time Provider Department Dept. Phone Encounter #  
 11/10/2017 10:00 AM [facility] [phone] [id number] Upcoming Health Maintenance Date Due Hepatitis C Screening 1950 FOOT EXAM Q1 1/4/1959 MICROALBUMIN Q1 1/4/1959 EYE EXAM RETINAL OR DILATED Q1 1/4/1959 DTaP/Tdap/Td series (1 - Tdap) 1/4/1970 FOBT Q 1 YEAR AGE 50-75 1/4/1999 ZOSTER VACCINE AGE 60> 11/4/2008 GLAUCOMA SCREENING Q2Y 1/4/2014 Pneumococcal 65+ Low/Medium Risk (1 of 2 - PCV13) 1/4/2014 MEDICARE YEARLY EXAM 1/4/2014 HEMOGLOBIN A1C Q6M 9/30/2017 LIPID PANEL Q1 3/28/2018 Allergies as of 11/10/2017  Review Complete On: 11/10/2017 By: Marcus Wagner NP Severity Noted Reaction Type Reactions Chantix [Varenicline]  03/17/2017    Rash Other reaction(s): Rash Very itchy rash on back Very itchy rash on back Current Immunizations  Reviewed on 11/10/2017 No immunizations on file. Reviewed by Caro Adames Certified Medical Assistant on 11/10/2017 at  9:57 AM  
You Were Diagnosed With   
  
 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 
ICD-9-CM: 438.89, 293.83 Vitals BP Pulse Temp Resp Height(growth percentile) Weight(growth percentile) 110/62 (BP 1 Location: Left arm, BP Patient Position: Sitting) (!) 58 97.9 °F (36.6 °C) (Oral) 16 6' (1.829 m) 215 lb (97.5 kg) SpO2 BMI Smoking Status 98% 29.16 kg/m2 Current Every Day Smoker Vitals History BMI and BSA Data Body Mass Index Body Surface Area  
 29.16 kg/m 2 2.23 m 2 Preferred Pharmacy Pharmacy Name Phone [facility] [address] [place] [address], [address] [place] [address] [phone] Your Updated Medication List  
  
   
This list is accurate as of: 11/10/17 11:13 AM.  Always use your most recent med list. amLODIPine 10 mg tablet Commonly known as:  Negron Alexandre Take 1 Tab by mouth daily. aspirin delayed-release 81 mg tablet Take  by mouth daily. aspirin-calcium carbonate 81 mg-300 mg calcium(777 mg) Tab Take  by mouth.  
  
 metoprolol tartrate 50 mg tablet Commonly known as:  LOPRESSOR Take  by mouth two (2) times a day. pravastatin 40 mg tablet Commonly known as:  PRAVACHOL Take 1 Tab by mouth nightly. Indications: prevention of cerebrovascular accident  
  
 sertraline 50 mg tablet Commonly known as:  ZOLOFT Take 1 Tab by mouth daily. ZyrTEC 10 mg Cap Generic drug:  Cetirizine Take  by mouth. Prescriptions Sent to Pharmacy Refills  
 sertraline (ZOLOFT) 50 mg tablet 3 Sig: Take 1 Tab by mouth daily. Class: Normal  
 Pharmacy: [facility] [address] [place] [address], [address] [facility] #: [phone] Route: Oral  
  
We Performed the Following REFERRAL TO NEUROPSYCHOLOGY [NLR15 Custom] Referral Information Referral ID Referred By Referred To  
  
 [id number] Adrián Pearl [address] [facility] [address] [facility], [address] [facility] Phone: [phone] Fax: [phone] Visits Status Start Date End Date 1 Authorized 11/10/17 11/10/18 If your referral has a status of pending review or denied, additional information will be sent to support the outcome of this decision. Patient Instructions Advance Care Planning: Care Instructions Your Care Instructions It can be hard to live with an illness that cannot be cured.  But if your health is getting worse, you may want to make decisions about end-of-life care. Planning for the end of your life does not mean that you are giving up. It is a way to make sure that your wishes are met. Clearly stating your wishes can make it easier for your loved ones. Making plans while you are still able may also ease your mind and make your final days less stressful and more meaningful. Follow-up care is a key part of your treatment and safety. Be sure to make and go to all appointments, and call your doctor if you are having problems. It's also a good idea to know your test results and keep a list of the medicines you take. What can you do to plan for the end of life? · You can bring these issues up with your doctor. You do not need to wait until your doctor starts the conversation. You might start with \"I would not be willing to live with . [place]erry [place] Lerry Cirilo [place]erry [place] \" When you complete this sentence it helps your doctor understand your wishes. · Talk openly and honestly with your doctor. This is the best way to understand the decisions you will need to make as your health changes. Know that you can always change your mind. · Ask your doctor about commonly used life-support measures. These include tube feedings, breathing machines, and fluids given through a vein (IV). Understanding these treatments will help you decide whether you want them. · You may choose to have these life-supporting treatments for a limited time. This allows a trial period to see whether they will help you. You may also decide that you want your doctor to take only certain measures to keep you alive. It is important to spell out these conditions so that your doctor and family understand them. · Talk to your doctor about how long you are likely to live. He or she may be able to give you an idea of what usually happens with your specific illness. · Think about preparing papers that state your wishes. This way there will not be any confusion about what you want. You can change your instructions at any time. Which papers should you prepare? Advance directives are legal papers that tell doctors how you want to be cared for at the end of your life. You do not need a  to write these papers. Ask your doctor or your state health department for information on how to write your advance directives. They may have the forms for each of these types of papers. Make sure your doctor has a copy of these on file, and give a copy to a family member or close friend. · Consider a do-not-resuscitate order (DNR). This order asks that no extra treatments be done if your heart stops or you stop breathing. Extra treatments may include cardiopulmonary resuscitation (CPR), electrical shock to restart your heart, or a machine to breathe for you. If you decide to have a DNR order, ask your doctor to explain and write it. Place the order in your home where everyone can easily see it. · Consider a living will. A living will explains your wishes about life support and other treatments at the end of your life if you become unable to speak for yourself. Living dorantes tell doctors to use or not use treatments that would keep you alive. You must have one or two witnesses or a notary present when you sign this form. · Consider a durable power of  for health care. This allows you to name a person to make decisions about your care if you are not able to. Most people ask a close friend or family member. Talk to this person about the kinds of treatments you want and those that you do not want. Make sure this person understands your wishes. These legal papers are simple to change. Tell your doctor what you want to change, and ask him or her to make a note in your medical file. Give your family updated copies of the papers. Where can you learn more? Go to http://veto-jose.info/. Enter P184 in the search box to learn more about \"Advance Care Planning: Care Instructions. \" Current as of: September 24, 2016 Content Version: 11.4 © 8648-3298 Healthwise, [employer]. Care instructions adapted under license by [employer] (which disclaims liability or warranty for this information). If you have questions about a medical condition or this instruction, always ask your healthcare professional. [address] any warranty or liability for your use of this information. Introducing [facility]! Sukh Martinez introduces Locately patient portal. Now you can access parts of your medical record, email your doctor's office, and request medication refills online. 1. In your internet browser, go to [email or web address] 2. Click on the First Time User? Click Here link in the Sign In box. You will see the New Member Sign Up page. 3. Enter your Locately Access Code exactly as it appears below. You will not need to use this code after youve completed the sign-up process. If you do not sign up before the expiration date, you must request a new code. · Locately Access Code: [email or web address] Expires: 2/8/2018  9:46 AM 
 
4. Enter the last four digits of your Social Security Number (xxxx) and Date of Birth (mm/dd/yyyy) as indicated and click Submit. You will be taken to the next sign-up page. 5. Create a Locately ID. This will be your Locately login ID and cannot be changed, so think of one that is secure and easy to remember. 6. Create a Locately password. You can change your password at any time. 7. Enter your Password Reset Question and Answer. This can be used at a later time if you forget your password. 8. Enter your e-mail address. You will receive e-mail notification when new information is available in [address] 9. Click Sign Up. You can now view and download portions of your medical record. 10. Click the Download Summary menu link to download a portable copy of your medical information. If you have questions, please visit the Frequently Asked Questions section of the [employer] website. Remember, [employer] is NOT to be used for urgent needs. For medical emergencies, dial 911. Now available from your iPhone and Android! Please provide this summary of care documentation to your next provider. Your primary care clinician is listed as Berta Kpalan. If you have any questions after today's visit, please call [phone].

## 2017-11-10 NOTE — PATIENT INSTRUCTIONS
Advance Care Planning: Care Instructions  Your Care Instructions    It can be hard to live with an illness that cannot be cured. But if your health is getting worse, you may want to make decisions about end-of-life care. Planning for the end of your life does not mean that you are giving up. It is a way to make sure that your wishes are met. Clearly stating your wishes can make it easier for your loved ones. Making plans while you are still able may also ease your mind and make your final days less stressful and more meaningful. Follow-up care is a key part of your treatment and safety. Be sure to make and go to all appointments, and call your doctor if you are having problems. It's also a good idea to know your test results and keep a list of the medicines you take. What can you do to plan for the end of life? · You can bring these issues up with your doctor. You do not need to wait until your doctor starts the conversation. You might start with \"I would not be willing to live with . Candance Huger Candance Huger Candance Huger \" When you complete this sentence it helps your doctor understand your wishes. · Talk openly and honestly with your doctor. This is the best way to understand the decisions you will need to make as your health changes. Know that you can always change your mind. · Ask your doctor about commonly used life-support measures. These include tube feedings, breathing machines, and fluids given through a vein (IV). Understanding these treatments will help you decide whether you want them. · You may choose to have these life-supporting treatments for a limited time. This allows a trial period to see whether they will help you. You may also decide that you want your doctor to take only certain measures to keep you alive. It is important to spell out these conditions so that your doctor and family understand them. · Talk to your doctor about how long you are likely to live.  He or she may be able to give you an idea of what usually happens with your specific illness. · Think about preparing papers that state your wishes. This way there will not be any confusion about what you want. You can change your instructions at any time. Which papers should you prepare? Advance directives are legal papers that tell doctors how you want to be cared for at the end of your life. You do not need a  to write these papers. Ask your doctor or your state health department for information on how to write your advance directives. They may have the forms for each of these types of papers. Make sure your doctor has a copy of these on file, and give a copy to a family member or close friend. · Consider a do-not-resuscitate order (DNR). This order asks that no extra treatments be done if your heart stops or you stop breathing. Extra treatments may include cardiopulmonary resuscitation (CPR), electrical shock to restart your heart, or a machine to breathe for you. If you decide to have a DNR order, ask your doctor to explain and write it. Place the order in your home where everyone can easily see it. · Consider a living will. A living will explains your wishes about life support and other treatments at the end of your life if you become unable to speak for yourself. Living dorantes tell doctors to use or not use treatments that would keep you alive. You must have one or two witnesses or a notary present when you sign this form. · Consider a durable power of  for health care. This allows you to name a person to make decisions about your care if you are not able to. Most people ask a close friend or family member. Talk to this person about the kinds of treatments you want and those that you do not want. Make sure this person understands your wishes. These legal papers are simple to change. Tell your doctor what you want to change, and ask him or her to make a note in your medical file. Give your family updated copies of the papers.   Where can you learn more?  Go to http://veto-jose.info/. Enter P184 in the search box to learn more about \"Advance Care Planning: Care Instructions. \"  Current as of: September 24, 2016  Content Version: 11.4  © 5719-5485 OpenNews. Care instructions adapted under license by [employer] (which disclaims liability or warranty for this information). If you have questions about a medical condition or this instruction, always ask your healthcare professional. [address] any warranty or liability for your use of this information.

## 2017-11-10 NOTE — PROGRESS NOTES
Date:  11/10/17     Name:  Jasmin Stone  :  1950  MRN:  [id number]     PCP:  Steven Jones MD    Chief Complaint   Patient presents with    Neurologic Problem     Patient is here for a 6 month follow up visit from a previous stroke      HISTORY OF PRESENT ILLNESS: Follow up from previous CVA. Still has some cognitive issues which are still attention, current events/short term. He is starting to read more now. He states that he has been bored and that he has not had anything to do. He is not cleared to drive. He is still losing his balance and is wobbly. No falls. He did have HEP for this but he will not do them. Except as noted above, denies  fever, chills, cough. No CP or SOB. No dysuria, loss of bowel or bladder control. No Weight loss. Appetite good. Sleeping well. No sweats. No edema. No bruising or bleeding. No nausea or vomit. No diarrhea. No frequency, urgency, No depressive sxs. No anxiety. Denies sore throat, nasal congestion, nasal discharge, epistaxis, tinnitus, hearing loss, back pain, muscle pain, or joint pain. Current Outpatient Prescriptions   Medication Sig    aspirin-calcium carbonate 81 mg-300 mg calcium(777 mg) tab Take  by mouth.  metoprolol tartrate (LOPRESSOR) 50 mg tablet Take  by mouth two (2) times a day.  aspirin delayed-release 81 mg tablet Take  by mouth daily.  Cetirizine (ZYRTEC) 10 mg cap Take  by mouth.  amLODIPine (NORVASC) 10 mg tablet Take 1 Tab by mouth daily. (Patient taking differently: Take 5 mg by mouth daily.)    pravastatin (PRAVACHOL) 40 mg tablet Take 1 Tab by mouth nightly. Indications: prevention of cerebrovascular accident     No current facility-administered medications for this visit.       Allergies   Allergen Reactions    Chantix [Varenicline] Rash     Other reaction(s): Rash  Very itchy rash on back  Very itchy rash on back     Past Medical History:   Diagnosis Date    Hemorrhagic stroke ([facility] [address] 3/28/2017    S/p TPA    Hypertension     Ill-defined condition     Eczema    [facility] [address]     Type II diabetes mellitus ([facility] 3/29/2017     Past Surgical History:   Procedure Laterality Date    HX ORTHOPAEDIC  03/2017    left wrist surgery     Social History     Social History    Marital status:      Spouse name: N/A    Number of children: N/A    Years of education: N/A     Occupational History    Not on file. Social History Main Topics    Smoking status: Current Every Day Smoker     Packs/day: 0.50     Years: 50.00    Smokeless tobacco: Former User     Quit date: 2/28/2017      Comment: cold turkey 2.5 wks ago after attempt Chantix developed allergy    Alcohol use No      Comment: around holidatys    Drug use: No    Sexual activity: Not on file     Other Topics Concern    Not on file     Social History Narrative     Family History   Problem Relation Age of Onset    Diabetes Mother     Cancer Father     Headache Other        PHYSICAL EXAMINATION:    Visit Vitals    /62 (BP 1 Location: Left arm, BP Patient Position: Sitting)    Pulse (!) 58    Temp 97.9 °F (36.6 °C) (Oral)    Resp 16    Ht 6' (1.829 m)    Wt 97.5 kg (215 lb)    SpO2 98%    BMI 29.16 kg/m2     General:  Well defined, nourished, and groomed individual in no acute distress. Neck: Supple, nontender, no bruits, no pain with resistance to active range of motion. Heart: Regular rate and rhythm, no murmurs, rub, or gallop. Normal S1S2. Lungs:  Clear to auscultation bilaterally with equal chest expansion, no cough, no wheeze  Musculoskeletal:  Extremities revealed no edema and had full range of motion of joints. Psych:  Apathetic and depressed with a flat affect    NEUROLOGICAL EXAMINATION:     Mental Status:   Alert and oriented to person and place. He is repetitive. Clearly has some short term memory loss    Cranial Nerves:    II, III, IV, VI:  Visual acuity grossly intact.  Visual fields are normal.    Pupils are equal, round, and reactive to light and accommodation. Extra-ocular movements are full and fluid. Fundoscopic exam was benign, no ptosis or nystagmus. V-XII: Hearing is grossly intact. Facial features are symmetric, with normal sensation and strength. The palate rises symmetrically and the tongue protrudes midline. Sternocleidomastoids 5/5. Motor Examination: Normal tone, bulk, and strength, 5/5 muscle strength throughout. Coordination:  Finger to nose was normal.   No resting or intention tremor    Gait and Station:  Steady while walking. Normal arm swing. No pronator drift. No muscle wasting or fasiculations noted. Reflexes:  DTRs 2+ throughout. ASSESSMENT AND PLAN    ICD-10-CM ICD-9-CM    1. 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 CVA, old, cognitive deficits I69.319 438.0 REFERRAL TO NEUROPSYCHOLOGY   3. 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 I discussed this with  him and his wife. Of note, his cognitive function is perhaps a little bit worse than it had been on last visit. He is more repetitive. We will refer him for formal neuropsychological evaluation. In the meantime, he was started on Zoloft. Purpose potential side effects were discussed and they have verbalized understanding. Follow-up in about 4 weeks. Gayathri Jaquez

## 2017-12-22 ENCOUNTER — OFFICE VISIT (OUTPATIENT)
Dept: NEUROLOGY | Age: 67
End: 2017-12-22

## 2017-12-22 VITALS
HEART RATE: 60 BPM | DIASTOLIC BLOOD PRESSURE: 74 MMHG | HEIGHT: 72 IN | WEIGHT: 215.7 LBS | SYSTOLIC BLOOD PRESSURE: 122 MMHG | BODY MASS INDEX: 29.22 KG/M2

## 2017-12-22 DIAGNOSIS — I69.319 COGNITIVE DEFICIT DUE TO OLD CEREBROVASCULAR ACCIDENT (CVA): Primary | ICD-10-CM

## 2017-12-22 NOTE — MR AVS SNAPSHOT
Visit Information Date & Time Provider Department Dept. Phone Encounter #  
 12/22/2017 10:00 AM [facility] [phone] [id number] Your Appointments 4/19/2018  1:40 PM  
New Patient with Salazar Schultz PsyD [address] [facility] ([facility]) Appt Note: new patient consult/ Charanjit Paul [place] [address] [facility]ly [facility], [facility] [id number] [phone]  
  
   
 [place] [address] [id number] [address] Upcoming Health Maintenance Date Due Hepatitis C Screening 1950 FOOT EXAM Q1 1/4/1959 MICROALBUMIN Q1 1/4/1959 EYE EXAM RETINAL OR DILATED Q1 1/4/1959 DTaP/Tdap/Td series (1 - Tdap) 1/4/1970 FOBT Q 1 YEAR AGE 50-75 1/4/1999 ZOSTER VACCINE AGE 60> 11/4/2008 GLAUCOMA SCREENING Q2Y 1/4/2014 Pneumococcal 65+ Low/Medium Risk (1 of 2 - PCV13) 1/4/2014 MEDICARE YEARLY EXAM 1/4/2014 HEMOGLOBIN A1C Q6M 9/30/2017 LIPID PANEL Q1 3/28/2018 Allergies as of 12/22/2017  Review Complete On: 12/22/2017 By: Leah Mistry NP Severity Noted Reaction Type Reactions Chantix [Varenicline]  03/17/2017    Rash Other reaction(s): Rash Very itchy rash on back Very itchy rash on back Current Immunizations  Reviewed on 11/10/2017 No immunizations on file. Not reviewed this visit You Were Diagnosed With   
  
 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)     ICD-10-CM: I63.9, F07.0 ICD-9-CM: 434.91, 310.1 Vitals BP Pulse Height(growth percentile) Weight(growth percentile) BMI Smoking Status 122/74 60 6' (1.829 m) 215 lb 11.2 oz (97.8 kg) 29.25 kg/m2 Former Smoker Vitals History BMI and BSA Data  Body Mass Index Body Surface Area  
 29.25 kg/m 2 2.23 m 2  
  
  
 Preferred Pharmacy Pharmacy Name Phone [facility] [address] [facility], [address] [place] [address] Shelia Graver [phone] Your Updated Medication List  
  
   
This list is accurate as of: 12/22/17 10:42 AM.  Always use your most recent med list. amLODIPine 10 mg tablet Commonly known as:  Horris Bills Take 1 Tab by mouth daily. aspirin delayed-release 81 mg tablet Take  by mouth daily. metoprolol tartrate 50 mg tablet Commonly known as:  LOPRESSOR Take  by mouth two (2) times a day. pravastatin 40 mg tablet Commonly known as:  PRAVACHOL Take 1 Tab by mouth nightly. Indications: prevention of cerebrovascular accident ZyrTEC 10 mg Cap Generic drug:  Cetirizine Take  by mouth. Introducing [facility]! [employer] introduces [employer] patient portal. Now you can access parts of your medical record, email your doctor's office, and request medication refills online. 1. In your internet browser, go to [email or web address] 2. Click on the First Time User? Click Here link in the Sign In box. You will see the New Member Sign Up page. 3. Enter your [employer] Access Code exactly as it appears below. You will not need to use this code after youve completed the sign-up process. If you do not sign up before the expiration date, you must request a new code. · [employer] Access Code: [email or web address] Expires: 2/8/2018  9:46 AM 
 
4. Enter the last four digits of your Social Security Number (xxxx) and Date of Birth (mm/dd/yyyy) as indicated and click Submit. You will be taken to the next sign-up page. 5. Create a [employer] ID. This will be your [employer] login ID and cannot be changed, so think of one that is secure and easy to remember. 6. Create a [employer] password. You can change your password at any time. 7. Enter your Password Reset Question and Answer. This can be used at a later time if you forget your password. 8. Enter your e-mail address. You will receive e-mail notification when new information is available in [address] 9. Click Sign Up. You can now view and download portions of your medical record. 10. Click the Download Summary menu link to download a portable copy of your medical information. If you have questions, please visit the Frequently Asked Questions section of the [employer] website. Remember, [employer] is NOT to be used for urgent needs. For medical emergencies, dial 911. Now available from your iPhone and Android! Please provide this summary of care documentation to your next provider. Your primary care clinician is listed as Berta Kaplan. If you have any questions after today's visit, please call [phone].

## 2017-12-22 NOTE — PROGRESS NOTES
Date:  17     Name:  Kerri Umaña  :  1950  MRN:  [id number]     PCP:  Mello Robledo MD    Chief Complaint   Patient presents with    Follow-up     medication evaluation since starting Zoloft     HISTORY OF PRESENT ILLNESS: Follow up for personality change secondary to CVA and possible memory loss. He was started on Zoloft at the last office visit. He took this for about five days and they indicate that he seemed to be even flatter and he would sleep all the time. They did discontinue this. He is actually a little better than he was at his last visit in terms of his personality and trying to keep busy. He has been helping out around the house, trying to read more, watching movies, and actually helped in the festival at [employer]. He is not back to his baseline self but certainly better. Memory is about the same. Seems to be more short term than long term. He will not remember things immediately and will need reminders to help jog his memory. Except as noted above, denies  fever, chills, cough. No CP or SOB. No dysuria, loss of bowel or bladder control. No Weight loss. Appetite good. Sleeping well. No sweats. No edema. No bruising or bleeding. No nausea or vomit. No diarrhea. No frequency, urgency, No depressive sxs. No anxiety. Denies sore throat, nasal congestion, nasal discharge, epistaxis, tinnitus, hearing loss, back pain, muscle pain, or joint pain. Current Outpatient Prescriptions   Medication Sig    metoprolol tartrate (LOPRESSOR) 50 mg tablet Take  by mouth two (2) times a day.  aspirin delayed-release 81 mg tablet Take  by mouth daily.  Cetirizine (ZYRTEC) 10 mg cap Take  by mouth.  amLODIPine (NORVASC) 10 mg tablet Take 1 Tab by mouth daily. (Patient taking differently: Take 5 mg by mouth daily.)    pravastatin (PRAVACHOL) 40 mg tablet Take 1 Tab by mouth nightly.  Indications: prevention of cerebrovascular accident     No current facility-administered medications for this visit. Allergies   Allergen Reactions    Chantix [Varenicline] Rash     Other reaction(s): Rash  Very itchy rash on back  Very itchy rash on back     Past Medical History:   Diagnosis Date    Hemorrhagic stroke ([facility] 3/28/2017    S/p TPA    Hypertension     Ill-defined condition     Eczema    Stroke ([facility]     Type II diabetes mellitus ([facility] 3/29/2017     Past Surgical History:   Procedure Laterality Date    HX ORTHOPAEDIC  03/2017    left wrist surgery     Social History     Social History    Marital status:      Spouse name: N/A    Number of children: N/A    Years of education: N/A     Occupational History    Not on file. Social History Main Topics    Smoking status: Former Smoker     Packs/day: 0.50     Years: 50.00     Types: Cigarettes     Quit date: 2/28/2017    Smokeless tobacco: Never Used      Comment: cold turkey 2.5 wks ago after attempt Chantix developed allergy    Alcohol use No      Comment: around holidatys    Drug use: No    Sexual activity: Not on file     Other Topics Concern    Not on file     Social History Narrative     Family History   Problem Relation Age of Onset    Diabetes Mother     Cancer Father     Headache Other        PHYSICAL EXAMINATION:    Visit Vitals    /74    Pulse 60    Ht 6' (1.829 m)    Wt 97.8 kg (215 lb 11.2 oz)    BMI 29.25 kg/m2     General:  Well defined, nourished, and groomed individual in no acute distress. Neck:             Supple, nontender, no bruits, no pain with resistance to active range of motion. Heart:            Regular rate and rhythm, no murmurs, rub, or gallop. Normal S1S2. Lungs:  Clear to auscultation bilaterally with equal chest expansion, no cough, no wheeze  Musculoskeletal:  Extremities revealed no edema and had full range of motion of joints.     Psych:  Apathetic and depressed with a flat affect     NEUROLOGICAL EXAMINATION:     Mental Status:   Alert and oriented to person and place. He is repetitive. Clearly has some short term memory loss     Cranial Nerves:    II, III, IV, VI:  Visual acuity grossly intact. Visual fields are normal.    Pupils are equal, round, and reactive to light and accommodation. Extra-ocular movements are full and fluid. Fundoscopic exam was benign, no ptosis or nystagmus. V-XII:             Hearing is grossly intact. Facial features are symmetric, with normal sensation and strength. The palate rises symmetrically and the tongue protrudes midline. Sternocleidomastoids 5/5.       Motor Examination:               Normal tone, bulk, and strength, 5/5 muscle strength throughout.        Coordination:  Finger to nose was normal.   No resting or intention tremor     Gait and Station:  Steady while walking. Normal arm swing. No pronator drift. No muscle wasting or fasiculations noted.       Reflexes:  DTRs 2+ throughout. ASSESSMENT AND PLAN    ICD-10-CM ICD-9-CM    1. Cognitive deficit due to old cerebrovascular accident (CVA) I69.319 438.0    2. 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 He continues to be repetitive as well. There continues to be concern for personality and cognitive change secondary to CVA versus an underlying dementia process. Currently, he is scheduled for his initial neuropsychological evaluation in April. Follow up after the neuropsychological testing. Gayathri Monae

## 2018-04-19 ENCOUNTER — OFFICE VISIT (OUTPATIENT)
Dept: NEUROLOGY | Age: 68
End: 2018-04-19

## 2018-04-19 DIAGNOSIS — I69.319 COGNITIVE DEFICIT DUE TO OLD CEREBROVASCULAR ACCIDENT (CVA): Primary | ICD-10-CM

## 2018-04-19 DIAGNOSIS — I69.319 CVA, OLD, COGNITIVE DEFICITS: ICD-10-CM

## 2018-04-19 DIAGNOSIS — F06.31 DEPRESSION AS LATE EFFECT OF CEREBROVASCULAR ACCIDENT (CVA): ICD-10-CM

## 2018-04-19 DIAGNOSIS — I69.398 DEPRESSION AS LATE EFFECT OF CEREBROVASCULAR ACCIDENT (CVA): ICD-10-CM

## 2018-04-19 NOTE — PROGRESS NOTES
[address] [facility],[address]  [address]   [place] [address] [facility] [address] [facility]   [place] [address]   [phone] Office   [phone] Fax      Neuropsychology    Initial Diagnostic Interview Note      Referral:  Darcy Garcia MD, Elizabeth HenryGertrudis Lizama is a 70 y.o. right handed   American male who was accompanied by his spouse to the initial clinical interview on 4/19/18. Please refer to his medical records for details pertaining to his history. Briefly, the patient reported that he completed college at [employer] (Class of 1970). He studied Economics. No history of previously diagnosed LD and/or receipt of special education services. He worked in data processing. He had a stroke in March 28 2017. No cognitive issues prior to the stroke, He had some episodic depression prior. Since the stroke, the patient has been struggling with short term memory as well as attention and focus and now he is ignoring just about everything. He is driving a little but spouse won't let him. Spouse says the driving issue is mostly related to his difficulties making the wrong turn and traffic is hectic and he has slow response time. They live in [place] and goes to [facility] and will drive to [place] but that's about it. Feels like memory problems are worsening. He takes his own medications. Spouse has been managing the family finances for quite some time. No current legal troubles. No known family history of dementia. No counseling or psychiatrist.  Ms. Minerva Sandhu has recently started him on Zoloft. Spouse says he remembers much from the past including his times at [employer] but struggles with short term information  Forgets names. Doesn't remember who people are. Memory seems to come and go. No changes in sense of taste or smell. No sleep or appetite changes. Spouse says he may be more down/depressed and will cry and laugh more easily. Doesn't seem to care as much about things. Used to read a lot but doesn't read as much anymore. Would read a book every few days and now reads a few pages and forgets or gets bored so stops. He is independent for his ADLS. Helps spouse in the yard. 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 In family (he is [facility]) there is a lot of expectations on him. Used to being a leader, and now struggling with those changes. No previous neuropsych.       Neuropsychological Mental Status Exam (NMSE):  Historian: Good  Praxis: No UE apraxia  R/L Orientation: Intact to self and to other  Dress: within normal limits   Weight: within normal limits   Appearance/Hygiene: within normal limits   Gait: within normal limits   Assistive Devices: None  Mood: Mildly depressed   Affect: Flat   Comprehension: within normal limits   Thought Process: within normal limits   Expressive Language: within normal limits   Receptive Language: within normal limits   Motor:  No cognitive or motor perseveration  ETOH: rarely  Tobacco: used to, quit last year, right before the stroke  Illicit: Denied  SI/HI: Denied  Psychosis: Denied  Insight: Within normal limits  Judgment: Within normal limits  Other Psych:      Past Medical History:   Diagnosis Date    Hemorrhagic stroke ([facility] 3/28/2017    S/p TPA    Hypertension     Ill-defined condition     Eczema    Stroke ([facility]     Type II diabetes mellitus ([facility] 3/29/2017       Past Surgical History:   Procedure Laterality Date    HX ORTHOPAEDIC  03/2017    left wrist surgery       Allergies   Allergen Reactions    Chantix [Varenicline] Rash     Other reaction(s): Rash  Very itchy rash on back  Very itchy rash on back       Family History   Problem Relation Age of Onset    Diabetes Mother     Cancer Father     Headache Other        Social History   Substance Use Topics    Smoking status: Former Smoker     Packs/day: 0.50     Years: 50.00     Types: Cigarettes     Quit date: 2/28/2017    Smokeless tobacco: Never Used      Comment: cold turkey 2.5 wks ago after attempt Chantix developed allergy    Alcohol use No      Comment: around holidatys       Current Outpatient Prescriptions   Medication Sig Dispense Refill    metoprolol tartrate (LOPRESSOR) 50 mg tablet Take  by mouth two (2) times a day.  aspirin delayed-release 81 mg tablet Take  by mouth daily.  Cetirizine (ZYRTEC) 10 mg cap Take  by mouth.  amLODIPine (NORVASC) 10 mg tablet Take 1 Tab by mouth daily. (Patient taking differently: Take 5 mg by mouth daily.) 30 Tab 0    pravastatin (PRAVACHOL) 40 mg tablet Take 1 Tab by mouth nightly. Indications: prevention of cerebrovascular accident 30 Tab 0         Plan:  Obtain authorization for testing from [employer]. Report to follow once testing, scoring, and interpretation completed. ? Organic based neurocognitive issues versus mood disorder or combination of same. ? Problems organic, functional, or both? This note will not be viewable in [address] 70year old with cognitive and personality changes since stroke and appears to be worsening. On Zoloft. ? Vascular dementia versus residua from CVA versus mood versus combination of same. Will evaluate.

## 2018-05-07 ENCOUNTER — OFFICE VISIT (OUTPATIENT)
Dept: NEUROLOGY | Age: 68
End: 2018-05-07

## 2018-05-07 DIAGNOSIS — F06.31 DEPRESSION AS LATE EFFECT OF CEREBROVASCULAR ACCIDENT (CVA): ICD-10-CM

## 2018-05-07 DIAGNOSIS — G30.9 MIXED ALZHEIMER'S AND VASCULAR DEMENTIA (HCC): Primary | ICD-10-CM

## 2018-05-07 DIAGNOSIS — F01.50 MIXED ALZHEIMER'S AND VASCULAR DEMENTIA (HCC): Primary | ICD-10-CM

## 2018-05-07 DIAGNOSIS — F41.9 MODERATE ANXIETY: ICD-10-CM

## 2018-05-07 DIAGNOSIS — F02.80 MIXED ALZHEIMER'S AND VASCULAR DEMENTIA (HCC): Primary | ICD-10-CM

## 2018-05-07 DIAGNOSIS — I69.398 DEPRESSION AS LATE EFFECT OF CEREBROVASCULAR ACCIDENT (CVA): ICD-10-CM

## 2018-05-09 NOTE — PROGRESS NOTES
[address] [facility],[address]Th [address]  [address]   [place] [address] [place] [address] [address]   [phone] Office   [phone] Fax      Neuropsychological Evaluation Report    Referral:  Evelyn Puckett MD, Edin Joseph. Stephy Garcia is a 70 y.o. right handed   American male who was accompanied by his spouse to the initial clinical interview on 4/19/18. Please refer to his medical records for details pertaining to his history. Briefly, the patient reported that he completed college at [employer] (Class of 1970). He studied Economics. No history of previously diagnosed LD and/or receipt of special education services. He worked in data processing. He had a stroke in March 28 2017. No cognitive issues prior to the stroke, He had some episodic depression prior. Since the stroke, the patient has been struggling with short term memory as well as attention and focus and now he is ignoring just about everything. He is driving a little but spouse won't let him. Spouse says the driving issue is mostly related to his difficulties making the wrong turn and traffic is hectic and he has slow response time. They live in [place] and goes to [facility] and will drive to [employer] but that's about it. Feels like memory problems are worsening. He takes his own medications. Spouse has been managing the family finances for quite some time. No current legal troubles. No known family history of dementia. No counseling or psychiatrist.  Ms. Marissa Burgos has recently started him on Zoloft. Spouse says he remembers much from the past including his times at [employer] but struggles with short term information  Forgets names. Doesn't remember who people are. Memory seems to come and go. No changes in sense of taste or smell. No sleep or appetite changes. Spouse says he may be more down/depressed and will cry and laugh more easily.    Doesn't seem to care as much about things. Used to read a lot but doesn't read as much anymore. Would read a book every few days and now reads a few pages and forgets or gets bored so stops. He is independent for his ADLS. Helps spouse in the yard. 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 In family (he is [employer]) there is a lot of expectations on him. Used to being a leader, and now struggling with those changes. No previous neuropsych.       Neuropsychological Mental Status Exam (NMSE):  Historian: Good  Praxis: No UE apraxia  R/L Orientation: Intact to self and to other  Dress: within normal limits   Weight: within normal limits   Appearance/Hygiene: within normal limits   Gait: within normal limits   Assistive Devices: None  Mood: Mildly depressed   Affect: Flat   Comprehension: within normal limits   Thought Process: within normal limits   Expressive Language: within normal limits   Receptive Language: within normal limits   Motor:  No cognitive or motor perseveration  ETOH: rarely  Tobacco: used to, quit last year, right before the stroke  Illicit: Denied  SI/HI: Denied  Psychosis: Denied  Insight: Within normal limits  Judgment: Within normal limits  Other Psych:      Past Medical History:   Diagnosis Date    Hemorrhagic stroke ([facility] [address] 3/28/2017    S/p TPA    Hypertension     Ill-defined condition     Eczema    Stroke ([facility] [address]     Type II diabetes mellitus ([facility] [address] 3/29/2017       Past Surgical History:   Procedure Laterality Date    HX ORTHOPAEDIC  03/2017    left wrist surgery       Allergies   Allergen Reactions    Chantix [Varenicline] Rash     Other reaction(s): Rash  Very itchy rash on back  Very itchy rash on back       Family History   Problem Relation Age of Onset    Diabetes Mother     Cancer Father     Headache Other        Social History   Substance Use Topics    Smoking status: Former Smoker     Packs/day: 0.50     Years: 50.00     Types: Cigarettes     Quit date: 2/28/2017    Smokeless tobacco: Never Used      Comment: cold turkey 2.5 wks ago after attempt Chantix developed allergy    Alcohol use No      Comment: around holidatys       Current Outpatient Prescriptions   Medication Sig Dispense Refill    metoprolol tartrate (LOPRESSOR) 50 mg tablet Take  by mouth two (2) times a day.  aspirin delayed-release 81 mg tablet Take  by mouth daily.  Cetirizine (ZYRTEC) 10 mg cap Take  by mouth.  amLODIPine (NORVASC) 10 mg tablet Take 1 Tab by mouth daily. (Patient taking differently: Take 5 mg by mouth daily.) 30 Tab 0    pravastatin (PRAVACHOL) 40 mg tablet Take 1 Tab by mouth nightly. Indications: prevention of cerebrovascular accident 30 Tab 0         Plan:  Obtain authorization for testing from [email or web address]. Report to follow once testing, scoring, and interpretation completed. ? Organic based neurocognitive issues versus mood disorder or combination of same. ? Problems organic, functional, or both? This note will not be viewable in [address] 70year old with cognitive and personality changes since stroke and appears to be worsening. On Zoloft. ? Vascular dementia versus residua from CVA versus mood versus combination of same. Will evaluate. Neuropsychological Test Results  Patient Testing 5/7/18 Report Completed 5/9/18  A Psychometrist Assisted w/ portions of this evaluation while under my direct  supervision    The following evaluation procedures/tests were administered:      Neuropsychologist Administered/Interpreted:  Neuropsychological Mental Status Exam, Revised Memory & Behavior Checklist,  Mini Mental Status Exam, Clock Drawing Test, Test Of Premorbid Functioning, Thuy-Melzack Pain Questionnaire, History Taking  & Clinical Interview With The Patient, Additional History Taking w/ The Spouse, ABAS-3, CASE,  Review Of Available Records.     Psychometrist Administered under Neuropsychologist Supervision & Neuropsychologist Interpreted:  Verbal Fluency Tests, Reji Test - Revised, Trailmaking Test Parts A & B, Wechsler Adult Intelligence Scale - IV, Wyatt Continuous Performance Test - III, [place] [employer] - 3, Grooved Pegboard, Jimenez Depression Inventory - II, Jimenez Anxiety Inventory, Personality Assessment Inventory. Test Findings:  Test Findings:  Note:  The patients raw data have been compared with currently available norms which include demographic corrections for age, gender, and/or education. Sometimes, the patients scores are compared to demographically similar individuals as close to the patients age, education level, etc., as possible. \"Average\" is viewed as being +/- 1 standard deviation (SD) from the stated mean for a particular test score. \"Low average\" is viewed as being between 1 and 2 SD below the mean, and above average is viewed as being 1 and 2 SD above the mean. Scores falling in the borderline range (between 1-1/2 and 2 SD below the mean) are viewed with particular attention as to whether they are normal or abnormal neurocognitive test scores. Other methods of inference in analyzing the test data are also utilized, including the pattern and range of scores in the profile, bilateral motor functions, and the presence, if any, of pathognomonic signs. Behaviorally, the patient was friendly and cooperative and appeared motivated to perform well during this examination. Within this context, the results of this evaluation are viewed as a valid reflection of the patients actual neurocognitive and emotional status. His MMSE score of 27/30 correct was normal.  In this regard, recall for three words after a brief delay was 0/3 correct. Clock drawing was normal.        His structured word list fluency, as assessed by the FAS Test, was within the moderately to severely impaired range with a T score of 23. Category fluency was within the severely impaired range with a T score of 17.   Confrontation naming ability, as assessed by the Monico & Monico - Revised, was within the average range at 56/60 correct (T = 349. This pattern of performance is indicative of a patient who is at increased risk for day-to-day problems with verbal fluency and confrontation naming was normal.       The patient was administered the Wyatt Continuous Performance Test - III,a computer administered test of sustained attention, and review of the subscales within this instrument revealed numerous concerns for inattentiveness without impulsivity. This pattern of performance is indicative of a patient who is at increased risk for day-to-day problems with sustained visual attention/concentration. The patient is showing problems with working memory capacity (6th %ile) though processing speed (18th %ile) was low average on the WAIS-IV. His Verbal Comprehension Index score of 98 was within the average range. His Perceptual Reasoning Index score of 92 was average. Working memory is lower than expected based on an assessment of premorbid functioning. The patient was administered the [place] Verbal Learning Test  - 3 and generated an impaired range (and flat) learning curve over five repeated auditory word list learning trials. An interference trial was within the normal range. Free and cued, short and long delayed recall were all impaired. Recognition recall was impaired. Forced choice recall was normal, suggesting that he put forth good effort on this test.  This pattern of performance is indicative of a patient who is at increased risk for day-to-day problems with auditory learning and memory. Simple timed visual motor sequencing (Trailmaking Test Part A) was within the mildly to moderately impaired range with a T score of 33. His performance on a similar, but more complex task of timed visual motor sequencing (Trailmaking Test Part B) was within the moderately impaired range with a T score of 27.   He made only two sequencing errors on this latter completed test.  Taken together, this pattern of performance is not indicative of a patient who is at increased risk for day-to-day problems with executive functioning. Fine motor dexterity was within the mildly impaired range bilaterally. This pattern of performance is indicative of a patient who is at increased risk for day-to-day problems with bilateral motor dexterity. The patient rated his current level of pain as \"0/5- No Pain\" on the Thuy-Melzack Pain Questionnaire. His Jimenez Depression Inventory- II score of 12 was within the normal range. His Jimenez Anxiety Inventory score of 21 reflected moderate anxiety. The patient was administered the Personality Assessment Inventory and generated a valid profile for interpretation. Within this context,mild depression is present, and the personality profile is otherwise normal.       The spouse completed the ABAS-3 and reported borderline range concerns regarding the patient's general adaptive skills (5th %ile), conceptual skills (4th %ile), and practical skills (6th %ile). She also completed the CASE and reported clinically significant concerns for anxiety, depression, and his day-to-day cognitive functioning. Impressions & Recommendations: This patient generated an abnormal range Neuropsychological Evaluation with respect to neurocognitive functioning. In this regard, he is showing problems with  verbal fluency, sustained attention, working memory, auditory learning, auditory memory, and bilateral fine motor dexterity. Confrontation naming, processing speed, perceptual reasoning, verbal comprehension, and executive functioning abilities remain normal.  These are important strengths which will serve to mask underlying cognitive deficits at times. Emotionally, the patient reported moderate anxiety and mild depression.        In my opinion, this profile is consistent with an evolving organic process that is currently at a mild to moderate level of severity. 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 Mood issues are exacerbating factors and this does not appear to be a pseudodementia. PBA unlikely. In addition to continued medical care, my recommendations include consideration for memory management medication, treatment for attention issues, and psychiatric medication management and counseling for anxiety and depression. The patient should be encouraged to remain as mentally, socially, and physically active as possible. The patient's generated cognitive difficulties are significant to the degree whereby it is my opinion that he should not live independently without appropriate supervision for those domains with a heavy memory emphasis. This includes medication management supervision and supervision of financial dealings. Marked problems with attention and reaction time raise concern regarding driving safety, and I suggest consideration for a formal evaluation of same. I find him competent at this time. Baseline now established. Follow up prn. Clinical correlation is, of course, indicated. I will discuss these findings with the patient and family when they follow up with me in the near future. A follow up Neuropsychological Evaluation is indicated on a prn basis. DIAGNOSES: Dementia -      Depression -     The above information is based upon information currently available to me. If there is any additional information of which I am currently unaware, I would be more than happy to review it upon having it made available to me. Thank you for the opportunity to see this interesting individual.     Sincerely,       Sushant Pryor. Stefan Zacarias PsyD, EdS      Attachments:  IQ Test Results (In Media Section Of This EMR)    Cc: Frank Elaine MD    2 units -86270- 1.75 hours Record review. Review of history provided by patient. Review of collaborative information. Testing by Clinician.   Review of raw data. Scoring. Report writing of individual tests administered by Clinician. Integration of individual tests administered by psychometrist (that were previously reported and billed under psychometry code below) with testing by clinician and review of records/history/collaborative information. Case Conceptualization, Report writing. Coordination Of Care. 4 units  -86859 - 3.75 hours Psychometrist test prep, administration, and scoring under clinician's direct supervision. Clinical interpretation of individual tests administered by psychometrist .  Clinician report of individual tests administered by psychometrist.    \"Unit\" is defined by CPT/National Guidelines (31 - 60 minutes). Integral services including scoring of raw data, data interpretation, case conceptualization, report writing etcetera were initiated after the patient finished testing/raw data collected and was completed on the date the report was signed.

## 2018-06-21 ENCOUNTER — OFFICE VISIT (OUTPATIENT)
Dept: NEUROLOGY | Age: 68
End: 2018-06-21

## 2018-06-21 VITALS
HEIGHT: 72 IN | RESPIRATION RATE: 20 BRPM | SYSTOLIC BLOOD PRESSURE: 108 MMHG | HEART RATE: 62 BPM | WEIGHT: 215 LBS | OXYGEN SATURATION: 98 % | DIASTOLIC BLOOD PRESSURE: 72 MMHG | BODY MASS INDEX: 29.12 KG/M2

## 2018-06-21 DIAGNOSIS — G30.9 MIXED ALZHEIMER'S AND VASCULAR DEMENTIA (HCC): Primary | ICD-10-CM

## 2018-06-21 DIAGNOSIS — F02.80 MIXED ALZHEIMER'S AND VASCULAR DEMENTIA (HCC): Primary | ICD-10-CM

## 2018-06-21 DIAGNOSIS — F01.50 MIXED ALZHEIMER'S AND VASCULAR DEMENTIA (HCC): Primary | ICD-10-CM

## 2018-06-21 RX ORDER — METFORMIN HYDROCHLORIDE 500 MG/1
500 TABLET ORAL DAILY
COMMUNITY
Start: 2017-04-04 | End: 2020-10-13

## 2018-06-21 RX ORDER — DONEPEZIL HYDROCHLORIDE 10 MG/1
TABLET, FILM COATED ORAL
Qty: 30 TAB | Refills: 3 | Status: SHIPPED | OUTPATIENT
Start: 2018-06-21 | End: 2018-10-31 | Stop reason: ALTCHOICE

## 2018-06-21 NOTE — PATIENT INSTRUCTIONS

## 2018-06-21 NOTE — PROGRESS NOTES
Date:  18     Name:  Tate Reyes  :  1950  MRN:  [id number]     PCP:  Anisa Sandoval MD    Chief Complaint   Patient presents with    Results     HISTORY OF PRESENT ILLNESS: Follow up for memory loss following neuropsychological testing. He is accompanied by his wife. She indicates that he continues to be very repetitive. He continues to have a loss of interest in things. He indicates that he simply is getting bored. Neuropsychological evaluation does confirm the presence of a dementing process. It is felt to be a mild to moderate level severity. It is likely a combination of Alzheimer's type dementia and vascular dementia. Is felt also that his underlying anxiety and mild depression are likely exacerbating the memory disorder. While he is considered competent at this time, there is some concern regarding attention and reaction time particularly as it relates to driving safety with a recommendation for a formal evaluation for driving. He should have supervision with regard to medication management and financial dealings. Except as noted above, denies  fever, chills, cough. No CP or SOB. No dysuria, loss of bowel or bladder control. No Weight loss. Appetite good. Sleeping well. No sweats. No edema. No bruising or bleeding. No nausea or vomit. No diarrhea. No frequency, urgency, No depressive sxs. No anxiety. Denies sore throat, nasal congestion, nasal discharge, epistaxis, tinnitus, hearing loss, back pain, muscle pain, or joint pain. Current Outpatient Prescriptions   Medication Sig    metFORMIN (GLUCOPHAGE) 500 mg tablet Take 500 mg by mouth.  metoprolol tartrate (LOPRESSOR) 50 mg tablet Take  by mouth two (2) times a day.  aspirin delayed-release 81 mg tablet Take  by mouth daily.  Cetirizine (ZYRTEC) 10 mg cap Take  by mouth.  amLODIPine (NORVASC) 10 mg tablet Take 1 Tab by mouth daily.  (Patient taking differently: Take 5 mg by mouth daily.)    pravastatin (PRAVACHOL) 40 mg tablet Take 1 Tab by mouth nightly. Indications: prevention of cerebrovascular accident     No current facility-administered medications for this visit. Allergies   Allergen Reactions    Chantix [Varenicline] Rash     Other reaction(s): Rash  Very itchy rash on back  Very itchy rash on back     Past Medical History:   Diagnosis Date    Hemorrhagic stroke ([facility] [address] 3/28/2017    S/p TPA    Hypertension     Ill-defined condition     Eczema    Stroke ([facility]     Type II diabetes mellitus ([facility] 3/29/2017     Past Surgical History:   Procedure Laterality Date    HX ORTHOPAEDIC  03/2017    left wrist surgery     Social History     Social History    Marital status:      Spouse name: N/A    Number of children: N/A    Years of education: N/A     Occupational History    Not on file. Social History Main Topics    Smoking status: Former Smoker     Packs/day: 0.50     Years: 50.00     Types: Cigarettes     Quit date: 2/28/2017    Smokeless tobacco: Never Used      Comment: cold turkey 2.5 wks ago after attempt Chantix developed allergy    Alcohol use No      Comment: around holidatys    Drug use: No    Sexual activity: Not on file     Other Topics Concern    Not on file     Social History Narrative     Family History   Problem Relation Age of Onset    Diabetes Mother     Cancer Father     Headache Other        PHYSICAL EXAMINATION:    Visit Vitals    /72    Pulse 62    Resp 20    Ht 6' (1.829 m)    Wt 97.5 kg (215 lb)    SpO2 98%    BMI 29.16 kg/m2     General:  Well defined, nourished, and groomed individual in no acute distress.    Neck:             Supple, nontender, no bruits, no pain with resistance to active range of motion.    Heart:            Regular rate and rhythm, no murmurs, rub, or gallop.  Normal S1S2.   Lungs:  Clear to auscultation bilaterally with equal chest expansion, no cough, no wheeze  Musculoskeletal:  Extremities revealed no edema and had full range of motion of joints.    Psych:  Apathetic and depressed with a flat affect      NEUROLOGICAL EXAMINATION:     Mental Status:   Alert and oriented to person and place. He is repetitive. Clearly has some short term memory loss      Cranial Nerves:    II, III, IV, VI:  Visual acuity grossly intact. Visual fields are normal.    Pupils are equal, round, and reactive to light and accommodation.    Extra-ocular movements are full and fluid.  Fundoscopic exam was benign, no ptosis or nystagmus. V-XII:             Hearing is grossly intact.  Facial features are symmetric, with normal sensation and strength.  The palate rises symmetrically and the tongue protrudes midline.  Sternocleidomastoids 5/5.        Motor Examination:               Normal tone, bulk, and strength, 5/5 muscle strength throughout.         Coordination:  Finger to nose was normal.   No resting or intention tremor      Gait and Station: frooly while walking.  Normal arm swing.  No pronator drift.   No muscle wasting or fasiculations noted.        Reflexes:  DTRs 2+ throughout. ASSESSMENT AND PLAN    ICD-10-CM ICD-9-CM    1. 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 We discussed the diagnosis, prognosis, progression, and treatment. This included the principles of dual therapy. All of their questions were answered. He was started on Aricept today. Purpose potential side effects were discussed and they have verbalized understanding. I will plan to see him back in 6 weeks to assess for any potential side effects and if not then to add Namenda for dual therapy. He should continue to be active both physically and socially. In the long run, we will likely need to also address the anxiety and mild depression.   More than 50% of today's 40+ minute office visit was spent in education and counseling regarding his mixed Alzheimer's and vascular type dementia. Gayathri Cartagena

## 2018-06-21 NOTE — MR AVS SNAPSHOT
[facility] [address] [facility] [address] [place] [address] [phone] Patient: Gely Pappas MRN: [id number] QWM:8/1/[id number] Visit Information Date & Time Provider Department Dept. Phone Encounter #  
 6/21/2018  1:30 PM PALMER Zhang Neurology [facility] [phone] [id number] Follow-up Instructions Return in about 6 weeks (around 8/2/2018). Your Appointments 7/31/2018  2:20 PM  
Follow Up with Jose Amos PsyD [address] [facility] ([facility]) Appt Note: office feedback/ [facility]it [facility] [address] [facility] [address] [place] [address] [phone]  
  
   
 [facility] [address] [id number] [address] Upcoming Health Maintenance Date Due Hepatitis C Screening 1950 FOOT EXAM Q1 1/4/1959 MICROALBUMIN Q1 1/4/1959 EYE EXAM RETINAL OR DILATED Q1 1/4/1959 DTaP/Tdap/Td series (1 - Tdap) 1/4/1970 FOBT Q 1 YEAR AGE 50-75 1/4/1999 ZOSTER VACCINE AGE 60> 11/4/2008 GLAUCOMA SCREENING Q2Y 1/4/2014 Pneumococcal 65+ Low/Medium Risk (1 of 2 - PCV13) 1/4/2014 HEMOGLOBIN A1C Q6M 9/30/2017 LIPID PANEL Q1 3/28/2018 Influenza Age 5 to Adult 8/1/2018 Allergies as of 6/21/2018  Review Complete On: 6/21/2018 By: Sj Schwartz NP Severity Noted Reaction Type Reactions Chantix [Varenicline]  03/17/2017    Rash Other reaction(s): Rash Very itchy rash on back Very itchy rash on back Current Immunizations  Reviewed on 11/10/2017 No immunizations on file. Not reviewed this visit You Were Diagnosed With   
  
 Codes Comments Mixed Alzheimer's and vascular dementia    -  Primary ICD-10-CM: G30.9, F01.50, F02.80 ICD-9-CM: 331.0, 294.10, 290.40 Vitals BP Pulse Resp Height(growth percentile) Weight(growth percentile) SpO2  
 108/72 62 20 6' (1.829 m) 215 lb (97.5 kg) 98% BMI Smoking Status 29.16 kg/m2 Former Smoker Vitals History BMI and BSA Data Body Mass Index Body Surface Area  
 29.16 kg/m 2 2.23 m 2 Preferred Pharmacy Pharmacy Name Phone [facility] [address] [place] [address], [address] [phone] Your Updated Medication List  
  
   
This list is accurate as of 6/21/18  2:40 PM.  Always use your most recent med list. amLODIPine 10 mg tablet Commonly known as:  Hampton Jeni Take 1 Tab by mouth daily. aspirin delayed-release 81 mg tablet Take  by mouth daily. donepezil 10 mg tablet Commonly known as:  ARICEPT Take 0.5 tablet po daily x30 days then increase to 1 tablet po thereafter  
  
 metFORMIN 500 mg tablet Commonly known as:  GLUCOPHAGE Take 500 mg by mouth.  
  
 metoprolol tartrate 50 mg tablet Commonly known as:  LOPRESSOR Take  by mouth two (2) times a day. pravastatin 40 mg tablet Commonly known as:  PRAVACHOL Take 1 Tab by mouth nightly. Indications: prevention of cerebrovascular accident ZyrTEC 10 mg Cap Generic drug:  Cetirizine Take  by mouth. Prescriptions Sent to Pharmacy Refills  
 donepezil (ARICEPT) 10 mg tablet 3 Sig: Take 0.5 tablet po daily x30 days then increase to 1 tablet po thereafter Class: Normal  
 Pharmacy: [address], [address] [facility] #: [phone] Follow-up Instructions Return in about 6 weeks (around 8/2/2018). Patient Instructions A Healthy Lifestyle: Care Instructions Your Care Instructions A healthy lifestyle can help you feel good, stay at a healthy weight, and have plenty of energy for both work and play. A healthy lifestyle is something you can share with your whole family. A healthy lifestyle also can lower your risk for serious health problems, such as high blood pressure, heart disease, and diabetes. You can follow a few steps listed below to improve your health and the health of your family. Follow-up care is a key part of your treatment and safety. Be sure to make and go to all appointments, and call your doctor if you are having problems. It's also a good idea to know your test results and keep a list of the medicines you take. How can you care for yourself at home? · Do not eat too much sugar, fat, or fast foods. You can still have dessert and treats now and then. The goal is moderation. · Start small to improve your eating habits. Pay attention to portion sizes, drink less juice and soda pop, and eat more fruits and vegetables. ¨ Eat a healthy amount of food. A 3-ounce serving of meat, for example, is about the size of a deck of cards. Fill the rest of your plate with vegetables and whole grains. ¨ Limit the amount of soda and sports drinks you have every day. Drink more water when you are thirsty. ¨ Eat at least 5 servings of fruits and vegetables every day. It may seem like a lot, but it is not hard to reach this goal. A serving or helping is 1 piece of fruit, 1 cup of vegetables, or 2 cups of leafy, raw vegetables. Have an apple or some carrot sticks as an afternoon snack instead of a candy bar. Try to have fruits and/or vegetables at every meal. 
· Make exercise part of your daily routine. You may want to start with simple activities, such as walking, bicycling, or slow swimming. Try to be active 30 to 60 minutes every day. You do not need to do all 30 to 60 minutes all at once. For example, you can exercise 3 times a day for 10 or 20 minutes. Moderate exercise is safe for most people, but it is always a good idea to talk to your doctor before starting an exercise program. 
· Keep moving. Kary Hdz the lawn, work in the garden, or [employer]. Take the stairs instead of the elevator at work. · If you smoke, quit.  People who smoke have an increased risk for heart attack, stroke, cancer, and other lung illnesses. Quitting is hard, but there are ways to boost your chance of quitting tobacco for good. ¨ Use nicotine gum, patches, or lozenges. ¨ Ask your doctor about stop-smoking programs and medicines. ¨ Keep trying. In addition to reducing your risk of diseases in the future, you will notice some benefits soon after you stop using tobacco. If you have shortness of breath or asthma symptoms, they will likely get better within a few weeks after you quit. · Limit how much alcohol you drink. Moderate amounts of alcohol (up to 2 drinks a day for men, 1 drink a day for women) are okay. But drinking too much can lead to liver problems, high blood pressure, and other health problems. Family health If you have a family, there are many things you can do together to improve your health. · Eat meals together as a family as often as possible. · Eat healthy foods. This includes fruits, vegetables, lean meats and dairy, and whole grains. · Include your family in your fitness plan. Most people think of activities such as jogging or tennis as the way to fitness, but there are many ways you and your family can be more active. Anything that makes you breathe hard and gets your heart pumping is exercise. Here are some tips: 
¨ Walk to do errands or to take your child to school or the bus. ¨ Go for a family bike ride after dinner instead of watching TV. Where can you learn more? Go to http://veto-jose.info/. Enter K556 in the search box to learn more about \"A Healthy Lifestyle: Care Instructions. \" Current as of: May 12, 2017 Content Version: 11.4 © 9300-2679 SustainX. Care instructions adapted under license by Sparkcloud (which disclaims liability or warranty for this information).  If you have questions about a medical condition or this instruction, always ask your healthcare professional. Ruth Casas[employer] Incorporated disclaims any warranty or liability for your use of this information. Introducing \[facility] [place] [facility]\"" & HEALTH SERVICES! [employer] introduces [employer] patient portal. Now you can access parts of your medical record, email your doctor's office, and request medication refills online. 1. In your internet browser, go to [email or web address] 2. Click on the First Time User? Click Here link in the Sign In box. You will see the New Member Sign Up page. 3. Enter your [employer] Access Code exactly as it appears below. You will not need to use this code after youve completed the sign-up process. If you do not sign up before the expiration date, you must request a new code. · [employer] Access Code: [email or web address] Expires: 8/7/2018  9:33 AM 
 
4. Enter the last four digits of your Social Security Number (xxxx) and Date of Birth (mm/dd/yyyy) as indicated and click Submit. You will be taken to the next sign-up page. 5. Create a [employer] ID. This will be your [employer] login ID and cannot be changed, so think of one that is secure and easy to remember. 6. Create a [employer] password. You can change your password at any time. 7. Enter your Password Reset Question and Answer. This can be used at a later time if you forget your password. 8. Enter your e-mail address. You will receive e-mail notification when new information is available in [address] 9. Click Sign Up. You can now view and download portions of your medical record. 10. Click the Download Summary menu link to download a portable copy of your medical information. If you have questions, please visit the Frequently Asked Questions section of the [employer] website. Remember, [employer] is NOT to be used for urgent needs. For medical emergencies, dial 911. Now available from your iPhone and Android! Please provide this summary of care documentation to your next provider. Your primary care clinician is listed as Bryon Daly. If you have any questions after today's visit, please call [phone].

## 2018-07-31 ENCOUNTER — OFFICE VISIT (OUTPATIENT)
Dept: NEUROLOGY | Age: 68
End: 2018-07-31

## 2018-07-31 DIAGNOSIS — F41.9 MODERATE ANXIETY: ICD-10-CM

## 2018-07-31 DIAGNOSIS — I69.398 DEPRESSION AS LATE EFFECT OF CEREBROVASCULAR ACCIDENT (CVA): ICD-10-CM

## 2018-07-31 DIAGNOSIS — F06.31 DEPRESSION AS LATE EFFECT OF CEREBROVASCULAR ACCIDENT (CVA): ICD-10-CM

## 2018-07-31 DIAGNOSIS — F01.50 MIXED ALZHEIMER'S AND VASCULAR DEMENTIA (HCC): Primary | ICD-10-CM

## 2018-07-31 DIAGNOSIS — G30.9 MIXED ALZHEIMER'S AND VASCULAR DEMENTIA (HCC): Primary | ICD-10-CM

## 2018-07-31 DIAGNOSIS — F02.80 MIXED ALZHEIMER'S AND VASCULAR DEMENTIA (HCC): Primary | ICD-10-CM

## 2018-07-31 NOTE — PROGRESS NOTES
Office feedback session with the patient and spouse today. They were late to this appointment but I was able to see them today. I reviewed the results of the recent Neuropsychological Evaluation, including discussing individual tests as well as patient's areas of neurocognitive strength versus weakness. Education was provided regarding my diagnostic impressions, and we discussed treatment plan/options. I also answered numerous questions related to the clinical findings, including discussing various methods to improve cognition and mood. Counseling provided regarding mood and cognition. We discussed dementia with anxiety and depression. He has seen Ms. Rafaela Wilkins, and we talked about treatment and follow up PRN. HE has been doing fairly well. He has had to step up to the plate because spouse had surgey recently. CBT and supportive psychotherapy techniques were utilized. The patient will follow up with the referring provider, and reported being very pleased with the services provided. Follow up with [facility] prn. 20 minutes with patient and spouse, record review, coordination of care. Records provided. We discussed: This patient generated an abnormal range Neuropsychological Evaluation with respect to neurocognitive functioning. In this regard, he is showing problems with  verbal fluency, sustained attention, working memory, auditory learning, auditory memory, and bilateral fine motor dexterity. Confrontation naming, processing speed, perceptual reasoning, verbal comprehension, and executive functioning abilities remain normal.  These are important strengths which will serve to mask underlying cognitive deficits at times. Emotionally, the patient reported moderate anxiety and mild depression.                             In my opinion, this profile is consistent with an evolving organic process that is currently at a mild to moderate level of severity.  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 Mood issues are exacerbating factors and this does not appear to be a pseudodementia. PBA unlikely. In addition to continued medical care, my recommendations include consideration for memory management medication, treatment for attention issues, and psychiatric medication management and counseling for anxiety and depression. The patient should be encouraged to remain as mentally, socially, and physically active as possible.                             The patient's generated cognitive difficulties are significant to the degree whereby it is my opinion that he should not live independently without appropriate supervision for those domains with a heavy memory emphasis. This includes medication management supervision and supervision of financial dealings. Marked problems with attention and reaction time raise concern regarding driving safety, and I suggest consideration for a formal evaluation of same. I find him competent at this time. Baseline now established. Follow up prn. Clinical correlation is, of course, indicated.                           I will discuss these findings with the patient and family when they follow up with me in the near future.   A follow up Neuropsychological Evaluation is indicated on a prn basis.       DIAGNOSES:           Dementia -                                                                                    Depression -

## 2018-08-02 ENCOUNTER — OFFICE VISIT (OUTPATIENT)
Dept: NEUROLOGY | Age: 68
End: 2018-08-02

## 2018-08-02 VITALS
BODY MASS INDEX: 29.12 KG/M2 | HEART RATE: 63 BPM | SYSTOLIC BLOOD PRESSURE: 106 MMHG | WEIGHT: 215 LBS | HEIGHT: 72 IN | RESPIRATION RATE: 20 BRPM | OXYGEN SATURATION: 96 % | DIASTOLIC BLOOD PRESSURE: 66 MMHG

## 2018-08-02 DIAGNOSIS — G30.9 MIXED ALZHEIMER'S AND VASCULAR DEMENTIA (HCC): ICD-10-CM

## 2018-08-02 DIAGNOSIS — F02.80 MIXED ALZHEIMER'S AND VASCULAR DEMENTIA (HCC): ICD-10-CM

## 2018-08-02 DIAGNOSIS — F01.50 MIXED ALZHEIMER'S AND VASCULAR DEMENTIA (HCC): ICD-10-CM

## 2018-08-02 NOTE — PATIENT INSTRUCTIONS

## 2018-08-02 NOTE — MR AVS SNAPSHOT
[address] [place] [address] [address] [facility] [address] [facility] [address] [phone] Patient: Narayan Wilson MRN: [id number] DUS:1[birth year] Visit Information Date & Time Provider Department Dept. Phone Encounter #  
 8/2/2018  1:30 PM Keli Lugo NP [facility] Neurology [facility] [phone] [id number] Follow-up Instructions Return in about 8 weeks (around 9/27/2018). Upcoming Health Maintenance Date Due Hepatitis C Screening 1950 FOOT EXAM Q1 1/4/1959 MICROALBUMIN Q1 1/4/1959 EYE EXAM RETINAL OR DILATED Q1 1/4/1959 DTaP/Tdap/Td series (1 - Tdap) 1/4/1970 FOBT Q 1 YEAR AGE 50-75 1/4/1999 ZOSTER VACCINE AGE 60> 11/4/2008 GLAUCOMA SCREENING Q2Y 1/4/2014 Pneumococcal 65+ Low/Medium Risk (1 of 2 - PCV13) 1/4/2014 HEMOGLOBIN A1C Q6M 9/30/2017 LIPID PANEL Q1 3/28/2018 Influenza Age 5 to Adult 8/1/2018 MEDICARE YEARLY EXAM 8/2/2018 Allergies as of 8/2/2018  Review Complete On: 8/2/2018 By: Keli Lugo NP Severity Noted Reaction Type Reactions Chantix [Varenicline]  03/17/2017    Rash Other reaction(s): Rash Very itchy rash on back Very itchy rash on back Current Immunizations  Reviewed on 11/10/2017 No immunizations on file. Not reviewed this visit You Were Diagnosed With   
  
 Codes Comments Mixed Alzheimer's and vascular dementia     ICD-10-CM: G30.9, F01.50, F02.80 ICD-9-CM: 331.0, 294.10, 290.40 Vitals BP Pulse Resp Height(growth percentile) Weight(growth percentile) SpO2  
 106/66 63 20 6' (1.829 m) 215 lb (97.5 kg) 96% BMI Smoking Status 29.16 kg/m2 Former Smoker Vitals History BMI and BSA Data Body Mass Index Body Surface Area  
 29.16 kg/m 2 2.23 m 2 Preferred Pharmacy Pharmacy Name Phone [address], [address] [place] [phone] Your Updated Medication List  
  
   
This list is accurate as of 8/2/18  2:10 PM.  Always use your most recent med list. amLODIPine 10 mg tablet Commonly known as:  Gaurav Sheldon Take 1 Tab by mouth daily. aspirin delayed-release 81 mg tablet Take  by mouth daily. donepezil 10 mg tablet Commonly known as:  ARICEPT Take 0.5 tablet po daily x30 days then increase to 1 tablet po thereafter  
  
 memantine-donepezil 28-10 mg Cspx Commonly known as:  [facility] Take 1 Cap by mouth daily. metFORMIN 500 mg tablet Commonly known as:  GLUCOPHAGE Take 500 mg by mouth.  
  
 metoprolol tartrate 50 mg tablet Commonly known as:  LOPRESSOR Take  by mouth two (2) times a day. pravastatin 40 mg tablet Commonly known as:  PRAVACHOL Take 1 Tab by mouth nightly. Indications: prevention of cerebrovascular accident ZyrTEC 10 mg Cap Generic drug:  Cetirizine Take  by mouth. Prescriptions Sent to Pharmacy Refills  
 memantine-donepezil (NAMZARIC) 28-10 mg CSpX 3 Sig: Take 1 Cap by mouth daily. Class: Normal  
 Pharmacy: Germaine Norton [address] [place] [address] #: [phone] Route: Oral  
  
Follow-up Instructions Return in about 8 weeks (around 9/27/2018). Patient Instructions A Healthy Lifestyle: Care Instructions Your Care Instructions A healthy lifestyle can help you feel good, stay at a healthy weight, and have plenty of energy for both work and play. A healthy lifestyle is something you can share with your whole family. A healthy lifestyle also can lower your risk for serious health problems, such as high blood pressure, heart disease, and diabetes. You can follow a few steps listed below to improve your health and the health of your family. Follow-up care is a key part of your treatment and safety.  Be sure to make and go to all appointments, and call your doctor if you are having problems. It's also a good idea to know your test results and keep a list of the medicines you take. How can you care for yourself at home? · Do not eat too much sugar, fat, or fast foods. You can still have dessert and treats now and then. The goal is moderation. · Start small to improve your eating habits. Pay attention to portion sizes, drink less juice and soda pop, and eat more fruits and vegetables. ¨ Eat a healthy amount of food. A 3-ounce serving of meat, for example, is about the size of a deck of cards. Fill the rest of your plate with vegetables and whole grains. ¨ Limit the amount of soda and sports drinks you have every day. Drink more water when you are thirsty. ¨ Eat at least 5 servings of fruits and vegetables every day. It may seem like a lot, but it is not hard to reach this goal. A serving or helping is 1 piece of fruit, 1 cup of vegetables, or 2 cups of leafy, raw vegetables. Have an apple or some carrot sticks as an afternoon snack instead of a candy bar. Try to have fruits and/or vegetables at every meal. 
· Make exercise part of your daily routine. You may want to start with simple activities, such as walking, bicycling, or slow swimming. Try to be active 30 to 60 minutes every day. You do not need to do all 30 to 60 minutes all at once. For example, you can exercise 3 times a day for 10 or 20 minutes. Moderate exercise is safe for most people, but it is always a good idea to talk to your doctor before starting an exercise program. 
· Keep moving. Rhys Northern the lawn, work in the garden, or [employer] Take the stairs instead of the elevator at work. · If you smoke, quit. People who smoke have an increased risk for heart attack, stroke, cancer, and other lung illnesses. Quitting is hard, but there are ways to boost your chance of quitting tobacco for good. ¨ Use nicotine gum, patches, or lozenges. ¨ Ask your doctor about stop-smoking programs and medicines. ¨ Keep trying. In addition to reducing your risk of diseases in the future, you will notice some benefits soon after you stop using tobacco. If you have shortness of breath or asthma symptoms, they will likely get better within a few weeks after you quit. · Limit how much alcohol you drink. Moderate amounts of alcohol (up to 2 drinks a day for men, 1 drink a day for women) are okay. But drinking too much can lead to liver problems, high blood pressure, and other health problems. Family health If you have a family, there are many things you can do together to improve your health. · Eat meals together as a family as often as possible. · Eat healthy foods. This includes fruits, vegetables, lean meats and dairy, and whole grains. · Include your family in your fitness plan. Most people think of activities such as jogging or tennis as the way to fitness, but there are many ways you and your family can be more active. Anything that makes you breathe hard and gets your heart pumping is exercise. Here are some tips: 
¨ Walk to do errands or to take your child to school or the bus. ¨ Go for a family bike ride after dinner instead of watching TV. Where can you learn more? Go to http://vetoPicovicojose.info/. Enter E759 in the search box to learn more about \"A Healthy Lifestyle: Care Instructions. \" Current as of: December 7, 2017 Content Version: 11.7 © 3028-3499 Digitel, Incorporated. Care instructions adapted under license by [employer]ise (which disclaims liability or warranty for this information). If you have questions about a medical condition or this instruction, always ask your healthcare professional. [place] [address] any warranty or liability for your use of this information. Introducing hospitals & HEALTH SERVICES! Ovidio Mosquera introduces [employer] patient portal. Now you can access parts of your medical record, email your doctor's office, and request medication refills online. 1. In your internet browser, go to [email or web address] 2. Click on the First Time User? Click Here link in the Sign In box. You will see the New Member Sign Up page. 3. Enter your [employer] Access Code exactly as it appears below. You will not need to use this code after youve completed the sign-up process. If you do not sign up before the expiration date, you must request a new code. · [employer] Access Code: [email or web address] Expires: 8/7/2018  9:33 AM 
 
4. Enter the last four digits of your Social Security Number (xxxx) and Date of Birth (mm/dd/yyyy) as indicated and click Submit. You will be taken to the next sign-up page. 5. Create a Cardeeot ID. This will be your [employer]Forward login ID and cannot be changed, so think of one that is secure and easy to remember. 6. Create a [employer]Forward password. You can change your password at any time. 7. Enter your Password Reset Question and Answer. This can be used at a later time if you forget your password. 8. Enter your e-mail address. You will receive e-mail notification when new information is available in [address] 9. Click Sign Up. You can now view and download portions of your medical record. 10. Click the Download Summary menu link to download a portable copy of your medical information. If you have questions, please visit the Frequently Asked Questions section of the [employer] website. Remember, [employer] is NOT to be used for urgent needs. For medical emergencies, dial 911. Now available from your iPhone and Android! Please provide this summary of care documentation to your next provider. Your primary care clinician is listed as Sade Marcos. If you have any questions after today's visit, please call [phone].

## 2018-08-02 NOTE — PROGRESS NOTES
Memory- some good days and some bad days   Still only using half the aricept and will go to the full dose tonight, tolerated it well no side effects that we know of

## 2018-08-02 NOTE — PROGRESS NOTES
Date:  18     Name:  Clary Aponte  :  1950  MRN:  [id number]     PCP:  Nico Shin MD    Chief Complaint   Patient presents with    Follow-up     dementia     HISTORY OF PRESENT ILLNESS: Follow up for dementia. At his last office visit, we discussed his neuropsychological testing and he was started on memory management therapy of Aricept. Currently, he is still taking 5mg daily and is due to increase this to 10mg daily today. So far, he has not had any issues with side effects. They have noticed some improvement. About a month ago, he took his electric razor apart to fix it and could not figure out how to put it back together so it just sat for the last month apart. Recently, he was able to put it back together without any difficulty. He indicates that it was like when he looked at it how it went together just clicked. Except as noted above, denies  fever, chills, cough. No CP or SOB. No dysuria, loss of bowel or bladder control. No Weight loss. Appetite good. Sleeping well. No sweats. No edema. No bruising or bleeding. No nausea or vomit. No diarrhea. No frequency, urgency, No depressive sxs. No anxiety. Denies sore throat, nasal congestion, nasal discharge, epistaxis, tinnitus, hearing loss, back pain, muscle pain, or joint pain. Current Outpatient Prescriptions   Medication Sig    metFORMIN (GLUCOPHAGE) 500 mg tablet Take 500 mg by mouth.  donepezil (ARICEPT) 10 mg tablet Take 0.5 tablet po daily x30 days then increase to 1 tablet po thereafter    metoprolol tartrate (LOPRESSOR) 50 mg tablet Take  by mouth two (2) times a day.  aspirin delayed-release 81 mg tablet Take  by mouth daily.  Cetirizine (ZYRTEC) 10 mg cap Take  by mouth.  amLODIPine (NORVASC) 10 mg tablet Take 1 Tab by mouth daily. (Patient taking differently: Take 5 mg by mouth daily.)    pravastatin (PRAVACHOL) 40 mg tablet Take 1 Tab by mouth nightly.  Indications: prevention of cerebrovascular accident     No current facility-administered medications for this visit. Allergies   Allergen Reactions    Chantix [Varenicline] Rash     Other reaction(s): Rash  Very itchy rash on back  Very itchy rash on back     Past Medical History:   Diagnosis Date    Hemorrhagic stroke ([facility] [address] 3/28/2017    S/p TPA    Hypertension     Ill-defined condition     Eczema    Stroke ([facility] [address]     Type II diabetes mellitus ([facility] 3/29/2017     Past Surgical History:   Procedure Laterality Date    HX ORTHOPAEDIC  03/2017    left wrist surgery     Social History     Social History    Marital status:      Spouse name: N/A    Number of children: N/A    Years of education: N/A     Occupational History    Not on file. Social History Main Topics    Smoking status: Former Smoker     Packs/day: 0.50     Years: 50.00     Types: Cigarettes     Quit date: 2/28/2017    Smokeless tobacco: Never Used      Comment: cold turkey 2.5 wks ago after attempt Chantix developed allergy    Alcohol use No      Comment: around holidatys    Drug use: No    Sexual activity: Not on file     Other Topics Concern    Not on file     Social History Narrative     Family History   Problem Relation Age of Onset    Diabetes Mother     Cancer Father     Headache Other        PHYSICAL EXAMINATION:    Visit Vitals    /66    Pulse 63    Resp 20    Ht 6' (1.829 m)    Wt 97.5 kg (215 lb)    SpO2 96%    BMI 29.16 kg/m2     General:  Well defined, nourished, and groomed individual in no acute distress.    Neck:             Supple, nontender, no bruits, no pain with resistance to active range of motion.    Heart:            Regular rate and rhythm, no murmurs, rub, or gallop.  Normal S1S2.   Lungs:  Clear to auscultation bilaterally with equal chest expansion, no cough, no wheeze  Musculoskeletal:  Extremities revealed no edema and had full range of motion of joints.    Psych:  Apathetic and depressed with a flat affect      NEUROLOGICAL EXAMINATION:     Mental Status:   Alert and oriented to person and place. He is repetitive. Clearly has some short term memory loss      Cranial Nerves:    II, III, IV, VI:  Visual acuity grossly intact. Visual fields are normal.    Pupils are equal, round, and reactive to light and accommodation.    Extra-ocular movements are full and fluid.  Fundoscopic exam was benign, no ptosis or nystagmus. V-XII:             Hearing is grossly intact.  Facial features are symmetric, with normal sensation and strength.  The palate rises symmetrically and the tongue protrudes midline.  Sternocleidomastoids 5/5.        Motor Examination:               Normal tone, bulk, and strength, 5/5 muscle strength throughout.         Coordination:  Finger to nose was normal.   No resting or intention tremor      Gait and Station: Upmann's while walking.  Normal arm swing.  No pronator drift.   No muscle wasting or fasiculations noted.        Reflexes:  DTRs 2+ throughout. ASSESSMENT AND PLAN    ICD-10-CM ICD-9-CM    1. 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 Purpose and potential side effects were discussed and he and his wife have verbalized understanding. Follow up in about 8 weeks. Gayathri Castelan

## 2018-10-03 ENCOUNTER — TELEPHONE (OUTPATIENT)
Dept: NEUROLOGY | Age: 68
End: 2018-10-03

## 2018-10-03 NOTE — TELEPHONE ENCOUNTER
----- Message from [facility] sent at 10/3/2018  9:04 AM EDT -----  Regarding: PALMER Monge/Telephone  Contact: [phone]  Pt is requesting a call back because he has had hiccups for the 10 days and he thinks it might be the step up medication the NP has him on?

## 2018-10-03 NOTE — TELEPHONE ENCOUNTER
Pt called and stated on the last week of his new medication taking for dementia he started with the hiccups and was wondering if it was from the medication? Please advise     I don't think so per NP. Left message on home number provided letting them know I was returning their call and was asking for a return call.

## 2018-10-05 NOTE — TELEPHONE ENCOUNTER
----- Message from Massiel Jang sent at 10/5/2018  2:04 PM EDT -----  Regarding: PALMER Monge/Telephone  Pt's wife returned nurse call and requested a call back today. Best contact number [phone].

## 2018-10-11 PROBLEM — G30.1 ALZHEIMER'S TYPE DEMENTIA WITH LATE ONSET WITHOUT BEHAVIORAL DISTURBANCE (HCC): Status: ACTIVE | Noted: 2018-10-11

## 2018-10-11 PROBLEM — F02.80 ALZHEIMER'S TYPE DEMENTIA WITH LATE ONSET WITHOUT BEHAVIORAL DISTURBANCE (HCC): Status: ACTIVE | Noted: 2018-10-11

## 2018-10-31 ENCOUNTER — OFFICE VISIT (OUTPATIENT)
Dept: NEUROLOGY | Age: 68
End: 2018-10-31

## 2018-10-31 VITALS
OXYGEN SATURATION: 98 % | WEIGHT: 215 LBS | BODY MASS INDEX: 29.12 KG/M2 | DIASTOLIC BLOOD PRESSURE: 70 MMHG | HEIGHT: 72 IN | HEART RATE: 62 BPM | SYSTOLIC BLOOD PRESSURE: 124 MMHG | RESPIRATION RATE: 20 BRPM

## 2018-10-31 DIAGNOSIS — F02.80 MIXED ALZHEIMER'S AND VASCULAR DEMENTIA (HCC): Primary | ICD-10-CM

## 2018-10-31 DIAGNOSIS — G30.9 MIXED ALZHEIMER'S AND VASCULAR DEMENTIA (HCC): Primary | ICD-10-CM

## 2018-10-31 DIAGNOSIS — F01.50 MIXED ALZHEIMER'S AND VASCULAR DEMENTIA (HCC): Primary | ICD-10-CM

## 2018-10-31 NOTE — PROGRESS NOTES
Date:  
 
Name:  Thu Cho 
:  1950 MRN:  [id number] REQUESTING PHYSICIAN:  Tiki Alcazar MD 
 
Chief Complaint Patient presents with  Follow-up  
  mixed alzheimers and vascular dementia CC- The patient presents for follow up of dementia. HISTORY OF PRESENT ILLNESS:  
 
The patient's last visit was on 18. He did not tolerate Namzaric at the 28 mg/10 mg dose- it made him sleepy and he did not want to talk (he also had hiccups). He is currently taking Namzaric at the 21mg/10 mg dose and he is tolerating this well and denies any side effects. The patient and his wife state his memory  has been stable since his last visit. The patient has a good appetite and he denies any nausea or vomiting. He denies any dizziness. He walks \"slowly all the time\" since his stroke in 2017. He denies any falls since his last visit. He states he has always been a \"night person\" and has trouble falling asleep- he falls asleep about 5 am and is awake by 7:30 am and gets up at 10 am. This sleep pattern is normal for him. He also takes some naps. For activity he eats meals and watches TV on and off all day. He used to read, but since his stoke he has a decreased attention span and so he does not read anymore. He is bored most of the time. He is not motivated to do physical activity, but he does help in the yard. His wife is going to start taking patient to a fitness class for seniors. He denies any other changes in his health since his last visit. Repeating or asking the same thing over and over? Sometimes Needs help managing finances? His wife does this. Needs help shopping? His wife does this. Needs help taking medications correctly? His wife helps him. Getting lost in familiar places? No  
Need help with ADLs? No  
 
Unsafe behaviors:  
Aggression- no Wandering- no  
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 Answers provided by: Patient and his wife. ROS- Complete review of systems negative except as noted in HPI above. Patient denies any current acute illness or infection. Current Outpatient Medications Medication Sig  
 memantine-donepezil (NAMZARIC) 21-10 mg CSpX Take 1 Cap by mouth daily.  metFORMIN (GLUCOPHAGE) 500 mg tablet Take 500 mg by mouth two (2) times daily (with meals).  metoprolol tartrate (LOPRESSOR) 50 mg tablet Take 50 mg by mouth two (2) times a day.  aspirin delayed-release 81 mg tablet Take  by mouth daily.  Cetirizine (ZYRTEC) 10 mg cap Take  by mouth.  amLODIPine (NORVASC) 10 mg tablet Take 1 Tab by mouth daily. (Patient taking differently: Take 5 mg by mouth daily.)  pravastatin (PRAVACHOL) 40 mg tablet Take 1 Tab by mouth nightly. Indications: prevention of cerebrovascular accident No current facility-administered medications for this visit. Allergies Allergen Reactions  Chantix [Varenicline] Rash Other reaction(s): Rash Very itchy rash on back Very itchy rash on back Past Medical History:  
Diagnosis Date  Hemorrhagic stroke ([facility] [address] 3/28/2017 S/p TPA  Hypertension  Ill-defined condition Eczema  Stroke ([facility] [address]  Type II diabetes mellitus ([facility] [address] 3/29/2017 Past Surgical History:  
Procedure Laterality Date  HX ORTHOPAEDIC  03/2017  
 left wrist surgery Social History Socioeconomic History  Marital status:  Spouse name: Not on file  Number of children: Not on file  Years of education: Not on file  Highest education level: Not on file Social Needs  Financial resource strain: Not on file  Food insecurity - worry: Not on file  Food insecurity - inability: Not on file  Transportation needs - medical: Not on file  Transportation needs - non-medical: Not on file Occupational History  Not on file Tobacco Use  Smoking status: Former Smoker Packs/day: 0.50 Years: 50.00 Pack years: 25.00 Types: Cigarettes Last attempt to quit: 2017 Years since quittin.6  Smokeless tobacco: Never Used  Tobacco comment: cold turkey 2.5 wks ago after attempt Chantix developed allergy Substance and Sexual Activity  Alcohol use: No  
  Comment: around holidatys  Drug use: No  
 Sexual activity: Not on file Other Topics Concern  Not on file Social History Narrative  Not on file Family History Problem Relation Age of Onset  Diabetes Mother  Cancer Father  Headache Other PHYSICAL EXAMINATION:   
Visit Vitals /70 Pulse 62 Resp 20 Ht 6' (1.829 m) Wt 97.5 kg (215 lb) SpO2 98% BMI 29.16 kg/m² Physical Exam -  
  
General - Patient is alert in NAD, well nourished and well groomed and color normal.  
HEENT- head is normocephalic, sclera clear,  nose and throat are clear Neck- supple, no carotid bruit Chest - CTA A/P/L, full breath sounds bilaterally Heart - RRR, no murmur Abdomen- not distended Musculoskeletal- normal posture, FROM of joints Extremities - warm and no edema Skin- no rashes or lesions Psychiatric- flat affect, but talkative and brighter affect when speaking about his former career and college experiences  
  
Neurological- Alert and oriented to person, place and time (patient is able to give accurate date today). Speech is somewhat slow but clear.   
 
Cranial nerves II-XII are intact- visual acuity is grossly intact,  Fundoscopic- no papilledema, PERRLA, EOMS intact, no nystagmus or ptosis, facial sensation normal and masseter strength intact, no facial asymmetry, facial movements are symmetrical and normal strength, hearing intact, palate rises symmetrically, sternocleidomastoid and trapezius strength 5/5 bilaterally, tongue midline. Motor System- strength 5/5 to upper and lower extremities bilaterally, normal muscle bulk and tone, no tremor. Sensation- intact to light touch and temperature throughout. Gait- normal and steady Coordination - finger to nose accurate, MARINA intact, Romberg negative, no pronator drift. Reflexes- 2+ to upper and lower extremities bilaterally.  
  
Above history and exam findings reviewed with Jenny Llanes NP who also spoke with patient and confirmed exam findings and approved plan below. ASSESSMENT AND PLAN 
  ICD-10-CM ICD-9-CM 1. Mixed Alzheimer's and vascular dementia G30.9 331.0 F01.50 294.10 F02.80 290.40 1. Continue Namzaric 21mg/10mg daily. 2. Follow up in 6 months. Jose Juan Rodriguez, [facility] This note will not be viewable in [address]

## 2018-10-31 NOTE — PATIENT INSTRUCTIONS
A Healthy Lifestyle: Care Instructions Your Care Instructions A healthy lifestyle can help you feel good, stay at a healthy weight, and have plenty of energy for both work and play. A healthy lifestyle is something you can share with your whole family. A healthy lifestyle also can lower your risk for serious health problems, such as high blood pressure, heart disease, and diabetes. You can follow a few steps listed below to improve your health and the health of your family. Follow-up care is a key part of your treatment and safety. Be sure to make and go to all appointments, and call your doctor if you are having problems. It's also a good idea to know your test results and keep a list of the medicines you take. How can you care for yourself at home? · Do not eat too much sugar, fat, or fast foods. You can still have dessert and treats now and then. The goal is moderation. · Start small to improve your eating habits. Pay attention to portion sizes, drink less juice and soda pop, and eat more fruits and vegetables. ? Eat a healthy amount of food. A 3-ounce serving of meat, for example, is about the size of a deck of cards. Fill the rest of your plate with vegetables and whole grains. ? Limit the amount of soda and sports drinks you have every day. Drink more water when you are thirsty. ? Eat at least 5 servings of fruits and vegetables every day. It may seem like a lot, but it is not hard to reach this goal. A serving or helping is 1 piece of fruit, 1 cup of vegetables, or 2 cups of leafy, raw vegetables. Have an apple or some carrot sticks as an afternoon snack instead of a candy bar. Try to have fruits and/or vegetables at every meal. 
· Make exercise part of your daily routine. You may want to start with simple activities, such as walking, bicycling, or slow swimming. Try to be active 30 to 60 minutes every day.  You do not need to do all 30 to 60 minutes all at once. For example, you can exercise 3 times a day for 10 or 20 minutes. Moderate exercise is safe for most people, but it is always a good idea to talk to your doctor before starting an exercise program. 
· Keep moving. Chelsey Jose the lawn, work in the garden, or [employer]. Take the stairs instead of the elevator at work. · If you smoke, quit. People who smoke have an increased risk for heart attack, stroke, cancer, and other lung illnesses. Quitting is hard, but there are ways to boost your chance of quitting tobacco for good. ? Use nicotine gum, patches, or lozenges. ? Ask your doctor about stop-smoking programs and medicines. ? Keep trying. In addition to reducing your risk of diseases in the future, you will notice some benefits soon after you stop using tobacco. If you have shortness of breath or asthma symptoms, they will likely get better within a few weeks after you quit. · Limit how much alcohol you drink. Moderate amounts of alcohol (up to 2 drinks a day for men, 1 drink a day for women) are okay. But drinking too much can lead to liver problems, high blood pressure, and other health problems. Family health If you have a family, there are many things you can do together to improve your health. · Eat meals together as a family as often as possible. · Eat healthy foods. This includes fruits, vegetables, lean meats and dairy, and whole grains. · Include your family in your fitness plan. Most people think of activities such as jogging or tennis as the way to fitness, but there are many ways you and your family can be more active. Anything that makes you breathe hard and gets your heart pumping is exercise. Here are some tips: 
? Walk to do errands or to take your child to school or the bus. 
? Go for a family bike ride after dinner instead of watching TV. Where can you learn more? Go to http://veto-jose.info/. Enter Z257 in the search box to learn more about \"A Healthy Lifestyle: Care Instructions. \" Current as of: December 7, 2017 Content Version: 11.8 © 6585-2866 Healthwise, [employer]. Care instructions adapted under license by [employer] (which disclaims liability or warranty for this information). If you have questions about a medical condition or this instruction, always ask your healthcare professional. [place] [address] any warranty or liability for your use of this information.

## 2018-10-31 NOTE — PROGRESS NOTES
Since his last visit he has been doing pretty good, tolerating the new medication pretty well , no side effects or any issues that they know of

## 2019-02-11 DIAGNOSIS — G30.1 ALZHEIMER'S TYPE DEMENTIA WITH LATE ONSET WITHOUT BEHAVIORAL DISTURBANCE (HCC): ICD-10-CM

## 2019-02-11 DIAGNOSIS — F02.80 ALZHEIMER'S TYPE DEMENTIA WITH LATE ONSET WITHOUT BEHAVIORAL DISTURBANCE (HCC): ICD-10-CM

## 2019-02-11 RX ORDER — MEMANTINE HYDROCHLORIDE AND DONEPEZIL HYDROCHLORIDE 21; 10 MG/1; MG/1
CAPSULE ORAL
Qty: 30 EACH | Refills: 2 | Status: SHIPPED | OUTPATIENT
Start: 2019-02-11 | End: 2019-05-01 | Stop reason: SDUPTHER

## 2019-05-01 ENCOUNTER — OFFICE VISIT (OUTPATIENT)
Dept: NEUROLOGY | Age: 69
End: 2019-05-01

## 2019-05-01 VITALS
HEART RATE: 58 BPM | HEIGHT: 72 IN | SYSTOLIC BLOOD PRESSURE: 114 MMHG | DIASTOLIC BLOOD PRESSURE: 68 MMHG | OXYGEN SATURATION: 98 % | BODY MASS INDEX: 29.16 KG/M2 | RESPIRATION RATE: 20 BRPM

## 2019-05-01 DIAGNOSIS — I69.398 DEPRESSION AS LATE EFFECT OF CEREBROVASCULAR ACCIDENT (CVA): ICD-10-CM

## 2019-05-01 DIAGNOSIS — F06.31 DEPRESSION AS LATE EFFECT OF CEREBROVASCULAR ACCIDENT (CVA): ICD-10-CM

## 2019-05-01 DIAGNOSIS — I69.319 CVA, OLD, COGNITIVE DEFICITS: ICD-10-CM

## 2019-05-01 DIAGNOSIS — F02.80 ALZHEIMER'S TYPE DEMENTIA WITH LATE ONSET WITHOUT BEHAVIORAL DISTURBANCE (HCC): Primary | ICD-10-CM

## 2019-05-01 DIAGNOSIS — G30.1 ALZHEIMER'S TYPE DEMENTIA WITH LATE ONSET WITHOUT BEHAVIORAL DISTURBANCE (HCC): Primary | ICD-10-CM

## 2019-05-01 RX ORDER — TAMSULOSIN HYDROCHLORIDE 0.4 MG/1
0.4 CAPSULE ORAL EVERY EVENING
COMMUNITY

## 2019-05-01 NOTE — PROGRESS NOTES
Date:  19     Name:  Clary Aponte  :  1950  MRN:  [id number]     PCP:  Delbert Wyatt MD    Chief Complaint   Patient presents with    Dementia     HISTORY OF PRESENT ILLNESS: Follow up for dementia. At his last office visit, he had some issues with the Namzaric at the 10/28mg dose, he did not have any issues with the slightly lower dose of 10/21mg and was to continue with this. He continues to be apathetic. He is helping in the yard and garden some now and is reading some again. He still does the [employer] breakfast once a month. 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 He has also gotten out with a school group. He is finding that activities that were pretty easy in the past are more difficult now and this can be bothersome. He is using a cane for stability since his balance is a little off. If he drives at all, his wife is always with him. He will get a little turned around on a route that is not familiar. Otherwise, his wife indicates that he seems to be doing fine with this. Independent with ADLs. No incontinence. His last MMSE was in May 2018. Score of 27/30 correct was normal.  In this regard, recall for three words after a brief delay was 0/3 correct. Clock drawing was normal.      Except as noted above, denies  fever, chills, cough. No CP or SOB. No dysuria, loss of bowel or bladder control. No Weight loss. Appetite good. Sleeping well. No sweats. No edema. No bruising or bleeding. No nausea or vomit. No diarrhea. No frequency, urgency, No depressive sxs. No anxiety. Denies sore throat, nasal congestion, nasal discharge, epistaxis, tinnitus, hearing loss, back pain, muscle pain, or joint pain. Current Outpatient Medications   Medication Sig    tamsulosin (FLOMAX) 0.4 mg capsule Take 0.4 mg by mouth daily.  NAMZARIC 21-10 mg CSpX TAKE ONE CAPSULE BY MOUTH DAILY    metFORMIN (GLUCOPHAGE) 500 mg tablet Take 500 mg by mouth daily.     metoprolol tartrate (LOPRESSOR) 50 mg tablet Take 50 mg by mouth two (2) times a day.  aspirin delayed-release 81 mg tablet Take  by mouth daily.  Cetirizine (ZYRTEC) 10 mg cap Take  by mouth.  amLODIPine (NORVASC) 10 mg tablet Take 1 Tab by mouth daily. (Patient taking differently: Take 2.5 mg by mouth daily.)    pravastatin (PRAVACHOL) 40 mg tablet Take 1 Tab by mouth nightly. Indications: prevention of cerebrovascular accident     No current facility-administered medications for this visit.       Allergies   Allergen Reactions    Chantix [Varenicline] Rash     Other reaction(s): Rash  Very itchy rash on back  Very itchy rash on back     Past Medical History:   Diagnosis Date    Hemorrhagic stroke [facility] [address] 3/28/2017    S/p TPA    Hypertension     Ill-defined condition     Eczema    [facility]     Type II diabetes mellitus ([facility] 3/29/2017     Past Surgical History:   Procedure Laterality Date    HX ORTHOPAEDIC  2017    left wrist surgery     Social History     Socioeconomic History    Marital status:      Spouse name: Not on file    Number of children: Not on file    Years of education: Not on file    Highest education level: Not on file   Occupational History    Not on file   Social Needs    Financial resource strain: Not on file    Food insecurity:     Worry: Not on file     Inability: Not on file    Transportation needs:     Medical: Not on file     Non-medical: Not on file   Tobacco Use    Smoking status: Former Smoker     Packs/day: 0.50     Years: 50.00     Pack years: 25.00     Types: Cigarettes     Last attempt to quit: 2017     Years since quittin.1    Smokeless tobacco: Never Used    Tobacco comment: cold turkey 2.5 wks ago after attempt Chantix developed allergy   Substance and Sexual Activity    Alcohol use: No     Comment: around holidatys    Drug use: No    Sexual activity: Not on file   Lifestyle    Physical activity:     Days per week: Not on file     Minutes per session: Not on file    Stress: Not on file   Relationships    Social connections:     Talks on phone: Not on file     Gets together: Not on file     Attends Mu-ism service: Not on file     Active member of club or organization: Not on file     Attends meetings of clubs or organizations: Not on file     Relationship status: Not on file    Intimate partner violence:     Fear of current or ex partner: Not on file     Emotionally abused: Not on file     Physically abused: Not on file     Forced sexual activity: Not on file   Other Topics Concern    Not on file   Social History Narrative    Not on file     Family History   Problem Relation Age of Onset    Diabetes Mother     Cancer Father     Headache Other        PHYSICAL EXAMINATION:    Visit Vitals  /68   Pulse (!) 58   Resp 20   Ht 6' (1.829 m)   SpO2 98%   BMI 29.16 kg/m²     General:  Well defined, nourished, and groomed individual in no acute distress.    Neck:             Supple, nontender, no bruits, no pain with resistance to active range of motion.    Heart:            Regular rate and rhythm, no murmurs, rub, or gallop.  Normal S1S2. Lungs:  Clear to auscultation bilaterally with equal chest expansion, no cough, no wheeze  Musculoskeletal:  Extremities revealed no edema and had full range of motion of joints.    Psych:  Apathetic and depressed with a flat affect      NEUROLOGICAL EXAMINATION:     Mental Status:   Alert and oriented to person and place. His MMSE was 26/30 today. Recall for three words after a brief delay was 2/3 correct, day of the week was off, and he was only able to perform 1/3 steps in a three step command. Clock drawing was abnormal.        Cranial Nerves:    II, III, IV, VI:  Visual acuity grossly intact. Visual fields are normal.    Pupils are equal, round, and reactive to light and accommodation.    Extra-ocular movements are full and fluid.  Fundoscopic exam was benign, no ptosis or nystagmus. V-XII:             Hearing is grossly intact.  Facial features are symmetric, with normal sensation and strength.  The palate rises symmetrically and the tongue protrudes midline.  Sternocleidomastoids 5/5.        Motor Examination:               Normal tone, bulk, and strength, 5/5 muscle strength throughout.         Coordination:  Finger to nose was normal.   No resting or intention tremor      Gait and Station: [place] while walking.  Normal arm swing.  No pronator drift.   No muscle wasting or fasiculations noted.        Reflexes:  DTRs 2+ throughout. ASSESSMENT AND PLAN    ICD-10-CM ICD-9-CM    1. Alzheimer's type dementia with late onset without behavioral disturbance G30.1 331.0 memantine-donepezil (NAMZARIC) 21-10 mg CSpX    F02.80 294.10    2. 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 CVA, old, cognitive deficits I69.319 438.0      Today, he expressed frustration over having to take all of the medication he is taking now. Discussed the purpose of his medications are to manage his stroke risk factors to prevent another event and to help with his memory management. Reinforced secondary stroke prevention. Continue to try to be active physically and mentally. Continue Namzaric at 10/21mg for memory management. Follow up in six months. Gayathri Mendenhall

## 2019-05-01 NOTE — PROGRESS NOTES
Per the patient he has been doing okay since his last visit     They both agreed no really changes since her was here last

## 2019-09-05 ENCOUNTER — TELEPHONE (OUTPATIENT)
Dept: NEUROLOGY | Age: 69
End: 2019-09-05

## 2019-09-05 NOTE — TELEPHONE ENCOUNTER
----- Message from [place] sent at 9/5/2019  1:17 PM EDT -----  Regarding: PALMER Monge/Karin Ramires from [facility],[address] requesting call back in regards to prior authorization for Rx \"Namzaric 21/10 mg\". Best contact [phone].

## 2019-09-06 NOTE — TELEPHONE ENCOUNTER
PA submitted URGENTLY for Namzaric 21-10mg to [facility] via Cover My Meds. Status Pending. Allow 24 hours for response.      [facility] Key:  [id number]  Case #: [id number]

## 2019-09-06 NOTE — TELEPHONE ENCOUNTER
PA APPROVED for Namzaric 21-10mg by [facility]. Effective dates 06/07/19 - 09/05/20. Case #[id number]. Approval will be scanned into media for review.  Faxed approval to pharmacy and informed patient of approval.

## 2019-10-23 RX ORDER — MEMANTINE HYDROCHLORIDE 10 MG/1
TABLET ORAL
Qty: 60 TAB | Refills: 0 | OUTPATIENT
Start: 2019-10-23

## 2019-10-23 RX ORDER — MEMANTINE HYDROCHLORIDE 10 MG/1
10 TABLET ORAL 2 TIMES DAILY
Qty: 60 TAB | Refills: 3 | Status: SHIPPED | OUTPATIENT
Start: 2019-10-23 | End: 2019-11-06 | Stop reason: SDUPTHER

## 2019-10-29 RX ORDER — DONEPEZIL HYDROCHLORIDE 10 MG/1
10 TABLET, FILM COATED ORAL
Qty: 30 TAB | Refills: 2 | Status: SHIPPED | OUTPATIENT
Start: 2019-10-29 | End: 2019-11-06 | Stop reason: SDUPTHER

## 2019-11-06 ENCOUNTER — OFFICE VISIT (OUTPATIENT)
Dept: NEUROLOGY | Age: 69
End: 2019-11-06

## 2019-11-06 VITALS
OXYGEN SATURATION: 96 % | HEART RATE: 68 BPM | HEIGHT: 72 IN | WEIGHT: 237 LBS | DIASTOLIC BLOOD PRESSURE: 80 MMHG | SYSTOLIC BLOOD PRESSURE: 116 MMHG | BODY MASS INDEX: 32.1 KG/M2

## 2019-11-06 DIAGNOSIS — G30.1 ALZHEIMER'S TYPE DEMENTIA WITH LATE ONSET WITHOUT BEHAVIORAL DISTURBANCE (HCC): Primary | ICD-10-CM

## 2019-11-06 DIAGNOSIS — R45.3 APATHY: ICD-10-CM

## 2019-11-06 DIAGNOSIS — I69.319 CVA, OLD, COGNITIVE DEFICITS: ICD-10-CM

## 2019-11-06 DIAGNOSIS — F02.80 ALZHEIMER'S TYPE DEMENTIA WITH LATE ONSET WITHOUT BEHAVIORAL DISTURBANCE (HCC): Primary | ICD-10-CM

## 2019-11-06 DIAGNOSIS — F06.31 DEPRESSION AS LATE EFFECT OF CEREBROVASCULAR ACCIDENT (CVA): ICD-10-CM

## 2019-11-06 DIAGNOSIS — I69.398 DEPRESSION AS LATE EFFECT OF CEREBROVASCULAR ACCIDENT (CVA): ICD-10-CM

## 2019-11-06 RX ORDER — SERTRALINE HYDROCHLORIDE 50 MG/1
50 TABLET, FILM COATED ORAL DAILY
Qty: 90 TAB | Refills: 3 | Status: SHIPPED | OUTPATIENT
Start: 2019-11-06 | End: 2020-08-27 | Stop reason: SDUPTHER

## 2019-11-06 RX ORDER — MEMANTINE HYDROCHLORIDE 10 MG/1
10 TABLET ORAL 2 TIMES DAILY
Qty: 180 TAB | Refills: 3 | Status: SHIPPED | OUTPATIENT
Start: 2019-11-06 | End: 2020-08-27 | Stop reason: SDUPTHER

## 2019-11-06 RX ORDER — DONEPEZIL HYDROCHLORIDE 10 MG/1
10 TABLET, FILM COATED ORAL
Qty: 90 TAB | Refills: 3 | Status: SHIPPED | OUTPATIENT
Start: 2019-11-06 | End: 2020-08-27 | Stop reason: SDUPTHER

## 2019-11-06 NOTE — PROGRESS NOTES
Date:  19     Name:  Anabell Kc  :  1950  MRN:  [id number]     PCP:  Gama Holliday MD    Chief Complaint   Patient presents with    Alzheimers     HISTORY OF PRESENT ILLNESS: Follow up for dementia. He is still taking Namenda and Aricept but taking the 10mg bid of immediate release Namenda and 10mg of Aricept instead of the Namzaric 21/10. He continues to be apathetic. He is helping in the yard and garden occasionally but this is not really consistent. He did go for a walk a couple of times which was a bit of a shock. He is back to reading some. He still does the [employer] breakfast once a month. 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 He is using a cane for stability since his balance is a little off. If he drives at all, his wife is always with him. His wife drives 09% of the time. Independent with ADLs. No incontinence. His last MMSE was in May 2019. His MMSE was 26/30. Recall for three words after a brief delay was 2/3 correct, day of the week was off, and he was only able to perform 1/3 steps in a three step command. Clock drawing was abnormal.    Recap from last office visit:  Today, he expressed frustration over having to take all of the medication he is taking now. Discussed the purpose of his medications are to manage his stroke risk factors to prevent another event and to help with his memory management. Reinforced secondary stroke prevention. Continue to try to be active physically and mentally. Continue Namzaric at 10/21mg for memory management. Except as noted above, denies  fever, chills, cough. No CP or SOB. No dysuria, loss of bowel or bladder control. No Weight loss. Appetite good. Sleeping well. No sweats. No edema. No bruising or bleeding. No nausea or vomit. No diarrhea. No frequency, urgency, No depressive sxs. No anxiety.   Denies sore throat, nasal congestion, nasal discharge, epistaxis, tinnitus, hearing loss, back pain, muscle pain, or joint pain. Current Outpatient Medications   Medication Sig    donepezil (ARICEPT) 10 mg tablet Take 1 Tab by mouth nightly.  memantine (NAMENDA) 10 mg tablet Take 1 Tab by mouth two (2) times a day.  tamsulosin (FLOMAX) 0.4 mg capsule Take 0.4 mg by mouth daily.  metFORMIN (GLUCOPHAGE) 500 mg tablet Take 500 mg by mouth daily.  metoprolol tartrate (LOPRESSOR) 50 mg tablet Take 50 mg by mouth two (2) times a day.  aspirin delayed-release 81 mg tablet Take  by mouth daily.  Cetirizine (ZYRTEC) 10 mg cap Take  by mouth.  amLODIPine (NORVASC) 10 mg tablet Take 1 Tab by mouth daily. (Patient taking differently: Take 2.5 mg by mouth daily.)    pravastatin (PRAVACHOL) 40 mg tablet Take 1 Tab by mouth nightly. Indications: prevention of cerebrovascular accident     No current facility-administered medications for this visit.       Allergies   Allergen Reactions    Chantix [Varenicline] Rash     Other reaction(s): Rash  Very itchy rash on back  Very itchy rash on back     Past Medical History:   Diagnosis Date    Hemorrhagic stroke ([facility] [address] 3/28/2017    S/p TPA    Hypertension     Ill-defined condition     Eczema    Stroke ([facility]     Type II diabetes mellitus ([facility] [address] [facility] 3/29/2017     Past Surgical History:   Procedure Laterality Date    HX ORTHOPAEDIC  03/2017    left wrist surgery     Social History     Socioeconomic History    Marital status:      Spouse name: Not on file    Number of children: Not on file    Years of education: Not on file    Highest education level: Not on file   Occupational History    Not on file   Social Needs    Financial resource strain: Not on file    Food insecurity:     Worry: Not on file     Inability: Not on file    Transportation needs:     Medical: Not on file     Non-medical: Not on file   Tobacco Use    Smoking status: Former Smoker     Packs/day: 0.50     Years: 50.00     Pack years: 25.00     Types: Cigarettes     Last attempt to quit: 2/28/2017 Years since quittin.6    Smokeless tobacco: Never Used    Tobacco comment: cold turkey 2.5 wks ago after attempt Chantix developed allergy   Substance and Sexual Activity    Alcohol use: No     Comment: around holidatys    Drug use: No    Sexual activity: Not on file   Lifestyle    Physical activity:     Days per week: Not on file     Minutes per session: Not on file    Stress: Not on file   Relationships    Social connections:     Talks on phone: Not on file     Gets together: Not on file     Attends [employer] service: Not on file     Active member of club or organization: Not on file     Attends meetings of clubs or organizations: Not on file     Relationship status: Not on file    Intimate partner violence:     Fear of current or ex partner: Not on file     Emotionally abused: Not on file     Physically abused: Not on file     Forced sexual activity: Not on file   Other Topics Concern    Not on file   Social History Narrative    Not on file     Family History   Problem Relation Age of Onset    Diabetes Mother     Cancer Father     Headache Other        PHYSICAL EXAMINATION:    Visit Vitals  /80   Pulse 68   Ht 6' (1.829 m)   Wt 107.5 kg (237 lb)   SpO2 96%   BMI 32.14 kg/m²     General:  Well defined, nourished, and groomed individual in no acute distress.    Neck:             Supple, nontender, no bruits, no pain with resistance to active range of motion.    Heart:            Regular rate and rhythm, no murmurs, rub, or gallop.  Normal S1S2. Lungs:  Clear to auscultation bilaterally with equal chest expansion, no cough, no wheeze  Musculoskeletal:  Extremities revealed no edema and had full range of motion of joints.    Psych:  Apathetic and depressed with a flat affect      NEUROLOGICAL EXAMINATION:     Mental Status:   Alert and oriented to person and place. Continues to be apathetic and repetitive.       Cranial Nerves:    II, III, IV, VI:  Visual acuity grossly intact.  Visual fields are normal.    Pupils are equal, round, and reactive to light and accommodation.    Extra-ocular movements are full and fluid.  Fundoscopic exam was benign, no ptosis or nystagmus. V-XII:             Hearing is grossly intact.  Facial features are symmetric, with normal sensation and strength.  The palate rises symmetrically and the tongue protrudes midline.  Sternocleidomastoids 5/5.        Motor Examination:               Normal tone, bulk, and strength, 5/5 muscle strength throughout.         Coordination:  Finger to nose was normal.   No resting or intention tremor      Gait and Station: [place] while walking.  Normal arm swing.  No pronator drift.   No muscle wasting or fasiculations noted.        Reflexes:  DTRs 2+ throughout. ASSESSMENT AND PLAN    ICD-10-CM ICD-9-CM    1. Alzheimer's type dementia with late onset without behavioral disturbance (HCC) G30.1 331.0 donepezil (ARICEPT) 10 mg tablet    F02.80 294.10 memantine (NAMENDA) 10 mg tablet   2. 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 CVA, old, cognitive deficits I69.319 438.0    4. Apathy R45.3 799.25 sertraline (ZOLOFT) 50 mg tablet     While he continues to be functional from an ADLs perspective and remains independent in this regard, he still needs assistance with medication administration and higher executive function. He remains apathetic and appears depressed some of which I am sure are secondary to the dementia process and previous CVA, some of which is secondary to his own personality and underlying perception of self. Discussed some mood stabilization may be of some benefit. Will start Zoloft 50mg daily. Purpose and potential side effects were discussed and they have verbalized understanding. Wife will call with any issues. Encouraged her to try to maintain some self care activities for herself to prevent caregiver burnout.  Encouraged him to be as active as possible both physically and mentally. Follow up in six months. Kara A. Jerrilyn Goldmann

## 2020-08-27 ENCOUNTER — OFFICE VISIT (OUTPATIENT)
Dept: NEUROLOGY | Age: 70
End: 2020-08-27
Payer: MEDICARE

## 2020-08-27 VITALS
HEART RATE: 66 BPM | OXYGEN SATURATION: 95 % | WEIGHT: 220 LBS | BODY MASS INDEX: 29.8 KG/M2 | TEMPERATURE: 98 F | RESPIRATION RATE: 17 BRPM | HEIGHT: 72 IN | SYSTOLIC BLOOD PRESSURE: 122 MMHG | DIASTOLIC BLOOD PRESSURE: 77 MMHG

## 2020-08-27 DIAGNOSIS — R45.3 APATHY: ICD-10-CM

## 2020-08-27 DIAGNOSIS — F06.31 DEPRESSION AS LATE EFFECT OF CEREBROVASCULAR ACCIDENT (CVA): ICD-10-CM

## 2020-08-27 DIAGNOSIS — G30.1 ALZHEIMER'S TYPE DEMENTIA WITH LATE ONSET WITHOUT BEHAVIORAL DISTURBANCE (HCC): ICD-10-CM

## 2020-08-27 DIAGNOSIS — F02.80 ALZHEIMER'S TYPE DEMENTIA WITH LATE ONSET WITHOUT BEHAVIORAL DISTURBANCE (HCC): ICD-10-CM

## 2020-08-27 DIAGNOSIS — I69.398 DEPRESSION AS LATE EFFECT OF CEREBROVASCULAR ACCIDENT (CVA): ICD-10-CM

## 2020-08-27 PROCEDURE — G8427 DOCREV CUR MEDS BY ELIG CLIN: HCPCS | Performed by: NURSE PRACTITIONER

## 2020-08-27 PROCEDURE — G8752 SYS BP LESS 140: HCPCS | Performed by: NURSE PRACTITIONER

## 2020-08-27 PROCEDURE — G8536 NO DOC ELDER MAL SCRN: HCPCS | Performed by: NURSE PRACTITIONER

## 2020-08-27 PROCEDURE — G8432 DEP SCR NOT DOC, RNG: HCPCS | Performed by: NURSE PRACTITIONER

## 2020-08-27 PROCEDURE — 3017F COLORECTAL CA SCREEN DOC REV: CPT | Performed by: NURSE PRACTITIONER

## 2020-08-27 PROCEDURE — 99214 OFFICE O/P EST MOD 30 MIN: CPT | Performed by: NURSE PRACTITIONER

## 2020-08-27 PROCEDURE — G8419 CALC BMI OUT NRM PARAM NOF/U: HCPCS | Performed by: NURSE PRACTITIONER

## 2020-08-27 PROCEDURE — 1101F PT FALLS ASSESS-DOCD LE1/YR: CPT | Performed by: NURSE PRACTITIONER

## 2020-08-27 PROCEDURE — G8754 DIAS BP LESS 90: HCPCS | Performed by: NURSE PRACTITIONER

## 2020-08-27 RX ORDER — DONEPEZIL HYDROCHLORIDE 10 MG/1
10 TABLET, FILM COATED ORAL
Qty: 90 TAB | Refills: 3 | Status: SHIPPED | OUTPATIENT
Start: 2020-08-27 | End: 2020-10-13

## 2020-08-27 RX ORDER — MEMANTINE HYDROCHLORIDE 10 MG/1
10 TABLET ORAL 2 TIMES DAILY
Qty: 180 TAB | Refills: 3 | Status: SHIPPED | OUTPATIENT
Start: 2020-08-27 | End: 2021-03-01 | Stop reason: SDUPTHER

## 2020-08-27 RX ORDER — SERTRALINE HYDROCHLORIDE 100 MG/1
100 TABLET, FILM COATED ORAL DAILY
Qty: 90 TAB | Refills: 3 | Status: SHIPPED | OUTPATIENT
Start: 2020-08-27 | End: 2020-10-13

## 2020-08-27 NOTE — PROGRESS NOTES
Date:  20     Name:  Amado Gabriel  :  1950  MRN:  [id number]     PCP:  Ysabel Nick MD    Chief Complaint   Patient presents with    Dementia     HISTORY OF PRESENT ILLNESS: Follow up for dementia. He is still taking Namenda and Aricept. He continues to be apathetic. His wife indicates that he largely has bed most of the time. She cannot get him to help her in the yard at all despite the fact that she has asked. He indicates that he feels that she can do this on her own and does not need his help. He makes it sounds as though he would be doing her a disservice if he helped her when he knew that she could do it on her own. He has no interest in anything and expresses that he has done everything that he wants to do in his life. He continues to take Zoloft 50 mg daily and this may have helped with his mood in a very minimal fashion. Independent with ADLs. No incontinence. His last MMSE was in May 2019. His MMSE was 26/30. Recall for three words after a brief delay was 2/3 correct, day of the week was off, and he was only able to perform 1/3 steps in a three step command. Clock drawing was abnormal.    Recap from last office visit:  While he continues to be functional from an ADLs perspective and remains independent in this regard, he still needs assistance with medication administration and higher executive function. He remains apathetic and appears depressed some of which I am sure are secondary to the dementia process and previous CVA, some of which is secondary to his own personality and underlying perception of self. Discussed some mood stabilization may be of some benefit. Will start Zoloft 50mg daily. Purpose and potential side effects were discussed and they have verbalized understanding. Wife will call with any issues. Encouraged her to try to maintain some self care activities for herself to prevent caregiver burnout.  Encouraged him to be as active as possible both physically and mentally. Except as noted above, denies  fever, chills, cough. No CP or SOB. No dysuria, loss of bowel or bladder control. No Weight loss. Appetite good. Sleeping well. No sweats. No edema. No bruising or bleeding. No nausea or vomit. No diarrhea. No frequency, urgency, No depressive sxs. No anxiety. Denies sore throat, nasal congestion, nasal discharge, epistaxis, tinnitus, hearing loss, back pain, muscle pain, or joint pain. Current Outpatient Medications   Medication Sig    sertraline (ZOLOFT) 50 mg tablet Take 1 Tab by mouth daily.  donepezil (ARICEPT) 10 mg tablet Take 1 Tab by mouth nightly.  memantine (NAMENDA) 10 mg tablet Take 1 Tab by mouth two (2) times a day.  tamsulosin (FLOMAX) 0.4 mg capsule Take 0.4 mg by mouth daily.  metFORMIN (GLUCOPHAGE) 500 mg tablet Take 500 mg by mouth daily.  metoprolol tartrate (LOPRESSOR) 50 mg tablet Take 50 mg by mouth two (2) times a day.  aspirin delayed-release 81 mg tablet Take  by mouth daily.  Cetirizine (ZYRTEC) 10 mg cap Take  by mouth.  amLODIPine (NORVASC) 10 mg tablet Take 1 Tab by mouth daily. (Patient taking differently: Take 2.5 mg by mouth daily.)    pravastatin (PRAVACHOL) 40 mg tablet Take 1 Tab by mouth nightly. Indications: prevention of cerebrovascular accident     No current facility-administered medications for this visit.       Allergies   Allergen Reactions    Chantix [Varenicline] Rash     Other reaction(s): Rash  Very itchy rash on back  Very itchy rash on back     Past Medical History:   Diagnosis Date    Hemorrhagic stroke ([facility] [address] 3/28/2017    S/p TPA    Hypertension     Ill-defined condition     Eczema    Stroke ([facility] [address]     Type II diabetes mellitus ([facility] [address] 3/29/2017     Past Surgical History:   Procedure Laterality Date    HX ORTHOPAEDIC  03/2017    left wrist surgery     Social History     Socioeconomic History    Marital status:      Spouse name: Not on file    Number of children: Not on file    Years of education: Not on file    Highest education level: Not on file   Occupational History    Not on file   Social Needs    Financial resource strain: Not on file    Food insecurity     Worry: Not on file     Inability: Not on file    Transportation needs     Medical: Not on file     Non-medical: Not on file   Tobacco Use    Smoking status: Former Smoker     Packs/day: 0.50     Years: 50.00     Pack years: 25.00     Types: Cigarettes     Last attempt to quit: 2/28/2017     Years since quitting: 3.4    Smokeless tobacco: Never Used    Tobacco comment: cold turkey 2.5 wks ago after attempt Chantix developed allergy   Substance and Sexual Activity    Alcohol use: No     Comment: around holidatys    Drug use: No    Sexual activity: Not on file   Lifestyle    Physical activity     Days per week: Not on file     Minutes per session: Not on file    Stress: Not on file   Relationships    Social connections     Talks on phone: Not on file     Gets together: Not on file     Attends Yarsani service: Not on file     Active member of club or organization: Not on file     Attends meetings of clubs or organizations: Not on file     Relationship status: Not on file    Intimate partner violence     Fear of current or ex partner: Not on file     Emotionally abused: Not on file     Physically abused: Not on file     Forced sexual activity: Not on file   Other Topics Concern    Not on file   Social History Narrative    Not on file     Family History   Problem Relation Age of Onset    Diabetes Mother     Cancer Father     Headache Other        PHYSICAL EXAMINATION:    Visit Vitals  /77   Pulse 66   Temp 98 °F (36.7 °C)   Resp 17   Ht 6' (1.829 m)   Wt 99.8 kg (220 lb)   SpO2 95%   BMI 29.84 kg/m²     General:  Well defined, nourished, and groomed individual in no acute distress.    Neck:             Supple, nontender, no bruits, no pain with resistance to active range of motion.    Heart:            Regular rate and rhythm, no murmurs, rub, or gallop.  Normal S1S2. Lungs:  Clear to auscultation bilaterally with equal chest expansion, no cough, no wheeze  Musculoskeletal:  Extremities revealed no edema and had full range of motion of joints.    Psych:  Apathetic and depressed with a flat affect      NEUROLOGICAL EXAMINATION:     Mental Status:   Alert and oriented to person and place. Continues to be apathetic and repetitive. MMSE today was 29 of 30 having only been unable to recall 2 of 3 items after 5 minutes of distraction      Cranial Nerves:    II, III, IV, VI:  Visual acuity grossly intact. Visual fields are normal.    Pupils are equal, round, and reactive to light and accommodation.    Extra-ocular movements are full and fluid.  Fundoscopic exam was benign, no ptosis or nystagmus. V-XII:             Hearing is grossly intact.  Facial features are symmetric, with normal sensation and strength.  The palate rises symmetrically and the tongue protrudes midline.  Sternocleidomastoids 5/5.        Motor Examination:               Normal tone, bulk, and strength, 5/5 muscle strength throughout.         Coordination:  Finger to nose was normal.   No resting or intention tremor      Gait and Station: [employer] while walking.  Normal arm swing.  No pronator drift.   No muscle wasting or fasiculations noted.        Reflexes:  DTRs 2+ throughout. ASSESSMENT AND PLAN    ICD-10-CM ICD-9-CM    1. 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 Apathy  R45.3 799.25 sertraline (ZOLOFT) 100 mg tablet   3. 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   His score today was 29 of 30. He had a normal clock draw. He continues to be functional from an activities of daily living perspective. He is at least independent in this regard. His wife is still helping him with his medications. The biggest issue is really related to the severe apathy. We will try to increase the Zoloft to 100 mg daily to see if this provides any help. He should continue with secondary stroke prevention. He should continue to try to stay as active as possible though we will need to continue to try to pull him out of his apathetic state. He will remain on Aricept and Namenda as previously prescribed. Follow up in six months. Gayathri Schwartz

## 2020-10-13 ENCOUNTER — APPOINTMENT (OUTPATIENT)
Dept: CT IMAGING | Age: 70
DRG: 373 | End: 2020-10-13
Attending: PHYSICIAN ASSISTANT
Payer: MEDICARE

## 2020-10-13 ENCOUNTER — HOSPITAL ENCOUNTER (INPATIENT)
Age: 70
LOS: 4 days | Discharge: HOME | DRG: 373 | End: 2020-10-17
Attending: EMERGENCY MEDICINE | Admitting: SURGERY
Payer: MEDICARE

## 2020-10-13 DIAGNOSIS — K35.890 OTHER ACUTE APPENDICITIS: Primary | ICD-10-CM

## 2020-10-13 PROBLEM — K37 APPENDICITIS: Status: ACTIVE | Noted: 2020-10-13

## 2020-10-13 LAB
ALBUMIN SERPL-MCNC: 3.8 G/DL (ref 3.5–5)
ALBUMIN/GLOB SERPL: 0.9 {RATIO} (ref 1.1–2.2)
ALP SERPL-CCNC: 75 U/L (ref 45–117)
ALT SERPL-CCNC: 17 U/L (ref 12–78)
ANION GAP SERPL CALC-SCNC: 7 MMOL/L (ref 5–15)
APPEARANCE UR: CLEAR
AST SERPL-CCNC: 17 U/L (ref 15–37)
BACTERIA URNS QL MICRO: NEGATIVE /HPF
BASOPHILS # BLD: 0 K/UL (ref 0–0.1)
BASOPHILS NFR BLD: 0 % (ref 0–1)
BILIRUB SERPL-MCNC: 0.8 MG/DL (ref 0.2–1)
BILIRUB UR QL: NEGATIVE
BUN SERPL-MCNC: 18 MG/DL (ref 6–20)
BUN/CREAT SERPL: 14 (ref 12–20)
CALCIUM SERPL-MCNC: 9 MG/DL (ref 8.5–10.1)
CHLORIDE SERPL-SCNC: 101 MMOL/L (ref 97–108)
CO2 SERPL-SCNC: 28 MMOL/L (ref 21–32)
COLOR UR: ABNORMAL
CREAT SERPL-MCNC: 1.26 MG/DL (ref 0.7–1.3)
DIFFERENTIAL METHOD BLD: ABNORMAL
EOSINOPHIL # BLD: 0.2 K/UL (ref 0–0.4)
EOSINOPHIL NFR BLD: 2 % (ref 0–7)
EPITH CASTS URNS QL MICRO: ABNORMAL /LPF
ERYTHROCYTE [DISTWIDTH] IN BLOOD BY AUTOMATED COUNT: 12.3 % (ref 11.5–14.5)
GLOBULIN SER CALC-MCNC: 4.3 G/DL (ref 2–4)
GLUCOSE SERPL-MCNC: 141 MG/DL (ref 65–100)
GLUCOSE UR STRIP.AUTO-MCNC: NEGATIVE MG/DL
HCT VFR BLD AUTO: 44.2 % (ref 36.6–50.3)
HGB BLD-MCNC: 14.6 G/DL (ref 12.1–17)
HGB UR QL STRIP: NEGATIVE
HYALINE CASTS URNS QL MICRO: ABNORMAL /LPF (ref 0–5)
IMM GRANULOCYTES # BLD AUTO: 0 K/UL (ref 0–0.04)
IMM GRANULOCYTES NFR BLD AUTO: 0 % (ref 0–0.5)
KETONES UR QL STRIP.AUTO: ABNORMAL MG/DL
LEUKOCYTE ESTERASE UR QL STRIP.AUTO: NEGATIVE
LIPASE SERPL-CCNC: 147 U/L (ref 73–393)
LYMPHOCYTES # BLD: 1.4 K/UL (ref 0.8–3.5)
LYMPHOCYTES NFR BLD: 12 % (ref 12–49)
MCH RBC QN AUTO: 31.1 PG (ref 26–34)
MCHC RBC AUTO-ENTMCNC: 33 G/DL (ref 30–36.5)
MCV RBC AUTO: 94 FL (ref 80–99)
MONOCYTES # BLD: 0.9 K/UL (ref 0–1)
MONOCYTES NFR BLD: 8 % (ref 5–13)
NEUTS SEG # BLD: 9.2 K/UL (ref 1.8–8)
NEUTS SEG NFR BLD: 78 % (ref 32–75)
NITRITE UR QL STRIP.AUTO: NEGATIVE
NRBC # BLD: 0 K/UL (ref 0–0.01)
NRBC BLD-RTO: 0 PER 100 WBC
PH UR STRIP: 6 [PH] (ref 5–8)
PLATELET # BLD AUTO: 260 K/UL (ref 150–400)
PMV BLD AUTO: 9.7 FL (ref 8.9–12.9)
POTASSIUM SERPL-SCNC: 3.9 MMOL/L (ref 3.5–5.1)
PROT SERPL-MCNC: 8.1 G/DL (ref 6.4–8.2)
PROT UR STRIP-MCNC: NEGATIVE MG/DL
RBC # BLD AUTO: 4.7 M/UL (ref 4.1–5.7)
RBC #/AREA URNS HPF: ABNORMAL /HPF (ref 0–5)
SODIUM SERPL-SCNC: 136 MMOL/L (ref 136–145)
SP GR UR REFRACTOMETRY: 1.01 (ref 1–1.03)
UR CULT HOLD, URHOLD: NORMAL
UROBILINOGEN UR QL STRIP.AUTO: 1 EU/DL (ref 0.2–1)
WBC # BLD AUTO: 11.8 K/UL (ref 4.1–11.1)
WBC URNS QL MICRO: ABNORMAL /HPF (ref 0–4)

## 2020-10-13 PROCEDURE — 85025 COMPLETE CBC W/AUTO DIFF WBC: CPT

## 2020-10-13 PROCEDURE — 74177 CT ABD & PELVIS W/CONTRAST: CPT

## 2020-10-13 PROCEDURE — 36415 COLL VENOUS BLD VENIPUNCTURE: CPT

## 2020-10-13 PROCEDURE — 83690 ASSAY OF LIPASE: CPT

## 2020-10-13 PROCEDURE — 81001 URINALYSIS AUTO W/SCOPE: CPT

## 2020-10-13 PROCEDURE — 65270000029 HC RM PRIVATE

## 2020-10-13 PROCEDURE — 80053 COMPREHEN METABOLIC PANEL: CPT

## 2020-10-13 PROCEDURE — 99284 EMERGENCY DEPT VISIT MOD MDM: CPT

## 2020-10-13 PROCEDURE — 74011000636 HC RX REV CODE- 636: Performed by: RADIOLOGY

## 2020-10-13 PROCEDURE — 74011250636 HC RX REV CODE- 250/636: Performed by: EMERGENCY MEDICINE

## 2020-10-13 PROCEDURE — 74011250637 HC RX REV CODE- 250/637: Performed by: SURGERY

## 2020-10-13 PROCEDURE — 74011250636 HC RX REV CODE- 250/636: Performed by: SURGERY

## 2020-10-13 RX ORDER — SERTRALINE HYDROCHLORIDE 100 MG/1
100 TABLET, FILM COATED ORAL EVERY EVENING
COMMUNITY
End: 2021-03-01 | Stop reason: DRUGHIGH

## 2020-10-13 RX ORDER — SODIUM CHLORIDE 0.9 % (FLUSH) 0.9 %
5-40 SYRINGE (ML) INJECTION AS NEEDED
Status: DISCONTINUED | OUTPATIENT
Start: 2020-10-13 | End: 2020-10-17 | Stop reason: HOSPADM

## 2020-10-13 RX ORDER — DONEPEZIL HYDROCHLORIDE 5 MG/1
10 TABLET, FILM COATED ORAL
Status: DISCONTINUED | OUTPATIENT
Start: 2020-10-13 | End: 2020-10-13

## 2020-10-13 RX ORDER — LEVOFLOXACIN 5 MG/ML
750 INJECTION, SOLUTION INTRAVENOUS EVERY 24 HOURS
Status: DISCONTINUED | OUTPATIENT
Start: 2020-10-14 | End: 2020-10-17 | Stop reason: HOSPADM

## 2020-10-13 RX ORDER — SODIUM CHLORIDE 0.9 % (FLUSH) 0.9 %
5-40 SYRINGE (ML) INJECTION EVERY 8 HOURS
Status: DISCONTINUED | OUTPATIENT
Start: 2020-10-13 | End: 2020-10-17 | Stop reason: HOSPADM

## 2020-10-13 RX ORDER — HYDROMORPHONE HYDROCHLORIDE 1 MG/ML
1 INJECTION, SOLUTION INTRAMUSCULAR; INTRAVENOUS; SUBCUTANEOUS
Status: DISCONTINUED | OUTPATIENT
Start: 2020-10-13 | End: 2020-10-17 | Stop reason: HOSPADM

## 2020-10-13 RX ORDER — SODIUM CHLORIDE 9 MG/ML
125 INJECTION, SOLUTION INTRAVENOUS CONTINUOUS
Status: DISCONTINUED | OUTPATIENT
Start: 2020-10-13 | End: 2020-10-17 | Stop reason: HOSPADM

## 2020-10-13 RX ORDER — ENOXAPARIN SODIUM 100 MG/ML
40 INJECTION SUBCUTANEOUS EVERY 24 HOURS
Status: DISCONTINUED | OUTPATIENT
Start: 2020-10-13 | End: 2020-10-17 | Stop reason: HOSPADM

## 2020-10-13 RX ORDER — LEVOFLOXACIN 5 MG/ML
500 INJECTION, SOLUTION INTRAVENOUS ONCE
Status: COMPLETED | OUTPATIENT
Start: 2020-10-13 | End: 2020-10-13

## 2020-10-13 RX ORDER — PRAVASTATIN SODIUM 20 MG/1
40 TABLET ORAL
Status: DISCONTINUED | OUTPATIENT
Start: 2020-10-13 | End: 2020-10-17 | Stop reason: HOSPADM

## 2020-10-13 RX ORDER — METFORMIN HYDROCHLORIDE 500 MG/1
500 TABLET, EXTENDED RELEASE ORAL
Status: ON HOLD | COMMUNITY
End: 2020-10-24 | Stop reason: SDUPTHER

## 2020-10-13 RX ORDER — AMLODIPINE BESYLATE 2.5 MG/1
2.5 TABLET ORAL DAILY
COMMUNITY
End: 2021-03-01 | Stop reason: ALTCHOICE

## 2020-10-13 RX ORDER — METRONIDAZOLE 500 MG/100ML
500 INJECTION, SOLUTION INTRAVENOUS ONCE
Status: COMPLETED | OUTPATIENT
Start: 2020-10-13 | End: 2020-10-13

## 2020-10-13 RX ORDER — SERTRALINE HYDROCHLORIDE 50 MG/1
100 TABLET, FILM COATED ORAL DAILY
Status: DISCONTINUED | OUTPATIENT
Start: 2020-10-13 | End: 2020-10-17 | Stop reason: HOSPADM

## 2020-10-13 RX ORDER — TAMSULOSIN HYDROCHLORIDE 0.4 MG/1
0.4 CAPSULE ORAL DAILY
Status: DISCONTINUED | OUTPATIENT
Start: 2020-10-13 | End: 2020-10-17 | Stop reason: HOSPADM

## 2020-10-13 RX ORDER — MORPHINE SULFATE 2 MG/ML
1 INJECTION, SOLUTION INTRAMUSCULAR; INTRAVENOUS
Status: DISCONTINUED | OUTPATIENT
Start: 2020-10-13 | End: 2020-10-13

## 2020-10-13 RX ORDER — AMLODIPINE BESYLATE 2.5 MG/1
2.5 TABLET ORAL DAILY
Status: DISCONTINUED | OUTPATIENT
Start: 2020-10-14 | End: 2020-10-17 | Stop reason: HOSPADM

## 2020-10-13 RX ORDER — METRONIDAZOLE 500 MG/100ML
500 INJECTION, SOLUTION INTRAVENOUS EVERY 8 HOURS
Status: DISCONTINUED | OUTPATIENT
Start: 2020-10-14 | End: 2020-10-17 | Stop reason: HOSPADM

## 2020-10-13 RX ORDER — METOPROLOL TARTRATE 50 MG/1
50 TABLET ORAL 2 TIMES DAILY
Status: DISCONTINUED | OUTPATIENT
Start: 2020-10-13 | End: 2020-10-17 | Stop reason: HOSPADM

## 2020-10-13 RX ORDER — ONDANSETRON 2 MG/ML
4 INJECTION INTRAMUSCULAR; INTRAVENOUS
Status: DISCONTINUED | OUTPATIENT
Start: 2020-10-13 | End: 2020-10-17 | Stop reason: HOSPADM

## 2020-10-13 RX ADMIN — PRAVASTATIN SODIUM 40 MG: 20 TABLET ORAL at 22:35

## 2020-10-13 RX ADMIN — METRONIDAZOLE 500 MG: 500 INJECTION, SOLUTION INTRAVENOUS at 19:36

## 2020-10-13 RX ADMIN — Medication 10 ML: at 19:38

## 2020-10-13 RX ADMIN — ENOXAPARIN SODIUM 40 MG: 40 INJECTION SUBCUTANEOUS at 22:35

## 2020-10-13 RX ADMIN — TAMSULOSIN HYDROCHLORIDE 0.4 MG: 0.4 CAPSULE ORAL at 22:35

## 2020-10-13 RX ADMIN — MORPHINE SULFATE 1 MG: 2 INJECTION, SOLUTION INTRAMUSCULAR; INTRAVENOUS at 19:51

## 2020-10-13 RX ADMIN — IOPAMIDOL 100 ML: 755 INJECTION, SOLUTION INTRAVENOUS at 17:44

## 2020-10-13 RX ADMIN — LEVOFLOXACIN 500 MG: 5 INJECTION, SOLUTION INTRAVENOUS at 19:33

## 2020-10-13 RX ADMIN — MORPHINE SULFATE 1 MG: 2 INJECTION, SOLUTION INTRAMUSCULAR; INTRAVENOUS at 22:35

## 2020-10-13 RX ADMIN — SODIUM CHLORIDE 75 ML/HR: 900 INJECTION, SOLUTION INTRAVENOUS at 19:32

## 2020-10-13 RX ADMIN — SERTRALINE HYDROCHLORIDE 100 MG: 50 TABLET ORAL at 22:35

## 2020-10-13 RX ADMIN — METOPROLOL TARTRATE 50 MG: 50 TABLET, FILM COATED ORAL at 22:35

## 2020-10-13 RX ADMIN — ONDANSETRON 4 MG: 2 INJECTION INTRAMUSCULAR; INTRAVENOUS at 19:48

## 2020-10-13 NOTE — PROGRESS NOTES
10/13/2020  7:32 PM    Case management note    Reason for Admission:   Appendicitis    Patient came to ED for RLQ pain x 3 days. Patient has history of stroke  [facility] [address]  Patient is  and independent with ADL's.                   RUR Score:     low                Plan for utilizing home health:    Patient is independent and will most likely not qualify for home health services. PCP: First and Last name:  Dr. Lazaro Robbins   Name of Practice:    Are you a current patient: Yes/No: yes   Approximate date of last visit: 3 months   Can you participate in a virtual visit with your PCP:  yes                    Current Advanced Directive/Advance Care Plan: No AD. ACP note to chart                         Transition of Care Plan:           1. Home with family assistance  2. PCP follow up  3. AD planning  4.  CM to follow for discharge planning    Care Management Interventions  PCP Verified by CM: Yes(Dr. Maddison Clark)  Mode of Transport at Discharge: Self  Current Support Network: Lives with Spouse  Confirm Follow Up Transport: Family  The Plan for Transition of Care is Related to the Following Treatment Goals : appendicitis  Discharge Location  Discharge Placement: Home with family assistance  Jovany Gandara

## 2020-10-13 NOTE — ED NOTES
Patient states last time he ate at 1400 today, chicken salad sandwich. Patient states last drank fluids at 1400 today, [facility].

## 2020-10-13 NOTE — ACP (ADVANCE CARE PLANNING)
10/13/2020  7:40 PM    Advance Care Planning     Advance Care Planning Activator (Inpatient)  Conversation Note      Date of ACP Conversation: 10/13/20     Conversation Conducted with:   Patient with Decision Making Capacity    ACP Activator: [address]:    Current Designated Health Care Decision Maker:   Primary Decision Maker: Marlen Jimmie  [phone]  (  Note: If the relationship of these Decision-Makers to the patient does NOT follow your state's Next of Kin hierarchy, recommend that patient complete ACP document that meets state-specific requirements to allow them to act on the patient's behalf when appropriate. Care Preferences    Ventilation: \"If you were in your present state of health and suddenly became very ill and were unable to breathe on your own, what would your preference be about the use of a ventilator (breathing machine) if it were available to you? \"      If patient would desire the use of a ventilator (breathing machine), answer \"yes\", if not \"no\":no    \"If your health worsens and it becomes clear that your chance of recovery is unlikely, what would your preference be about the use of a ventilator (breathing machine) if it were available to you? \"     Would the patient desire the use of a ventilator (breathing machine)? NO      Resuscitation  \"CPR works best to restart the heart when there is a sudden event, like a heart attack, in someone who is otherwise healthy. Unfortunately, CPR does not typically restart the heart for people who have serious health conditions or who are very sick. \"    \"In the event your heart stopped as a result of an underlying serious health condition, would you want attempts to be made to restart your heart (answer \"yes\" for attempt to resuscitate) or would you prefer a natural death (answer \"no\" for do not attempt to resuscitate)? \" no        [x] Yes  [] No   Educated Patient  regarding differences between Advance Directives and portable DNR orders.     Length of ACP Conversation in minutes:  12 minutes   Conversation Outcomes:  [x] ACP discussion completed  [] Existing advance directive reviewed with patient; no changes to patient's previously recorded wishes     [] New Advance Directive completed   [] Portable Do Not Resuscitate prepared for Provider review and signature  [] POLST/POST/MOLST/MOST prepared for Provider review and signature      Follow-up plan:    [] Schedule follow-up conversation to continue planning  [] Referred individual to Provider for additional questions/concerns   [] Advised patient/agent/surrogate to review completed ACP document and update if needed with changes in condition, patient preferences or care setting     [] This note routed to one or more involved healthcare providers

## 2020-10-13 NOTE — ED PROVIDER NOTES
Patient is a 26-year-old male with history of prior stroke, hypertension, dementia, type 2 diabetes who presents with 3-day history of right lower quadrant pain. Patient reports constant pain which is worsening in intensity. Reports normal p.o. intake, no change in bowel or bladder habits. Denies fevers or chills at home. Takes tamsulosin for baseline urinary retention that is unchanged at this time. No prior history of abdominal surgeries.            Past Medical History:   Diagnosis Date    Hemorrhagic [address] ([facility] 3/28/2017    S/p TPA    Hypertension     Ill-defined condition     Eczema    [facility]     Type II diabetes mellitus ([facility] 3/29/2017       Past Surgical History:   Procedure Laterality Date    HX ORTHOPAEDIC  03/2017    left wrist surgery         Family History:   Problem Relation Age of Onset    Diabetes Mother     Cancer Father     Headache Other        Social History     Socioeconomic History    Marital status:      Spouse name: Not on file    Number of children: Not on file    Years of education: Not on file    Highest education level: Not on file   Occupational History    Not on file   Social Needs    Financial resource strain: Not on file    Food insecurity     Worry: Not on file     Inability: Not on file    Transportation needs     Medical: Not on file     Non-medical: Not on file   Tobacco Use    Smoking status: Former Smoker     Packs/day: 0.50     Years: 50.00     Pack years: 25.00     Types: Cigarettes     Last attempt to quit: 2/28/2017     Years since quitting: 3.6    Smokeless tobacco: Never Used    Tobacco comment: cold turkey 2.5 wks ago after attempt Chantix developed allergy   Substance and Sexual Activity    Alcohol use: No     Comment: around holidatys    Drug use: No    Sexual activity: Not on file   Lifestyle    Physical activity     Days per week: Not on file     Minutes per session: Not on file    Stress: Not on file   Relationships    Social connections     Talks on phone: Not on file     Gets together: Not on file     Attends Confucianist service: Not on file     Active member of club or organization: Not on file     Attends meetings of clubs or organizations: Not on file     Relationship status: Not on file    Intimate partner violence     Fear of current or ex partner: Not on file     Emotionally abused: Not on file     Physically abused: Not on file     Forced sexual activity: Not on file   Other Topics Concern    Not on file   Social History Narrative    Not on file         ALLERGIES: Chantix [varenicline] and Penicillins    Review of Systems   Constitutional: Negative for chills and fever. HENT: Negative for drooling and nosebleeds. Eyes: Negative for pain and itching. Respiratory: Negative for choking and stridor. Cardiovascular: Negative for leg swelling. Gastrointestinal: Positive for abdominal pain. Negative for nausea, rectal pain and vomiting. Endocrine: Negative for heat intolerance and polyphagia. Genitourinary: Negative for enuresis and genital sores. Musculoskeletal: Negative for arthralgias and joint swelling. Skin: Negative for color change. Allergic/Immunologic: Negative for immunocompromised state. Neurological: Negative for tremors and speech difficulty. Hematological: Negative for adenopathy. Psychiatric/Behavioral: Negative for dysphoric mood and sleep disturbance. Vitals:    10/13/20 1600 10/13/20 1830 10/13/20 1902   BP: (!) 146/77  (!) 144/78   Pulse: 79  73   Resp: 16  25   Temp: 97.1 °F (36.2 °C)     SpO2: 96% 96% 97%   Weight: 99.3 kg (219 lb)     Height: 6' (1.829 m)              Physical Exam  Vitals signs and nursing note reviewed. Constitutional:       General: He is not in acute distress. Appearance: He is well-developed. He is not ill-appearing, toxic-appearing or diaphoretic. HENT:      Head: Normocephalic.       Nose: Nose normal.   Eyes:      Conjunctiva/sclera: Conjunctivae normal.   Neck:      Musculoskeletal: Normal range of motion and neck supple. Cardiovascular:      Rate and Rhythm: Normal rate and regular rhythm. Heart sounds: Normal heart sounds. Pulmonary:      Effort: Pulmonary effort is normal. No respiratory distress. Breath sounds: Normal breath sounds. Abdominal:      General: There is no distension. Palpations: Abdomen is soft. Tenderness: There is abdominal tenderness. Positive signs include McBurney's sign. Musculoskeletal: Normal range of motion. General: No deformity. Skin:     General: Skin is warm and dry. Neurological:      Mental Status: He is alert. Coordination: Coordination normal.   Psychiatric:         Behavior: Behavior normal.          MDM  Number of Diagnoses or Management Options  Other acute appendicitis:   Diagnosis management comments: Patient nontoxic-appearing, ruptured appendicitis with abscess noted on CT scan. Discussed with general surgery on-call Dr. Medina Pompa who recommended antibiotics and admission for further management. ED Course as of Oct 13 1947   Tue Oct 13, 2020   1631 4:31 PM  I have evaluated the patient as the Provider in Triage. I have reviewed His vital signs and the triage nurse assessment. I have talked with the patient and any available family and advised that I am the provider in triage and have ordered the appropriate study to initiate their work up based on the clinical presentation during my assessment. I have advised that the patient will be accommodated in the Main ED as soon as possible. I have also requested to contact the triage nurse or myself immediately if the patient experiences any changes in their condition during this brief waiting period. JEN Doss          [JM]   7334 Spoke with Dr Medina Pompa who will admit.      [AL]      ED Course User Index  [AL] Steven Arreguin MD  [JM] JEN Clement       Procedures    Patient is being admitted to the hospital.  The results of their tests and reasons for their admission have been discussed with them and/or available family. They convey agreement and understanding for the need to be admitted and for their admission diagnosis.

## 2020-10-14 LAB
ANION GAP SERPL CALC-SCNC: 3 MMOL/L (ref 5–15)
BASOPHILS # BLD: 0 K/UL (ref 0–0.1)
BASOPHILS NFR BLD: 0 % (ref 0–1)
BUN SERPL-MCNC: 17 MG/DL (ref 6–20)
BUN/CREAT SERPL: 16 (ref 12–20)
CALCIUM SERPL-MCNC: 8.7 MG/DL (ref 8.5–10.1)
CHLORIDE SERPL-SCNC: 104 MMOL/L (ref 97–108)
CO2 SERPL-SCNC: 28 MMOL/L (ref 21–32)
CREAT SERPL-MCNC: 1.05 MG/DL (ref 0.7–1.3)
DIFFERENTIAL METHOD BLD: ABNORMAL
EOSINOPHIL # BLD: 0.1 K/UL (ref 0–0.4)
EOSINOPHIL NFR BLD: 1 % (ref 0–7)
ERYTHROCYTE [DISTWIDTH] IN BLOOD BY AUTOMATED COUNT: 12.2 % (ref 11.5–14.5)
GLUCOSE SERPL-MCNC: 108 MG/DL (ref 65–100)
HCT VFR BLD AUTO: 40.9 % (ref 36.6–50.3)
HGB BLD-MCNC: 13.4 G/DL (ref 12.1–17)
IMM GRANULOCYTES # BLD AUTO: 0.1 K/UL (ref 0–0.04)
IMM GRANULOCYTES NFR BLD AUTO: 0 % (ref 0–0.5)
LYMPHOCYTES # BLD: 1.4 K/UL (ref 0.8–3.5)
LYMPHOCYTES NFR BLD: 13 % (ref 12–49)
MCH RBC QN AUTO: 31 PG (ref 26–34)
MCHC RBC AUTO-ENTMCNC: 32.8 G/DL (ref 30–36.5)
MCV RBC AUTO: 94.7 FL (ref 80–99)
MONOCYTES # BLD: 1 K/UL (ref 0–1)
MONOCYTES NFR BLD: 9 % (ref 5–13)
NEUTS SEG # BLD: 8.6 K/UL (ref 1.8–8)
NEUTS SEG NFR BLD: 77 % (ref 32–75)
NRBC # BLD: 0 K/UL (ref 0–0.01)
NRBC BLD-RTO: 0 PER 100 WBC
PLATELET # BLD AUTO: 203 K/UL (ref 150–400)
PMV BLD AUTO: 9.8 FL (ref 8.9–12.9)
POTASSIUM SERPL-SCNC: 4.1 MMOL/L (ref 3.5–5.1)
RBC # BLD AUTO: 4.32 M/UL (ref 4.1–5.7)
SODIUM SERPL-SCNC: 135 MMOL/L (ref 136–145)
WBC # BLD AUTO: 11.2 K/UL (ref 4.1–11.1)

## 2020-10-14 PROCEDURE — 74011250636 HC RX REV CODE- 250/636: Performed by: SURGERY

## 2020-10-14 PROCEDURE — 74011250636 HC RX REV CODE- 250/636: Performed by: NURSE PRACTITIONER

## 2020-10-14 PROCEDURE — 65270000029 HC RM PRIVATE

## 2020-10-14 PROCEDURE — 36415 COLL VENOUS BLD VENIPUNCTURE: CPT

## 2020-10-14 PROCEDURE — 85025 COMPLETE CBC W/AUTO DIFF WBC: CPT

## 2020-10-14 PROCEDURE — 74011250637 HC RX REV CODE- 250/637: Performed by: NURSE PRACTITIONER

## 2020-10-14 PROCEDURE — 51798 US URINE CAPACITY MEASURE: CPT

## 2020-10-14 PROCEDURE — 74011250637 HC RX REV CODE- 250/637: Performed by: SURGERY

## 2020-10-14 PROCEDURE — 80048 BASIC METABOLIC PNL TOTAL CA: CPT

## 2020-10-14 PROCEDURE — 77030005513 HC CATH URETH FOL11 MDII -B

## 2020-10-14 RX ORDER — TAMSULOSIN HYDROCHLORIDE 0.4 MG/1
0.4 CAPSULE ORAL ONCE
Status: COMPLETED | OUTPATIENT
Start: 2020-10-14 | End: 2020-10-14

## 2020-10-14 RX ORDER — OXYCODONE HYDROCHLORIDE 5 MG/1
5 TABLET ORAL
Status: DISCONTINUED | OUTPATIENT
Start: 2020-10-14 | End: 2020-10-17 | Stop reason: HOSPADM

## 2020-10-14 RX ORDER — KETOROLAC TROMETHAMINE 30 MG/ML
15 INJECTION, SOLUTION INTRAMUSCULAR; INTRAVENOUS EVERY 6 HOURS
Status: DISCONTINUED | OUTPATIENT
Start: 2020-10-14 | End: 2020-10-17 | Stop reason: HOSPADM

## 2020-10-14 RX ADMIN — HYDROMORPHONE HYDROCHLORIDE 1 MG: 1 INJECTION, SOLUTION INTRAMUSCULAR; INTRAVENOUS; SUBCUTANEOUS at 01:48

## 2020-10-14 RX ADMIN — SERTRALINE HYDROCHLORIDE 100 MG: 50 TABLET ORAL at 21:18

## 2020-10-14 RX ADMIN — Medication 10 ML: at 01:49

## 2020-10-14 RX ADMIN — METRONIDAZOLE 500 MG: 500 INJECTION, SOLUTION INTRAVENOUS at 11:32

## 2020-10-14 RX ADMIN — METOPROLOL TARTRATE 50 MG: 50 TABLET, FILM COATED ORAL at 21:18

## 2020-10-14 RX ADMIN — SODIUM CHLORIDE 75 ML/HR: 900 INJECTION, SOLUTION INTRAVENOUS at 17:48

## 2020-10-14 RX ADMIN — HYDROMORPHONE HYDROCHLORIDE 1 MG: 1 INJECTION, SOLUTION INTRAMUSCULAR; INTRAVENOUS; SUBCUTANEOUS at 09:28

## 2020-10-14 RX ADMIN — METRONIDAZOLE 500 MG: 500 INJECTION, SOLUTION INTRAVENOUS at 21:18

## 2020-10-14 RX ADMIN — PRAVASTATIN SODIUM 40 MG: 20 TABLET ORAL at 21:18

## 2020-10-14 RX ADMIN — KETOROLAC TROMETHAMINE 15 MG: 30 INJECTION, SOLUTION INTRAMUSCULAR at 13:41

## 2020-10-14 RX ADMIN — KETOROLAC TROMETHAMINE 15 MG: 30 INJECTION, SOLUTION INTRAMUSCULAR at 17:48

## 2020-10-14 RX ADMIN — TAMSULOSIN HYDROCHLORIDE 0.4 MG: 0.4 CAPSULE ORAL at 21:18

## 2020-10-14 RX ADMIN — LEVOFLOXACIN 750 MG: 5 INJECTION, SOLUTION INTRAVENOUS at 21:18

## 2020-10-14 RX ADMIN — METRONIDAZOLE 500 MG: 500 INJECTION, SOLUTION INTRAVENOUS at 03:48

## 2020-10-14 RX ADMIN — ENOXAPARIN SODIUM 40 MG: 40 INJECTION SUBCUTANEOUS at 21:18

## 2020-10-14 RX ADMIN — TAMSULOSIN HYDROCHLORIDE 0.4 MG: 0.4 CAPSULE ORAL at 11:32

## 2020-10-14 RX ADMIN — Medication 10 ML: at 22:00

## 2020-10-14 NOTE — ED NOTES
TRANSFER - OUT REPORT:    Verbal report given to Sherice RN(name) on Delbert Forrest  being transferred to 5th floor(unit) for routine progression of care       Report consisted of patients Situation, Background, Assessment and   Recommendations(SBAR). Information from the following report(s) SBAR, Kardex, ED Summary, [facility] and Recent Results was reviewed with the receiving nurse. Lines:   Peripheral IV 10/13/20 Left Antecubital (Active)   Site Assessment Clean, dry, & intact 10/13/20 1654   Phlebitis Assessment 0 10/13/20 1654   Infiltration Assessment 0 10/13/20 1654   Dressing Status Clean, dry, & intact 10/13/20 1654   Dressing Type Transparent 10/13/20 1654   Hub Color/Line Status Pink 10/13/20 1654        Opportunity for questions and clarification was provided.       Patient transported with:  Transport

## 2020-10-14 NOTE — PROGRESS NOTES
Bedside and Verbal shift change report given to Ottoniel Snowden RN (oncoming nurse) by Cathy Pandya RN (offgoing nurse). Report included the following information SBAR, Kardex, Intake/Output, MAR, Accordion and Recent Results.

## 2020-10-14 NOTE — PROGRESS NOTES
[facility]: MED RECONCILIATION    Comments/Recommendations:   -Due to history of dementia and previous stroke,  completed medication reconciliation with patient's spouse  Spouse confirm patient's name, , allergies, and preferred pharmacy  Patient reports compliance to medications      Medications added:     None     Medications removed:    Donepezil 10mg PO QHS    Medications adjusted:    Amlodipine 2.5mg PO daily; Change from 10mg PO daily as previously reported. Metformin ER 500mg PO daily; Change from Metformin 500mg PO daily as previously reported. Information obtained from: Patient's spouse, Rx Query     Allergies: Chantix [varenicline] and Penicillins    Prior to Admission Medications:   Prior to Admission Medications   Prescriptions Last Dose Informant Taking? Cetirizine (ZYRTEC) 10 mg cap 10/13/2020 at am Significant Other Yes   Sig: Take 10 mg by mouth daily. amLODIPine (NORVASC) 2.5 mg tablet 10/13/2020 at am Significant Other Yes   Sig: Take 2.5 mg by mouth daily. aspirin delayed-release 81 mg tablet 10/13/2020 at am Significant Other Yes   Sig: Take 81 mg by mouth daily. memantine (Namenda) 10 mg tablet 10/13/2020 at am Significant Other Yes   Sig: Take 1 Tab by mouth two (2) times a day. metFORMIN ER (GLUCOPHAGE XR) 500 mg tablet 10/12/2020 at pm Significant Other Yes   Sig: Take 500 mg by mouth daily (with dinner). metoprolol tartrate (LOPRESSOR) 50 mg tablet 10/13/2020 at am Significant Other Yes   Sig: Take 50 mg by mouth two (2) times a day. pravastatin (PRAVACHOL) 40 mg tablet 10/12/2020 at pm Significant Other Yes   Sig: Take 1 Tab by mouth nightly. Indications: prevention of cerebrovascular accident   sertraline (ZOLOFT) 100 mg tablet 10/12/2020 at pm Significant Other Yes   Sig: Take 100 mg by mouth every evening. tamsulosin (FLOMAX) 0.4 mg capsule 10/12/2020 at pm Significant Other Yes   Sig: Take 0.4 mg by mouth every evening.       Facility-Administered Medications: None Marline Cunha, Pharm. D.   [address]

## 2020-10-14 NOTE — ROUTINE PROCESS
Bedside and Verbal shift change report given to Edd Logan (oncoming nurse) by Sabrina Alas RN (offgoing nurse). Report included the following information SBAR, Kardex, MAR and Accordion.

## 2020-10-14 NOTE — H&P
Assessment:     68 yo man with perforated appendicitis     Plan:     Admit for IV abx  Discussed with radiology - abscess not amenable to drainage  Discussed the options of surgical intervention vs antibiotics and close monitoring at this time. IV pain medication PRN  Clears today as tolerates  Repeat labs in am    Signed By: Fabian Scott MD  [facility]  [phone]    October 14, 2020          General Surgery History and Physical    Subjective:      Mikaela Chavez is a 69 y.o.  male who presents with a three day history of abdominal pain. He denies nausea or vomiting. He has been tolerating a regular diet and having regular bowel movements. He reports that the pain has been constant since onset. He denies any fevers or chills.      Past Medical History:   Diagnosis Date    Hemorrhagic stroke ([facility] [address] 3/28/2017    S/p TPA    Hypertension     Ill-defined condition     Eczema    Stroke ([facility] [address]     Type II diabetes mellitus ([facility] [address] 3/29/2017     Past Surgical History:   Procedure Laterality Date    HX ORTHOPAEDIC  03/2017    left wrist surgery      Family History   Problem Relation Age of Onset    Diabetes Mother     Cancer Father     Headache Other      Social History     Socioeconomic History    Marital status:      Spouse name: Not on file    Number of children: Not on file    Years of education: Not on file    Highest education level: Not on file   Tobacco Use    Smoking status: Former Smoker     Packs/day: 0.50     Years: 50.00     Pack years: 25.00     Types: Cigarettes     Last attempt to quit: 2/28/2017     Years since quitting: 3.6    Smokeless tobacco: Never Used    Tobacco comment: cold turkey 2.5 wks ago after attempt Chantix developed allergy   Substance and Sexual Activity    Alcohol use: No     Comment: around holidatys    Drug use: No      Current Facility-Administered Medications   Medication Dose Route Frequency    sodium chloride (NS) flush 5-40 mL  5-40 mL IntraVENous Q8H    sodium chloride (NS) flush 5-40 mL  5-40 mL IntraVENous PRN    0.9% sodium chloride infusion  75 mL/hr IntraVENous CONTINUOUS    ondansetron (ZOFRAN) injection 4 mg  4 mg IntraVENous Q6H PRN    metroNIDAZOLE (FLAGYL) IVPB premix 500 mg  500 mg IntraVENous Q8H    levoFLOXacin (LEVAQUIN) 750 mg in D5W IVPB  750 mg IntraVENous Q24H    enoxaparin (LOVENOX) injection 40 mg  40 mg SubCUTAneous Q24H    amLODIPine (NORVASC) tablet 2.5 mg  2.5 mg Oral DAILY    metoprolol tartrate (LOPRESSOR) tablet 50 mg  50 mg Oral BID    pravastatin (PRAVACHOL) tablet 40 mg  40 mg Oral QHS    sertraline (ZOLOFT) tablet 100 mg  100 mg Oral DAILY    tamsulosin (FLOMAX) capsule 0.4 mg  0.4 mg Oral DAILY    HYDROmorphone (PF) (DILAUDID) injection 1 mg  1 mg IntraVENous Q3H PRN      Allergies   Allergen Reactions    Chantix [Varenicline] Rash     Other reaction(s): Rash  Very itchy rash on back  Very itchy rash on back    Penicillins Hives       Review of Systems:     []     Unable to obtain  ROS due to  []    mental status change  []    sedated   []    intubated   [x]    Total of 12 system negative, unless specified below or in HPI:  Constitutional: negative fever, negative chills, negative weight loss  Eyes:   negative visual changes  ENT:   negative sore throat, tongue or lip swelling  Respiratory:  negative cough, negative dyspnea  Cards:  negative for chest pain, palpitations, lower extremity edema  GI:   negative for nausea, vomiting, diarrhea,positive for abdominal pain  :  negative for frequency, dysuria  Integument:  negative for rash and pruritus  Heme:  negative for easy bruising and gum/nose bleeding  Musculoskel: negative for myalgias,  back pain and muscle weakness  Neuro:  negative for headaches, dizziness, vertigo  Psych:  negative for feelings of anxiety, depression     Objective:        Patient Vitals for the past 8 hrs:   BP Temp Pulse Resp SpO2   10/14/20 1135 114/63 98.8 °F (37.1 °C) 74 16 94 %   10/14/20 0855 119/60  80     10/14/20 0713 96/63 97.4 °F (36.3 °C) 73 17 91 %       Temp (24hrs), Av.7 °F (36.5 °C), Min:97.1 °F (36.2 °C), Max:98.8 °F (37.1 °C)      Physical Exam:  General:  Alert, cooperative, no distress, appears stated age. Eyes:  Conjunctivae clear. PERRL, EOMs intact. Nose: Nares normal. Septum midline. Mucosa normal. No drainage or sinus tenderness. Mouth/Throat: Lips, mucosa, and tongue normal. Teeth and gums normal.   Neck: Supple, symmetrical, trachea midline   Back:   Symmetric, no curvature. ROM normal. No CVA tenderness. Lungs:   Clear to auscultation bilaterally. Heart:  Regular rate and rhythm   Abdomen:   Soft, diffusely tender worse in RLQ. Bowel sounds normal. No masses,  No organomegaly. Extremities: Extremities normal, atraumatic, no cyanosis or edema. Pulses: 2+ and symmetric all extremities.    Skin: Skin color, texture, turgor normal. No rashes or lesions         BMP:   Lab Results   Component Value Date/Time     (L) 10/14/2020 07:18 AM    K 4.1 10/14/2020 07:18 AM     10/14/2020 07:18 AM    CO2 28 10/14/2020 07:18 AM    AGAP 3 (L) 10/14/2020 07:18 AM     (H) 10/14/2020 07:18 AM    BUN 17 10/14/2020 07:18 AM    CREA 1.05 10/14/2020 07:18 AM    GFRAA >60 10/14/2020 07:18 AM    GFRNA >60 10/14/2020 07:18 AM     CMP:   Lab Results   Component Value Date/Time     (L) 10/14/2020 07:18 AM    K 4.1 10/14/2020 07:18 AM     10/14/2020 07:18 AM    CO2 28 10/14/2020 07:18 AM    AGAP 3 (L) 10/14/2020 07:18 AM     (H) 10/14/2020 07:18 AM    BUN 17 10/14/2020 07:18 AM    CREA 1.05 10/14/2020 07:18 AM    GFRAA >60 10/14/2020 07:18 AM    GFRNA >60 10/14/2020 07:18 AM    CA 8.7 10/14/2020 07:18 AM    ALB 3.8 10/13/2020 04:49 PM    TP 8.1 10/13/2020 04:49 PM    GLOB 4.3 (H) 10/13/2020 04:49 PM    AGRAT 0.9 (L) 10/13/2020 04:49 PM    ALT 17 10/13/2020 04:49 PM     CBC:   Lab Results   Component Value Date/Time    WBC 11.2 (H) 10/14/2020 07:18 AM    HGB 13.4 10/14/2020 07:18 AM    HCT 40.9 10/14/2020 07:18 AM     10/14/2020 07:18 AM     All Cardiac Markers in the last 24 hours: No results found for: CPK, CK, CKMMB, CKMB, RCK3, CKMBT, CKNDX, CKND1, ASH, TROPT, TROIQ, KIRTI, TROPT, TNIPOC, BNP, BNPP  ABG: No results found for: PH, PHI, PCO2, PCO2I, PO2, PO2I, HCO3, HCO3I, FIO2, FIO2I  COAGS: No results found for: APTT, PTP, INR, INREXT, INREXT  Pancreatic Markers:   Lab Results   Component Value Date/Time    LPSE 147 10/13/2020 04:49 PM

## 2020-10-14 NOTE — PROGRESS NOTES
1547-Lopez to be inserted. 1540-Bladder scan re-checked. Showed 625 mL. Faustina Crowell general surgery NP notified. 1344-Patient voided 200 mL; bladder scan to be re-checked at 1500.  1125-Flomax ordered for urinary retention. 1040-RN arrived at bedside to assist patient to bathroom. Patient ambulated with x 1 and cane to bathroom. After 15 minutes, patient still unable to void. Bladder scan showed 445 mL of urine. Faustina Crowell, general surgery NP notified. Awaiting orders.

## 2020-10-14 NOTE — PROGRESS NOTES
Problem: Falls - Risk of  Goal: *Absence of Falls  Description: Document Anette Eli Fall Risk and appropriate interventions in the flowsheet.   Outcome: Progressing Towards Goal  Note: Fall Risk Interventions:  Mobility Interventions: Patient to call before getting OOB, Utilize walker, cane, or other assistive device         Medication Interventions: Patient to call before getting OOB, Teach patient to arise slowly                   Problem: Pain  Goal: *Control of Pain  Outcome: Progressing Towards Goal

## 2020-10-14 NOTE — PROGRESS NOTES
3:21 PM    Transition of Care Plan:            1. Home with family assistance  2. PCP follow up  3. AD planning  4.  CM to follow for discharge planning    Juan Pablo Hernandez

## 2020-10-14 NOTE — PROGRESS NOTES
Spiritual Care Assessment/Progress Note  [address]      NAME: Chris Roldan      MRN: [id number]  AGE: 69 y.o. SEX: male  Hoahaoism Affiliation: Presbyterian   Language: English     10/14/2020     Total Time (in minutes): 28     Spiritual Assessment begun in [facility] 5M1 MED SURG 1 through conversation with:         [x]Patient        [] Family    [] Friend(s)        Reason for Consult: Advance medical directive consult     Spiritual beliefs: (Please include comment if needed)     [x] Identifies with a willa tradition: Presbyterian        [x] Supported by a willa community: [email or web address]           [] Claims no spiritual orientation:           [] Seeking spiritual identity:                [] Adheres to an individual form of spirituality:           [] Not able to assess:                           Identified resources for coping:      [x] Prayer                               [] Music                  [] Guided Imagery     [x] Family/friends                 [] Pet visits     [] Devotional reading                         [] Unknown     [] Other:                                              Interventions offered during this visit: (See comments for more details)    Patient Interventions: Other (comment)(Unable to assess)     Family/Friend(s):  Affirmation of willa, Catharsis/review of pertinent events in supportive environment, Coping skills reviewed/reinforced     Plan of Care:     [] Support spiritual and/or cultural needs    [x] Support AMD and/or advance care planning process      [] Support grieving process   [] Coordinate Rites and/or Rituals    [] Coordination with community clergy   [] No spiritual needs identified at this time   [] Detailed Plan of Care below (See Comments)  [] Make referral to Music Therapy  [] Make referral to Pet Therapy     [] Make referral to Addiction services  [] Make referral to [facility]  [] Make referral to Spiritual Care Partner  [] No future visits requested        [x] Follow up visits as needed     Comments:    responded to an in-basket request to assist Mr. Hui Carlos with an Advanced Medical Directive (AMD) on the Med. Surgical Unit. Consulted with his nurse who confirmed that Mr. Hui Carlos would be able to have a conversation with the  at this time. Mr. Hui Carlos was lying in bed and appeared to be resting comfortably when the  came to the room. He did not become awake to the 's presence or voice. His wife, Robyn Still, was sitting at the bedside. She made good eye contact with the  and greeted the  warmly. Engaged in active listening as Robyn Still shared about her 's illness concerns and the set back that they have experienced with this hospitalization. She also shared that she spoke to their  this morning and many people in their [employer] are lifting Mr. Hui Carlos and herself up in prayer.  inquired about the Advanced Medical Directive request and she shared that is something they wanted to have more information.  left and AMD form and the Your Right To Decide brochure for Mr Hui Carlos and Robyn Still to review when able.  also informed Robyn Still that if Mr. Hui Carlos chose not to complete an AMD, she would automatically become his decision maker per the [facility]. She expressed understanding. Robyn Joeer thanked the  for her visit and expressed no additional needs at this time. 's will follow up as able and/or needed  Applika. Lurdes Parkinson.      Paging Service: CHRIS ([phone])

## 2020-10-14 NOTE — ROUTINE PROCESS
Received consult for \"drain placement\" from Dr. Maddie Toribio. Not performed in IR. Notified Terrie Olivas from [facility] of the consult for Mr. Lavada Cockayne.

## 2020-10-14 NOTE — ROUTINE PROCESS
2235: Pt in 8/10 pain after morphine. Calling Dr. Mike Briggs to make her aware of patient's pain and impression on CT. 
 
2315: Ni calls back. States that patient will need a drain placed by IR in the morning. Orders for 1 mg dilaudid q3hrs for severe pain. Morphine discontinued.

## 2020-10-15 LAB
ALBUMIN SERPL-MCNC: 2.8 G/DL (ref 3.5–5)
ALBUMIN/GLOB SERPL: 0.8 {RATIO} (ref 1.1–2.2)
ALP SERPL-CCNC: 49 U/L (ref 45–117)
ALT SERPL-CCNC: 11 U/L (ref 12–78)
ANION GAP SERPL CALC-SCNC: 3 MMOL/L (ref 5–15)
AST SERPL-CCNC: 10 U/L (ref 15–37)
BASOPHILS # BLD: 0 K/UL (ref 0–0.1)
BASOPHILS NFR BLD: 0 % (ref 0–1)
BILIRUB SERPL-MCNC: 0.7 MG/DL (ref 0.2–1)
BUN SERPL-MCNC: 22 MG/DL (ref 6–20)
BUN/CREAT SERPL: 23 (ref 12–20)
CALCIUM SERPL-MCNC: 8 MG/DL (ref 8.5–10.1)
CHLORIDE SERPL-SCNC: 106 MMOL/L (ref 97–108)
CO2 SERPL-SCNC: 28 MMOL/L (ref 21–32)
CREAT SERPL-MCNC: 0.96 MG/DL (ref 0.7–1.3)
DIFFERENTIAL METHOD BLD: ABNORMAL
EOSINOPHIL # BLD: 0.1 K/UL (ref 0–0.4)
EOSINOPHIL NFR BLD: 1 % (ref 0–7)
ERYTHROCYTE [DISTWIDTH] IN BLOOD BY AUTOMATED COUNT: 12.3 % (ref 11.5–14.5)
GLOBULIN SER CALC-MCNC: 3.4 G/DL (ref 2–4)
GLUCOSE SERPL-MCNC: 124 MG/DL (ref 65–100)
HCT VFR BLD AUTO: 35.7 % (ref 36.6–50.3)
HGB BLD-MCNC: 11.9 G/DL (ref 12.1–17)
IMM GRANULOCYTES # BLD AUTO: 0 K/UL (ref 0–0.04)
IMM GRANULOCYTES NFR BLD AUTO: 0 % (ref 0–0.5)
LYMPHOCYTES # BLD: 1.3 K/UL (ref 0.8–3.5)
LYMPHOCYTES NFR BLD: 14 % (ref 12–49)
MCH RBC QN AUTO: 31.3 PG (ref 26–34)
MCHC RBC AUTO-ENTMCNC: 33.3 G/DL (ref 30–36.5)
MCV RBC AUTO: 93.9 FL (ref 80–99)
MONOCYTES # BLD: 0.8 K/UL (ref 0–1)
MONOCYTES NFR BLD: 9 % (ref 5–13)
NEUTS SEG # BLD: 7 K/UL (ref 1.8–8)
NEUTS SEG NFR BLD: 76 % (ref 32–75)
NRBC # BLD: 0 K/UL (ref 0–0.01)
NRBC BLD-RTO: 0 PER 100 WBC
PLATELET # BLD AUTO: 190 K/UL (ref 150–400)
PMV BLD AUTO: 9.3 FL (ref 8.9–12.9)
POTASSIUM SERPL-SCNC: 4 MMOL/L (ref 3.5–5.1)
PROT SERPL-MCNC: 6.2 G/DL (ref 6.4–8.2)
RBC # BLD AUTO: 3.8 M/UL (ref 4.1–5.7)
SODIUM SERPL-SCNC: 137 MMOL/L (ref 136–145)
WBC # BLD AUTO: 9.2 K/UL (ref 4.1–11.1)

## 2020-10-15 PROCEDURE — 36415 COLL VENOUS BLD VENIPUNCTURE: CPT

## 2020-10-15 PROCEDURE — 80053 COMPREHEN METABOLIC PANEL: CPT

## 2020-10-15 PROCEDURE — 74011250637 HC RX REV CODE- 250/637: Performed by: NURSE PRACTITIONER

## 2020-10-15 PROCEDURE — 85025 COMPLETE CBC W/AUTO DIFF WBC: CPT

## 2020-10-15 PROCEDURE — 74011250637 HC RX REV CODE- 250/637: Performed by: SURGERY

## 2020-10-15 PROCEDURE — 74011250636 HC RX REV CODE- 250/636: Performed by: SURGERY

## 2020-10-15 PROCEDURE — 65270000029 HC RM PRIVATE

## 2020-10-15 PROCEDURE — 74011250636 HC RX REV CODE- 250/636: Performed by: NURSE PRACTITIONER

## 2020-10-15 RX ADMIN — OXYCODONE 5 MG: 5 TABLET ORAL at 04:22

## 2020-10-15 RX ADMIN — KETOROLAC TROMETHAMINE 15 MG: 30 INJECTION, SOLUTION INTRAMUSCULAR at 05:27

## 2020-10-15 RX ADMIN — METRONIDAZOLE 500 MG: 500 INJECTION, SOLUTION INTRAVENOUS at 12:12

## 2020-10-15 RX ADMIN — SERTRALINE HYDROCHLORIDE 100 MG: 50 TABLET ORAL at 21:38

## 2020-10-15 RX ADMIN — METOPROLOL TARTRATE 50 MG: 50 TABLET, FILM COATED ORAL at 08:52

## 2020-10-15 RX ADMIN — LEVOFLOXACIN 750 MG: 5 INJECTION, SOLUTION INTRAVENOUS at 21:38

## 2020-10-15 RX ADMIN — KETOROLAC TROMETHAMINE 15 MG: 30 INJECTION, SOLUTION INTRAMUSCULAR at 00:54

## 2020-10-15 RX ADMIN — KETOROLAC TROMETHAMINE 15 MG: 30 INJECTION, SOLUTION INTRAMUSCULAR at 17:08

## 2020-10-15 RX ADMIN — METOPROLOL TARTRATE 50 MG: 50 TABLET, FILM COATED ORAL at 21:38

## 2020-10-15 RX ADMIN — TAMSULOSIN HYDROCHLORIDE 0.4 MG: 0.4 CAPSULE ORAL at 21:38

## 2020-10-15 RX ADMIN — PRAVASTATIN SODIUM 40 MG: 20 TABLET ORAL at 21:38

## 2020-10-15 RX ADMIN — METRONIDAZOLE 500 MG: 500 INJECTION, SOLUTION INTRAVENOUS at 04:16

## 2020-10-15 RX ADMIN — KETOROLAC TROMETHAMINE 15 MG: 30 INJECTION, SOLUTION INTRAMUSCULAR at 23:37

## 2020-10-15 RX ADMIN — ENOXAPARIN SODIUM 40 MG: 40 INJECTION SUBCUTANEOUS at 21:38

## 2020-10-15 RX ADMIN — KETOROLAC TROMETHAMINE 15 MG: 30 INJECTION, SOLUTION INTRAMUSCULAR at 12:12

## 2020-10-15 RX ADMIN — Medication 10 ML: at 21:46

## 2020-10-15 RX ADMIN — METRONIDAZOLE 500 MG: 500 INJECTION, SOLUTION INTRAVENOUS at 19:53

## 2020-10-15 NOTE — ROUTINE PROCESS
Bedside shift change report given to Lorie Grimm RN (oncoming nurse) by Hanna Millan RN (offgoing nurse). Report included the following information SBAR, Kardex and MAR.

## 2020-10-15 NOTE — PROGRESS NOTES
Transition of Care Plan:     RUR-9%-low     1. Home with family assistance  2. PCP follow up  3. AD planning  4. CM to follow for discharge planning  5. Tolerating clears  JENI ZhengRN

## 2020-10-15 NOTE — PROGRESS NOTES
General Surgery Daily Progress Note    Patient: Chris Roldan MRN: [id number]  SSN: [id number]    YOB: 1950  Age: 69 y.o. Sex: male      Admit Date: 10/13/2020    POD * No surgery found *    Procedure: * No surgery found *    Subjective:   Pt today states his pain is less. He still feels a significant amnt of tenderness in right upper and lower quadrant, but no longer diffuse. Denies N/V.  +flatus. Tolerating clear liquids diet. Pain in bladder feeling better with rowland in place. Current Facility-Administered Medications   Medication Dose Route Frequency    ketorolac (TORADOL) injection 15 mg  15 mg IntraVENous Q6H    oxyCODONE IR (ROXICODONE) tablet 5 mg  5 mg Oral Q4H PRN    sodium chloride (NS) flush 5-40 mL  5-40 mL IntraVENous Q8H    sodium chloride (NS) flush 5-40 mL  5-40 mL IntraVENous PRN    0.9% sodium chloride infusion  125 mL/hr IntraVENous CONTINUOUS    ondansetron (ZOFRAN) injection 4 mg  4 mg IntraVENous Q6H PRN    metroNIDAZOLE (FLAGYL) IVPB premix 500 mg  500 mg IntraVENous Q8H    levoFLOXacin (LEVAQUIN) 750 mg in D5W IVPB  750 mg IntraVENous Q24H    enoxaparin (LOVENOX) injection 40 mg  40 mg SubCUTAneous Q24H    amLODIPine (NORVASC) tablet 2.5 mg  2.5 mg Oral DAILY    metoprolol tartrate (LOPRESSOR) tablet 50 mg  50 mg Oral BID    pravastatin (PRAVACHOL) tablet 40 mg  40 mg Oral QHS    sertraline (ZOLOFT) tablet 100 mg  100 mg Oral DAILY    tamsulosin (FLOMAX) capsule 0.4 mg  0.4 mg Oral DAILY    HYDROmorphone (PF) (DILAUDID) injection 1 mg  1 mg IntraVENous Q3H PRN        Objective:   No intake/output data recorded.   10/13 1901 - 10/15 0700  In: 568.8 [I.V.:568.8]  Out: 800 [Urine:800]  Patient Vitals for the past 8 hrs:   BP Temp Pulse Resp SpO2   10/15/20 0806 107/63 97.7 °F (36.5 °C) 71 17 97 %   10/15/20 0408 98/61 97.3 °F (36.3 °C) 67 18 95 %       Physical Exam:  General: Alert, cooperative, NAD  Lungs: Unlabored  Heart:  RRR  Abdomen: Soft, diffusely tender worse in RLQ. Bowel sounds normal. No masses,  No organomegaly  Extremities: Warm, moves all, no edema  Skin:  Warm and dry, no rash    Labs:   Recent Labs     10/15/20  0526   WBC 9.2   HGB 11.9*   HCT 35.7*        Recent Labs     10/15/20  0526      K 4.0      CO2 28   *   BUN 22*   CREA 0.96   CA 8.0*   ALB 2.8*   TBILI 0.7   ALT 11*       Assessment / Plan:   Continue conservative management   Continue current pain regimen   Bps min soft. Hold amlodipine and give metoprolol.    Increase fluids to 125cc/hr  Clear liquid diet  Keep Lopez  Intake and Ouput       Pt and Plan Discussed with Dr Stephen Cerna, NP

## 2020-10-15 NOTE — PROGRESS NOTES
RN notified Livia Hoyos general surgery NP about patient's low BP (98/54). Livia Hoyos NP stated to hold evening dose of metoprolol is patient's BP continues to run low.

## 2020-10-15 NOTE — PROGRESS NOTES
Bedside and Verbal shift change report given to Mey Day RN (oncoming nurse) by Yaya Givens RN (offgoing nurse). Report included the following information SBAR, Kardex, Intake/Output, MAR, Accordion and Recent Results.

## 2020-10-16 LAB
ALBUMIN SERPL-MCNC: 3 G/DL (ref 3.5–5)
ALBUMIN/GLOB SERPL: 0.9 {RATIO} (ref 1.1–2.2)
ALP SERPL-CCNC: 50 U/L (ref 45–117)
ALT SERPL-CCNC: 11 U/L (ref 12–78)
ANION GAP SERPL CALC-SCNC: 5 MMOL/L (ref 5–15)
AST SERPL-CCNC: 11 U/L (ref 15–37)
BASOPHILS # BLD: 0 K/UL (ref 0–0.1)
BASOPHILS NFR BLD: 0 % (ref 0–1)
BILIRUB SERPL-MCNC: 0.7 MG/DL (ref 0.2–1)
BUN SERPL-MCNC: 21 MG/DL (ref 6–20)
BUN/CREAT SERPL: 22 (ref 12–20)
CALCIUM SERPL-MCNC: 8.4 MG/DL (ref 8.5–10.1)
CHLORIDE SERPL-SCNC: 108 MMOL/L (ref 97–108)
CO2 SERPL-SCNC: 25 MMOL/L (ref 21–32)
CREAT SERPL-MCNC: 0.97 MG/DL (ref 0.7–1.3)
DIFFERENTIAL METHOD BLD: ABNORMAL
EOSINOPHIL # BLD: 0.2 K/UL (ref 0–0.4)
EOSINOPHIL NFR BLD: 2 % (ref 0–7)
ERYTHROCYTE [DISTWIDTH] IN BLOOD BY AUTOMATED COUNT: 11.9 % (ref 11.5–14.5)
GLOBULIN SER CALC-MCNC: 3.5 G/DL (ref 2–4)
GLUCOSE SERPL-MCNC: 88 MG/DL (ref 65–100)
HCT VFR BLD AUTO: 37.4 % (ref 36.6–50.3)
HGB BLD-MCNC: 12.3 G/DL (ref 12.1–17)
IMM GRANULOCYTES # BLD AUTO: 0 K/UL (ref 0–0.04)
IMM GRANULOCYTES NFR BLD AUTO: 0 % (ref 0–0.5)
LYMPHOCYTES # BLD: 1.4 K/UL (ref 0.8–3.5)
LYMPHOCYTES NFR BLD: 15 % (ref 12–49)
MCH RBC QN AUTO: 31.3 PG (ref 26–34)
MCHC RBC AUTO-ENTMCNC: 32.9 G/DL (ref 30–36.5)
MCV RBC AUTO: 95.2 FL (ref 80–99)
MONOCYTES # BLD: 0.8 K/UL (ref 0–1)
MONOCYTES NFR BLD: 9 % (ref 5–13)
NEUTS SEG # BLD: 6.9 K/UL (ref 1.8–8)
NEUTS SEG NFR BLD: 74 % (ref 32–75)
NRBC # BLD: 0 K/UL (ref 0–0.01)
NRBC BLD-RTO: 0 PER 100 WBC
PLATELET # BLD AUTO: 204 K/UL (ref 150–400)
PMV BLD AUTO: 9.8 FL (ref 8.9–12.9)
POTASSIUM SERPL-SCNC: 4.4 MMOL/L (ref 3.5–5.1)
PROT SERPL-MCNC: 6.5 G/DL (ref 6.4–8.2)
RBC # BLD AUTO: 3.93 M/UL (ref 4.1–5.7)
SODIUM SERPL-SCNC: 138 MMOL/L (ref 136–145)
WBC # BLD AUTO: 9.3 K/UL (ref 4.1–11.1)

## 2020-10-16 PROCEDURE — 85025 COMPLETE CBC W/AUTO DIFF WBC: CPT

## 2020-10-16 PROCEDURE — 65270000029 HC RM PRIVATE

## 2020-10-16 PROCEDURE — 74011250636 HC RX REV CODE- 250/636: Performed by: NURSE PRACTITIONER

## 2020-10-16 PROCEDURE — 74011250636 HC RX REV CODE- 250/636: Performed by: SURGERY

## 2020-10-16 PROCEDURE — 80053 COMPREHEN METABOLIC PANEL: CPT

## 2020-10-16 PROCEDURE — 36415 COLL VENOUS BLD VENIPUNCTURE: CPT

## 2020-10-16 PROCEDURE — 74011250637 HC RX REV CODE- 250/637: Performed by: SURGERY

## 2020-10-16 RX ORDER — CIPROFLOXACIN 500 MG/1
500 TABLET ORAL EVERY 12 HOURS
Qty: 28 TAB | Refills: 0 | Status: SHIPPED | OUTPATIENT
Start: 2020-10-16 | End: 2020-10-30

## 2020-10-16 RX ORDER — METRONIDAZOLE 500 MG/1
500 TABLET ORAL 3 TIMES DAILY
Qty: 42 TAB | Refills: 0 | Status: SHIPPED | OUTPATIENT
Start: 2020-10-16 | End: 2020-10-30

## 2020-10-16 RX ADMIN — METOPROLOL TARTRATE 50 MG: 50 TABLET, FILM COATED ORAL at 21:07

## 2020-10-16 RX ADMIN — KETOROLAC TROMETHAMINE 15 MG: 30 INJECTION, SOLUTION INTRAMUSCULAR at 05:36

## 2020-10-16 RX ADMIN — METRONIDAZOLE 500 MG: 500 INJECTION, SOLUTION INTRAVENOUS at 12:37

## 2020-10-16 RX ADMIN — METRONIDAZOLE 500 MG: 500 INJECTION, SOLUTION INTRAVENOUS at 03:31

## 2020-10-16 RX ADMIN — LEVOFLOXACIN 750 MG: 5 INJECTION, SOLUTION INTRAVENOUS at 21:07

## 2020-10-16 RX ADMIN — METOPROLOL TARTRATE 50 MG: 50 TABLET, FILM COATED ORAL at 10:29

## 2020-10-16 RX ADMIN — SODIUM CHLORIDE 125 ML/HR: 900 INJECTION, SOLUTION INTRAVENOUS at 12:41

## 2020-10-16 RX ADMIN — Medication 10 ML: at 05:18

## 2020-10-16 RX ADMIN — AMLODIPINE BESYLATE 2.5 MG: 2.5 TABLET ORAL at 10:29

## 2020-10-16 RX ADMIN — KETOROLAC TROMETHAMINE 15 MG: 30 INJECTION, SOLUTION INTRAMUSCULAR at 19:27

## 2020-10-16 RX ADMIN — Medication 10 ML: at 12:38

## 2020-10-16 RX ADMIN — METRONIDAZOLE 500 MG: 500 INJECTION, SOLUTION INTRAVENOUS at 21:06

## 2020-10-16 RX ADMIN — Medication 10 ML: at 21:09

## 2020-10-16 RX ADMIN — SERTRALINE HYDROCHLORIDE 100 MG: 50 TABLET ORAL at 21:07

## 2020-10-16 RX ADMIN — ENOXAPARIN SODIUM 40 MG: 40 INJECTION SUBCUTANEOUS at 21:08

## 2020-10-16 RX ADMIN — KETOROLAC TROMETHAMINE 15 MG: 30 INJECTION, SOLUTION INTRAMUSCULAR at 12:37

## 2020-10-16 RX ADMIN — TAMSULOSIN HYDROCHLORIDE 0.4 MG: 0.4 CAPSULE ORAL at 21:08

## 2020-10-16 RX ADMIN — PRAVASTATIN SODIUM 40 MG: 20 TABLET ORAL at 21:07

## 2020-10-16 RX ADMIN — SODIUM CHLORIDE 125 ML/HR: 900 INJECTION, SOLUTION INTRAVENOUS at 02:12

## 2020-10-16 RX ADMIN — SODIUM CHLORIDE 125 ML/HR: 900 INJECTION, SOLUTION INTRAVENOUS at 21:18

## 2020-10-16 NOTE — PROGRESS NOTES
Bedside and Verbal shift change report given to Magaly (oncoming nurse) by Symone Adames (offgoing nurse). Report included the following information SBAR, Intake/Output, MAR and Recent Results.

## 2020-10-16 NOTE — PROGRESS NOTES
Transition of Care Plan:     RUR-8%-low     1. Home with family assistance  2. PCP follow up  3. Lopez out  4. CM to follow for discharge planning  5. Advance diet  6. May d/c later today or Saturday  JENI Zheng RN

## 2020-10-16 NOTE — PROGRESS NOTES
Pt pharmacy called to state that Rx Cipro mixed with pt Aricept can cause arrythmias. Pt tolerating IV levaquin in hospital.  14 day course given with flagyl. Pt allergic to PCNs. Pt on metoprolol 50 BID. Wife informed and aware to call if any issues/concerns.         Aleida Sethi NP

## 2020-10-16 NOTE — PROGRESS NOTES
General Surgery Daily Progress Note    Patient: Katelynn Ashton MRN: [id number]  SSN: [id number]    YOB: 1950  Age: 69 y.o. Sex: male      Admit Date: 10/13/2020    POD * No surgery found *    Procedure: * No surgery found *    Subjective:   Pt today states he feels better today and would like to go home. He still feels tenderness in right lower quadrant, but no longer diffuse. Denies N/V.  +flatus. Tolerating clear liquids diet. Denies pain associated with urination/rowland. Current Facility-Administered Medications   Medication Dose Route Frequency    ketorolac (TORADOL) injection 15 mg  15 mg IntraVENous Q6H    oxyCODONE IR (ROXICODONE) tablet 5 mg  5 mg Oral Q4H PRN    sodium chloride (NS) flush 5-40 mL  5-40 mL IntraVENous Q8H    sodium chloride (NS) flush 5-40 mL  5-40 mL IntraVENous PRN    0.9% sodium chloride infusion  125 mL/hr IntraVENous CONTINUOUS    ondansetron (ZOFRAN) injection 4 mg  4 mg IntraVENous Q6H PRN    metroNIDAZOLE (FLAGYL) IVPB premix 500 mg  500 mg IntraVENous Q8H    levoFLOXacin (LEVAQUIN) 750 mg in D5W IVPB  750 mg IntraVENous Q24H    enoxaparin (LOVENOX) injection 40 mg  40 mg SubCUTAneous Q24H    amLODIPine (NORVASC) tablet 2.5 mg  2.5 mg Oral DAILY    metoprolol tartrate (LOPRESSOR) tablet 50 mg  50 mg Oral BID    pravastatin (PRAVACHOL) tablet 40 mg  40 mg Oral QHS    sertraline (ZOLOFT) tablet 100 mg  100 mg Oral DAILY    tamsulosin (FLOMAX) capsule 0.4 mg  0.4 mg Oral DAILY    HYDROmorphone (PF) (DILAUDID) injection 1 mg  1 mg IntraVENous Q3H PRN        Objective:   No intake/output data recorded.   10/14 1901 - 10/16 0700  In: -   Out: 2300 [Urine:2300]  Patient Vitals for the past 8 hrs:   BP Temp Pulse Resp SpO2   10/16/20 0825 131/83 97.5 °F (36.4 °C) 79 16 95 %   10/16/20 0327 126/81 98.3 °F (36.8 °C) 71 16 95 %       Physical Exam:  General: Alert, cooperative, NAD  HEENT:   Poor dentition  Lungs: Unlabored  Heart:  RRR  Abdomen: Soft, TTP in RLQ- no longer diffuse. Bowel sounds normal. No masses,  No organomegaly  Extremities: Warm, moves all, no edema  Skin:  Warm and dry, no rash    Labs:   Recent Labs     10/16/20  0523   WBC 9.3   HGB 12.3   HCT 37.4        Recent Labs     10/16/20  0523      K 4.4      CO2 25   GLU 88   BUN 21*   CREA 0.97   CA 8.4*   ALB 3.0*   TBILI 0.7   ALT 11*       Assessment / Plan:   Continue conservative management- IV antibx  Continue current pain regimen   Bps min soft.   Will need to discuss decreased dose of BP meds on discharge  Decrease fluids to maint until urinating on own and tolerating diet  D/C rowland  Advance to regular diet  Intake and Ouput   Possible discharge late today or tomorrow- will need repeat CT outpt in one week      Pt and Plan Discussed with Dr Olinda Mascorro, NP

## 2020-10-16 NOTE — PROGRESS NOTES
Comprehensive Nutrition Assessment    Type and Reason for Visit: Initial, RD nutrition re-screen/LOS    Nutrition Recommendations/Plan:   1. Continue regular diet. 2. Send Ensure Clear TID to promote adequate intake. Nutrition Assessment:      1016: 68 y/o male admitted with appendicitis. PMH includes HTN, DM, dementia. BMI 29.7, c/w overweight. Pt was on CLD at time of visit. Diet advanced to regular after visit. Pt reports good appetite. Eating well PTA. Thinks he has probably lost weight since admission d/t being on CLD. Says #. #. No c/o N/V. Agreeable to receiving ensure clear while on CLD. Will still add although diet now advanced. Monitor for acceptance. Labs- reviewed. Meds- flagyl. Malnutrition Assessment:  Malnutrition Status: none    Estimated Daily Nutrient Needs:  Energy (kcal):  2713(7246 x 1.3 AF)  Protein (g):  99(1-1.2 g/kg)       Fluid (ml/day):  2321(1mL/kcal)    Nutrition Related Findings:  LBM unknown      Wounds:    None       Current Nutrition Therapies:  DIET NUTRITIONAL SUPPLEMENTS Lunch, Dinner, Breakfast; Ensure Clear  DIET REGULAR    Anthropometric Measures:  · Height:  6' (182.9 cm)  · Current Body Wt:  99.3 kg (219 lb)   · Admission Body Wt:       · Usual Body Wt:        · Ideal Body Wt:  178 lbs:  123 %   ·   · BMI Category: Overweight (BMI 25.0-29. 9)       Nutrition Diagnosis:   · Inadequate oral intake related to inadequate protein-energy intake as evidenced by NPO or clear liquid status due to medical condition    Nutrition Interventions:   Food and/or Nutrient Delivery: Modify current diet, Start oral nutrition supplement  Nutrition Education and Counseling: No recommendations at this time  Coordination of Nutrition Care: Continued inpatient monitoring    Goals:  Po intake >75% meals + ONS next 1-3 days       Nutrition Monitoring and Evaluation:   Food/Nutrient Intake Outcomes: Food and nutrient intake, Supplement intake  Physical Signs/Symptoms Outcomes: Weight, Biochemical data    Discharge Planning:    Continue current diet     Electronically signed by Jana Davidson RDN on 10/16/2020 at 11:07 AM    Contact: [phone]

## 2020-10-16 NOTE — DISCHARGE INSTRUCTIONS
Patient Education        Possible Appendicitis: Care Instructions  Your Care Instructions     Your doctor thinks you may have appendicitis. This means that your appendix may be infected. The appendix is a small sac that is shaped like a finger. It's attached to your large intestine. Sometimes it's hard to tell if a person has appendicitis. It is especially hard to tell in children, pregnant women, and older adults. If your doctor thinks it's possible that you have appendicitis, he or she may want to order more tests. Or your doctor may want to wait to see if your symptoms change. Your doctor thinks it's okay for you to go home right now. But you will need to watch for symptoms of appendicitis at home. If your symptoms continue or get worse, it's important to call your doctor or get medical care right away. Appendicitis can get serious very quickly. The main treatment is surgery to remove your appendix. Follow-up care is a key part of your treatment and safety. Be sure to make and go to all appointments, and call your doctor if you are having problems. It's also a good idea to know your test results and keep a list of the medicines you take. How can you care for yourself at home? · Do not eat or drink, unless your doctor says it is okay. If you need surgery, it's best to have an empty stomach. If you're thirsty, you can rinse your mouth with water. Or you can suck on hard candy. · Do not take laxatives. If you have appendicitis, they can make the appendix burst.  · Follow your doctor's instructions about taking medicines. Your doctor may tell you not to take antibiotics or pain pills. These medicines can make it harder to tell if you have appendicitis. · Watch for symptoms of appendicitis. See the When should you call for help section below. It is very important to follow your doctor's instructions about getting treatment if you have these symptoms. When should you call for help?    Call your doctor now or seek immediate medical care if:    · You have pain that becomes focused on one area of your belly.     · You have new or worse belly pain.     · You are vomiting.     · You have a fever.     · You cannot pass stools or gas. Watch closely for changes in your health, and be sure to contact your doctor if:    · You do not get better as expected. Where can you learn more? Go to http://www.gray.com/  Enter J463 in the search box to learn more about \"Possible Appendicitis: Care Instructions. \"  Current as of: April 15, 2020               Content Version: 12.6  © 7785-7383 [employer]. Care instructions adapted under license by [employer] (which disclaims liability or warranty for this information). If you have questions about a medical condition or this instruction, always ask your healthcare professional. [address] any warranty or liability for your use of this information.

## 2020-10-17 VITALS
SYSTOLIC BLOOD PRESSURE: 111 MMHG | TEMPERATURE: 98 F | HEART RATE: 69 BPM | HEIGHT: 72 IN | RESPIRATION RATE: 16 BRPM | DIASTOLIC BLOOD PRESSURE: 74 MMHG | BODY MASS INDEX: 29.66 KG/M2 | OXYGEN SATURATION: 94 % | WEIGHT: 219 LBS

## 2020-10-17 LAB
ALBUMIN SERPL-MCNC: 2.7 G/DL (ref 3.5–5)
ALBUMIN/GLOB SERPL: 0.8 {RATIO} (ref 1.1–2.2)
ALP SERPL-CCNC: 48 U/L (ref 45–117)
ALT SERPL-CCNC: 12 U/L (ref 12–78)
ANION GAP SERPL CALC-SCNC: 7 MMOL/L (ref 5–15)
AST SERPL-CCNC: 18 U/L (ref 15–37)
BASOPHILS # BLD: 0 K/UL (ref 0–0.1)
BASOPHILS NFR BLD: 0 % (ref 0–1)
BILIRUB SERPL-MCNC: 0.5 MG/DL (ref 0.2–1)
BUN SERPL-MCNC: 16 MG/DL (ref 6–20)
BUN/CREAT SERPL: 21 (ref 12–20)
CALCIUM SERPL-MCNC: 8.4 MG/DL (ref 8.5–10.1)
CHLORIDE SERPL-SCNC: 109 MMOL/L (ref 97–108)
CO2 SERPL-SCNC: 22 MMOL/L (ref 21–32)
CREAT SERPL-MCNC: 0.78 MG/DL (ref 0.7–1.3)
DIFFERENTIAL METHOD BLD: ABNORMAL
EOSINOPHIL # BLD: 0.2 K/UL (ref 0–0.4)
EOSINOPHIL NFR BLD: 3 % (ref 0–7)
ERYTHROCYTE [DISTWIDTH] IN BLOOD BY AUTOMATED COUNT: 12 % (ref 11.5–14.5)
GLOBULIN SER CALC-MCNC: 3.3 G/DL (ref 2–4)
GLUCOSE SERPL-MCNC: 102 MG/DL (ref 65–100)
HCT VFR BLD AUTO: 35.4 % (ref 36.6–50.3)
HGB BLD-MCNC: 11.6 G/DL (ref 12.1–17)
IMM GRANULOCYTES # BLD AUTO: 0 K/UL (ref 0–0.04)
IMM GRANULOCYTES NFR BLD AUTO: 0 % (ref 0–0.5)
LYMPHOCYTES # BLD: 1.1 K/UL (ref 0.8–3.5)
LYMPHOCYTES NFR BLD: 11 % (ref 12–49)
MCH RBC QN AUTO: 30.7 PG (ref 26–34)
MCHC RBC AUTO-ENTMCNC: 32.8 G/DL (ref 30–36.5)
MCV RBC AUTO: 93.7 FL (ref 80–99)
MONOCYTES # BLD: 0.8 K/UL (ref 0–1)
MONOCYTES NFR BLD: 8 % (ref 5–13)
NEUTS SEG # BLD: 7.5 K/UL (ref 1.8–8)
NEUTS SEG NFR BLD: 78 % (ref 32–75)
NRBC # BLD: 0 K/UL (ref 0–0.01)
NRBC BLD-RTO: 0 PER 100 WBC
PLATELET # BLD AUTO: 204 K/UL (ref 150–400)
PMV BLD AUTO: 9.5 FL (ref 8.9–12.9)
POTASSIUM SERPL-SCNC: 3.8 MMOL/L (ref 3.5–5.1)
PROT SERPL-MCNC: 6 G/DL (ref 6.4–8.2)
RBC # BLD AUTO: 3.78 M/UL (ref 4.1–5.7)
SODIUM SERPL-SCNC: 138 MMOL/L (ref 136–145)
WBC # BLD AUTO: 9.8 K/UL (ref 4.1–11.1)

## 2020-10-17 PROCEDURE — 74011250636 HC RX REV CODE- 250/636: Performed by: NURSE PRACTITIONER

## 2020-10-17 PROCEDURE — 85025 COMPLETE CBC W/AUTO DIFF WBC: CPT

## 2020-10-17 PROCEDURE — 74011250637 HC RX REV CODE- 250/637: Performed by: SURGERY

## 2020-10-17 PROCEDURE — 80053 COMPREHEN METABOLIC PANEL: CPT

## 2020-10-17 PROCEDURE — 36415 COLL VENOUS BLD VENIPUNCTURE: CPT

## 2020-10-17 PROCEDURE — 74011250636 HC RX REV CODE- 250/636: Performed by: SURGERY

## 2020-10-17 RX ADMIN — Medication 10 ML: at 04:44

## 2020-10-17 RX ADMIN — HYDROMORPHONE HYDROCHLORIDE 1 MG: 1 INJECTION, SOLUTION INTRAMUSCULAR; INTRAVENOUS; SUBCUTANEOUS at 04:39

## 2020-10-17 RX ADMIN — KETOROLAC TROMETHAMINE 15 MG: 30 INJECTION, SOLUTION INTRAMUSCULAR at 01:26

## 2020-10-17 RX ADMIN — METRONIDAZOLE 500 MG: 500 INJECTION, SOLUTION INTRAVENOUS at 04:39

## 2020-10-17 RX ADMIN — METOPROLOL TARTRATE 50 MG: 50 TABLET, FILM COATED ORAL at 08:40

## 2020-10-17 RX ADMIN — AMLODIPINE BESYLATE 2.5 MG: 2.5 TABLET ORAL at 08:40

## 2020-10-17 NOTE — PROGRESS NOTES
Discharge medications reviewed with patient and appropriate educational materials and side effects teaching were provided. I have reviewed discharge instructions with the patient. The patient verbalized understanding. Leave smart education provided to patient. The patient verbalized understanding. AVS signed and given to patient. Peripheral IV removed. Spouse to transport home. Made aware of scheduled test for next week.

## 2020-10-17 NOTE — PROGRESS NOTES
Problem: Falls - Risk of  Goal: *Absence of Falls  Description: Document Jesus Paez Fall Risk and appropriate interventions in the flowsheet.   Outcome: Progressing Towards Goal  Note: Fall Risk Interventions:  Mobility Interventions: Communicate number of staff needed for ambulation/transfer         Medication Interventions: Bed/chair exit alarm

## 2020-10-17 NOTE — PROGRESS NOTES
Problem: Falls - Risk of  Goal: *Absence of Falls  Description: Document Ivin Watson Fall Risk and appropriate interventions in the flowsheet.   Outcome: Resolved/Met     Problem: Patient Education: Go to Patient Education Activity  Goal: Patient/Family Education  Outcome: Resolved/Met     Problem: Pain  Goal: *Control of Pain  Outcome: Resolved/Met  Goal: *PALLIATIVE CARE:  Alleviation of Pain  Outcome: Resolved/Met

## 2020-10-17 NOTE — DISCHARGE SUMMARY
Discharge Summary    Patient: Christy Zamorano               Sex: male          DOA: 10/13/2020  4:02 PM       YOB: 1950      Age:  69 y.o.        LOS:  LOS: 4 days                Discharge Date:  10/17/2020    Admission Diagnoses: Perforated Appendicitis Vaishali Do   Urinary retention    Discharge Diagnoses:  Same    Procedure:  None    Discharge Condition: Good    Hospital Course:  Perforated appendicitis managed non-operatively with four days of IV antibiotics, minimal oral intake, rowland cathter. Catheter discontinued prior to discharge. Discharge to home in stable condition. Consults: None    Significant Diagnostic Studies: See full electronic record. Discharge Medications:   Cipro/Flagyl, Flomax. Resume home meds. Activity/Diet/Wound Care: See patient administered discharge instructions.     Follow-up: 2 weeks    Ehsan De Jesus MD  [facility]  Office:  [phone]  Fax:  [phone]

## 2020-10-17 NOTE — PROGRESS NOTES
Bedside, Verbal and Written shift change report given to Nancy Ely  (oncoming nurse) by Kamini Sales RN (offgoing nurse). Report included the following information SBAR, Kardex, Intake/Output, MAR, Recent Results and Med Rec Status.

## 2020-10-19 ENCOUNTER — TRANSCRIBE ORDER (OUTPATIENT)
Dept: SCHEDULING | Age: 70
End: 2020-10-19

## 2020-10-19 DIAGNOSIS — K35.32 PERFORATED APPENDICITIS: Primary | ICD-10-CM

## 2020-10-22 ENCOUNTER — HOSPITAL ENCOUNTER (OUTPATIENT)
Age: 70
Setting detail: OBSERVATION
Discharge: HOME | End: 2020-10-24
Attending: STUDENT IN AN ORGANIZED HEALTH CARE EDUCATION/TRAINING PROGRAM | Admitting: INTERNAL MEDICINE
Payer: MEDICARE

## 2020-10-22 ENCOUNTER — HOSPITAL ENCOUNTER (OUTPATIENT)
Dept: CT IMAGING | Age: 70
Discharge: HOME | End: 2020-10-22
Attending: SURGERY
Payer: MEDICARE

## 2020-10-22 ENCOUNTER — APPOINTMENT (OUTPATIENT)
Dept: GENERAL RADIOLOGY | Age: 70
End: 2020-10-22
Attending: STUDENT IN AN ORGANIZED HEALTH CARE EDUCATION/TRAINING PROGRAM
Payer: MEDICARE

## 2020-10-22 DIAGNOSIS — K35.32 PERFORATED APPENDICITIS: ICD-10-CM

## 2020-10-22 DIAGNOSIS — R19.7 DIARRHEA, UNSPECIFIED TYPE: ICD-10-CM

## 2020-10-22 DIAGNOSIS — E86.0 DEHYDRATION: ICD-10-CM

## 2020-10-22 DIAGNOSIS — R29.6 FREQUENT FALLS: ICD-10-CM

## 2020-10-22 DIAGNOSIS — Z78.9 IMPAIRED MOBILITY AND ADLS: ICD-10-CM

## 2020-10-22 DIAGNOSIS — R53.83 FATIGUE, UNSPECIFIED TYPE: Primary | ICD-10-CM

## 2020-10-22 DIAGNOSIS — Z74.09 IMPAIRED MOBILITY AND ADLS: ICD-10-CM

## 2020-10-22 PROBLEM — R53.81 DEBILITY: Status: ACTIVE | Noted: 2020-10-22

## 2020-10-22 PROBLEM — K35.33 APPENDICULAR ABSCESS: Status: ACTIVE | Noted: 2020-10-22

## 2020-10-22 LAB
ALBUMIN SERPL-MCNC: 3.1 G/DL (ref 3.5–5)
ALBUMIN/GLOB SERPL: 0.9 {RATIO} (ref 1.1–2.2)
ALP SERPL-CCNC: 43 U/L (ref 45–117)
ALT SERPL-CCNC: 25 U/L (ref 12–78)
ANION GAP SERPL CALC-SCNC: 6 MMOL/L (ref 5–15)
APPEARANCE UR: CLEAR
AST SERPL-CCNC: 29 U/L (ref 15–37)
BASOPHILS # BLD: 0 K/UL (ref 0–0.1)
BASOPHILS NFR BLD: 0 % (ref 0–1)
BILIRUB SERPL-MCNC: 0.4 MG/DL (ref 0.2–1)
BILIRUB UR QL: NEGATIVE
BUN SERPL-MCNC: 10 MG/DL (ref 6–20)
BUN/CREAT SERPL: 13 (ref 12–20)
CALCIUM SERPL-MCNC: 8.1 MG/DL (ref 8.5–10.1)
CHLORIDE SERPL-SCNC: 100 MMOL/L (ref 97–108)
CO2 SERPL-SCNC: 29 MMOL/L (ref 21–32)
COLOR UR: ABNORMAL
COMMENT, HOLDF: NORMAL
COMMENT, HOLDF: NORMAL
CREAT SERPL-MCNC: 0.78 MG/DL (ref 0.7–1.3)
DIFFERENTIAL METHOD BLD: ABNORMAL
EOSINOPHIL # BLD: 0.1 K/UL (ref 0–0.4)
EOSINOPHIL NFR BLD: 1 % (ref 0–7)
ERYTHROCYTE [DISTWIDTH] IN BLOOD BY AUTOMATED COUNT: 12.7 % (ref 11.5–14.5)
GLOBULIN SER CALC-MCNC: 3.4 G/DL (ref 2–4)
GLUCOSE BLD STRIP.AUTO-MCNC: 78 MG/DL (ref 65–100)
GLUCOSE SERPL-MCNC: 97 MG/DL (ref 65–100)
GLUCOSE UR STRIP.AUTO-MCNC: NEGATIVE MG/DL
HCT VFR BLD AUTO: 36.2 % (ref 36.6–50.3)
HGB BLD-MCNC: 12.3 G/DL (ref 12.1–17)
HGB UR QL STRIP: NEGATIVE
IMM GRANULOCYTES # BLD AUTO: 0.1 K/UL (ref 0–0.04)
IMM GRANULOCYTES NFR BLD AUTO: 1 % (ref 0–0.5)
KETONES UR QL STRIP.AUTO: 40 MG/DL
LACTATE SERPL-SCNC: 0.6 MMOL/L (ref 0.4–2)
LEUKOCYTE ESTERASE UR QL STRIP.AUTO: NEGATIVE
LYMPHOCYTES # BLD: 1.1 K/UL (ref 0.8–3.5)
LYMPHOCYTES NFR BLD: 12 % (ref 12–49)
MAGNESIUM SERPL-MCNC: 1.8 MG/DL (ref 1.6–2.4)
MCH RBC QN AUTO: 31 PG (ref 26–34)
MCHC RBC AUTO-ENTMCNC: 34 G/DL (ref 30–36.5)
MCV RBC AUTO: 91.2 FL (ref 80–99)
MONOCYTES # BLD: 0.5 K/UL (ref 0–1)
MONOCYTES NFR BLD: 5 % (ref 5–13)
NEUTS SEG # BLD: 7.3 K/UL (ref 1.8–8)
NEUTS SEG NFR BLD: 81 % (ref 32–75)
NITRITE UR QL STRIP.AUTO: NEGATIVE
NRBC # BLD: 0 K/UL (ref 0–0.01)
NRBC BLD-RTO: 0 PER 100 WBC
PH UR STRIP: 6 [PH] (ref 5–8)
PLATELET # BLD AUTO: 302 K/UL (ref 150–400)
PMV BLD AUTO: 8.9 FL (ref 8.9–12.9)
POTASSIUM SERPL-SCNC: 3.5 MMOL/L (ref 3.5–5.1)
PROT SERPL-MCNC: 6.5 G/DL (ref 6.4–8.2)
PROT UR STRIP-MCNC: NEGATIVE MG/DL
RBC # BLD AUTO: 3.97 M/UL (ref 4.1–5.7)
SAMPLES BEING HELD,HOLD: NORMAL
SAMPLES BEING HELD,HOLD: NORMAL
SERVICE CMNT-IMP: NORMAL
SODIUM SERPL-SCNC: 135 MMOL/L (ref 136–145)
SP GR UR REFRACTOMETRY: 1.02 (ref 1–1.03)
TROPONIN I SERPL-MCNC: <0.05 NG/ML
UROBILINOGEN UR QL STRIP.AUTO: 0.2 EU/DL (ref 0.2–1)
WBC # BLD AUTO: 9 K/UL (ref 4.1–11.1)

## 2020-10-22 PROCEDURE — 83735 ASSAY OF MAGNESIUM: CPT

## 2020-10-22 PROCEDURE — 99218 HC RM OBSERVATION: CPT

## 2020-10-22 PROCEDURE — 74177 CT ABD & PELVIS W/CONTRAST: CPT

## 2020-10-22 PROCEDURE — 80053 COMPREHEN METABOLIC PANEL: CPT

## 2020-10-22 PROCEDURE — 74011000636 HC RX REV CODE- 636: Performed by: RADIOLOGY

## 2020-10-22 PROCEDURE — 74011250636 HC RX REV CODE- 250/636: Performed by: STUDENT IN AN ORGANIZED HEALTH CARE EDUCATION/TRAINING PROGRAM

## 2020-10-22 PROCEDURE — 82962 GLUCOSE BLOOD TEST: CPT

## 2020-10-22 PROCEDURE — 85025 COMPLETE CBC W/AUTO DIFF WBC: CPT

## 2020-10-22 PROCEDURE — 84484 ASSAY OF TROPONIN QUANT: CPT

## 2020-10-22 PROCEDURE — 71045 X-RAY EXAM CHEST 1 VIEW: CPT

## 2020-10-22 PROCEDURE — 36415 COLL VENOUS BLD VENIPUNCTURE: CPT

## 2020-10-22 PROCEDURE — 74011250636 HC RX REV CODE- 250/636: Performed by: INTERNAL MEDICINE

## 2020-10-22 PROCEDURE — 81003 URINALYSIS AUTO W/O SCOPE: CPT

## 2020-10-22 PROCEDURE — 83605 ASSAY OF LACTIC ACID: CPT

## 2020-10-22 PROCEDURE — 99284 EMERGENCY DEPT VISIT MOD MDM: CPT

## 2020-10-22 RX ORDER — SODIUM CHLORIDE 0.9 % (FLUSH) 0.9 %
5-40 SYRINGE (ML) INJECTION AS NEEDED
Status: DISCONTINUED | OUTPATIENT
Start: 2020-10-22 | End: 2020-10-24 | Stop reason: HOSPADM

## 2020-10-22 RX ORDER — ONDANSETRON 2 MG/ML
4 INJECTION INTRAMUSCULAR; INTRAVENOUS
Status: DISCONTINUED | OUTPATIENT
Start: 2020-10-22 | End: 2020-10-24 | Stop reason: HOSPADM

## 2020-10-22 RX ORDER — SODIUM CHLORIDE 9 MG/ML
1000 INJECTION, SOLUTION INTRAVENOUS ONCE
Status: COMPLETED | OUTPATIENT
Start: 2020-10-22 | End: 2020-10-22

## 2020-10-22 RX ORDER — METFORMIN HYDROCHLORIDE 500 MG/1
500 TABLET, EXTENDED RELEASE ORAL
Status: DISCONTINUED | OUTPATIENT
Start: 2020-10-23 | End: 2020-10-23

## 2020-10-22 RX ORDER — SODIUM CHLORIDE 9 MG/ML
50 INJECTION, SOLUTION INTRAVENOUS CONTINUOUS
Status: DISCONTINUED | OUTPATIENT
Start: 2020-10-23 | End: 2020-10-24 | Stop reason: HOSPADM

## 2020-10-22 RX ORDER — LEVOFLOXACIN 500 MG/1
500 TABLET, FILM COATED ORAL DAILY
Status: DISCONTINUED | OUTPATIENT
Start: 2020-10-23 | End: 2020-10-24 | Stop reason: HOSPADM

## 2020-10-22 RX ORDER — ASPIRIN 81 MG/1
81 TABLET ORAL DAILY
Status: DISCONTINUED | OUTPATIENT
Start: 2020-10-23 | End: 2020-10-24 | Stop reason: HOSPADM

## 2020-10-22 RX ORDER — SODIUM CHLORIDE 0.9 % (FLUSH) 0.9 %
5-40 SYRINGE (ML) INJECTION EVERY 8 HOURS
Status: DISCONTINUED | OUTPATIENT
Start: 2020-10-23 | End: 2020-10-24 | Stop reason: HOSPADM

## 2020-10-22 RX ORDER — INSULIN LISPRO 100 [IU]/ML
INJECTION, SOLUTION INTRAVENOUS; SUBCUTANEOUS
Status: DISCONTINUED | OUTPATIENT
Start: 2020-10-22 | End: 2020-10-24 | Stop reason: HOSPADM

## 2020-10-22 RX ORDER — MEMANTINE HYDROCHLORIDE 10 MG/1
10 TABLET ORAL 2 TIMES DAILY
Status: DISCONTINUED | OUTPATIENT
Start: 2020-10-23 | End: 2020-10-24 | Stop reason: HOSPADM

## 2020-10-22 RX ORDER — PROMETHAZINE HYDROCHLORIDE 25 MG/1
12.5 TABLET ORAL
Status: DISCONTINUED | OUTPATIENT
Start: 2020-10-22 | End: 2020-10-24 | Stop reason: HOSPADM

## 2020-10-22 RX ORDER — AMLODIPINE BESYLATE 2.5 MG/1
2.5 TABLET ORAL DAILY
Status: DISCONTINUED | OUTPATIENT
Start: 2020-10-23 | End: 2020-10-24 | Stop reason: HOSPADM

## 2020-10-22 RX ORDER — MAGNESIUM SULFATE 100 %
4 CRYSTALS MISCELLANEOUS AS NEEDED
Status: DISCONTINUED | OUTPATIENT
Start: 2020-10-22 | End: 2020-10-24 | Stop reason: HOSPADM

## 2020-10-22 RX ORDER — DEXTROSE 50 % IN WATER (D50W) INTRAVENOUS SYRINGE
12.5-25 AS NEEDED
Status: DISCONTINUED | OUTPATIENT
Start: 2020-10-22 | End: 2020-10-24 | Stop reason: HOSPADM

## 2020-10-22 RX ORDER — SERTRALINE HYDROCHLORIDE 50 MG/1
100 TABLET, FILM COATED ORAL EVERY EVENING
Status: DISCONTINUED | OUTPATIENT
Start: 2020-10-23 | End: 2020-10-24 | Stop reason: HOSPADM

## 2020-10-22 RX ORDER — ACETAMINOPHEN 650 MG/1
650 SUPPOSITORY RECTAL
Status: DISCONTINUED | OUTPATIENT
Start: 2020-10-22 | End: 2020-10-24 | Stop reason: HOSPADM

## 2020-10-22 RX ORDER — POLYETHYLENE GLYCOL 3350 17 G/17G
17 POWDER, FOR SOLUTION ORAL DAILY PRN
Status: DISCONTINUED | OUTPATIENT
Start: 2020-10-22 | End: 2020-10-24 | Stop reason: HOSPADM

## 2020-10-22 RX ORDER — METRONIDAZOLE 250 MG/1
500 TABLET ORAL
Status: DISCONTINUED | OUTPATIENT
Start: 2020-10-23 | End: 2020-10-24 | Stop reason: HOSPADM

## 2020-10-22 RX ORDER — TAMSULOSIN HYDROCHLORIDE 0.4 MG/1
0.4 CAPSULE ORAL EVERY EVENING
Status: DISCONTINUED | OUTPATIENT
Start: 2020-10-23 | End: 2020-10-24 | Stop reason: HOSPADM

## 2020-10-22 RX ORDER — PRAVASTATIN SODIUM 20 MG/1
40 TABLET ORAL
Status: DISCONTINUED | OUTPATIENT
Start: 2020-10-23 | End: 2020-10-24 | Stop reason: HOSPADM

## 2020-10-22 RX ORDER — ACETAMINOPHEN 325 MG/1
650 TABLET ORAL
Status: DISCONTINUED | OUTPATIENT
Start: 2020-10-22 | End: 2020-10-24 | Stop reason: HOSPADM

## 2020-10-22 RX ORDER — METOPROLOL TARTRATE 50 MG/1
50 TABLET ORAL 2 TIMES DAILY
Status: DISCONTINUED | OUTPATIENT
Start: 2020-10-23 | End: 2020-10-24 | Stop reason: HOSPADM

## 2020-10-22 RX ADMIN — SODIUM CHLORIDE 1000 ML: 900 INJECTION, SOLUTION INTRAVENOUS at 20:56

## 2020-10-22 RX ADMIN — IOPAMIDOL 100 ML: 755 INJECTION, SOLUTION INTRAVENOUS at 13:00

## 2020-10-22 RX ADMIN — SODIUM CHLORIDE 100 ML/HR: 900 INJECTION, SOLUTION INTRAVENOUS at 23:00

## 2020-10-22 NOTE — ED TRIAGE NOTES
Weakness since last week w/diarhrea, GLF w/ no injury today. No LOC. Seen here last week for the same.

## 2020-10-23 LAB
ANION GAP SERPL CALC-SCNC: 6 MMOL/L (ref 5–15)
BUN SERPL-MCNC: 9 MG/DL (ref 6–20)
BUN/CREAT SERPL: 13 (ref 12–20)
CALCIUM SERPL-MCNC: 8.2 MG/DL (ref 8.5–10.1)
CHLORIDE SERPL-SCNC: 102 MMOL/L (ref 97–108)
CO2 SERPL-SCNC: 27 MMOL/L (ref 21–32)
CREAT SERPL-MCNC: 0.72 MG/DL (ref 0.7–1.3)
ERYTHROCYTE [DISTWIDTH] IN BLOOD BY AUTOMATED COUNT: 12.6 % (ref 11.5–14.5)
GLUCOSE BLD STRIP.AUTO-MCNC: 107 MG/DL (ref 65–100)
GLUCOSE BLD STRIP.AUTO-MCNC: 89 MG/DL (ref 65–100)
GLUCOSE BLD STRIP.AUTO-MCNC: 92 MG/DL (ref 65–100)
GLUCOSE BLD STRIP.AUTO-MCNC: 99 MG/DL (ref 65–100)
GLUCOSE SERPL-MCNC: 92 MG/DL (ref 65–100)
HCT VFR BLD AUTO: 36 % (ref 36.6–50.3)
HGB BLD-MCNC: 12.4 G/DL (ref 12.1–17)
MCH RBC QN AUTO: 31.3 PG (ref 26–34)
MCHC RBC AUTO-ENTMCNC: 34.4 G/DL (ref 30–36.5)
MCV RBC AUTO: 90.9 FL (ref 80–99)
NRBC # BLD: 0 K/UL (ref 0–0.01)
NRBC BLD-RTO: 0 PER 100 WBC
PLATELET # BLD AUTO: 311 K/UL (ref 150–400)
PMV BLD AUTO: 8.6 FL (ref 8.9–12.9)
POTASSIUM SERPL-SCNC: 3.2 MMOL/L (ref 3.5–5.1)
RBC # BLD AUTO: 3.96 M/UL (ref 4.1–5.7)
SERVICE CMNT-IMP: ABNORMAL
SERVICE CMNT-IMP: NORMAL
SODIUM SERPL-SCNC: 135 MMOL/L (ref 136–145)
WBC # BLD AUTO: 8.7 K/UL (ref 4.1–11.1)

## 2020-10-23 PROCEDURE — 97535 SELF CARE MNGMENT TRAINING: CPT

## 2020-10-23 PROCEDURE — 36415 COLL VENOUS BLD VENIPUNCTURE: CPT

## 2020-10-23 PROCEDURE — 97161 PT EVAL LOW COMPLEX 20 MIN: CPT

## 2020-10-23 PROCEDURE — 80048 BASIC METABOLIC PNL TOTAL CA: CPT

## 2020-10-23 PROCEDURE — 74011250636 HC RX REV CODE- 250/636: Performed by: INTERNAL MEDICINE

## 2020-10-23 PROCEDURE — 97116 GAIT TRAINING THERAPY: CPT

## 2020-10-23 PROCEDURE — 85027 COMPLETE CBC AUTOMATED: CPT

## 2020-10-23 PROCEDURE — 74011250637 HC RX REV CODE- 250/637: Performed by: INTERNAL MEDICINE

## 2020-10-23 PROCEDURE — 97165 OT EVAL LOW COMPLEX 30 MIN: CPT

## 2020-10-23 PROCEDURE — 99218 HC RM OBSERVATION: CPT

## 2020-10-23 PROCEDURE — 96372 THER/PROPH/DIAG INJ SC/IM: CPT

## 2020-10-23 PROCEDURE — 82962 GLUCOSE BLOOD TEST: CPT

## 2020-10-23 RX ORDER — HEPARIN SODIUM 5000 [USP'U]/ML
5000 INJECTION, SOLUTION INTRAVENOUS; SUBCUTANEOUS EVERY 8 HOURS
Status: DISCONTINUED | OUTPATIENT
Start: 2020-10-23 | End: 2020-10-24 | Stop reason: HOSPADM

## 2020-10-23 RX ORDER — POTASSIUM CHLORIDE 750 MG/1
40 TABLET, FILM COATED, EXTENDED RELEASE ORAL
Status: COMPLETED | OUTPATIENT
Start: 2020-10-23 | End: 2020-10-23

## 2020-10-23 RX ADMIN — ASPIRIN 81 MG: 81 TABLET, COATED ORAL at 00:06

## 2020-10-23 RX ADMIN — METRONIDAZOLE 500 MG: 250 TABLET, FILM COATED ORAL at 08:47

## 2020-10-23 RX ADMIN — AMLODIPINE BESYLATE 2.5 MG: 2.5 TABLET ORAL at 08:47

## 2020-10-23 RX ADMIN — PRAVASTATIN SODIUM 40 MG: 20 TABLET ORAL at 00:07

## 2020-10-23 RX ADMIN — HEPARIN SODIUM 5000 UNITS: 5000 INJECTION INTRAVENOUS; SUBCUTANEOUS at 18:26

## 2020-10-23 RX ADMIN — MEMANTINE HYDROCHLORIDE 10 MG: 10 TABLET ORAL at 08:48

## 2020-10-23 RX ADMIN — Medication 10 ML: at 12:52

## 2020-10-23 RX ADMIN — SERTRALINE HYDROCHLORIDE 100 MG: 50 TABLET ORAL at 21:38

## 2020-10-23 RX ADMIN — METOPROLOL TARTRATE 50 MG: 50 TABLET, FILM COATED ORAL at 21:38

## 2020-10-23 RX ADMIN — SODIUM CHLORIDE 100 ML/HR: 900 INJECTION, SOLUTION INTRAVENOUS at 17:06

## 2020-10-23 RX ADMIN — PRAVASTATIN SODIUM 40 MG: 20 TABLET ORAL at 21:38

## 2020-10-23 RX ADMIN — TAMSULOSIN HYDROCHLORIDE 0.4 MG: 0.4 CAPSULE ORAL at 21:38

## 2020-10-23 RX ADMIN — MEMANTINE HYDROCHLORIDE 10 MG: 10 TABLET ORAL at 21:38

## 2020-10-23 RX ADMIN — LEVOFLOXACIN 500 MG: 500 TABLET, FILM COATED ORAL at 21:38

## 2020-10-23 RX ADMIN — METRONIDAZOLE 500 MG: 250 TABLET, FILM COATED ORAL at 12:52

## 2020-10-23 RX ADMIN — METOPROLOL TARTRATE 50 MG: 50 TABLET, FILM COATED ORAL at 00:06

## 2020-10-23 RX ADMIN — MEMANTINE HYDROCHLORIDE 10 MG: 10 TABLET ORAL at 00:08

## 2020-10-23 RX ADMIN — SODIUM CHLORIDE 100 ML/HR: 900 INJECTION, SOLUTION INTRAVENOUS at 08:47

## 2020-10-23 RX ADMIN — Medication 10 ML: at 00:00

## 2020-10-23 RX ADMIN — LEVOFLOXACIN 500 MG: 500 TABLET, FILM COATED ORAL at 00:07

## 2020-10-23 RX ADMIN — POTASSIUM CHLORIDE 40 MEQ: 750 TABLET, FILM COATED, EXTENDED RELEASE ORAL at 08:48

## 2020-10-23 RX ADMIN — METRONIDAZOLE 500 MG: 250 TABLET, FILM COATED ORAL at 00:07

## 2020-10-23 RX ADMIN — METOPROLOL TARTRATE 50 MG: 50 TABLET, FILM COATED ORAL at 08:48

## 2020-10-23 RX ADMIN — TAMSULOSIN HYDROCHLORIDE 0.4 MG: 0.4 CAPSULE ORAL at 00:06

## 2020-10-23 RX ADMIN — METRONIDAZOLE 500 MG: 250 TABLET, FILM COATED ORAL at 17:03

## 2020-10-23 RX ADMIN — SERTRALINE HYDROCHLORIDE 100 MG: 50 TABLET ORAL at 00:07

## 2020-10-23 NOTE — H&P
[address]  [phone]    Admission History and Physical      NAME:  Kathy Piña   :   1950   MRN:  [id number]     PCP:  Matthew Martin MD     Date of service:  10/22/2020         Subjective:     CHIEF COMPLAINT: Feeling weak, poor p.o. intake    HISTORY OF PRESENT ILLNESS:     Mr. Annie Victoria is a 69 y.o.  male who is admitted with debility. Mr. Annie Victoria with past medical history of hypertension, stroke, diabetes mellitus, presented to ER complaining of fatigue, weakness and poor p.o. intake since he was discharged from the hospital recently. A week ago, patient was admitted to the hospital for perforated appendicitis managed non-operatively with ABx. Since then patient p.o. intake was minimal, became weak and was brought to the emergency room. Past Medical History:   Diagnosis Date    Hemorrhagic stroke ([facility] [address] 3/28/2017    S/p TPA    Hypertension     Ill-defined condition     Eczema    Stroke ([facility] [address]     Type II diabetes mellitus ([facility] [address] 3/29/2017        Past Surgical History:   Procedure Laterality Date    HX ORTHOPAEDIC  2017    left wrist surgery       Social History     Tobacco Use    Smoking status: Former Smoker     Packs/day: 0.50     Years: 50.00     Pack years: 25.00     Types: Cigarettes     Last attempt to quit: 2017     Years since quitting: 3.6    Smokeless tobacco: Never Used    Tobacco comment: cold turkey 2.5 wks ago after attempt Chantix developed allergy   Substance Use Topics    Alcohol use: No     Comment: around [facility]        Family History   Problem Relation Age of Onset    Diabetes Mother     Cancer Father     Headache Other         Allergies   Allergen Reactions    Chantix [Varenicline] Rash     Other reaction(s): Rash  Very itchy rash on back  Very itchy rash on back    Penicillins Hives        Prior to Admission medications    Medication Sig Start Date End Date Taking?  Authorizing Provider   ciprofloxacin HCl (CIPRO) 500 mg tablet Take 1 Tab by mouth every twelve (12) hours for 14 days. 10/16/20 10/30/20  Janeann Never H, NP   metroNIDAZOLE (FLAGYL) 500 mg tablet Take 1 Tab by mouth three (3) times daily for 14 days. 10/16/20 10/30/20  Janeann Never H, NP   amLODIPine (NORVASC) 2.5 mg tablet Take 2.5 mg by mouth daily. Provider, Historical   metFORMIN ER (GLUCOPHAGE XR) 500 mg tablet Take 500 mg by mouth daily (with dinner). Provider, Historical   sertraline (ZOLOFT) 100 mg tablet Take 100 mg by mouth every evening. Provider, Historical   memantine (Namenda) 10 mg tablet Take 1 Tab by mouth two (2) times a day. 8/27/20   Elia [place], PALMER   tamsulosin (FLOMAX) 0.4 mg capsule Take 0.4 mg by mouth every evening. Provider, Historical   metoprolol tartrate (LOPRESSOR) 50 mg tablet Take 50 mg by mouth two (2) times a day. Provider, Historical   aspirin delayed-release 81 mg tablet Take 81 mg by mouth daily. Provider, Historical   Cetirizine (ZYRTEC) 10 mg cap Take 10 mg by mouth daily. Provider, Historical   pravastatin (PRAVACHOL) 40 mg tablet Take 1 Tab by mouth nightly.  Indications: prevention of cerebrovascular accident 4/4/17   Jg Hall DO         Review of Systems:  (bold if positive, if negative)    Gen:  Eyes:  ENT:  CVS:  Pulm:  GI:  :  MS:  Skin:  Psych:  Endo:  Hem:  Renal:  Neuro:            Objective:      VITALS:    Vital signs reviewed; most recent are:    Visit Vitals  /77   Pulse 79   Temp 97.5 °F (36.4 °C)   Resp 16   Wt 99.3 kg (218 lb 14.4 oz)   SpO2 96%   BMI 29.69 kg/m²     SpO2 Readings from Last 6 Encounters:   10/22/20 96%   10/17/20 94%   08/27/20 95%   11/06/19 96%   05/01/19 98%   10/31/18 98%        No intake or output data in the 24 hours ending 10/22/20 8533         Exam:     Physical Exam:    Gen:  Well-developed, well-nourished, in no acute distress  HEENT:  Pink conjunctivae, PERRL, hearing intact to voice, dry mucous membranes  Neck:  Supple, without masses, thyroid non-tender  Resp:  No accessory muscle use, clear breath sounds without wheezes rales or rhonchi  Card:  No murmurs, normal S1, S2 without thrills, bruits or peripheral edema  Abd:  Soft, non-tender, non-distended, normoactive bowel sounds are present, no palpable organomegaly and no detectable hernias  Lymph:  No cervical or inguinal adenopathy  Musc:  No cyanosis or clubbing  Skin:  No rashes or ulcers, skin turgor is good  Neuro:  Cranial nerves are grossly intact, no focal motor weakness, follows commands appropriately  Psych:  Good insight, oriented to person, place and time, alert       Labs:    Recent Labs     10/22/20  1830   WBC 9.0   HGB 12.3   HCT 36.2*        Recent Labs     10/22/20  1830   *   K 3.5      CO2 29   GLU 97   BUN 10   CREA 0.78   CA 8.1*   MG 1.8   ALB 3.1*   TBILI 0.4   ALT 25     Lab Results   Component Value Date/Time    Glucose (POC) 136 (H) 04/04/2017 07:29 AM    Glucose (POC) 133 (H) 04/03/2017 09:37 PM     No results for input(s): PH, PCO2, PO2, HCO3, FIO2 in the last 72 hours. No results for input(s): INR, INREXT in the last 72 hours. Telemetry reviewed:        Assessment/Plan:    1. Dehydration (10/22/2020). Patient is clinically looks dehydrated that is due to his decreased p.o. intake. Admit to medical under observation and start IV fluids and monitor clinically    2. Appendicular abscess (10/22/2020). CT scan of the abdomen: slight decrease in inflammation around the appendix and periappendiceal abscess. Stable size of the small periappendiceal abscess. About a week ago patient was admitted under surgical service and was discharged on antibiotics. Will consult general surgery to review. 3.  HTN (hypertension). Continue amlodipine and metoprolol    4. Type II diabetes mellitus ([facility] [address] (3/29/2017). On Metformin. Start sliding scale    5.   Alzheimer's type dementia with late onset without behavioral disturbance ([facility] [address] (10/11/2018). Continue Namenda    6. Debility (10/22/2020). Consult PT OT.                Previous medical records reviewed     Risk of deterioration: high      Total time spent with patient: [address] discussed with: Patient, Nursing Staff and >50% of time spent in counseling and coordination of care    Discussed:  Care Plan    Prophylaxis:  SCD's    Probable Disposition:  Home w/Family           ___________________________________________________    Attending Physician: Christella Sicard, MD

## 2020-10-23 NOTE — PROGRESS NOTES
Discussed with PT/OT. Not safe to discharge home today. Monitor overnight. Anticipate DC home tomorrow.

## 2020-10-23 NOTE — PROGRESS NOTES
Bedside and Verbal shift change report given to [place], RN (oncoming nurse) by Jessica Turner RN (offgoing nurse). Report included the following information SBAR, Kardex, ED Summary and MAR.

## 2020-10-23 NOTE — PROGRESS NOTES
Problem: Falls - Risk of  Goal: *Absence of Falls  Outcome: Progressing Towards Goal  Note: Fall Risk Interventions:  Mobility Interventions: Bed/chair exit alarm    Mentation Interventions: Adequate sleep, hydration, pain control    Medication Interventions: Bed/chair exit alarm         History of Falls Interventions: Bed/chair exit alarm

## 2020-10-23 NOTE — PROGRESS NOTES
Problem: Mobility Impaired (Adult and Pediatric)  Goal: *Acute Goals and Plan of Care (Insert Text)  Description: FUNCTIONAL STATUS PRIOR TO ADMISSION: Patient was modified independent using a single point cane for functional mobility. HOME SUPPORT PRIOR TO ADMISSION: The patient lived with wife who assisted him with out of bed. Physical Therapy Goals  Initiated 10/23/2020  1. Patient will move from supine to sit and sit to supine  in bed with minimal assistance/contact guard assist within 7 day(s). 2.  Patient will transfer from bed to chair and chair to bed with minimal assistance/contact guard assist using the least restrictive device within 7 day(s). 3.  Patient will perform sit to stand with supervision/set-up within 7 day(s). 4.  Patient will ambulate with supervision/set-up for 100 feet with the least restrictive device within 7 day(s). 5.  Patient will ascend/descend 13 stairs with handrail(s) with minimal assistance/contact guard assist within 7 day(s). Outcome: Progressing Towards Goal   PHYSICAL THERAPY EVALUATION  Patient: Roxana Treadwell (1 y.o. male)  Date: 10/23/2020  Primary Diagnosis: Dehydration [E86.0]        Precautions: universal       ASSESSMENT  Based on the objective data described below, the patient presents with decrease mobility, weakness all 4 extremities, decrease activity tolerance and fall risk. He had one fall at home due to weakness, wife describes it as him collapsing \"not falling\". Supportive wife present. Pt wants to return home. Pt/wife do not want HH therapy or SNF d/t COVID. They plan on doing OP therapy. Asked wife if she was able to get pt to/from car, she stated that she has been doing it thus far. Concerned with pt's weakness and wife's ability to care for him and bedroom being on second floor. Pt presents at min assist level, but is very weak. Will con't to follow and make further recommendations prn.       Current Level of Function Impacting Discharge min assist with transfers and mobility     Functional Outcome Measure: The patient scored 50/100 on the Barthel outcome measure which is indicative of impairment. Other factors to consider for discharge: is wife able to care for him, will he be able to go up stairs to second floor     Patient will benefit from skilled therapy intervention to address the above noted impairments. PLAN :  Recommendations and Planned Interventions: bed mobility training, transfer training, gait training, and therapeutic exercises      Frequency/Duration: Patient will be followed by physical therapy:  5 times a week to address goals. Recommendation for discharge: (in order for the patient to meet his/her long term goals)  Outpatient physical therapy follow up recommended for general weakness    This discharge recommendation:  Has not yet been discussed the attending provider and/or case management    IF patient discharges home will need the following DME: patient owns DME required for discharge         SUBJECTIVE:   Patient stated Elisabeth Vail wants to get up to the chair to eat lunch.     OBJECTIVE DATA SUMMARY:   HISTORY:    Past Medical History:   Diagnosis Date    Hemorrhagic stroke ([facility] [address] 3/28/2017    S/p TPA    Hypertension     Ill-defined condition     Eczema    Stroke ([facility] [address]     Type II diabetes mellitus ([facility] [address] 3/29/2017     Past Surgical History:   Procedure Laterality Date    HX ORTHOPAEDIC  03/2017    left wrist surgery       Personal factors and/or comorbidities impacting plan of care:     Home Situation  Home Environment: Private residence  One/Two Story Residence: Two story  Living Alone: No  Support Systems: Child(uvaldo), Family member(s)  Patient Expects to be Discharged to[de-identified] Unknown  Current DME Used/Available at Home: Other (comment)    EXAMINATION/PRESENTATION/DECISION MAKING:   Critical Behavior:  Neurologic State: Alert  Orientation Level: Oriented X4  Cognition: Follows commands  Safety/Judgement: Awareness of environment  Hearing: Auditory  Auditory Impairment: None    Range Of Motion:  AROM: Within functional limits                       Strength:    Strength: Generally decreased, functional                    Tone & Sensation:   Tone: Normal              Sensation: Intact               Coordination:  Coordination: Within functional limits  Vision:      Functional Mobility:  Bed Mobility:     Supine to Sit: Contact guard assistance        Transfers:  Sit to Stand: Minimum assistance;Assist x1  Stand to Sit: Minimum assistance;Assist x1        Bed to Chair: Minimum assistance; Additional time              Balance:   Sitting: Intact  Standing: Intact; With support  Ambulation/Gait Training:  Distance (ft): 3 Feet (ft)  Assistive Device: Cane, straight;Gait belt  Ambulation - Level of Assistance: Minimal assistance;Assist x2        Gait Abnormalities: Decreased step clearance        Base of Support: Widened;Center of gravity altered     Speed/[place]: Slow  Step Length: Right shortened;Left shortened                   Functional Measure:  Barthel Index:    Bathin  Bladder: 10  Bowels: 5  Groomin  Dressin  Feedin  Mobility: 5  Stairs: 0  Toilet Use: 5  Transfer (Bed to Chair and Back): 10  Total: 50/100       The Barthel ADL Index: Guidelines  1. The index should be used as a record of what a patient does, not as a record of what a patient could do. 2. The main aim is to establish degree of independence from any help, physical or verbal, however minor and for whatever reason. 3. The need for supervision renders the patient not independent. 4. A patient's performance should be established using the best available evidence. Asking the patient, friends/relatives and nurses are the usual sources, but direct observation and common sense are also important. However direct testing is not needed.   5. Usually the patient's performance over the preceding 24-48 hours is important, but occasionally longer periods will be relevant. 6. Middle categories imply that the patient supplies over 50 per cent of the effort. 7. Use of aids to be independent is allowed. Cathe Friends., Barthel, D.W. (0598). Functional evaluation: the Barthel Index. [address] (14)2. DILSHAD Davis, Corina Canales., Hilario Coombe., [place], [address] (1999). Measuring the change indisability after inpatient rehabilitation; comparison of the responsiveness of the Barthel Index and Functional [place] Measure. Journal of Neurology, Neurosurgery, and Psychiatry, 66(4), 291-780. Kojo Garcia, N.J.A, ANGI Govea, & Danuta Humphreys M.A. (2004.) Assessment of post-stroke quality of life in cost-effectiveness studies: The usefulness of the Barthel Index and the EuroQoL-5D. Quality of Life Research, 15, 112-76           Physical Therapy Evaluation Charge Determination   History Examination Presentation Decision-Making   MEDIUM  Complexity : 1-2 comorbidities / personal factors will impact the outcome/ POC  LOW Complexity : 1-2 Standardized tests and measures addressing body structure, function, activity limitation and / or participation in recreation  LOW Complexity : Stable, uncomplicated  LOW Complexity : FOTO score of       Based on the above components, the patient evaluation is determined to be of the following complexity level: LOW     Pain Rating:  denies    Activity Tolerance:   Good  Please refer to the flowsheet for vital signs taken during this treatment. After treatment patient left in no apparent distress:   Sitting in chair, Call bell within reach, Bed / chair alarm activated, and Caregiver / family present    COMMUNICATION/EDUCATION:   The patients plan of care was discussed with: Occupational therapist and Registered nurse. Fall prevention education was provided and the patient/caregiver indicated understanding., Patient/family have participated as able in goal setting and plan of care. , and Patient/family agree to work toward stated goals and plan of care.     Thank you for this referral.  Igor Ma, PT   Time Calculation: 24 mins

## 2020-10-23 NOTE — PROGRESS NOTES
[facility] [place] [address]  [address]  [phone]      Medical Progress Note      NAME: Maria Luisa Cuevas   :  1950  MRM:  [id number]    Date/Time: 10/23/2020        Assessment / Plan:     70 yo  M w/ hx of HTN, DM, dementia, CVA, recent admission for appendicitis being treated with oral antibiotics presenting with weakness    Debility / Weakness: fell to the floor at home due to profound weakness. Wife unable to get him up and unable to care for patient at home. Discussed with PT, not safe to return home today due to high fall risk. Monitor overnight, hopefully home in the morning     Appendicitis / Appendicular abscess: CT scan of the abdomen showed slight decrease in inflammation around the appendix and periappendiceal abscess. Stable size of the small periappendiceal abscess. About a week ago patient was admitted under surgical service and was discharged on antibiotics. Gen sugery following. Continue PO abx     HTN (hypertension): BP controlled. Continue amlodipine and metoprolol     Type II diabetes mellitus: well controlled. HOLD metformin post IV contrast. SSI alone for now     Alzheimer's type dementia with late onset without behavioral disturbance: appears mild. Continue Namenda        Total time spent: 35 minutes, d/w wife at bedside  Time spent in the care of this patient including reviewing records, discussing with nursing and/or other providers on the treatment team, obtaining history and examining the patient, and discussing treatment plans. Care Plan discussed with: Patient, Nursing Staff and >50% of time spent in counseling and coordination of care    Discussed:  Care Plan and D/C Planning    Prophylaxis:  Hep SQ    Disposition:  Home w/Family         Subjective:     Chief Complaint:  Follow up weakness    Chart/notes/labs/studies reviewed, patient examined. Generalized weakness. Wants to go home.  Denies abd pain or fevers          Objective:       Vitals:        Last 24hrs VS reviewed since prior progress note. Most recent are:    Visit Vitals  BP (!) 143/84 (BP 1 Location: Left arm, BP Patient Position: At rest)   Pulse 67   Temp 97.8 °F (36.6 °C)   Resp 18   Ht 6' (1.829 m)   Wt 99.3 kg (218 lb 14.4 oz)   SpO2 95%   BMI 29.69 kg/m²     SpO2 Readings from Last 6 Encounters:   10/23/20 95%   10/17/20 94%   08/27/20 95%   11/06/19 96%   05/01/19 98%   10/31/18 98%            Intake/Output Summary (Last 24 hours) at 10/23/2020 1729  Last data filed at 10/23/2020 0659  Gross per 24 hour   Intake 1100 ml   Output 350 ml   Net 750 ml          Exam:     Physical Exam:    Gen:  Chronically ill-appearing. NAD  HEENT:  Atraumatic, normocephalic. Sclerae nonicteric, hearing intact to voice  Neck:  Supple, without apparent masses. Resp:  No accessory muscle use, CTAB without wheezes, rales, or rhonchi  Card: RRR, without m/r/g. No LE edema. Abd:  +bowel sounds, soft, no significant TTP. Nondistended. No HSM. Neuro: Face symmetric, speech fluent, follows commands appropriately, no focal weakness or numbness noted. Psych:  Alert, oriented x 3.  Fair insight     Medications Reviewed: (see below)    Lab Data Reviewed: (see below)    ______________________________________________________________________    Medications:     Current Facility-Administered Medications   Medication Dose Route Frequency    amLODIPine (NORVASC) tablet 2.5 mg  2.5 mg Oral DAILY    aspirin delayed-release tablet 81 mg  81 mg Oral DAILY    levoFLOXacin (LEVAQUIN) tablet 500 mg  500 mg Oral DAILY    memantine (NAMENDA) tablet 10 mg  10 mg Oral BID    metoprolol tartrate (LOPRESSOR) tablet 50 mg  50 mg Oral BID    metroNIDAZOLE (FLAGYL) tablet 500 mg  500 mg Oral TID WITH MEALS    pravastatin (PRAVACHOL) tablet 40 mg  40 mg Oral QHS    sertraline (ZOLOFT) tablet 100 mg  100 mg Oral QPM    tamsulosin (FLOMAX) capsule 0.4 mg  0.4 mg Oral QPM    sodium chloride (NS) flush 5-40 mL  5-40 mL IntraVENous Q8H    sodium chloride (NS) flush 5-40 mL  5-40 mL IntraVENous PRN    acetaminophen (TYLENOL) tablet 650 mg  650 mg Oral Q6H PRN    Or    acetaminophen (TYLENOL) suppository 650 mg  650 mg Rectal Q6H PRN    polyethylene glycol (MIRALAX) packet 17 g  17 g Oral DAILY PRN    promethazine (PHENERGAN) tablet 12.5 mg  12.5 mg Oral Q6H PRN    Or    ondansetron (ZOFRAN) injection 4 mg  4 mg IntraVENous Q6H PRN    0.9% sodium chloride infusion  100 mL/hr IntraVENous CONTINUOUS    insulin lispro (HUMALOG) injection   SubCUTAneous AC&HS    glucose chewable tablet 16 g  4 Tab Oral PRN    dextrose (D50W) injection syrg 12.5-25 g  12.5-25 g IntraVENous PRN    glucagon (GLUCAGEN) injection 1 mg  1 mg IntraMUSCular PRN            Lab Review:     Recent Labs     10/23/20  0108 10/22/20  1830   WBC 8.7 9.0   HGB 12.4 12.3   HCT 36.0* 36.2*    302     Recent Labs     10/23/20  0108 10/22/20  1830   * 135*   K 3.2* 3.5    100   CO2 27 29   GLU 92 97   BUN 9 10   CREA 0.72 0.78   CA 8.2* 8.1*   MG  --  1.8   ALB  --  3.1*   ALT  --  25     No components found for: GLPOC  No results for input(s): PH, PCO2, PO2, HCO3, FIO2 in the last 72 hours. No results for input(s): INR, INREXT in the last 72 hours. No results found for: SDES  Lab Results   Component Value Date/Time    Culture result: MRSA NOT PRESENT 03/28/2017 08:24 PM    Culture result:  03/28/2017 08:24 PM         Screening of patient nares for MRSA is for surveillance purposes and, if positive, to facilitate isolation considerations in high risk settings.  It is not intended for automatic decolonization interventions per se as regimens are not sufficiently effective to warrant routine use.              ___________________________________________________    Attending Physician: Naomi López MD

## 2020-10-23 NOTE — PROGRESS NOTES
Comprehensive Nutrition Assessment    Type and Reason for Visit: Initial, Positive nutrition screen    Nutrition Recommendations/Plan:  1. Continue regular diet. 2. Add holland Magic Cup and holland Ensure Pudding 1x/day to promote adequate intake. Nutrition Assessment:      10/23: 70 y/o male admitted with dehydration. PMH includes HTN, DM. BMI 29.7, c/w overweight. Pt reports poor appetite since getting discharged from the hospital about a week ago. Pt was admitted a week ago with \"perforated appendicitis managed non-operatively with ABx\" per MD note. Pt says po intake has been poor since then- he has still been trying to eat 3 meals/day but they have been very small. Breakfast tray in room, few bites of sausage and fruit eaten. Thinks he has probably lost weight since last admission, though no significant weight changes are noted in EMR. No c/o N/V. Pt received Ensure Clear and Glucerna during previous admissions, though pt says he took them home and they are still in his fridge, he hasn't tried them yet. Open Ensure Enlive on bedside table, pt says \"it's okay. \" He thinks he would prefer a different supplement and is agreeable to trying Magic Cup and Ensure Pudding. Will add and monitor intakes. No c/o N/V. Labs- Na 135, K 3.2. Meds- flagyl, KCl. Malnutrition Assessment:  Malnutrition Status: none    Estimated Daily Nutrient Needs:  Energy (kcal):  5730(9190 x 1.3 AF)  Protein (g):  99(1-1.2 g/kg)       Fluid (ml/day):  2321(1mL/kcal)    Nutrition Related Findings:  LBM not documented      Wounds:    None       Current Nutrition Therapies:  DIET REGULAR  DIET NUTRITIONAL SUPPLEMENTS Lunch; Pudding, Fortified  DIET NUTRITIONAL SUPPLEMENTS Dinner; Magic Cups    Anthropometric Measures:  · Height:  6' (182.9 cm)  · Current Body Wt:  99.3 kg (218 lb 14.7 oz)   · Admission Body Wt:       · Usual Body Wt:        · Ideal Body Wt:  178 lbs:  123 %   · BMI Category: Overweight (BMI 25.0-29. 9)       Nutrition Diagnosis:   · Inadequate oral intake related to inadequate protein-energy intake as evidenced by poor intake prior to admission, intake 0-25%    Nutrition Interventions:   Food and/or Nutrient Delivery: Continue current diet, Start oral nutrition supplement  Nutrition Education and Counseling: No recommendations at this time  Coordination of Nutrition Care: Continued inpatient monitoring    Goals:  PO intake >50% meals + ONS next 1-3 days       Nutrition Monitoring and Evaluation:   Food/Nutrient Intake Outcomes: Food and nutrient intake, Supplement intake  Physical Signs/Symptoms Outcomes: Weight, Biochemical data    Discharge Planning:    Continue oral nutrition supplement, Continue current diet     Electronically signed by Susan Matson RDN on 10/23/2020 at 10:49 AM    Contact: [phone]

## 2020-10-23 NOTE — PROGRESS NOTES
Reason for Admission:   Dehydration                   RUR Score:          N/A           Plan for utilizing home health:      Patient has no prior home health history; per wife, she does not want anyone coming to their home due to their health issues and the Corona virus. PCP: First and Last name:  Dr. Mauricio Gimenez   Name of Practice:    Are you a current patient: Yes/No: Yes   Approximate date of last visit: 2 months ago   Can you participate in a virtual visit with your PCP: yes                     Current Advanced Directive/Advance Care Plan: patient states he has a copy at home; wife, Pepito Wells (ph: [phone]), is healthcare decision maker                          Transition of Care Plan:                    CM met with patient at bedside and spoke with wife, Pepito Wells, to begin discharge planning. Patient is on observation status and has been provided the observation notification letters. Patient was recently hospitalized at [facility] (10/13/-10/17/20) for appendicitis. Wife reported that since then, Faye Nolasco is just so weak. \"  Patient lives with his wife in a two story house with MBR on second floor. Patient has been able to manage ADLs but it is becoming more difficult. DME at home includes a rolling walker cane and shower chair. Preferred pharmacy is [facility] off of [address]. Care Management Interventions  PCP Verified by CM: Yes  Physical Therapy Consult: Yes  Occupational Therapy Consult: Yes  Speech Therapy Consult: No  Current Support Network: Lives with Spouse  Confirm Follow Up Transport: Family  The Plan for Transition of Care is Related to the Following Treatment Goals : Dehydration  Discharge Location  Discharge Placement: Home with family assistance     In the event that patient's status changes to inpatient, a readmission assessment was completed.     Readmission Assessment  Previous disposition: Home with Family  Who is being interviewed?: Patient  What was the patient's/caregiver's perception as to why they think they needed to return back to the hospital?: Other (Comment)(increased weakness since hospitalization)  Did you visit your Primary Care Physician after you left the hospital, before you returned this time?: No  Why weren't you able to visit your PCP?: Other (Comment)(PCP appointment scheduled for 10/27/20)  Did you see a specialist, such as Cardiac, Pulmonary, Orthopedic Physician, etc. after you left the hospital?: No  Who advised the patient to return to the hospital?: Other (Comment)(Wife)  Does the patient report anything that got in the way of taking their medications?: No  In our efforts to provide the best possible care to you and others like you, can you think of anything that we could have done to help you after you left the hospital the first time, so that you might not have needed to return so soon?: Other (Comment)(I don't know)     Jaylon Dickson, ASADW

## 2020-10-23 NOTE — PROGRESS NOTES
[facility]: MED RECONCILIATION    Comments/Recommendations:   Pharmacist updated prior to admission medication list per telephone conversation with patient's spouse. Spouse confirms no significant changes to medications since discharge. Patient has been taking antibiotics as prescribed at discharge; however, did not take any doses today. Last doses of medications were last night. Preferred pharmacy is [facility] at [address] [facility]. Allergies: Chantix [varenicline] and Penicillins    Prior to Admission Medications   Prescriptions Last Dose Informant Patient Reported? Taking? Cetirizine (ZYRTEC) 10 mg cap 10/21/2020 at Unknown time Significant Other Yes Yes   Sig: Take 10 mg by mouth daily. amLODIPine (NORVASC) 2.5 mg tablet 10/21/2020 at Unknown time Significant Other Yes Yes   Sig: Take 2.5 mg by mouth daily. aspirin delayed-release 81 mg tablet 10/21/2020 at Unknown time Significant Other Yes Yes   Sig: Take 81 mg by mouth daily. ciprofloxacin HCl (CIPRO) 500 mg tablet 10/21/2020 at Unknown time Significant Other No Yes   Sig: Take 1 Tab by mouth every twelve (12) hours for 14 days. memantine (Namenda) 10 mg tablet 10/21/2020 at Unknown time Significant Other No Yes   Sig: Take 1 Tab by mouth two (2) times a day. metFORMIN ER (GLUCOPHAGE XR) 500 mg tablet 10/21/2020 at Unknown time Significant Other Yes Yes   Sig: Take 500 mg by mouth daily (with dinner). metoprolol tartrate (LOPRESSOR) 50 mg tablet 10/21/2020 at Unknown time Significant Other Yes Yes   Sig: Take 50 mg by mouth two (2) times a day. metroNIDAZOLE (FLAGYL) 500 mg tablet 10/21/2020 at Unknown time Significant Other No Yes   Sig: Take 1 Tab by mouth three (3) times daily for 14 days. pravastatin (PRAVACHOL) 40 mg tablet 10/21/2020 at Unknown time Significant Other No Yes   Sig: Take 1 Tab by mouth nightly.  Indications: prevention of cerebrovascular accident   sertraline (ZOLOFT) 100 mg tablet 10/21/2020 at Unknown time Significant Other Yes Yes   Sig: Take 100 mg by mouth every evening. tamsulosin (FLOMAX) 0.4 mg capsule 10/21/2020 at Unknown time Significant Other Yes Yes   Sig: Take 0.4 mg by mouth every evening.         Yeny Soto, Pharmacist

## 2020-10-23 NOTE — PROGRESS NOTES
Problem: Self Care Deficits Care Plan (Adult)  Goal: *Acute Goals and Plan of Care (Insert Text)  Description:   FUNCTIONAL STATUS PRIOR TO ADMISSION: Patient was modified independent using a single point cane for functional mobility, however, increase weakness and h/o falls    HOME SUPPORT: The patient lived with wife and required occasional assistance with self-care and mobility    Occupational Therapy Goals  Initiated 10/23/2020  1. Patient will perform bathing with supervision/set-up within 7 day(s). 2.  Patient will perform lower body dressing with modified independence within 7 day(s). 3.  Patient will perform grooming standing at sink with modified independence within 7 day(s). 4.  Patient will perform toilet transfers with modified independence within 7 day(s). 5.  Patient will perform all aspects of toileting with modified independence within 7 day(s). 6.  Patient will participate in upper extremity therapeutic exercise/activities with supervision/set-up for 10 minutes within 7 day(s). 7.  Patient will utilize energy conservation techniques during functional activities with verbal cues within 7 day(s). Outcome: Progressing Towards Goal   OCCUPATIONAL THERAPY EVALUATION  Patient: Corine Hunt (19 y.o. male)  Date: 10/23/2020  Primary Diagnosis: Dehydration [E86.0]        Precautions: Falls       ASSESSMENT  Based on the objective data described below, the patient presents with decrease mobility, weakness all 4 extremities, decrease activity tolerance and decrease independence with self-care tasks. Supportive wife present. Pt is anxious and wanting to return home. Pt/wife do not want HH therapy or SNF d/t COVID. They plan on doing OP therapy. Asked wife if she was able to get pt to/from car, she stated that she has been doing it thus far. Concerned with pt's weakness and wife's ability to care for him. Pt presents at min assist level, but is very weak.   Will con't to follow and make further recommendations prn. Current Level of Function Impacting Discharge (ADLs/self-care): min assist     Functional Outcome Measure: The patient scored 50/100 on the Barthel outcome measure which is indicative of impairment. Other factors to consider for discharge: is wife able to care for him? Patient will benefit from skilled therapy intervention to address the above noted impairments. PLAN :  Recommendations and Planned Interventions: self care training, functional mobility training, therapeutic exercise, balance training, therapeutic activities, endurance activities, patient education, home safety training, and family training/education    Frequency/Duration: Patient will be followed by occupational therapy 5 times a week to address goals. Recommendation for discharge: (in order for the patient to meet his/her long term goals)  To be determined: family wants OP therapy, does not want anyone coming to the home d/t COVID    This discharge recommendation:  A follow-up discussion with the attending provider and/or case management is planned    IF patient discharges home will need the following DME: TBD, none noted at this time       SUBJECTIVE:   Patient stated I want to go home.     OBJECTIVE DATA SUMMARY:   HISTORY:   Past Medical History:   Diagnosis Date    Hemorrhagic stroke ([facility] [address] 3/28/2017    S/p TPA    Hypertension     Ill-defined condition     Eczema    Stroke ([facility] [address]     Type II diabetes mellitus ([facility] [address] 3/29/2017     Past Surgical History:   Procedure Laterality Date    HX ORTHOPAEDIC  03/2017    left wrist surgery       Expanded or extensive additional review of patient history:     Home Situation  Home Environment: Private residence  One/Two Story Residence: Two story  Living Alone: No  Support Systems: Child(uvaldo), Family member(s)  Patient Expects to be Discharged to[de-identified] Unknown  Current DME Used/Available at Home: Other (comment)    Hand dominance: Right    EXAMINATION OF PERFORMANCE DEFICITS:  Cognitive/Behavioral Status:  Neurologic State: Alert  Orientation Level: Oriented X4  Cognition: Follows commands        Safety/Judgement: Awareness of environment    Skin: intact    Edema: none noted    Hearing: Auditory  Auditory Impairment: None    Vision/Perceptual:                                     Range of Motion:    AROM: Within functional limits                         Strength:    Strength: Generally decreased, functional                Coordination:  Coordination: Within functional limits  Fine Motor Skills-Upper: Left Intact; Right Intact    Gross Motor Skills-Upper: Left Intact; Right Intact    Tone & Sensation:    Tone: Normal  Sensation: Intact                      Balance:  Sitting: Intact  Standing: Intact; With support    Functional Mobility and Transfers for ADLs:  Bed Mobility:  Supine to Sit: Contact guard assistance    Transfers:  Sit to Stand: Minimum assistance;Assist x1  Stand to Sit: Minimum assistance;Assist x1  Bed to Chair: Minimum assistance; Additional time  Bathroom Mobility: Minimum assistance  Toilet Transfer : Minimum assistance;Assist x1    ADL Assessment:  Feeding: Setup    Oral Facial Hygiene/Grooming: Setup    Bathing: Minimum assistance    Upper Body Dressing: Setup    Lower Body Dressing: Minimum assistance    Toileting: Minimum assistance                ADL Intervention and task modifications:               Cognitive Retraining  Safety/Judgement: Awareness of environment    Therapeutic Exercise:     Functional Measure:  Barthel Index:    Bathin  Bladder: 10  Bowels: 5  Groomin  Dressin  Feedin  Mobility: 5  Stairs: 0  Toilet Use: 5  Transfer (Bed to Chair and Back): 10  Total: 50/100        The Barthel ADL Index: Guidelines  1. The index should be used as a record of what a patient does, not as a record of what a patient could do.   2. The main aim is to establish degree of independence from any help, physical or verbal, however minor and for whatever reason. 3. The need for supervision renders the patient not independent. 4. A patient's performance should be established using the best available evidence. Asking the patient, friends/relatives and nurses are the usual sources, but direct observation and common sense are also important. However direct testing is not needed. 5. Usually the patient's performance over the preceding 24-48 hours is important, but occasionally longer periods will be relevant. 6. Middle categories imply that the patient supplies over 50 per cent of the effort. 7. Use of aids to be independent is allowed. Lucia Og., Barthel, D.W. (8652). Functional evaluation: the Barthel Index. [address] [facility] (14)2. Madai Gamez bushra DILSHAD Soto, Chema Briceño, Alyssa Barrios., [place], [facility] (1999). Measuring the change indisability after inpatient rehabilitation; comparison of the responsiveness of the Barthel Index and Functional [place] Measure. Journal of Neurology, Neurosurgery, and Psychiatry, 66(4), 120-007. Babak Kaur NHIMANSHU.RADHA, ANGI Govea, & Shavonne Ling MGertrudisA. (2004.) Assessment of post-stroke quality of life in cost-effectiveness studies: The usefulness of the Barthel Index and the EuroQoL-5D.  Quality of Life Research, 15, 105-10        Occupational Therapy Evaluation Charge Determination   History Examination Decision-Making   LOW Complexity : Brief history review  LOW Complexity : 1-3 performance deficits relating to physical, cognitive , or psychosocial skils that result in activity limitations and / or participation restrictions  LOW Complexity : No comorbidities that affect functional and no verbal or physical assistance needed to complete eval tasks       Based on the above components, the patient evaluation is determined to be of the following complexity level: LOW   Pain Rating:  No c/o pain    Activity Tolerance:   Fair      After treatment patient left in no apparent distress:    Sitting in chair, Call bell within reach, Bed / chair alarm activated, and Caregiver / family present    COMMUNICATION/EDUCATION:   The patients plan of care was discussed with: Physical therapist and Registered nurse. Home safety education was provided and the patient/caregiver indicated understanding., Patient/family have participated as able in goal setting and plan of care. , and Patient/family agree to work toward stated goals and plan of care. This patients plan of care is appropriate for delegation to [facility].     Thank you for this referral.  Jennifer Johnson OTR/L  Time Calculation: 24 mins

## 2020-10-23 NOTE — PROGRESS NOTES
Bedside and Verbal shift change report given to Le Kong RN (oncoming nurse) by Julianna King RN (offgoing nurse). Report included the following information SBAR, Kardex and ED Summary.

## 2020-10-23 NOTE — PROGRESS NOTES
Transition of Care Plan: RUR-N/A  1. Wife to transport pt home at d/c  2. Encourage pt to be OOB  3. Encourage po intake  4. To f/u with providers as RENA Oneil

## 2020-10-23 NOTE — CONSULTS
Assessment:   68 yo with hx perforated appendicitis. Repeat CT with stable inflammatory changes. Admitted with dehydration and debility. Plan:   CT stable - complete 14 day course of Cipro/ Flagyl  Encourage PO intake  OOB as able  Ok for d/c home when medically cleared  Follow up in 2-3 weeks  Please call with further questions or concerns    Signed By: Eliza Schaumann, MD  [address] [facility]  [phone]    October 23, 2020          General Surgery Consult    Subjective:      Kathy Piña is a 69 y.o.  male who presents with weakness and dehydration. Pt had an outpatient CT completed yesterday for follow up on recent perforated diverticulitis. Per his wife he had loose stool and soiled himself getting out of the car. She was able to get him into the house but he collpased and she called the rescue squad to bring him to the ER. She reports that he has not eaten or drank much of anything since discharge from the hospital last week. He reports that he has no appetite. He wants to go home. He reports that his pain is significantly improved from the time of discharge last week. He denies any fevers or chills.     Past Medical History:   Diagnosis Date    Hemorrhagic stroke ([facility] [address] 3/28/2017    S/p TPA    Hypertension     Ill-defined condition     Eczema    Stroke ([facility] [address]     Type II diabetes mellitus ([facility] [address] 3/29/2017     Past Surgical History:   Procedure Laterality Date    HX ORTHOPAEDIC  03/2017    left wrist surgery      Family History   Problem Relation Age of Onset    Diabetes Mother     Cancer Father     Headache Other      Social History     Socioeconomic History    Marital status:      Spouse name: Not on file    Number of children: Not on file    Years of education: Not on file    Highest education level: Not on file   Tobacco Use    Smoking status: Former Smoker     Packs/day: 0.50     Years: 50.00     Pack years: 25.00     Types: Cigarettes     Last attempt to quit: 2/28/2017     Years since quitting: 3.6    Smokeless tobacco: Never Used    Tobacco comment: cold turkey 2.5 wks ago after attempt Chantix developed allergy   Substance and Sexual Activity    Alcohol use: No     Comment: around holidatys    Drug use: No      Current Facility-Administered Medications   Medication Dose Route Frequency    amLODIPine (NORVASC) tablet 2.5 mg  2.5 mg Oral DAILY    aspirin delayed-release tablet 81 mg  81 mg Oral DAILY    levoFLOXacin (LEVAQUIN) tablet 500 mg  500 mg Oral DAILY    memantine (NAMENDA) tablet 10 mg  10 mg Oral BID    metoprolol tartrate (LOPRESSOR) tablet 50 mg  50 mg Oral BID    metroNIDAZOLE (FLAGYL) tablet 500 mg  500 mg Oral TID WITH MEALS    pravastatin (PRAVACHOL) tablet 40 mg  40 mg Oral QHS    sertraline (ZOLOFT) tablet 100 mg  100 mg Oral QPM    tamsulosin (FLOMAX) capsule 0.4 mg  0.4 mg Oral QPM    sodium chloride (NS) flush 5-40 mL  5-40 mL IntraVENous Q8H    sodium chloride (NS) flush 5-40 mL  5-40 mL IntraVENous PRN    acetaminophen (TYLENOL) tablet 650 mg  650 mg Oral Q6H PRN    Or    acetaminophen (TYLENOL) suppository 650 mg  650 mg Rectal Q6H PRN    polyethylene glycol (MIRALAX) packet 17 g  17 g Oral DAILY PRN    promethazine (PHENERGAN) tablet 12.5 mg  12.5 mg Oral Q6H PRN    Or    ondansetron (ZOFRAN) injection 4 mg  4 mg IntraVENous Q6H PRN    0.9% sodium chloride infusion  100 mL/hr IntraVENous CONTINUOUS    insulin lispro (HUMALOG) injection   SubCUTAneous AC&HS    glucose chewable tablet 16 g  4 Tab Oral PRN    dextrose (D50W) injection syrg 12.5-25 g  12.5-25 g IntraVENous PRN    glucagon (GLUCAGEN) injection 1 mg  1 mg IntraMUSCular PRN      Allergies   Allergen Reactions    Chantix [Varenicline] Rash     Other reaction(s): Rash  Very itchy rash on back  Very itchy rash on back    Penicillins Hives       Review of Systems:     []     Unable to obtain  ROS due to  []    mental status change  []    sedated   [] intubated   [x]    Total of 12 system negative, unless specified below or in HPI:  Constitutional: negative fever, negative chills, negative weight loss  Eyes:   negative visual changes  ENT:   negative sore throat, tongue or lip swelling  Respiratory:  negative cough, negative dyspnea  Cards:  negative for chest pain, palpitations, lower extremity edema  GI:   negative for nausea, vomiting, diarrhea, and abdominal pain  :  negative for frequency, dysuria  Integument:  negative for rash and pruritus  Heme:  negative for easy bruising and gum/nose bleeding  Musculoskel: negative for myalgias,  back pain and muscle weakness  Neuro:  negative for headaches, dizziness, vertigo  Psych:  negative for feelings of anxiety, depression     Objective:        Patient Vitals for the past 8 hrs:   BP Temp Pulse Resp SpO2 Height   10/23/20 1046      6' (1.829 m)   10/23/20 0809 133/74 98.1 °F (36.7 °C) 75 16 93 %        Temp (24hrs), Av.9 °F (36.6 °C), Min:97.5 °F (36.4 °C), Max:98.3 °F (36.8 °C)      Physical Exam:  General:  Alert, cooperative, no distress, appears stated age. Eyes:  Conjunctivae clear. PERRL, EOMs intact. Nose: Nares normal. Septum midline. Mucosa normal. No drainage or sinus tenderness. Mouth/Throat: Lips, mucosa, and tongue normal. Teeth and gums normal.   Neck: Supple, symmetrical, trachea midline   Back:   Symmetric, no curvature. ROM normal. No CVA tenderness. Lungs:   Clear to auscultation bilaterally. Heart:  Regular rate and rhythm. Abdomen:   Soft, non-tender. Bowel sounds normal. No masses,  No organomegaly. Extremities: Extremities normal, atraumatic, no cyanosis or edema.        Skin: Skin color, texture, turgor normal. No rashes or lesions         Labs:   Recent Labs     10/23/20  010   WBC 8.7   HGB 12.4   HCT 36.0*        Recent Labs     10/23/20  0108 10/22/20  1830   * 135*   K 3.2* 3.5    100   CO2 27 29   GLU 92 97   BUN 9 10   CREA 0.72 0.78   CA 8.2* 8.1*   MG  --  1.8   ALB  --  3.1*   TBILI  --  0.4   ALT  --  25     No results for input(s): INR, INREXT in the last 72 hours.

## 2020-10-23 NOTE — ED PROVIDER NOTES
patient is a 39-year-old male prior history of hemorrhagic stroke, hypertension, type 2 diabetes and recent diagnosis of perforated appendicitis treated conservatively with antibiotics who is currently on oral Cipro and Flagyl presenting today with diarrhea and fatigue. His wife states that over the past few days, since coming from the hospital, he has had worsening fatigue to the point where he cannot care for himself. She is having trouble taking care of him as well. He is falling frequently although denies injuries from falls. He has no complaints other than the fatigue and diarrhea throughout the day today. No blood in the stools. .  Denies any abdominal pain or headache. Denies any focal weakness just in the legs bilaterally. No headache, neck pain, back pain. No other complaints noted at this time.            Past Medical History:   Diagnosis Date    Hemorrhagic stroke ([facility] [address] 3/28/2017    S/p TPA    Hypertension     Ill-defined condition     Eczema    Stroke ([facility] [address]     Type II diabetes mellitus ([facility] [address] 3/29/2017       Past Surgical History:   Procedure Laterality Date    HX ORTHOPAEDIC  03/2017    left wrist surgery         Family History:   Problem Relation Age of Onset    Diabetes Mother     Cancer Father     Headache Other        Social History     Socioeconomic History    Marital status:      Spouse name: Not on file    Number of children: Not on file    Years of education: Not on file    Highest education level: Not on file   Occupational History    Not on file   Social Needs    Financial resource strain: Not on file    Food insecurity     Worry: Not on file     Inability: Not on file    Transportation needs     Medical: Not on file     Non-medical: Not on file   Tobacco Use    Smoking status: Former Smoker     Packs/day: 0.50     Years: 50.00     Pack years: 25.00     Types: Cigarettes     Last attempt to quit: 2/28/2017     Years since quitting: 3.6    Smokeless tobacco: Never Used    Tobacco comment: cold turkey 2.5 wks ago after attempt Chantix developed allergy   Substance and Sexual Activity    Alcohol use: No     Comment: around holidatys    Drug use: No    Sexual activity: Not on file   Lifestyle    Physical activity     Days per week: Not on file     Minutes per session: Not on file    Stress: Not on file   Relationships    Social connections     Talks on phone: Not on file     Gets together: Not on file     Attends Taoism service: Not on file     Active member of club or organization: Not on file     Attends meetings of clubs or organizations: Not on file     Relationship status: Not on file    Intimate partner violence     Fear of current or ex partner: Not on file     Emotionally abused: Not on file     Physically abused: Not on file     Forced sexual activity: Not on file   Other Topics Concern    Not on file   Social History Narrative    Not on file         ALLERGIES: Chantix [varenicline] and Penicillins    Review of Systems   Constitutional: Positive for fatigue. Negative for chills and fever. HENT: Negative for congestion and sore throat. Eyes: Negative for pain and redness. Respiratory: Negative for cough and shortness of breath. Cardiovascular: Negative for chest pain and palpitations. Gastrointestinal: Positive for diarrhea. Negative for abdominal pain, nausea and vomiting. Genitourinary: Negative for frequency and hematuria. Musculoskeletal: Negative for back pain and neck pain. Skin: Negative for rash and wound. Neurological: Negative for dizziness and headaches. Hematological: Does not bruise/bleed easily. Vitals:    10/22/20 1618 10/22/20 1621   BP: 125/77 125/77   Pulse: 79    Resp: 16    Temp: 97.5 °F (36.4 °C)    SpO2: 96%    Weight: 99.3 kg (218 lb 14.4 oz)             Physical Exam  Vitals signs and nursing note reviewed. Constitutional:       General: He is not in acute distress.      Appearance: He is well-developed. Comments: Globally weak appearing   HENT:      Head: Normocephalic and atraumatic. Eyes:      Conjunctiva/sclera: Conjunctivae normal.      Pupils: Pupils are equal, round, and reactive to light. Neck:      Musculoskeletal: Normal range of motion and neck supple. Cardiovascular:      Rate and Rhythm: Normal rate and regular rhythm. Heart sounds: Normal heart sounds. No murmur. No friction rub. No gallop. Pulmonary:      Effort: Pulmonary effort is normal. No respiratory distress. Breath sounds: Normal breath sounds. No wheezing or rales. Abdominal:      General: Bowel sounds are normal. There is no distension. Palpations: Abdomen is soft. Tenderness: There is no abdominal tenderness. There is no guarding or rebound. Musculoskeletal: Normal range of motion. Skin:     General: Skin is warm and dry. Capillary Refill: Capillary refill takes less than 2 seconds. Findings: No rash. Neurological:      Mental Status: He is alert and oriented to person, place, and time. Comments: 4 out of 5 strength in bilateral lower extremities  Appropriate strength of bilateral upper extremities   Psychiatric:      Comments: Flat affect            Prior Records Reviewed:   CT of the abdomen from today, taken prior to arrival, shows slight improvement in inflammatory changes related to small periappendiceal abscess      Labs Reviewed:   No leukocytosis or anemia  Mild hyponatremia with normal renal function and LFTs  Troponin negative  Lactic acid normal  Magnesium normal  Urinalysis with ketones but no signs of infection    Imaging Reviewed:   Chest x-ray negative      Course:  1 L of IV fluids given    Perfect Serve Consult for Admission  9:21 PM    ED Room Number: ER09/09  Patient Name and age:  Leann Comment 69 y.o.  male  Working Diagnosis:   1. Fatigue, unspecified type    2. Diarrhea, unspecified type    3. Frequent falls    4.  Impaired mobility and ADLs COVID-19 Suspicion:  no  Sepsis present:  no  Reassessment needed: no  Code Status:  Full Code  Readmission: yes  Isolation Requirements:  no  Recommended Level of Care:  med/surg  Department:[facility] ED - [phone]  Other:  69 y.o. male here with diarrhea, fatigue, falling, wife can't care for him. Probably needs acute rehab, no case management in ED tonight. MDM:  71-year-old male presenting today with generalized fatigue. He is on antibiotics currently for perforated appendicitis diagnosed last week. Wife is concerned about his wellbeing as he frequently falls and she does not think that she can care for him any longer. No focal weakness to suggest stroke. This is gradually been progressing over the past several days. He has had some diarrhea and not really wanting to eat or drink anything. He looks clinically dehydrated so he was given IV fluids. He does have ketones in the urine as well. I will admit him for further management and work-up, likely will require rehab/SNF. Clinical Impression:     ICD-10-CM ICD-9-CM    1. Fatigue, unspecified type  R53.83 780.79    2. Diarrhea, unspecified type  R19.7 787.91    3. Frequent falls  R29.6 V15.88    4.  Impaired mobility and ADLs  Z74.09 V49.89     Z78.9             Disposition: Admit  Scott Swain,

## 2020-10-23 NOTE — ED NOTES
TRANSFER - OUT REPORT:    Verbal report given to Samanta Rodriges RN on Chris Roldan  being transferred to [facility] for routine progression of care       Report consisted of patients Situation, Background, Assessment and   Recommendations(SBAR). Information from the following report(s) SBAR, ED Summary, [facility] and Recent Results was reviewed with the receiving nurse. Opportunity for questions and clarification was provided.

## 2020-10-24 VITALS
OXYGEN SATURATION: 95 % | HEIGHT: 72 IN | BODY MASS INDEX: 29.65 KG/M2 | DIASTOLIC BLOOD PRESSURE: 84 MMHG | HEART RATE: 72 BPM | TEMPERATURE: 97.6 F | SYSTOLIC BLOOD PRESSURE: 128 MMHG | WEIGHT: 218.9 LBS | RESPIRATION RATE: 16 BRPM

## 2020-10-24 LAB
ALBUMIN SERPL-MCNC: 2.8 G/DL (ref 3.5–5)
ANION GAP SERPL CALC-SCNC: 7 MMOL/L (ref 5–15)
BUN SERPL-MCNC: 8 MG/DL (ref 6–20)
BUN/CREAT SERPL: 12 (ref 12–20)
CALCIUM SERPL-MCNC: 8.2 MG/DL (ref 8.5–10.1)
CHLORIDE SERPL-SCNC: 105 MMOL/L (ref 97–108)
CO2 SERPL-SCNC: 26 MMOL/L (ref 21–32)
CREAT SERPL-MCNC: 0.69 MG/DL (ref 0.7–1.3)
ERYTHROCYTE [DISTWIDTH] IN BLOOD BY AUTOMATED COUNT: 13.1 % (ref 11.5–14.5)
GLUCOSE BLD STRIP.AUTO-MCNC: 94 MG/DL (ref 65–100)
GLUCOSE SERPL-MCNC: 85 MG/DL (ref 65–100)
HCT VFR BLD AUTO: 35.8 % (ref 36.6–50.3)
HGB BLD-MCNC: 11.9 G/DL (ref 12.1–17)
MAGNESIUM SERPL-MCNC: 2 MG/DL (ref 1.6–2.4)
MCH RBC QN AUTO: 30.7 PG (ref 26–34)
MCHC RBC AUTO-ENTMCNC: 33.2 G/DL (ref 30–36.5)
MCV RBC AUTO: 92.3 FL (ref 80–99)
NRBC # BLD: 0 K/UL (ref 0–0.01)
NRBC BLD-RTO: 0 PER 100 WBC
PHOSPHATE SERPL-MCNC: 2.1 MG/DL (ref 2.6–4.7)
PLATELET # BLD AUTO: 302 K/UL (ref 150–400)
PMV BLD AUTO: 8.9 FL (ref 8.9–12.9)
POTASSIUM SERPL-SCNC: 3.5 MMOL/L (ref 3.5–5.1)
RBC # BLD AUTO: 3.88 M/UL (ref 4.1–5.7)
SERVICE CMNT-IMP: NORMAL
SODIUM SERPL-SCNC: 138 MMOL/L (ref 136–145)
WBC # BLD AUTO: 8.5 K/UL (ref 4.1–11.1)

## 2020-10-24 PROCEDURE — 36415 COLL VENOUS BLD VENIPUNCTURE: CPT

## 2020-10-24 PROCEDURE — 74011250636 HC RX REV CODE- 250/636: Performed by: INTERNAL MEDICINE

## 2020-10-24 PROCEDURE — 74011250637 HC RX REV CODE- 250/637: Performed by: INTERNAL MEDICINE

## 2020-10-24 PROCEDURE — 99218 HC RM OBSERVATION: CPT

## 2020-10-24 PROCEDURE — 97116 GAIT TRAINING THERAPY: CPT

## 2020-10-24 PROCEDURE — 82962 GLUCOSE BLOOD TEST: CPT

## 2020-10-24 PROCEDURE — 96372 THER/PROPH/DIAG INJ SC/IM: CPT

## 2020-10-24 PROCEDURE — 80069 RENAL FUNCTION PANEL: CPT

## 2020-10-24 PROCEDURE — 85027 COMPLETE CBC AUTOMATED: CPT

## 2020-10-24 PROCEDURE — 83735 ASSAY OF MAGNESIUM: CPT

## 2020-10-24 RX ORDER — SODIUM,POTASSIUM PHOSPHATES 280-250MG
2 POWDER IN PACKET (EA) ORAL ONCE
Status: COMPLETED | OUTPATIENT
Start: 2020-10-24 | End: 2020-10-24

## 2020-10-24 RX ORDER — METFORMIN HYDROCHLORIDE 500 MG/1
500 TABLET, EXTENDED RELEASE ORAL
Qty: 30 TAB | Refills: 0 | Status: SHIPPED
Start: 2020-10-24 | End: 2021-07-29 | Stop reason: ALTCHOICE

## 2020-10-24 RX ADMIN — METOPROLOL TARTRATE 50 MG: 50 TABLET, FILM COATED ORAL at 08:32

## 2020-10-24 RX ADMIN — POTASSIUM & SODIUM PHOSPHATES POWDER PACK 280-160-250 MG 2 PACKET: 280-160-250 PACK at 10:09

## 2020-10-24 RX ADMIN — MEMANTINE HYDROCHLORIDE 10 MG: 10 TABLET ORAL at 08:32

## 2020-10-24 RX ADMIN — METRONIDAZOLE 500 MG: 250 TABLET, FILM COATED ORAL at 08:32

## 2020-10-24 RX ADMIN — AMLODIPINE BESYLATE 2.5 MG: 2.5 TABLET ORAL at 08:32

## 2020-10-24 RX ADMIN — ASPIRIN 81 MG: 81 TABLET, COATED ORAL at 08:32

## 2020-10-24 RX ADMIN — HEPARIN SODIUM 5000 UNITS: 5000 INJECTION INTRAVENOUS; SUBCUTANEOUS at 10:09

## 2020-10-24 RX ADMIN — HEPARIN SODIUM 5000 UNITS: 5000 INJECTION INTRAVENOUS; SUBCUTANEOUS at 02:37

## 2020-10-24 NOTE — DISCHARGE INSTRUCTIONS
HOSPITALIST DISCHARGE INSTRUCTIONS  NAME: David Simmons   :  1950   MRN:  [id number]     Date/Time:  10/24/2020 9:28 AM    ADMIT DATE: 10/22/2020     DISCHARGE DATE: 10/24/2020     PRINCIPAL DISCHARGE DIAGNOSES:  Weakness/debility  Appendicitis with abscess    MEDICATIONS:  · It is important that you take the medication exactly as they are prescribed. Note the changes and additions to your medications. Be sure you understand these changes before you are discharged today. · Keep your medication in the bottles provided by the pharmacist and keep a list of the medication names, dosages, and times to be taken in your wallet. · Do not take other medications without consulting your doctor. Pain Management: per above medications    What to do at Home    Recommended diet:  carmelita Morrow    Recommended activity: Activity as tolerated, PT/OT evaluate and treat, fall precautions    If you experience any of the following symptoms then please call your primary care physician or return to the emergency room if you cannot get hold of your doctor:  Fever, chills, severe abdominal pain, nausea, vomiting, diarrhea, confusion, weakness, falling, bleeding, severe chest pain, shortness of breath, or other severe concerning symptoms    Follow Up: Follow-up Information     Follow up With Specialties Details Why Contact Info    Lamont Chew. Patrice Boss       All Lama MD General Surgery In 2 weeks  [address] [address]  [phone]              Information obtained by :  I understand that if any problems occur once I am at home I am to contact my physician. I understand and acknowledge receipt of the instructions indicated above.                                                                                                                                            Physician's or R.N.'s Signature Date/Time                                                                                                                                              Patient or Representative Signature                                                          Date/Time

## 2020-10-24 NOTE — DISCHARGE SUMMARY
Physician Discharge Summary     Patient ID:  Mikaela Chavez  [id number]  39 y.o.  1950    Admit date: 10/22/2020    Discharge date: 10/24/2020    Admission Diagnoses: Dehydration [E86.0]    Principal Discharge Diagnoses:    weakness    OTHER PROBLEMS ADDRESSEDS  Principal Diagnosis Dehydration                                            Principal Problem:    Dehydration (10/22/2020)    Active Problems:    HTN (hypertension), malignant (3/28/2017)      Type II diabetes mellitus ([facility] [address] (3/29/2017)      Alzheimer's type dementia with late onset without behavioral disturbance ([address] (10/11/2018)      Appendicitis (10/13/2020)      Debility (10/22/2020)      Appendicular abscess (10/22/2020)       Patient Active Problem List   Diagnosis Code    Acute encephalopathy G93.40    Aphasia R47.01    HTN (hypertension), malignant I10    Fracture of radius, distal, left, closed S52.502A    Tobacco use disorder F17.200    Received intravenous tissue plasminogen activator (tPA) in emergency department Z92.82    Hemorrhage, intracerebral ([facility] [address] I61.9    Type II diabetes mellitus ([address] E11.9    Alzheimer's type dementia with late onset without behavioral disturbance ([facility] [address] G30.1, F02.80    Appendicitis K37    Debility R53.81    Dehydration E86.0    Appendicular abscess K35.33         Hospital Course:   Mr. Cl Lehman is a 68 yo  M w/ hx of HTN, DM, dementia, CVA, recent admission for appendicitis being treated with oral antibiotics presenting with weakness     Debility / Weakness: fell to the floor at home due to profound weakness. Wife unable to get him up and unable to care for patient at home. Much improved today and stable for discharge. Outpatient PT/OT     Appendicitis / Appendicular abscess: CT scan of the abdomen showed slight decrease in inflammation around the appendix and periappendiceal abscess.  Stable size of the small periappendiceal abscess.  About a week ago patient was admitted under surgical service and was discharged on antibiotics. Gen isa following. Continue PO abx. Outpatient followup     HTN (hypertension): BP controlled. Continue amlodipine and metoprolol     Type II diabetes mellitus: well controlled. HOLD metformin post IV contrast.     Alzheimer's type dementia with late onset without behavioral disturbance: appears mild. Continue Namenda       Pt discharged in improved and stable condition. Procedures performed: see above    Imaging studies: see above  Ct Abd Pelv W Cont    Result Date: 10/22/2020  IMPRESSION: 1. Slight decrease in inflammation around the appendix and periappendiceal abscess. 2. Stable size of the small periappendiceal abscess. 3. Duodenal inflammation, possibly attributable to right paracolic inflammation from subacute appendicitis. 4. Cardiomegaly. Small right and trace left pleural effusions. 5. Celiac artery origin stenosis and poststenotic dilation. Xr Chest Port    Result Date: 10/22/2020  IMPRESSION: No acute findings. PCP: Miriam Meyer MD    Consults: General Surgery    Discharge Exam:  Patient Vitals for the past 12 hrs:   Temp Pulse Resp BP SpO2   10/24/20 0757 97.6 °F (36.4 °C) 72 16 128/84 95 %   10/24/20 0512 97.9 °F (36.6 °C) 73 16 (!) 145/81 95 %   10/23/20 2258 98.4 °F (36.9 °C) 63 18 115/65 95 %     GEN: NAD  CV: RRR  RESP: CTAB    Disposition: home    Patient Instructions:   Current Discharge Medication List      START taking these medications    Details   OTHER,NON-FORMULARY, PT/OT evaluate and treat  Qty: 1 Each, Refills: 0         CONTINUE these medications which have CHANGED    Details   metFORMIN ER (GLUCOPHAGE XR) 500 mg tablet Take 1 Tab by mouth daily (with dinner). Do not resume until 10/25  Qty: 30 Tab, Refills: 0         CONTINUE these medications which have NOT CHANGED    Details   ciprofloxacin HCl (CIPRO) 500 mg tablet Take 1 Tab by mouth every twelve (12) hours for 14 days.   Qty: 28 Tab, Refills: 0      metroNIDAZOLE (FLAGYL) 500 mg tablet Take 1 Tab by mouth three (3) times daily for 14 days. Qty: 42 Tab, Refills: 0      amLODIPine (NORVASC) 2.5 mg tablet Take 2.5 mg by mouth daily. sertraline (ZOLOFT) 100 mg tablet Take 100 mg by mouth every evening. memantine (Namenda) 10 mg tablet Take 1 Tab by mouth two (2) times a day. Qty: 180 Tab, Refills: 3    Associated Diagnoses: Alzheimer's type dementia with late onset without behavioral disturbance (HCC)      tamsulosin (FLOMAX) 0.4 mg capsule Take 0.4 mg by mouth every evening. metoprolol tartrate (LOPRESSOR) 50 mg tablet Take 50 mg by mouth two (2) times a day. aspirin delayed-release 81 mg tablet Take 81 mg by mouth daily. Cetirizine (ZYRTEC) 10 mg cap Take 10 mg by mouth daily. pravastatin (PRAVACHOL) 40 mg tablet Take 1 Tab by mouth nightly. Indications: prevention of cerebrovascular accident  Qty: 30 Tab, Refills: 0             Activity: See discharge instructions  Diet: See discharge instructions  Wound Care: See discharge instructions    Follow-up Information     Follow up With Specialties Details Why Contact Info    Marianna Oscar, P.OGertrudis Box       Mauricio Hayden MD General Surgery In 2 weeks  [address] [address]  [phone]            I spent 35 minutes on this discharge. Signed:  Keegan Serna MD  10/24/2020  9:33 AM    CC: PCP Dr. Emiliano Bob  .

## 2020-10-24 NOTE — PROGRESS NOTES
Problem: Falls - Risk of  Goal: *Absence of Falls  Outcome: Progressing Towards Goal  Note: Fall Risk Interventions:  Mobility Interventions: Bed/chair exit alarm, Communicate number of staff needed for ambulation/transfer, Patient to call before getting OOB    Mentation Interventions: Adequate sleep, hydration, pain control, Bed/chair exit alarm, Door open when patient unattended, Evaluate medications/consider consulting pharmacy, Familiar objects from home, More frequent rounding, Reorient patient    Medication Interventions: Bed/chair exit alarm, Evaluate medications/consider consulting pharmacy, Patient to call before getting OOB, Teach patient to arise slowly         History of Falls Interventions: Bed/chair exit alarm

## 2020-10-24 NOTE — PROGRESS NOTES
Problem: Falls - Risk of  Goal: *Absence of Falls  10/24/2020 0836 by Robert Rivera, RN  Outcome: Progressing Towards Goal  Note: Fall Risk Interventions:  Mobility Interventions: Bed/chair exit alarm, Communicate number of staff needed for ambulation/transfer, Patient to call before getting OOB    Mentation Interventions: Bed/chair exit alarm, Reorient patient    Medication Interventions: Evaluate medications/consider consulting pharmacy, Patient to call before getting OOB, Teach patient to arise slowly         History of Falls Interventions: Bed/chair exit alarm      10/24/2020 0118 by Robert Rivera RN  Outcome: Progressing Towards Goal  Note: Fall Risk Interventions:  Mobility Interventions: Bed/chair exit alarm, Communicate number of staff needed for ambulation/transfer, Patient to call before getting OOB    Mentation Interventions: Adequate sleep, hydration, pain control, Bed/chair exit alarm, Door open when patient unattended, Evaluate medications/consider consulting pharmacy, Familiar objects from home, More frequent rounding, Reorient patient    Medication Interventions: Bed/chair exit alarm, Evaluate medications/consider consulting pharmacy, Patient to call before getting OOB, Teach patient to arise slowly         History of Falls Interventions: Bed/chair exit alarm

## 2020-10-24 NOTE — ROUTINE PROCESS
Bedside and Verbal shift change report given to [employer] (oncoming nurse) by Denise Hunt (offgoing nurse). Report included the following information SBAR and Kardex.

## 2020-10-24 NOTE — PROGRESS NOTES
Problem: Mobility Impaired (Adult and Pediatric)  Goal: *Acute Goals and Plan of Care (Insert Text)  Description: FUNCTIONAL STATUS PRIOR TO ADMISSION: Patient was modified independent using a single point cane for functional mobility. HOME SUPPORT PRIOR TO ADMISSION: The patient lived with wife who assisted him with out of bed. Physical Therapy Goals  Initiated 10/23/2020  1. Patient will move from supine to sit and sit to supine  in bed with minimal assistance/contact guard assist within 7 day(s). 2.  Patient will transfer from bed to chair and chair to bed with minimal assistance/contact guard assist using the least restrictive device within 7 day(s). 3.  Patient will perform sit to stand with supervision/set-up within 7 day(s). 4.  Patient will ambulate with supervision/set-up for 100 feet with the least restrictive device within 7 day(s). 5.  Patient will ascend/descend 13 stairs with handrail(s) with minimal assistance/contact guard assist within 7 day(s). Outcome: Progressing Towards Goal   PHYSICAL THERAPY TREATMENT  Patient: Estephania Watson (17 y.o. male)  Date: 10/24/2020  Diagnosis: Dehydration [E86.0]   Dehydration       Precautions:    Chart, physical therapy assessment, plan of care and goals were reviewed. ASSESSMENT  Patient continues with skilled PT services and is progressing towards goals. Performed transfer and gait training. Patient requires min assist for both but is safer, stronger, and increased endurance compared with yesterday. Wife present at end of treatment. Discuss assistance level needed for safe patient mobility and reviewed stair technique. Patient and wife comfortable with assist level needed. Patient is cleared for discharge home today from a mobility standpoint.     Current Level of Function Impacting Discharge (mobility/balance): needs min assist with mobility - wife aware    Other factors to consider for discharge: bedroom on second floor         PLAN :  Patient continues to benefit from skilled intervention to address the above impairments. Continue treatment per established plan of care. to address goals. Recommendation for discharge: (in order for the patient to meet his/her long term goals)  Outpatient physical therapy follow up recommended for strengthening    This discharge recommendation:  Has been made in collaboration with the attending provider and/or case management    IF patient discharges home will need the following DME: patient owns DME required for discharge       SUBJECTIVE:   Patient stated I want to wear my shoes.     OBJECTIVE DATA SUMMARY:   Critical Behavior:  Neurologic State: Alert  Orientation Level: Oriented X4  Cognition: Appropriate decision making  Safety/Judgement: Awareness of environment  Functional Mobility Training:  Bed Mobility:     Supine to Sit: Contact guard assistance              Transfers:  Sit to Stand: Minimum assistance  Stand to Sit: Contact guard assistance                             Balance:     Ambulation/Gait Training:  Distance (ft): 150 Feet (ft)  Assistive Device: Gait belt;Cane, straight  Ambulation - Level of Assistance: Minimal assistance                             Pain Rating:  denies    Activity Tolerance:   Good  Please refer to the flowsheet for vital signs taken during this treatment. After treatment patient left in no apparent distress:   Sitting in chair, Call bell within reach, Bed / chair alarm activated, and Caregiver / family present    COMMUNICATION/COLLABORATION:   The patients plan of care was discussed with: Occupational therapist and Registered nurse.      Rosa Isela Fernandes, PT   Time Calculation: 15 mins

## 2020-10-24 NOTE — PROGRESS NOTES
I have reviewed discharge instructions with the patient and spouse. The patient verbalized understanding. Peripheral IV removed upon D/C.

## 2020-10-24 NOTE — PROGRESS NOTES
Transition Plan of Care  RUR OBS      Patient is ready for discharge. Wife will transport. [facility] updated. Care Management Interventions  PCP Verified by CM:  Yes  Physical Therapy Consult: Yes  Occupational Therapy Consult: Yes  Speech Therapy Consult: No  Current Support Network: Lives with Spouse  Confirm Follow Up Transport: Family  The Plan for Transition of Care is Related to the Following Treatment Goals : Dehydration  Discharge Location  Discharge Placement: Home with family assistance  NOE Crouch

## 2021-03-01 ENCOUNTER — OFFICE VISIT (OUTPATIENT)
Dept: NEUROLOGY | Age: 71
End: 2021-03-01
Payer: MEDICARE

## 2021-03-01 VITALS
OXYGEN SATURATION: 98 % | HEART RATE: 82 BPM | HEIGHT: 72 IN | SYSTOLIC BLOOD PRESSURE: 124 MMHG | RESPIRATION RATE: 17 BRPM | TEMPERATURE: 97.2 F | WEIGHT: 192 LBS | DIASTOLIC BLOOD PRESSURE: 74 MMHG | BODY MASS INDEX: 26.01 KG/M2

## 2021-03-01 DIAGNOSIS — F06.31 DEPRESSION AS LATE EFFECT OF CEREBROVASCULAR ACCIDENT (CVA): ICD-10-CM

## 2021-03-01 DIAGNOSIS — I69.398 DEPRESSION AS LATE EFFECT OF CEREBROVASCULAR ACCIDENT (CVA): ICD-10-CM

## 2021-03-01 DIAGNOSIS — F02.80 ALZHEIMER'S TYPE DEMENTIA WITH LATE ONSET WITHOUT BEHAVIORAL DISTURBANCE (HCC): ICD-10-CM

## 2021-03-01 DIAGNOSIS — G30.1 ALZHEIMER'S TYPE DEMENTIA WITH LATE ONSET WITHOUT BEHAVIORAL DISTURBANCE (HCC): ICD-10-CM

## 2021-03-01 DIAGNOSIS — R45.3 APATHY: Primary | ICD-10-CM

## 2021-03-01 PROCEDURE — G8536 NO DOC ELDER MAL SCRN: HCPCS | Performed by: NURSE PRACTITIONER

## 2021-03-01 PROCEDURE — G8432 DEP SCR NOT DOC, RNG: HCPCS | Performed by: NURSE PRACTITIONER

## 2021-03-01 PROCEDURE — 1101F PT FALLS ASSESS-DOCD LE1/YR: CPT | Performed by: NURSE PRACTITIONER

## 2021-03-01 PROCEDURE — G8752 SYS BP LESS 140: HCPCS | Performed by: NURSE PRACTITIONER

## 2021-03-01 PROCEDURE — 3017F COLORECTAL CA SCREEN DOC REV: CPT | Performed by: NURSE PRACTITIONER

## 2021-03-01 PROCEDURE — G8754 DIAS BP LESS 90: HCPCS | Performed by: NURSE PRACTITIONER

## 2021-03-01 PROCEDURE — 99214 OFFICE O/P EST MOD 30 MIN: CPT | Performed by: NURSE PRACTITIONER

## 2021-03-01 PROCEDURE — G8427 DOCREV CUR MEDS BY ELIG CLIN: HCPCS | Performed by: NURSE PRACTITIONER

## 2021-03-01 PROCEDURE — G8419 CALC BMI OUT NRM PARAM NOF/U: HCPCS | Performed by: NURSE PRACTITIONER

## 2021-03-01 RX ORDER — SERTRALINE HYDROCHLORIDE 100 MG/1
100 TABLET, FILM COATED ORAL EVERY EVENING
Qty: 90 TAB | Refills: 3 | Status: SHIPPED | OUTPATIENT
Start: 2021-03-01 | End: 2022-04-01 | Stop reason: SDUPTHER

## 2021-03-01 RX ORDER — MEMANTINE HYDROCHLORIDE 10 MG/1
10 TABLET ORAL 2 TIMES DAILY
Qty: 180 TAB | Refills: 3 | Status: SHIPPED | OUTPATIENT
Start: 2021-03-01 | End: 2022-05-16 | Stop reason: SDUPTHER

## 2021-03-01 RX ORDER — DONEPEZIL HYDROCHLORIDE 10 MG/1
10 TABLET, FILM COATED ORAL
Qty: 90 TAB | Refills: 3 | Status: SHIPPED | OUTPATIENT
Start: 2021-03-01 | End: 2022-04-01 | Stop reason: SDUPTHER

## 2021-03-01 NOTE — PROGRESS NOTES
Pt is here for f/u for dementia. Pt is no longer taking Amlodipine and metoprolol due to blood pressure dropping low.

## 2021-03-01 NOTE — PROGRESS NOTES
Date:  21     Name:  Katharina Dunn  :  1950  MRN:  [id number]     PCP:  Sarah Vásquez MD    Chief Complaint   Patient presents with    Dementia     HISTORY OF PRESENT ILLNESS: Follow up for dementia. He is still taking Namenda and Aricept. He continues to be apathetic to a degree but this is better since the increase in the Zoloft. He has been helping out more around the house and is not sitting as much as he was. Since his last office visit, he did have an abcess on his appendix for which he was treated with antibiotics. He has also had several falls. His blood pressure medications were discontinued and he was sent for PT which has helped with his balance and strength. He is also eating better which his wife thinks has helped with his strength as well. He continues to be independent with ADLs. No incontinence issues. No dangerous behaviors, wandering, or agitation. He no longer drives. MMSE, 2020, was 29 of 30 having only been unable to recall 2 of 3 items after 5 minutes of distraction    Recap from last office visit:  At least on MMSE, there does not appear to be any significant progression with regard to his memory loss. His score today was 29 of 30. He had a normal clock draw. He continues to be functional from an activities of daily living perspective. He is at least independent in this regard. His wife is still helping him with his medications. The biggest issue is really related to the severe apathy. We will try to increase the Zoloft to 100 mg daily to see if this provides any help. He should continue with secondary stroke prevention. He should continue to try to stay as active as possible though we will need to continue to try to pull him out of his apathetic state. He will remain on Aricept and Namenda as previously prescribed. Follow up in six months. Except as noted above, denies  fever, chills, cough. No CP or SOB.   No dysuria, loss of bowel or bladder control. No Weight loss. Appetite good. Sleeping well. No sweats. No edema. No bruising or bleeding. No nausea or vomit. No diarrhea. No frequency, urgency, No depressive sxs. No anxiety. Denies sore throat, nasal congestion, nasal discharge, epistaxis, tinnitus, hearing loss, back pain, muscle pain, or joint pain. Current Outpatient Medications   Medication Sig    sertraline (ZOLOFT) 50 mg tablet TAKE ONE TABLET BY MOUTH DAILY    metFORMIN ER (GLUCOPHAGE XR) 500 mg tablet Take 1 Tab by mouth daily (with dinner). Do not resume until 10/25    sertraline (ZOLOFT) 100 mg tablet Take 100 mg by mouth every evening.  memantine (Namenda) 10 mg tablet Take 1 Tab by mouth two (2) times a day.  tamsulosin (FLOMAX) 0.4 mg capsule Take 0.4 mg by mouth every evening.  aspirin delayed-release 81 mg tablet Take 81 mg by mouth daily.  Cetirizine (ZYRTEC) 10 mg cap Take 10 mg by mouth daily.  pravastatin (PRAVACHOL) 40 mg tablet Take 1 Tab by mouth nightly. Indications: prevention of cerebrovascular accident     No current facility-administered medications for this visit.       Allergies   Allergen Reactions    Chantix [Varenicline] Rash     Other reaction(s): Rash  Very itchy rash on back  Very itchy rash on back    Penicillins Hives     Past Medical History:   Diagnosis Date    Hemorrhagic stroke [facility] 3/28/2017    S/p TPA    Hypertension     Ill-defined condition     Eczema    Stroke ([facility]     Type II diabetes mellitus ([facility] [facility] 3/29/2017     Past Surgical History:   Procedure Laterality Date    HX ORTHOPAEDIC  03/2017    left wrist surgery     Social History     Socioeconomic History    Marital status:      Spouse name: Not on file    Number of children: Not on file    Years of education: Not on file    Highest education level: Not on file   Occupational History    Not on file   Social Needs    Financial resource strain: Not on file    Food insecurity     Worry: Not on file     Inability: Not on file    Transportation needs     Medical: Not on file     Non-medical: Not on file   Tobacco Use    Smoking status: Former Smoker     Packs/day: 0.50     Years: 50.00     Pack years: 25.00     Types: Cigarettes     Quit date: 2017     Years since quittin.0    Smokeless tobacco: Never Used    Tobacco comment: cold turkey 2.5 wks ago after attempt Chantix developed allergy   Substance and Sexual Activity    Alcohol use: No     Comment: around holidatys    Drug use: No    Sexual activity: Not on file   Lifestyle    Physical activity     Days per week: Not on file     Minutes per session: Not on file    Stress: Not on file   Relationships    Social connections     Talks on phone: Not on file     Gets together: Not on file     Attends [employer] service: Not on file     Active member of club or organization: Not on file     Attends meetings of clubs or organizations: Not on file     Relationship status: Not on file    Intimate partner violence     Fear of current or ex partner: Not on file     Emotionally abused: Not on file     Physically abused: Not on file     Forced sexual activity: Not on file   Other Topics Concern    Not on file   Social History Narrative    Not on file     Family History   Problem Relation Age of Onset    Diabetes Mother     Cancer Father     Headache Other        PHYSICAL EXAMINATION:    Visit Vitals  /74   Pulse 82   Temp 97.2 °F (36.2 °C)   Resp 17   Ht 6' (1.829 m)   Wt 87.1 kg (192 lb)   SpO2 98%   BMI 26.04 kg/m²     General:  Well defined, nourished, and groomed individual in no acute distress.    Neck:             Supple, nontender, no bruits, no pain with resistance to active range of motion.    Heart:            Regular rate and rhythm, no murmurs, rub, or gallop.  Normal S1S2.   Lungs:  Clear to auscultation bilaterally with equal chest expansion, no cough, no wheeze  Musculoskeletal:  Extremities revealed no edema and had full range of motion of joints.    Psych:  Apathetic and depressed with a flat affect      NEUROLOGICAL EXAMINATION:     Mental Status:   Alert and oriented to person and place. Continues to be apathetic but his affect is a bit brighter      Cranial Nerves:    II, III, IV, VI:  Visual acuity grossly intact. Visual fields are normal.    Pupils are equal, round, and reactive to light and accommodation.    Extra-ocular movements are full and fluid.  Fundoscopic exam was benign, no ptosis or nystagmus. V-XII:             Hearing is grossly intact.  Facial features are symmetric, with normal sensation and strength.  The palate rises symmetrically and the tongue protrudes midline.  Sternocleidomastoids 5/5.        Motor Examination:               Normal tone, bulk, and strength, 5/5 muscle strength throughout.         Coordination:  Finger to nose was normal.   No resting or intention tremor      Gait and Station: WeTag while walking.  Normal arm swing.  No pronator drift.   No muscle wasting or fasiculations noted.    Reflexes:  DTRs 2+ throughout. ASSESSMENT AND PLAN    ICD-10-CM ICD-9-CM    1. Apathy  R45.3 799.25 sertraline (ZOLOFT) 100 mg tablet   2. Alzheimer's type dementia with late onset without behavioral disturbance (HCC)  G30.1 331.0 memantine (Namenda) 10 mg tablet    F02.80 294.10 donepeziL (ARICEPT) 10 mg tablet   3. 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 The depression and apathy are improved on the higher dose of Zoloft. Memory issues come and go but seem to be stable per his wife's report. Continue with Aricept and Namenda. Encouraged to continue to be as active socially and physically as possible. Follow up in six months    [address] [facility].  Gosia Kruger

## 2021-07-19 ENCOUNTER — TELEPHONE (OUTPATIENT)
Dept: NEUROLOGY | Age: 71
End: 2021-07-19

## 2021-07-19 NOTE — TELEPHONE ENCOUNTER
S/w pt's wife, she states he just dropped for a very short period at [employer] w/o warning. Refused EMS call. A nurse at their [employer] assessed him to make sure he was fine to go home. Once home, wife took BP and it was in the 90s/40s. She did not call pcp as she did not know if this was a neuro issue or something else. He is no longer taking BP meds. Advised her to contact pcp and I would forward call to [facility] for her recs.

## 2021-07-19 NOTE — TELEPHONE ENCOUNTER
Patients wife calling stating that her  passed out in [employer] yesterday and would like to know if she needed to bring him in sooner than his appt that is scheduled in September.  Please call back

## 2021-07-29 ENCOUNTER — OFFICE VISIT (OUTPATIENT)
Dept: CARDIOLOGY CLINIC | Age: 71
End: 2021-07-29
Payer: MEDICARE

## 2021-07-29 ENCOUNTER — CLINICAL SUPPORT (OUTPATIENT)
Dept: CARDIOLOGY CLINIC | Age: 71
End: 2021-07-29

## 2021-07-29 VITALS
OXYGEN SATURATION: 95 % | HEART RATE: 93 BPM | WEIGHT: 204 LBS | DIASTOLIC BLOOD PRESSURE: 62 MMHG | RESPIRATION RATE: 20 BRPM | SYSTOLIC BLOOD PRESSURE: 110 MMHG | HEIGHT: 72 IN | BODY MASS INDEX: 27.63 KG/M2

## 2021-07-29 DIAGNOSIS — R55 SYNCOPE, UNSPECIFIED SYNCOPE TYPE: Primary | ICD-10-CM

## 2021-07-29 DIAGNOSIS — I10 HTN (HYPERTENSION), MALIGNANT: ICD-10-CM

## 2021-07-29 PROCEDURE — 93227 XTRNL ECG REC<48 HR R&I: CPT | Performed by: INTERNAL MEDICINE

## 2021-07-29 PROCEDURE — 99204 OFFICE O/P NEW MOD 45 MIN: CPT | Performed by: SPECIALIST

## 2021-07-29 PROCEDURE — G8754 DIAS BP LESS 90: HCPCS | Performed by: SPECIALIST

## 2021-07-29 PROCEDURE — 93225 XTRNL ECG REC<48 HRS REC: CPT | Performed by: SPECIALIST

## 2021-07-29 PROCEDURE — G8752 SYS BP LESS 140: HCPCS | Performed by: SPECIALIST

## 2021-07-29 NOTE — PROGRESS NOTES
HISTORY OF PRESENT ILLNESS  Juan Jenkins is a 66 y.o. male     SUMMARY:   Problem List  Date Reviewed: 7/29/2021        Codes Class Noted    Debility ICD-10-CM: R53.81  ICD-9-CM: 799.3  10/22/2020        Dehydration ICD-10-CM: E86.0  ICD-9-CM: 276.51  10/22/2020        Appendicular abscess ICD-10-CM: K35.33  ICD-9-CM: 540.1  10/22/2020        Appendicitis ICD-10-CM: K37  ICD-9-CM: 069  10/13/2020        Alzheimer's type dementia with late onset without behavioral disturbance ([facility] ICD-10-CM: G30.1, F02.80  ICD-9-CM: 331.0, 294.10  10/11/2018        Type II diabetes mellitus ([facility] ICD-10-CM: E11.9  ICD-9-CM: 250.00  3/29/2017        Acute encephalopathy ICD-10-CM: G93.40  ICD-9-CM: 348.30  3/28/2017        Aphasia ICD-10-CM: R47.01  ICD-9-CM: 784.3  3/28/2017        HTN (hypertension), malignant ICD-10-CM: I10  ICD-9-CM: 401.0  3/28/2017        Fracture of radius, distal, left, closed ICD-10-CM: S52.502A  ICD-9-CM: 813.42  3/28/2017        Tobacco use disorder ICD-10-CM: F17.200  ICD-9-CM: 305.1  3/28/2017        Received intravenous tissue plasminogen activator (tPA) in emergency department ICD-10-CM: Z92.82  ICD-9-CM: V45.88  3/28/2017        Hemorrhage, intracerebral ([facility] ICD-10-CM: I61.9  ICD-9-CM: 870  3/28/2017              Current Outpatient Medications on File Prior to Visit   Medication Sig    sertraline (ZOLOFT) 100 mg tablet Take 1 Tab by mouth every evening.  memantine (Namenda) 10 mg tablet Take 1 Tab by mouth two (2) times a day.  donepeziL (ARICEPT) 10 mg tablet Take 1 Tab by mouth nightly.  tamsulosin (FLOMAX) 0.4 mg capsule Take 0.4 mg by mouth every evening.  aspirin delayed-release 81 mg tablet Take 81 mg by mouth daily.  Cetirizine (ZYRTEC) 10 mg cap Take 10 mg by mouth daily.  pravastatin (PRAVACHOL) 40 mg tablet Take 1 Tab by mouth nightly. Indications: prevention of cerebrovascular accident     No current facility-administered medications on file prior to visit. CARDIOLOGY STUDIES TO DATE:  3/17 lae, otherwise normal echo    Chief Complaint   Patient presents with    New Patient     HPI :  He is referred by his family physician for cardiac evaluation. He was recently in [employer] standing and singing him when he suddenly fainted without warning. He hit his tailbone and thus little sore but no other serious injury and no seizure activity was noted. Once he came to and just a few moments he was back to his baseline which includes some minor slow mentation and memory issues for which she is on medications. He has never had anything like this before. No palpitations no symptoms of angina or heart failure. He was fairly physically active up until the time of his stroke a few years ago but now he just walks short distances in the neighborhood. There is no history of hypertension. At 1 point he had prediabetes. He quit smoking about 5 years ago. He says his cholesterols been okay and family history is negative for premature coronary disease. He refused to go to the ER after this episode but he did see his primary care who apparently got some blood work and an EKG and although they have not yet sent that to us it sounds like everything was within normal limits. He drinks a fair amount of caffeinated soft drinks and not a lot of water. CARDIAC ROS:   negative for chest pain, dyspnea, orthopnea, paroxysmal nocturnal dyspnea, claudication, lower extremity edema    Family History   Problem Relation Age of Onset    Diabetes Mother     Cancer Father     Headache Other        Past Medical History:   Diagnosis Date    Hemorrhagic stroke ([facility] [address] 3/28/2017    S/p TPA    Hypertension     Ill-defined condition     Eczema    Stroke ([facility]     Type II diabetes mellitus ([facility] [facility] 3/29/2017       GENERAL ROS:  A comprehensive review of systems was negative except for that written in the HPI.     Visit Vitals  /62 (BP 1 Location: Left arm, BP Patient Position: Sitting, BP Cuff Size: Adult)   Pulse 93   Resp 20   Ht 6' (1.829 m)   Wt 204 lb (92.5 kg)   SpO2 95%   BMI 27.67 kg/m²       Wt Readings from Last 3 Encounters:   07/29/21 204 lb (92.5 kg)   03/01/21 192 lb (87.1 kg)   10/22/20 218 lb 14.4 oz (99.3 kg)            BP Readings from Last 3 Encounters:   07/29/21 110/62   03/01/21 124/74   10/24/20 128/84       PHYSICAL EXAM  General appearance: alert, cooperative, no distress, appears stated age  Neurologic: Alert and oriented X 3  Neck: supple, symmetrical, trachea midline, no adenopathy, no carotid bruit and no JVD  Lungs: clear to auscultation bilaterally  Heart: regular rate and rhythm, S1, S2 normal, no murmur, click, rub or gallop  Abdomen: soft, non-tender. Bowel sounds normal. No masses,  no organomegaly  Extremities: extremities normal, atraumatic, no cyanosis or edema  Pulses: 2+ and symmetric    Lab Results   Component Value Date/Time    Cholesterol, total 191 03/28/2017 02:52 PM    HDL Cholesterol 42 03/28/2017 02:52 PM    LDL, calculated 92.6 03/28/2017 02:52 PM    Triglyceride 282 (H) 03/28/2017 02:52 PM    CHOL/HDL Ratio 4.5 03/28/2017 02:52 PM     ASSESSMENT :      I think he most likely had a vasovagal episode or a low blood pressure episode since he was standing and singing. He has really noticed significant cardiac risk factors other than age and no cardiac symptoms. In addition to him drinking more water and less caffeine, we are going to put a monitor on him and get an echocardiogram. Assuming of those were all negative he will need no further cardiac testing. current treatment plan is effective, no change in therapy  lab results and schedule of future lab studies reviewed with patient  reviewed diet, exercise and weight control    Encounter Diagnoses   Name Primary?  Syncope, unspecified syncope type Yes    HTN (hypertension), malignant      No orders of the defined types were placed in this encounter.       Follow-up and Dispositions    · Return in about 4 weeks (around 8/26/2021). Luis Carlos Rivera MD  7/29/2021  Please note that this dictation was completed with [employer], the computer voice recognition software. Quite often unanticipated grammatical, syntax, homophones, and other interpretive errors are inadvertently transcribed by the computer software. Please disregard these errors. Please excuse any errors that have escaped final proofreading. Thank you.

## 2021-08-04 ENCOUNTER — TELEPHONE (OUTPATIENT)
Dept: CARDIOLOGY CLINIC | Age: 71
End: 2021-08-04

## 2021-08-04 NOTE — TELEPHONE ENCOUNTER
Dr. Kylee Monzon-  Patient's holter monitor results are on your desk for review. F/u with you tomorrow.     Future Appointments   Date Time Provider Bernard Villegas   8/5/2021  2:00 PM LOUIS CHANEL BS AMB   8/5/2021  2:40 PM MD CHIQUITA Singh III BS AMB   9/1/2021 10:30 AM George Clemente NP [facility] BS AMB

## 2021-08-05 ENCOUNTER — OFFICE VISIT (OUTPATIENT)
Dept: CARDIOLOGY CLINIC | Age: 71
End: 2021-08-05
Payer: MEDICARE

## 2021-08-05 ENCOUNTER — ANCILLARY PROCEDURE (OUTPATIENT)
Dept: CARDIOLOGY CLINIC | Age: 71
End: 2021-08-05

## 2021-08-05 VITALS
HEIGHT: 72 IN | WEIGHT: 204 LBS | BODY MASS INDEX: 27.63 KG/M2 | SYSTOLIC BLOOD PRESSURE: 110 MMHG | DIASTOLIC BLOOD PRESSURE: 62 MMHG

## 2021-08-05 VITALS
RESPIRATION RATE: 18 BRPM | WEIGHT: 204 LBS | DIASTOLIC BLOOD PRESSURE: 70 MMHG | OXYGEN SATURATION: 96 % | SYSTOLIC BLOOD PRESSURE: 118 MMHG | HEIGHT: 72 IN | HEART RATE: 79 BPM | BODY MASS INDEX: 27.63 KG/M2

## 2021-08-05 DIAGNOSIS — I10 HTN (HYPERTENSION), MALIGNANT: ICD-10-CM

## 2021-08-05 DIAGNOSIS — I71.20 THORACIC AORTIC ANEURYSM WITHOUT RUPTURE: ICD-10-CM

## 2021-08-05 DIAGNOSIS — R55 SYNCOPE, UNSPECIFIED SYNCOPE TYPE: ICD-10-CM

## 2021-08-05 DIAGNOSIS — I10 HTN (HYPERTENSION), MALIGNANT: Primary | ICD-10-CM

## 2021-08-05 LAB
ECHO AO ASC DIAM: 3.97 CM
ECHO AO ROOT DIAM: 3.93 CM
ECHO AV AREA PEAK VELOCITY: 4.92 CM2
ECHO AV AREA VTI: 4.41 CM2
ECHO AV AREA/BSA PEAK VELOCITY: 2.3 CM2/M2
ECHO AV AREA/BSA VTI: 2.1 CM2/M2
ECHO AV MEAN GRADIENT: 2.58 MMHG
ECHO AV PEAK GRADIENT: 3.87 MMHG
ECHO AV PEAK VELOCITY: 98.39 CM/S
ECHO AV VTI: 21.29 CM
ECHO EST RA PRESSURE: 5 MMHG
ECHO LA AREA 4C: 18.94 CM2
ECHO LA MAJOR AXIS: 3.85 CM
ECHO LA MINOR AXIS: 1.79 CM
ECHO LA VOL 2C: 50.11 ML (ref 18–58)
ECHO LA VOL 4C: 55.46 ML (ref 18–58)
ECHO LA VOL BP: 56.71 ML (ref 18–58)
ECHO LA VOL/BSA BIPLANE: 26.38 ML/M2 (ref 16–28)
ECHO LA VOLUME INDEX A2C: 23.31 ML/M2 (ref 16–28)
ECHO LA VOLUME INDEX A4C: 25.8 ML/M2 (ref 16–28)
ECHO LV E' LATERAL VELOCITY: 9.91 CM/S
ECHO LV E' SEPTAL VELOCITY: 9.02 CM/S
ECHO LV EDV A2C: 78.58 ML
ECHO LV EDV A4C: 88.22 ML
ECHO LV EDV BP: 85.31 ML (ref 67–155)
ECHO LV EDV INDEX A4C: 41 ML/M2
ECHO LV EDV INDEX BP: 39.7 ML/M2
ECHO LV EDV NDEX A2C: 36.5 ML/M2
ECHO LV EJECTION FRACTION A2C: 69 PERCENT
ECHO LV EJECTION FRACTION A4C: 61 PERCENT
ECHO LV EJECTION FRACTION BIPLANE: 66.3 PERCENT (ref 55–100)
ECHO LV ESV A2C: 24.02 ML
ECHO LV ESV A4C: 34.59 ML
ECHO LV ESV BP: 28.77 ML (ref 22–58)
ECHO LV ESV INDEX A2C: 11.2 ML/M2
ECHO LV ESV INDEX A4C: 16.1 ML/M2
ECHO LV ESV INDEX BP: 13.4 ML/M2
ECHO LV INTERNAL DIMENSION DIASTOLIC: 4.15 CM (ref 4.2–5.9)
ECHO LV INTERNAL DIMENSION SYSTOLIC: 2.69 CM
ECHO LV IVSD: 1.33 CM (ref 0.6–1)
ECHO LV MASS 2D: 176 G (ref 88–224)
ECHO LV MASS INDEX 2D: 81.9 G/M2 (ref 49–115)
ECHO LV POSTERIOR WALL DIASTOLIC: 1.08 CM (ref 0.6–1)
ECHO LVOT DIAM: 2.64 CM
ECHO LVOT PEAK GRADIENT: 3.13 MMHG
ECHO LVOT PEAK VELOCITY: 88.52 CM/S
ECHO LVOT SV: 93.9 ML
ECHO LVOT VTI: 17.16 CM
ECHO MV A VELOCITY: 75.16 CM/S
ECHO MV AREA PHT: 3.96 CM2
ECHO MV E DECELERATION TIME (DT): 191.34 MS
ECHO MV E VELOCITY: 57.77 CM/S
ECHO MV E/A RATIO: 0.77
ECHO MV E/E' LATERAL: 5.83
ECHO MV E/E' RATIO (AVERAGED): 6.12
ECHO MV E/E' SEPTAL: 6.4
ECHO MV PRESSURE HALF TIME (PHT): 55.49 MS
ECHO RA MAJOR AXIS: 3.74 CM
ECHO RIGHT VENTRICULAR SYSTOLIC PRESSURE (RVSP): 32 MMHG
ECHO RV INTERNAL DIMENSION: 3.37 CM
ECHO RV TAPSE: 1.92 CM (ref 1.5–2)
ECHO TV REGURGITANT MAX VELOCITY: 261.32 CM/S
ECHO TV REGURGITANT PEAK GRADIENT: 27.33 MMHG
LA VOL DISK BP: 54.41 ML (ref 18–58)
LVOT MG: 1.37 MMHG

## 2021-08-05 PROCEDURE — G8432 DEP SCR NOT DOC, RNG: HCPCS | Performed by: SPECIALIST

## 2021-08-05 PROCEDURE — G8536 NO DOC ELDER MAL SCRN: HCPCS | Performed by: SPECIALIST

## 2021-08-05 PROCEDURE — 3017F COLORECTAL CA SCREEN DOC REV: CPT | Performed by: SPECIALIST

## 2021-08-05 PROCEDURE — 93306 TTE W/DOPPLER COMPLETE: CPT | Performed by: SPECIALIST

## 2021-08-05 PROCEDURE — 1101F PT FALLS ASSESS-DOCD LE1/YR: CPT | Performed by: SPECIALIST

## 2021-08-05 PROCEDURE — 99213 OFFICE O/P EST LOW 20 MIN: CPT | Performed by: SPECIALIST

## 2021-08-05 PROCEDURE — G8752 SYS BP LESS 140: HCPCS | Performed by: SPECIALIST

## 2021-08-05 PROCEDURE — G8754 DIAS BP LESS 90: HCPCS | Performed by: SPECIALIST

## 2021-08-05 PROCEDURE — G8427 DOCREV CUR MEDS BY ELIG CLIN: HCPCS | Performed by: SPECIALIST

## 2021-08-05 PROCEDURE — G8419 CALC BMI OUT NRM PARAM NOF/U: HCPCS | Performed by: SPECIALIST

## 2021-08-05 NOTE — PROGRESS NOTES
HISTORY OF PRESENT ILLNESS  Alexus Song is a 66 y.o. male     SUMMARY:   Problem List  Date Reviewed: 8/5/2021        Codes Class Noted    Debility ICD-10-CM: R53.81  ICD-9-CM: 799.3  10/22/2020        Dehydration ICD-10-CM: E86.0  ICD-9-CM: 276.51  10/22/2020        Appendicular abscess ICD-10-CM: K35.33  ICD-9-CM: 540.1  10/22/2020        Appendicitis ICD-10-CM: K37  ICD-9-CM: 642  10/13/2020        Alzheimer's type dementia with late onset without behavioral disturbance ([facility] ICD-10-CM: G30.1, F02.80  ICD-9-CM: 331.0, 294.10  10/11/2018        Type II diabetes mellitus ([facility] ICD-10-CM: E11.9  ICD-9-CM: 250.00  3/29/2017        Acute encephalopathy ICD-10-CM: G93.40  ICD-9-CM: 348.30  3/28/2017        Aphasia ICD-10-CM: R47.01  ICD-9-CM: 784.3  3/28/2017        HTN (hypertension), malignant ICD-10-CM: I10  ICD-9-CM: 401.0  3/28/2017        Fracture of radius, distal, left, closed ICD-10-CM: S52.502A  ICD-9-CM: 813.42  3/28/2017        Tobacco use disorder ICD-10-CM: F17.200  ICD-9-CM: 305.1  3/28/2017        Received intravenous tissue plasminogen activator (tPA) in emergency department ICD-10-CM: Z92.82  ICD-9-CM: V45.88  3/28/2017        Hemorrhage, intracerebral ([facility] ICD-10-CM: I61.9  ICD-9-CM: 122  3/28/2017              Current Outpatient Medications on File Prior to Visit   Medication Sig    sertraline (ZOLOFT) 100 mg tablet Take 1 Tab by mouth every evening.  memantine (Namenda) 10 mg tablet Take 1 Tab by mouth two (2) times a day.  donepeziL (ARICEPT) 10 mg tablet Take 1 Tab by mouth nightly.  tamsulosin (FLOMAX) 0.4 mg capsule Take 0.4 mg by mouth every evening.  aspirin delayed-release 81 mg tablet Take 81 mg by mouth daily.  Cetirizine (ZYRTEC) 10 mg cap Take 10 mg by mouth daily.  pravastatin (PRAVACHOL) 40 mg tablet Take 1 Tab by mouth nightly. Indications: prevention of cerebrovascular accident     No current facility-administered medications on file prior to visit. CARDIOLOGY STUDIES TO DATE:  3/17 lae, otherwise normal echo   7/21 48 hr holter, rare pvc, pac, no sig danny, 1 six beat svt rate 115 at 0406 am  7/21 echo normal lvef, lvh, ascending aorta 4 cm    Chief Complaint   Patient presents with    Blood Pressure Check    Results     ECHO     HPI :  He is doing well with and has had no further episodes. His Holter showed some PVCs and very short runs of PAT which I doubt would cause any symptoms and his echo today showed good LV function no significant valvular abnormalities and a mildly dilated ascending aorta. They went out and walks through a couple of stores and around the neighborhood earlier this week for about a total of a mile and everything went well no symptoms  CARDIAC ROS:   negative for chest pain, dyspnea, palpitations, syncope, orthopnea, paroxysmal nocturnal dyspnea, exertional chest pressure/discomfort, claudication, lower extremity edema    Family History   Problem Relation Age of Onset    Diabetes Mother     Cancer Father     Headache Other        Past Medical History:   Diagnosis Date    Hemorrhagic stroke ([facility] 3/28/2017    S/p TPA    Hypertension     Ill-defined condition     Eczema    [facility] ([facility]     Type II diabetes mellitus ([facility] 3/29/2017       GENERAL ROS:  A comprehensive review of systems was negative except for that written in the HPI.     Visit Vitals  /70 (BP 1 Location: Left upper arm, BP Patient Position: Sitting, BP Cuff Size: Adult)   Pulse 79   Resp 18   Ht 6' (1.829 m)   Wt 204 lb (92.5 kg)   SpO2 96%   BMI 27.67 kg/m²       Wt Readings from Last 3 Encounters:   08/05/21 204 lb (92.5 kg)   08/05/21 204 lb (92.5 kg)   07/29/21 204 lb (92.5 kg)            BP Readings from Last 3 Encounters:   08/05/21 118/70   08/05/21 110/62   07/29/21 110/62       PHYSICAL EXAM  General appearance: alert, cooperative, no distress, appears stated age  Neurologic: Alert and oriented X 3  Neck: supple, symmetrical, trachea midline, no adenopathy, no carotid bruit and no JVD  Lungs: clear to auscultation bilaterally  Heart: regular rate and rhythm, S1, S2 normal, no murmur, click, rub or gallop  Extremities: extremities normal, atraumatic, no cyanosis or edema    Lab Results   Component Value Date/Time    Cholesterol, total 191 03/28/2017 02:52 PM    HDL Cholesterol 42 03/28/2017 02:52 PM    LDL, calculated 92.6 03/28/2017 02:52 PM    Triglyceride 282 (H) 03/28/2017 02:52 PM    CHOL/HDL Ratio 4.5 03/28/2017 02:52 PM     ASSESSMENT :      He is stable and asymptomatic at this point needs no further cardiac testing. I suspect his episode was either vasovagal or related to transient low blood pressure so we talked about that and the importance of hydration and minimizing his amount of caffeine intake. current treatment plan is effective, no change in therapy  lab results and schedule of future lab studies reviewed with patient  reviewed diet, exercise and weight control    Encounter Diagnoses   Name Primary?  HTN (hypertension), malignant Yes    Syncope, unspecified syncope type      No orders of the defined types were placed in this encounter. Follow-up and Dispositions    · Return in about 6 months (around 2/5/2022). Verena Curling, MD  8/5/2021  Please note that this dictation was completed with [employer], the [email or web address] voice recognition software. Quite often unanticipated grammatical, syntax, homophones, and other interpretive errors are inadvertently transcribed by the computer software. Please disregard these errors. Please excuse any errors that have escaped final proofreading. Thank you.

## 2021-08-05 NOTE — PATIENT INSTRUCTIONS
Please call in a few months to schedule 1 year echocardiogram dx aortic aneurysm and same day appointment with Dr. Hayden Mcdonnell (August 2022)

## 2021-09-01 ENCOUNTER — OFFICE VISIT (OUTPATIENT)
Dept: NEUROLOGY | Age: 71
End: 2021-09-01
Payer: MEDICARE

## 2021-09-01 VITALS
SYSTOLIC BLOOD PRESSURE: 110 MMHG | WEIGHT: 198 LBS | BODY MASS INDEX: 26.82 KG/M2 | OXYGEN SATURATION: 95 % | HEIGHT: 72 IN | HEART RATE: 99 BPM | DIASTOLIC BLOOD PRESSURE: 62 MMHG

## 2021-09-01 DIAGNOSIS — G30.1 ALZHEIMER'S TYPE DEMENTIA WITH LATE ONSET WITHOUT BEHAVIORAL DISTURBANCE (HCC): Primary | ICD-10-CM

## 2021-09-01 DIAGNOSIS — F06.31 DEPRESSION AS LATE EFFECT OF CEREBROVASCULAR ACCIDENT (CVA): ICD-10-CM

## 2021-09-01 DIAGNOSIS — I69.398 DEPRESSION AS LATE EFFECT OF CEREBROVASCULAR ACCIDENT (CVA): ICD-10-CM

## 2021-09-01 DIAGNOSIS — I69.319 CVA, OLD, COGNITIVE DEFICITS: ICD-10-CM

## 2021-09-01 DIAGNOSIS — R45.3 APATHY: ICD-10-CM

## 2021-09-01 DIAGNOSIS — F02.80 ALZHEIMER'S TYPE DEMENTIA WITH LATE ONSET WITHOUT BEHAVIORAL DISTURBANCE (HCC): Primary | ICD-10-CM

## 2021-09-01 PROCEDURE — 1101F PT FALLS ASSESS-DOCD LE1/YR: CPT | Performed by: NURSE PRACTITIONER

## 2021-09-01 PROCEDURE — 99214 OFFICE O/P EST MOD 30 MIN: CPT | Performed by: NURSE PRACTITIONER

## 2021-09-01 PROCEDURE — G8419 CALC BMI OUT NRM PARAM NOF/U: HCPCS | Performed by: NURSE PRACTITIONER

## 2021-09-01 PROCEDURE — G8754 DIAS BP LESS 90: HCPCS | Performed by: NURSE PRACTITIONER

## 2021-09-01 PROCEDURE — G8427 DOCREV CUR MEDS BY ELIG CLIN: HCPCS | Performed by: NURSE PRACTITIONER

## 2021-09-01 PROCEDURE — 3017F COLORECTAL CA SCREEN DOC REV: CPT | Performed by: NURSE PRACTITIONER

## 2021-09-01 PROCEDURE — G8752 SYS BP LESS 140: HCPCS | Performed by: NURSE PRACTITIONER

## 2021-09-01 PROCEDURE — G8432 DEP SCR NOT DOC, RNG: HCPCS | Performed by: NURSE PRACTITIONER

## 2021-09-01 PROCEDURE — G8536 NO DOC ELDER MAL SCRN: HCPCS | Performed by: NURSE PRACTITIONER

## 2021-09-01 NOTE — PROGRESS NOTES
[address] [facility],5Th Floor  [address]   [address] [place] [place], [place] [phone] Office   [phone] Fax                Date:  21     Name:  Nina Braun  :  1950  MRN:  [id number]     PCP:  Risa Villa MD    Chief Complaint   Patient presents with    Follow-up    Dementia     HISTORY OF PRESENT ILLNESS: Follow up for dementia. Passed out at [employer]. LOC for less than a minute. No loss of bowel or bladder. No ER. Saw PCP and cardiology. Echo was fine. No further events. He is still taking Namenda and Aricept. He continues to be apathetic to a degree. He continues to be independent with ADLs. No incontinence issues. No dangerous behaviors, wandering, or agitation. He no longer drives. MMSE, 2020, was 29 of 30 having only been unable to recall 2 of 3 items after 5 minutes of distraction    Recap from last office visit:  Overall, doing better since last office visit. The depression and apathy are improved on the higher dose of Zoloft. Memory issues come and go but seem to be stable per his wife's report. Continue with Aricept and Namenda. Encouraged to continue to be as active socially and physically as possible. Follow up in six months    Except as noted above, denies  fever, chills, cough. No CP or SOB. No dysuria, loss of bowel or bladder control. No Weight loss. Appetite good. Sleeping well. No sweats. No edema. No bruising or bleeding. No nausea or vomit. No diarrhea. No frequency, urgency, No depressive sxs. No anxiety. Denies sore throat, nasal congestion, nasal discharge, epistaxis, tinnitus, hearing loss, back pain, muscle pain, or joint pain. Current Outpatient Medications   Medication Sig    sertraline (ZOLOFT) 100 mg tablet Take 1 Tab by mouth every evening.  memantine (Namenda) 10 mg tablet Take 1 Tab by mouth two (2) times a day.     donepeziL (ARICEPT) 10 mg tablet Take 1 Tab by mouth nightly.  tamsulosin (FLOMAX) 0.4 mg capsule Take 0.4 mg by mouth every evening.  aspirin delayed-release 81 mg tablet Take 81 mg by mouth daily.  Cetirizine (ZYRTEC) 10 mg cap Take 10 mg by mouth daily.  pravastatin (PRAVACHOL) 40 mg tablet Take 1 Tab by mouth nightly. Indications: prevention of cerebrovascular accident     No current facility-administered medications for this visit.      Allergies   Allergen Reactions    Chantix [Varenicline] Rash     Other reaction(s): Rash  Very itchy rash on back  Very itchy rash on back    Penicillins Hives     Past Medical History:   Diagnosis Date    Hemorrhagic stroke ([facility] [address] 3/28/2017    S/p TPA    Hypertension     Ill-defined condition     Eczema    [facility] ([email or web address]     Type II diabetes mellitus ([email or web address] 3/29/2017     Past Surgical History:   Procedure Laterality Date    HX ORTHOPAEDIC  2017    left wrist surgery     Social History     Socioeconomic History    Marital status:      Spouse name: Not on file    Number of children: Not on file    Years of education: Not on file    Highest education level: Not on file   Occupational History    Not on file   Tobacco Use    Smoking status: Former Smoker     Packs/day: 0.50     Years: 50.00     Pack years: 25.00     Types: Cigarettes     Quit date: 2017     Years since quittin.5    Smokeless tobacco: Never Used    Tobacco comment: cold turkey 2.5 wks ago after attempt Chantix developed allergy   Vaping Use    Vaping Use: Never used   Substance and Sexual Activity    Alcohol use: No     Comment: around holidatys    Drug use: No    Sexual activity: Not on file   Other Topics Concern    Not on file   Social History Narrative    Not on file     Social Determinants of Health     Financial Resource Strain:     Difficulty of Paying Living Expenses:    Food Insecurity:     Worried About Running Out of Food in the Last Year:     Geri of Food in the Last Year: Transportation Needs:     Lack of Transportation (Medical):  Lack of Transportation (Non-Medical):    Physical Activity:     Days of Exercise per Week:     Minutes of Exercise per Session:    Stress:     Feeling of Stress :    Social Connections:     Frequency of Communication with Friends and Family:     Frequency of Social Gatherings with Friends and Family:     Attends Latter day Services:     Active Member of Clubs or Organizations:     Attends Club or Organization Meetings:     Marital Status:    Intimate Partner Violence:     Fear of Current or Ex-Partner:     Emotionally Abused:     Physically Abused:     Sexually Abused:      Family History   Problem Relation Age of Onset    Diabetes Mother     Cancer Father     Headache Other        PHYSICAL EXAMINATION:    Visit Vitals  /62 (BP 1 Location: Left upper arm, BP Patient Position: Sitting, BP Cuff Size: Adult)   Pulse 99   Ht 6' (1.829 m)   Wt 89.8 kg (198 lb)   SpO2 95%   BMI 26.85 kg/m²     General:  Well defined, nourished, and groomed individual in no acute distress.    Neck:             Supple, nontender, no bruits, no pain with resistance to active range of motion.    Heart:            Regular rate and rhythm, no murmurs, rub, or gallop.  Normal S1S2. Lungs:  Clear to auscultation bilaterally with equal chest expansion, no cough, no wheeze  Musculoskeletal:  Extremities revealed no edema and had full range of motion of joints.    Psych:  Apathetic and depressed with a flat affect      NEUROLOGICAL EXAMINATION:     Mental Status:   Alert and oriented to person and place. Continues to be apathetic but his affect is a bit brighter      Cranial Nerves:    II, III, IV, VI:  Visual acuity grossly intact. Visual fields are normal.    Pupils are equal, round, and reactive to light and accommodation.    Extra-ocular movements are full and fluid.  Fundoscopic exam was benign, no ptosis or nystagmus.    V-XII:             Hearing is grossly intact.  Facial features are symmetric, with normal sensation and strength.  The palate rises symmetrically and the tongue protrudes midline.  Sternocleidomastoids 5/5.        Motor Examination:               Normal tone, bulk, and strength, 5/5 muscle strength throughout.         Coordination:  Finger to nose was normal.   No resting or intention tremor      Gait and Station: Age of Learning while walking.  Normal arm swing.  No pronator drift.   No muscle wasting or fasiculations noted.    Reflexes:  DTRs 2+ throughout. ASSESSMENT AND PLAN    ICD-10-CM ICD-9-CM    1. Alzheimer's type dementia with late onset without behavioral disturbance ([facility] [address]  G30.1 331.0     F02.80 294.10    2. Apathy  R45.3 799.25    3. 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 CVA, old, cognitive deficits  I69.319 438.0      Dementia, mixed type Alzheimer's and vascular, associated with depression and apathy. Overall, this is pretty stable. He does continue to take Namenda, Aricept, and Zoloft. Today, his wife mentioned that he did pass out while in [employer] no loss of consciousness that was less than a minute. He did see his primary care physician as well as cardiology with no underlying findings. He has not had any further events. We did discuss the potential for seizure particularly in the face of changes related to his dementing process as well as previous CVA. We discussed potential work-up to include imaging and EEG. For now, and would like to take a watchful approach. If this occurs again, we will need to pursue formal work-up. Gayathri Mujica

## 2022-03-18 PROBLEM — E11.9 TYPE II DIABETES MELLITUS (HCC): Status: ACTIVE | Noted: 2017-03-29

## 2022-03-18 PROBLEM — R47.01 APHASIA: Status: ACTIVE | Noted: 2017-03-28

## 2022-03-18 PROBLEM — F17.200 TOBACCO USE DISORDER: Status: ACTIVE | Noted: 2017-03-28

## 2022-03-18 PROBLEM — E86.0 DEHYDRATION: Status: ACTIVE | Noted: 2020-10-22

## 2022-03-19 PROBLEM — I61.9: Status: ACTIVE | Noted: 2017-03-28

## 2022-03-19 PROBLEM — G93.40 ACUTE ENCEPHALOPATHY: Status: ACTIVE | Noted: 2017-03-28

## 2022-03-19 PROBLEM — K35.33 APPENDICULAR ABSCESS: Status: ACTIVE | Noted: 2020-10-22

## 2022-03-19 PROBLEM — S52.502A FRACTURE OF RADIUS, DISTAL, LEFT, CLOSED: Status: ACTIVE | Noted: 2017-03-28

## 2022-03-19 PROBLEM — Z92.82 RECEIVED INTRAVENOUS TISSUE PLASMINOGEN ACTIVATOR (TPA) IN EMERGENCY DEPARTMENT: Status: ACTIVE | Noted: 2017-03-28

## 2022-03-19 PROBLEM — R53.81 DEBILITY: Status: ACTIVE | Noted: 2020-10-22

## 2022-03-19 PROBLEM — I71.20 THORACIC AORTIC ANEURYSM WITHOUT RUPTURE (HCC): Status: ACTIVE | Noted: 2021-08-05

## 2022-03-19 PROBLEM — I10 HTN (HYPERTENSION), MALIGNANT: Status: ACTIVE | Noted: 2017-03-28

## 2022-03-19 PROBLEM — K37 APPENDICITIS: Status: ACTIVE | Noted: 2020-10-13

## 2022-03-20 PROBLEM — F02.80 ALZHEIMER'S TYPE DEMENTIA WITH LATE ONSET WITHOUT BEHAVIORAL DISTURBANCE (HCC): Status: ACTIVE | Noted: 2018-10-11

## 2022-03-20 PROBLEM — G30.1 ALZHEIMER'S TYPE DEMENTIA WITH LATE ONSET WITHOUT BEHAVIORAL DISTURBANCE (HCC): Status: ACTIVE | Noted: 2018-10-11

## 2022-04-01 DIAGNOSIS — F02.80 ALZHEIMER'S TYPE DEMENTIA WITH LATE ONSET WITHOUT BEHAVIORAL DISTURBANCE (HCC): ICD-10-CM

## 2022-04-01 DIAGNOSIS — I69.398 DEPRESSION AS LATE EFFECT OF CEREBROVASCULAR ACCIDENT (CVA): ICD-10-CM

## 2022-04-01 DIAGNOSIS — G30.1 ALZHEIMER'S TYPE DEMENTIA WITH LATE ONSET WITHOUT BEHAVIORAL DISTURBANCE (HCC): ICD-10-CM

## 2022-04-01 DIAGNOSIS — F06.31 DEPRESSION AS LATE EFFECT OF CEREBROVASCULAR ACCIDENT (CVA): ICD-10-CM

## 2022-04-01 DIAGNOSIS — R45.3 APATHY: ICD-10-CM

## 2022-04-03 RX ORDER — SERTRALINE HYDROCHLORIDE 100 MG/1
100 TABLET, FILM COATED ORAL EVERY EVENING
Qty: 90 TABLET | Refills: 3 | Status: SHIPPED | OUTPATIENT
Start: 2022-04-03 | End: 2022-05-16 | Stop reason: SDUPTHER

## 2022-04-03 RX ORDER — DONEPEZIL HYDROCHLORIDE 10 MG/1
10 TABLET, FILM COATED ORAL
Qty: 90 TABLET | Refills: 3 | Status: SHIPPED | OUTPATIENT
Start: 2022-04-03 | End: 2022-05-16 | Stop reason: SDUPTHER

## 2022-05-16 ENCOUNTER — OFFICE VISIT (OUTPATIENT)
Dept: NEUROLOGY | Age: 72
End: 2022-05-16
Payer: MEDICARE

## 2022-05-16 VITALS
OXYGEN SATURATION: 96 % | WEIGHT: 198 LBS | HEART RATE: 89 BPM | RESPIRATION RATE: 14 BRPM | SYSTOLIC BLOOD PRESSURE: 110 MMHG | TEMPERATURE: 98.3 F | BODY MASS INDEX: 26.85 KG/M2 | DIASTOLIC BLOOD PRESSURE: 62 MMHG

## 2022-05-16 DIAGNOSIS — F02.80 ALZHEIMER'S TYPE DEMENTIA WITH LATE ONSET WITHOUT BEHAVIORAL DISTURBANCE (HCC): Primary | ICD-10-CM

## 2022-05-16 DIAGNOSIS — G30.1 ALZHEIMER'S TYPE DEMENTIA WITH LATE ONSET WITHOUT BEHAVIORAL DISTURBANCE (HCC): Primary | ICD-10-CM

## 2022-05-16 DIAGNOSIS — F06.31 DEPRESSION AS LATE EFFECT OF CEREBROVASCULAR ACCIDENT (CVA): ICD-10-CM

## 2022-05-16 DIAGNOSIS — I69.398 DEPRESSION AS LATE EFFECT OF CEREBROVASCULAR ACCIDENT (CVA): ICD-10-CM

## 2022-05-16 DIAGNOSIS — R26.81 GAIT INSTABILITY: ICD-10-CM

## 2022-05-16 DIAGNOSIS — R45.3 APATHY: ICD-10-CM

## 2022-05-16 PROCEDURE — G8427 DOCREV CUR MEDS BY ELIG CLIN: HCPCS | Performed by: NURSE PRACTITIONER

## 2022-05-16 PROCEDURE — 1101F PT FALLS ASSESS-DOCD LE1/YR: CPT | Performed by: NURSE PRACTITIONER

## 2022-05-16 PROCEDURE — 3017F COLORECTAL CA SCREEN DOC REV: CPT | Performed by: NURSE PRACTITIONER

## 2022-05-16 PROCEDURE — G8536 NO DOC ELDER MAL SCRN: HCPCS | Performed by: NURSE PRACTITIONER

## 2022-05-16 PROCEDURE — G8752 SYS BP LESS 140: HCPCS | Performed by: NURSE PRACTITIONER

## 2022-05-16 PROCEDURE — G8419 CALC BMI OUT NRM PARAM NOF/U: HCPCS | Performed by: NURSE PRACTITIONER

## 2022-05-16 PROCEDURE — 99214 OFFICE O/P EST MOD 30 MIN: CPT | Performed by: NURSE PRACTITIONER

## 2022-05-16 PROCEDURE — G8432 DEP SCR NOT DOC, RNG: HCPCS | Performed by: NURSE PRACTITIONER

## 2022-05-16 PROCEDURE — G8754 DIAS BP LESS 90: HCPCS | Performed by: NURSE PRACTITIONER

## 2022-05-16 RX ORDER — DONEPEZIL HYDROCHLORIDE 10 MG/1
10 TABLET, FILM COATED ORAL
Qty: 90 TABLET | Refills: 3 | Status: SHIPPED | OUTPATIENT
Start: 2022-05-16

## 2022-05-16 RX ORDER — MEMANTINE HYDROCHLORIDE 10 MG/1
10 TABLET ORAL 2 TIMES DAILY
Qty: 180 TABLET | Refills: 3 | Status: SHIPPED | OUTPATIENT
Start: 2022-05-16

## 2022-05-16 RX ORDER — SERTRALINE HYDROCHLORIDE 100 MG/1
100 TABLET, FILM COATED ORAL EVERY EVENING
Qty: 90 TABLET | Refills: 3 | Status: SHIPPED | OUTPATIENT
Start: 2022-05-16

## 2022-05-16 NOTE — PROGRESS NOTES
[address] [facility],5Th Floor  [address]   [address] [place] [address] [address]   [phone] Office   [phone] Fax                Date:  22     Name:  Jc Ribeiro  :  1950  MRN:  [id number]     PCP:  Dulce Echavarria MD    Chief Complaint   Patient presents with    Dementia     HISTORY OF PRESENT ILLNESS: Follow up for dementia. At his last office visit, they had mentioned that he had an episode of syncope while at [employer]. We discussed potential neurologic work up for this but as this only occurred once and not again, they elected to take a watchful approach. This has not occurred again since that initial episode. With regard to the dementing process, he is still taking Namenda and Aricept. He continues to be apathetic to a degree. He continues to be independent with ADLs. No incontinence issues. No dangerous behaviors, wandering, or agitation. He no longer drives. His wife does mention today that he has had a couple of falls. Some of these falls seem related to certain activities. For instance, he did have a fall after cutting down a tree. She indicates that overall his balance seems to be a little off and wobbly. We discussed some of the activities that he does and how some of them are clearly inappropriate such as cutting down a tree, she indicates that he can be quite stubborn and once he gets it in his head that he is going to do something he will not listen to her or listen to reason. MMSE, 2020, was 29 of 30 having only been unable to recall 2 of 3 items after 5 minutes of distraction    Recap from last office visit:  Dementia, mixed type Alzheimer's and vascular, associated with depression and apathy. Overall, this is pretty stable. He does continue to take Namenda, Aricept, and Zoloft.   Today, his wife mentioned that he did pass out while in [employer] no loss of consciousness that was less than a minute. He did see his primary care physician as well as cardiology with no underlying findings. He has not had any further events. We did discuss the potential for seizure particularly in the face of changes related to his dementing process as well as previous CVA. We discussed potential work-up to include imaging and EEG. For now, and would like to take a watchful approach. If this occurs again, we will need to pursue formal work-up. Current Outpatient Medications   Medication Sig    sertraline (ZOLOFT) 100 mg tablet Take 1 Tablet by mouth every evening.  donepeziL (ARICEPT) 10 mg tablet Take 1 Tablet by mouth nightly.  memantine (Namenda) 10 mg tablet Take 1 Tab by mouth two (2) times a day.  tamsulosin (FLOMAX) 0.4 mg capsule Take 0.4 mg by mouth every evening.  aspirin delayed-release 81 mg tablet Take 81 mg by mouth daily.  Cetirizine (ZYRTEC) 10 mg cap Take 10 mg by mouth daily.  pravastatin (PRAVACHOL) 40 mg tablet Take 1 Tab by mouth nightly. Indications: prevention of cerebrovascular accident     No current facility-administered medications for this visit.      Allergies   Allergen Reactions    Chantix [Varenicline] Rash     Other reaction(s): Rash  Very itchy rash on back  Very itchy rash on back    Penicillins Hives     Past Medical History:   Diagnosis Date    Hemorrhagic stroke ([facility] [address] 3/28/2017    S/p TPA    Hypertension     Ill-defined condition     Eczema    Stroke ([facility] [address]     Type II diabetes mellitus ([facility] [address] 3/29/2017     Past Surgical History:   Procedure Laterality Date    HX ORTHOPAEDIC  03/2017    left wrist surgery     Social History     Socioeconomic History    Marital status:      Spouse name: Not on file    Number of children: Not on file    Years of education: Not on file    Highest education level: Not on file   Occupational History    Not on file   Tobacco Use    Smoking status: Former Smoker     Packs/day: 0.50     Years: 50.00 Pack years: 25.00     Types: Cigarettes     Quit date: 2017     Years since quittin.2    Smokeless tobacco: Never Used    Tobacco comment: cold turkey 2.5 wks ago after attempt Chantix developed allergy   Vaping Use    Vaping Use: Never used   Substance and Sexual Activity    Alcohol use: No     Comment: around holidatys    Drug use: No    Sexual activity: Not on file   Other Topics Concern    Not on file   Social History Narrative    Not on file     Social Determinants of Health     Financial Resource Strain:     Difficulty of Paying Living Expenses: Not on file   Food Insecurity:     Worried About Running Out of Food in the Last Year: Not on file    Geri of Food in the Last Year: Not on file   Transportation Needs:     Lack of Transportation (Medical): Not on file    Lack of Transportation (Non-Medical):  Not on file   Physical Activity:     Days of Exercise per Week: Not on file    Minutes of Exercise per Session: Not on file   Stress:     Feeling of Stress : Not on file   Social Connections:     Frequency of Communication with Friends and Family: Not on file    Frequency of Social Gatherings with Friends and Family: Not on file    Attends Roman Catholic Services: Not on file    Active Member of [address] [place] [address] [employer] or Organizations: Not on file    Attends Club or Organization Meetings: Not on file    Marital Status: Not on file   Intimate Partner Violence:     Fear of Current or Ex-Partner: Not on file    Emotionally Abused: Not on file    Physically Abused: Not on file    Sexually Abused: Not on file   Housing Stability:     Unable to Pay for Housing in the Last Year: Not on file    Number of [place] in the Last Year: Not on file    Unstable Housing in the Last Year: Not on file     Family History   Problem Relation Age of Onset    Diabetes Mother     Cancer Father     Headache Other        PHYSICAL EXAMINATION:    Visit Vitals  /62 (BP 1 Location: Right arm, BP Patient Position: Sitting, BP Cuff Size: Adult)   Pulse 89   Temp 98.3 °F (36.8 °C)   Resp 14   Wt 89.8 kg (198 lb)   SpO2 96%   BMI 26.85 kg/m²     General:  Well defined, nourished, and groomed individual in no acute distress.    Neck:             Supple, nontender, no bruits, no pain with resistance to active range of motion.    Heart:            Regular rate and rhythm, no murmurs, rub, or gallop.  Normal S1S2. Lungs:  Clear to auscultation bilaterally with equal chest expansion, no cough, no wheeze  Musculoskeletal:  Extremities revealed no edema and had full range of motion of joints.    Psych:  Apathetic and depressed with a flat affect      NEUROLOGICAL EXAMINATION:     Mental Status:   MMSE today was 27/30. Was not oriented to the specific date. Only able to recall 1 of three items after a brief period of distraction. Normal clock draw.       Cranial Nerves:    II, III, IV, VI:  Visual acuity grossly intact. Visual fields are normal.    Pupils are equal, round, and reactive to light and accommodation.    Extra-ocular movements are full and fluid.  Fundoscopic exam was benign, no ptosis or nystagmus. V-XII:             Hearing is grossly intact.  Facial features are symmetric, with normal sensation and strength.  The palate rises symmetrically and the tongue protrudes midline.  Sternocleidomastoids 5/5.        Motor Examination:               Normal tone, bulk, and strength, 5/5 muscle strength throughout.         Coordination:  Finger to nose was normal.   No resting or intention tremor      Gait and Station: Mildly unsteady while walking on a flat surface.  Normal arm swing.  No pronator drift.   No muscle wasting or fasiculations noted.      Reflexes:  DTRs 2+ throughout. ASSESSMENT AND PLAN    ICD-10-CM ICD-9-CM    1. Alzheimer's type dementia with late onset without behavioral disturbance (HCC)  G30.1 331.0 donepeziL (ARICEPT) 10 mg tablet    F02.80 294.10 memantine (Namenda) 10 mg tablet   2.  Apathy R45.3 799.25 sertraline (ZOLOFT) 100 mg tablet   3. 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 Gait instability  R26.81 781.2 REFERRAL TO PHYSICAL THERAPY     Mixed type dementia, Alzheimer's and vascular following CVA. This is a highly educated, intelligent man who on MMSE seems to do quite well. However there has been some decline even in the scoring of that though very subtle. At this point, he does continue to be highly functional and he was encouraged to continue to do his much as he can physically, socially, and mentally. Unfortunately he does continue to have depression and apathy. Continue Zoloft 100 mg daily. With regard to his gait instability, recommended he participate in physical therapy. Follow-up in 6 months or sooner if needed. Gayathri Klein Guard

## 2022-10-23 ENCOUNTER — APPOINTMENT (OUTPATIENT)
Dept: CT IMAGING | Age: 72
End: 2022-10-23
Attending: EMERGENCY MEDICINE
Payer: MEDICARE

## 2022-10-23 ENCOUNTER — HOSPITAL ENCOUNTER (EMERGENCY)
Age: 72
Discharge: HOME | End: 2022-10-23
Attending: EMERGENCY MEDICINE
Payer: MEDICARE

## 2022-10-23 ENCOUNTER — APPOINTMENT (OUTPATIENT)
Dept: GENERAL RADIOLOGY | Age: 72
End: 2022-10-23
Attending: EMERGENCY MEDICINE
Payer: MEDICARE

## 2022-10-23 VITALS
WEIGHT: 211 LBS | OXYGEN SATURATION: 96 % | TEMPERATURE: 98.1 F | RESPIRATION RATE: 15 BRPM | DIASTOLIC BLOOD PRESSURE: 63 MMHG | HEART RATE: 74 BPM | HEIGHT: 72 IN | BODY MASS INDEX: 28.58 KG/M2 | SYSTOLIC BLOOD PRESSURE: 158 MMHG

## 2022-10-23 DIAGNOSIS — R53.1 WEAKNESS: Primary | ICD-10-CM

## 2022-10-23 LAB
ANION GAP SERPL CALC-SCNC: 4 MMOL/L (ref 5–15)
BASOPHILS # BLD: 0 K/UL (ref 0–0.1)
BASOPHILS NFR BLD: 0 % (ref 0–1)
BUN SERPL-MCNC: 13 MG/DL (ref 6–20)
BUN/CREAT SERPL: 10 (ref 12–20)
CALCIUM SERPL-MCNC: 9 MG/DL (ref 8.5–10.1)
CHLORIDE SERPL-SCNC: 107 MMOL/L (ref 97–108)
CO2 SERPL-SCNC: 29 MMOL/L (ref 21–32)
COMMENT, HOLDF: NORMAL
COVID-19 RAPID TEST, COVR: NOT DETECTED
CREAT SERPL-MCNC: 1.29 MG/DL (ref 0.7–1.3)
DIFFERENTIAL METHOD BLD: NORMAL
EOSINOPHIL # BLD: 0.2 K/UL (ref 0–0.4)
EOSINOPHIL NFR BLD: 3 % (ref 0–7)
ERYTHROCYTE [DISTWIDTH] IN BLOOD BY AUTOMATED COUNT: 12.9 % (ref 11.5–14.5)
FLUAV AG NPH QL IA: NEGATIVE
FLUBV AG NOSE QL IA: NEGATIVE
GLUCOSE SERPL-MCNC: 129 MG/DL (ref 65–100)
HCT VFR BLD AUTO: 44.1 % (ref 36.6–50.3)
HGB BLD-MCNC: 14.6 G/DL (ref 12.1–17)
IMM GRANULOCYTES # BLD AUTO: 0 K/UL (ref 0–0.04)
IMM GRANULOCYTES NFR BLD AUTO: 0 % (ref 0–0.5)
LYMPHOCYTES # BLD: 1.8 K/UL (ref 0.8–3.5)
LYMPHOCYTES NFR BLD: 28 % (ref 12–49)
MCH RBC QN AUTO: 31.1 PG (ref 26–34)
MCHC RBC AUTO-ENTMCNC: 33.1 G/DL (ref 30–36.5)
MCV RBC AUTO: 94 FL (ref 80–99)
MONOCYTES # BLD: 0.4 K/UL (ref 0–1)
MONOCYTES NFR BLD: 6 % (ref 5–13)
NEUTS SEG # BLD: 4 K/UL (ref 1.8–8)
NEUTS SEG NFR BLD: 63 % (ref 32–75)
NRBC # BLD: 0 K/UL (ref 0–0.01)
NRBC BLD-RTO: 0 PER 100 WBC
PLATELET # BLD AUTO: 202 K/UL (ref 150–400)
PMV BLD AUTO: 9.5 FL (ref 8.9–12.9)
POTASSIUM SERPL-SCNC: 4.1 MMOL/L (ref 3.5–5.1)
RBC # BLD AUTO: 4.69 M/UL (ref 4.1–5.7)
SAMPLES BEING HELD,HOLD: NORMAL
SODIUM SERPL-SCNC: 140 MMOL/L (ref 136–145)
SOURCE, COVRS: NORMAL
TROPONIN-HIGH SENSITIVITY: 6 NG/L (ref 0–76)
WBC # BLD AUTO: 6.4 K/UL (ref 4.1–11.1)

## 2022-10-23 PROCEDURE — 70450 CT HEAD/BRAIN W/O DYE: CPT

## 2022-10-23 PROCEDURE — 87635 SARS-COV-2 COVID-19 AMP PRB: CPT

## 2022-10-23 PROCEDURE — 80048 BASIC METABOLIC PNL TOTAL CA: CPT

## 2022-10-23 PROCEDURE — 99285 EMERGENCY DEPT VISIT HI MDM: CPT

## 2022-10-23 PROCEDURE — 36415 COLL VENOUS BLD VENIPUNCTURE: CPT

## 2022-10-23 PROCEDURE — 71045 X-RAY EXAM CHEST 1 VIEW: CPT

## 2022-10-23 PROCEDURE — 87804 INFLUENZA ASSAY W/OPTIC: CPT

## 2022-10-23 PROCEDURE — 93005 ELECTROCARDIOGRAM TRACING: CPT

## 2022-10-23 PROCEDURE — 84484 ASSAY OF TROPONIN QUANT: CPT

## 2022-10-23 PROCEDURE — 85025 COMPLETE CBC W/AUTO DIFF WBC: CPT

## 2022-10-23 NOTE — ED NOTES
I have reviewed discharge instructions with the patient and spouse. The patient and spouse verbalized understanding. No further questions at this time.  Patient discharged via wheelchair in stable condition

## 2022-10-23 NOTE — DISCHARGE INSTRUCTIONS
You were seen in the emergency department for generalized weakness. The results of your tests including an EKG, imaging, and lab work, were normal.  Although an exact cause of your symptoms was not identified, the most likely cause is the medication you took for hiccups. As discussed, please return to the ED if symptoms or not improved in the next 3 to 5 days, at which point we will check a urine for a urinary tract infection. Please take any medications prescribed at this visit as instructed. Please also follow-up with your PCP or return to the emergency department if you experience a worsening of symptoms or any new symptoms that are concerning to you.

## 2022-10-23 NOTE — ED PROVIDER NOTES
69-year-old male with PMHx of CVA with residual left lower extremity weakness, DM, HTN presents to the emergency department for evaluation of fatigue and generalized weakness since yesterday. Patient's wife reports that he was complaining of hiccups for the last few days, and his PCP prescribed him an antipsychotic medication to treat this, which he has received the last 2 days. His wife notes that yesterday he woke up and was barely able to stand up due to generalized weakness, which continued into today. He denies any pain, nausea, focal extremity weakness, vision changes, speech changes. The history is provided by the patient and the spouse. Fatigue  This is a new problem. The current episode started yesterday. The problem has been gradually worsening. There was no focality noted. Pertinent negatives include no focal weakness, no loss of sensation, no loss of balance, no slurred speech, no speech difficulty and no mental status change. There has been no fever. Pertinent negatives include no altered mental status, no confusion, no headaches and no nausea.       Past Medical History:   Diagnosis Date    Hemorrhagic stroke ([facility] [address] 3/28/2017    S/p TPA    Hypertension     Ill-defined condition     Eczema    Stroke ([facility] [address]     Type II diabetes mellitus ([facility] [address] 3/29/2017       Past Surgical History:   Procedure Laterality Date    HX ORTHOPAEDIC  2017    left wrist surgery         Family History:   Problem Relation Age of Onset    Diabetes Mother     Cancer Father     Headache Other        Social History     Socioeconomic History    Marital status:      Spouse name: Not on file    Number of children: Not on file    Years of education: Not on file    Highest education level: Not on file   Occupational History    Not on file   Tobacco Use    Smoking status: Former     Packs/day: 0.50     Years: 50.00     Pack years: 25.00     Types: Cigarettes     Quit date: 2017     Years since quittin.6    Smokeless tobacco: Never    Tobacco comments:     cold turkey 2.5 wks ago after attempt Chantix developed allergy   Vaping Use    Vaping Use: Never used   Substance and Sexual Activity    Alcohol use: No     Comment: around holidatys    Drug use: No    Sexual activity: Not on file   Other Topics Concern    Not on file   Social History Narrative    Not on file     Social Determinants of Health     Financial Resource Strain: Not on file   Food Insecurity: Not on file   Transportation Needs: Not on file   Physical Activity: Not on file   Stress: Not on file   Social Connections: Not on file   Intimate Partner Violence: Not on file   Housing Stability: Not on file         ALLERGIES: Chantix [varenicline] and Penicillins    Review of Systems   Constitutional:  Positive for fatigue. HENT: Negative. Eyes: Negative. Respiratory: Negative. Cardiovascular: Negative. Gastrointestinal: Negative. Negative for nausea. Endocrine: Negative. Genitourinary: Negative. Musculoskeletal: Negative. Skin: Negative. Neurological: Negative. Negative for focal weakness, speech difficulty, headaches and loss of balance. Psychiatric/Behavioral: Negative. Negative for confusion. Vitals:    10/23/22 1138   BP: 114/70   Pulse: 85   Resp: 16   Temp: 98.1 °F (36.7 °C)   SpO2: 97%   Weight: 95.7 kg (211 lb)   Height: 6' (1.829 m)            Physical Exam  Vitals and nursing note reviewed. Constitutional:       General: He is not in acute distress. Appearance: Normal appearance. He is not ill-appearing. Comments: Tired appearing   HENT:      Head: Normocephalic and atraumatic. Nose: Nose normal.      Mouth/Throat:      Mouth: Mucous membranes are moist.   Eyes:      Extraocular Movements: Extraocular movements intact. Pupils: Pupils are equal, round, and reactive to light. Cardiovascular:      Rate and Rhythm: Normal rate and regular rhythm. Pulses: Normal pulses.    Pulmonary:      Effort: Pulmonary effort is normal. No respiratory distress. Breath sounds: Normal breath sounds. Abdominal:      General: There is no distension. Palpations: Abdomen is soft. Tenderness: There is no abdominal tenderness. Musculoskeletal:         General: Normal range of motion. Cervical back: Normal range of motion. Skin:     General: Skin is warm and dry. Neurological:      General: No focal deficit present. Mental Status: He is alert and oriented to person, place, and time. Psychiatric:         Mood and Affect: Mood normal.        MDM  Number of Diagnoses or Management Options  Weakness  Diagnosis management comments: DDx: Infection, sepsis, UTI, pneumonia, COVID, medication side effect, electrolyte abnormality    Plan:  - Labs: CBC, CMP, UA  - Imaging: CXR, CTH  - Medications: None indicated  - Consults: None indicated    Reassessment: Patient's work-up is reassuring. He is not able to provide a urine sample. Provided him with the options of receiving a fluid bolus to help with urination or being straight cathed. At this point patient stated that he would like to forego providing a urine sample. Etiology of his symptoms is most likely a side effect of the antipsychotic he was prescribed for his hiccups. It is reasonable to forego the urinalysis, given otherwise normal work-up, including normal vitals and absence of a leukocytosis. Will discharge at this time with recommendation to follow-up in 2 to 3 days at this ED if no improvement in energy level. Also encouraged patient to follow-up with his primary care provider.   He may be discharged at this time       Amount and/or Complexity of Data Reviewed  Clinical lab tests: reviewed  Tests in the radiology section of CPT®: reviewed  Tests in the medicine section of CPT®: reviewed    Risk of Complications, Morbidity, and/or Mortality  Presenting problems: low  Diagnostic procedures: low  Management options: low    Patient Progress  Patient progress: improved    ED Course as of 10/24/22 0142   Sun Oct 23, 2022   1305 This EKG was interpreted by me at 1153. Normal sinus rhythm at 75 bpm.  Normal axis.   No significant ST segment abnormalities [JT]      ED Course User Index  [JT] Anthony Munroe MD       Procedures

## 2022-10-23 NOTE — ED TRIAGE NOTES
Pt reports having general weakness on 10/21/22 after taking medication for hiccups (chlorpromazine). Pt reports left side does feel weaker starting on 10/21/22. Denies chest pain and SOB; denies dizziness.

## 2022-10-25 LAB
ATRIAL RATE: 75 BPM
CALCULATED P AXIS, ECG09: 59 DEGREES
CALCULATED R AXIS, ECG10: 43 DEGREES
CALCULATED T AXIS, ECG11: 35 DEGREES
DIAGNOSIS, 93000: NORMAL
P-R INTERVAL, ECG05: 136 MS
Q-T INTERVAL, ECG07: 384 MS
QRS DURATION, ECG06: 80 MS
QTC CALCULATION (BEZET), ECG08: 428 MS
VENTRICULAR RATE, ECG03: 75 BPM

## 2022-11-10 ENCOUNTER — OFFICE VISIT (OUTPATIENT)
Dept: NEUROLOGY | Age: 72
End: 2022-11-10
Payer: MEDICARE

## 2022-11-10 VITALS
DIASTOLIC BLOOD PRESSURE: 57 MMHG | OXYGEN SATURATION: 96 % | SYSTOLIC BLOOD PRESSURE: 119 MMHG | BODY MASS INDEX: 29.7 KG/M2 | RESPIRATION RATE: 16 BRPM | HEART RATE: 83 BPM | WEIGHT: 219 LBS

## 2022-11-10 DIAGNOSIS — R26.9 GAIT ABNORMALITY: Primary | ICD-10-CM

## 2022-11-10 DIAGNOSIS — R45.3 APATHY: ICD-10-CM

## 2022-11-10 DIAGNOSIS — G30.1 ALZHEIMER'S TYPE DEMENTIA WITH LATE ONSET WITHOUT BEHAVIORAL DISTURBANCE (HCC): ICD-10-CM

## 2022-11-10 DIAGNOSIS — F02.80 ALZHEIMER'S TYPE DEMENTIA WITH LATE ONSET WITHOUT BEHAVIORAL DISTURBANCE (HCC): ICD-10-CM

## 2022-11-10 DIAGNOSIS — R26.81 GAIT INSTABILITY: ICD-10-CM

## 2022-11-10 DIAGNOSIS — I69.398 DEPRESSION AS LATE EFFECT OF CEREBROVASCULAR ACCIDENT (CVA): ICD-10-CM

## 2022-11-10 DIAGNOSIS — R06.6 HICCUPS: ICD-10-CM

## 2022-11-10 DIAGNOSIS — F06.31 DEPRESSION AS LATE EFFECT OF CEREBROVASCULAR ACCIDENT (CVA): ICD-10-CM

## 2022-11-10 PROCEDURE — 3078F DIAST BP <80 MM HG: CPT | Performed by: PSYCHIATRY & NEUROLOGY

## 2022-11-10 PROCEDURE — 3074F SYST BP LT 130 MM HG: CPT | Performed by: PSYCHIATRY & NEUROLOGY

## 2022-11-10 PROCEDURE — G8427 DOCREV CUR MEDS BY ELIG CLIN: HCPCS | Performed by: PSYCHIATRY & NEUROLOGY

## 2022-11-10 PROCEDURE — 1123F ACP DISCUSS/DSCN MKR DOCD: CPT | Performed by: PSYCHIATRY & NEUROLOGY

## 2022-11-10 PROCEDURE — 1101F PT FALLS ASSESS-DOCD LE1/YR: CPT | Performed by: PSYCHIATRY & NEUROLOGY

## 2022-11-10 PROCEDURE — 3017F COLORECTAL CA SCREEN DOC REV: CPT | Performed by: PSYCHIATRY & NEUROLOGY

## 2022-11-10 PROCEDURE — G8417 CALC BMI ABV UP PARAM F/U: HCPCS | Performed by: PSYCHIATRY & NEUROLOGY

## 2022-11-10 PROCEDURE — G8536 NO DOC ELDER MAL SCRN: HCPCS | Performed by: PSYCHIATRY & NEUROLOGY

## 2022-11-10 PROCEDURE — G8432 DEP SCR NOT DOC, RNG: HCPCS | Performed by: PSYCHIATRY & NEUROLOGY

## 2022-11-10 PROCEDURE — G8754 DIAS BP LESS 90: HCPCS | Performed by: PSYCHIATRY & NEUROLOGY

## 2022-11-10 PROCEDURE — 99214 OFFICE O/P EST MOD 30 MIN: CPT | Performed by: PSYCHIATRY & NEUROLOGY

## 2022-11-10 PROCEDURE — G8752 SYS BP LESS 140: HCPCS | Performed by: PSYCHIATRY & NEUROLOGY

## 2022-11-10 RX ORDER — MEMANTINE HYDROCHLORIDE 10 MG/1
10 TABLET ORAL 2 TIMES DAILY
Qty: 180 TABLET | Refills: 3 | Status: SHIPPED | OUTPATIENT
Start: 2022-11-10

## 2022-11-10 RX ORDER — DONEPEZIL HYDROCHLORIDE 10 MG/1
10 TABLET, FILM COATED ORAL
Qty: 90 TABLET | Refills: 3 | Status: SHIPPED | OUTPATIENT
Start: 2022-11-10

## 2022-11-10 RX ORDER — SERTRALINE HYDROCHLORIDE 100 MG/1
100 TABLET, FILM COATED ORAL EVERY EVENING
Qty: 90 TABLET | Refills: 3 | Status: SHIPPED | OUTPATIENT
Start: 2022-11-10

## 2022-11-10 NOTE — PROGRESS NOTES
[facility] Neurology Clinics and  [address] at [facility] Neurology Clinics at [address] [facility], [address] [facility] [address] [facility], [address] [place] [address]   [phone]              Chief Complaint   Patient presents with    Stroke     Weakness in left leg, tic in right leg and hand    Dementia    Neurologic Problem     Will get hiccups that last hours and will gasp for air at times     Current Outpatient Medications   Medication Sig Dispense Refill    sertraline (ZOLOFT) 100 mg tablet Take 1 Tablet by mouth every evening. 90 Tablet 3    donepeziL (ARICEPT) 10 mg tablet Take 1 Tablet by mouth nightly. 90 Tablet 3    memantine (Namenda) 10 mg tablet Take 1 Tablet by mouth two (2) times a day. 180 Tablet 3    tamsulosin (FLOMAX) 0.4 mg capsule Take 0.4 mg by mouth every evening. aspirin delayed-release 81 mg tablet Take 81 mg by mouth daily. Cetirizine 10 mg cap Take 10 mg by mouth daily. pravastatin (PRAVACHOL) 40 mg tablet Take 1 Tab by mouth nightly. Indications: prevention of cerebrovascular accident 30 Tab 0      Allergies   Allergen Reactions    Chantix [Varenicline] Rash     Other reaction(s): Rash  Very itchy rash on back  Very itchy rash on back    Penicillins Hives     Social History     Tobacco Use    Smoking status: Former     Packs/day: 0.50     Years: 50.00     Pack years: 25.00     Types: Cigarettes     Quit date: 2017     Years since quittin.7    Smokeless tobacco: Never    Tobacco comments:     cold turkey 2.5 wks ago after attempt Chantix developed allergy   Vaping Use    Vaping Use: Never used   Substance Use Topics    Alcohol use: No     Comment: around holidatys    Drug use: No     71-year-old gentleman returns today for follow-up. He was last seen by nurse practitioner Jake Sanford May 16. He has dementia mixed vascular and Alzheimer's type. Mini-Mental status exam during that visit was .   He was continued on Aricept and Namenda. He was continued on Zoloft for depression and apathy. 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 Both provide history. Continues on Aricept and Namenda. Memory has been perhaps a little worse. He did have hiccups that were unremitting and was started on some Thorazine. After taking that for a day or so regularly it landed him in the emergency room. He was unable to get up with generalized weakness and had increased confusion. He is having some increased difficulty with gait overall and did not go to therapy last time    Per record review, in the ER he was evaluated said to be tired appearing otherwise no focality. He underwent laboratory testing and CT of the head  Labs included influenza and COVID that were both normal as well as a CBC that was normal and troponin normal.  CMP was normal.    CT of the head done October 23, 2022 for indication of weakness finds chronic microvascular changes and he has no evidence of anything acute  Examination  Visit Vitals  BP (!) 119/57 (BP 1 Location: Left upper arm, BP Patient Position: Sitting, BP Cuff Size: Large adult)   Pulse 83   Resp 16   Wt 99.3 kg (219 lb)   SpO2 96%   BMI 29.70 kg/m²     Pleasant well-appearing gentleman. He is awake alert and oriented. He has normal speech and normal language. He gets through all orientation questions properly. He calculates properly. He knows the election was this week. He talks about the upcoming holiday Thanksgiving. Follows all commands. He has no pronation or drift. He resist in all extremities and direct testing.     Impression/Plan  Mixed dementia with perhaps some mild decline  Continue Aricept and continue Namenda    Status post stroke  Continue to modify modifiable risk factors    Hiccups  We discussed the Thorazine as his wife had some concerns but discussed that perhaps if he gets them again give it to him every 6 or 8 hours instead of every 4 hours and that short course that should be fine. Also discussed sucking on a lemon to try to remedy the hiccups    Gait disorder  Refer to physical therapy    Follow-up 6 months    Azalea Hendrix MD        This note was created using voice recognition software. Despite editing, there may be syntax errors.

## 2022-11-10 NOTE — PATIENT INSTRUCTIONS
RESULT POLICY      If we have ordered testing for you, know that; \"NO NEWS IS GOOD NEWS! \"     It is our policy that we no longer call patients with results, nor do we  give test results over the phone. We schedule follow up appointments so that your results can be discussed in person. This allows you to address any questions you have regarding the results. If you choose to go to an imaging center outside of [facility], it is your responsibility to bring imaging report and disc to follow up appointment. If something of concern is revealed on your test, we will contact you to discuss the matter and if needed schedule a sooner follow up appointment. Additionally, results may be found by using the My Chart feature and one of our patient service representatives at the  can give you instructions on how to access this feature to utilize our electronic medical record system. Thank you for your understanding. [address] [facility][note 3] Neurology Clinic   Statement to Patients  April 1, 2014      In an effort to ensure the large volume of patient prescription refills is processed in the most efficient and expeditious manner, we are asking our patients to assist us by calling your Pharmacy for all prescription refills, this will include also your  Mail Order Pharmacy. The pharmacy will contact our office electronically to continue the refill process. Please do not wait until the last minute to call your pharmacy. We need at least 48 hours (2days) to fill prescriptions. We also encourage you to call your pharmacy before going to  your prescription to make sure it is ready. With regard to controlled substance prescription refill requests (narcotic refills) that need to be picked up at our office, we ask your cooperation by providing us with at least 72 hours (3days) notice that you will need a refill.     We will not refill narcotic prescription refill requests after 4:00pm on any weekday, Monday through Thursday, or after 2:00pm on Fridays, or on the weekends. We encourage everyone to explore another way of getting your prescription refill request processed using [employer], our patient web portal through our electronic medical record system. [employer] is an efficient and effective way to communicate your medication request directly to the office and  downloadable as an zenaida on your smart phone . [employer] also features a review functionality that allows you to view your medication list as well as leave messages for your physician. Are you ready to get connected? If so please review the attatched instructions or speak to any of our staff to get you set up right away! Thank you so much for your cooperation. Should you have any questions please contact our Practice Administrator.

## 2023-07-16 NOTE — ACP (ADVANCE CARE PLANNING)
responded to an in-basket request to assist Mr. Edy Wu with an Advanced Medical Directive (AMD) on the [facility]. Surgical Unit. Consulted with his nurse who confirmed that Mr. Edy Wu would be able to have a conversation with the  at this time. Mr. Edy Wu was lying in bed and appeared to be resting comfortably when the  came to the room. He did not become awake to the 's presence or voice. His wife, Kimberley Macias, was sitting at the bedside. She made good eye contact with the  and greeted the  warmly. Engaged in active listening as Kimberley Macias shared about her 's illness concerns and the set back that they have experienced with this hospitalization. She also shared that she spoke to their  this morning and many people in their Shinto are lifting Mr. Edy Wu and herself up in prayer.  inquired about the Advanced Medical Directive request and she shared that is something they wanted to have more information.  left and AMD form and the Your Right To Decide brochure for Mr Edy Wu and Kimberley Macias to review when able.  also informed Kimberley Macias that if Mr. Edy Wu chose not to complete an AMD, she would automatically become his decision maker per the [facility]. She expressed understanding. Kimberley Macias thanked the  for her visit and expressed no additional needs at this time. 's will follow up as able and/or needed  Stil[place]. Cristy Fabry.      Paging Service: [phone]) Previously Declined (within the last year)

## 2023-08-08 DIAGNOSIS — G30.9 ALZHEIMER'S DISEASE, UNSPECIFIED (CODE) (HCC): Primary | ICD-10-CM

## 2023-08-08 RX ORDER — MEMANTINE HYDROCHLORIDE 10 MG/1
TABLET ORAL
Qty: 180 TABLET | Refills: 3 | Status: SHIPPED | OUTPATIENT
Start: 2023-08-08

## 2023-10-04 ENCOUNTER — OFFICE VISIT (OUTPATIENT)
Age: 73
End: 2023-10-04
Payer: MEDICARE

## 2023-10-04 VITALS
OXYGEN SATURATION: 96 % | BODY MASS INDEX: 29.66 KG/M2 | RESPIRATION RATE: 18 BRPM | SYSTOLIC BLOOD PRESSURE: 144 MMHG | DIASTOLIC BLOOD PRESSURE: 78 MMHG | HEART RATE: 79 BPM | WEIGHT: 219 LBS | HEIGHT: 72 IN

## 2023-10-04 DIAGNOSIS — S09.90XA TRAUMATIC INJURY OF HEAD, INITIAL ENCOUNTER: ICD-10-CM

## 2023-10-04 DIAGNOSIS — F01.50 MIXED ALZHEIMER'S AND VASCULAR DEMENTIA (HCC): Primary | ICD-10-CM

## 2023-10-04 DIAGNOSIS — G30.9 MIXED ALZHEIMER'S AND VASCULAR DEMENTIA (HCC): Primary | ICD-10-CM

## 2023-10-04 DIAGNOSIS — F02.80 MIXED ALZHEIMER'S AND VASCULAR DEMENTIA (HCC): Primary | ICD-10-CM

## 2023-10-04 DIAGNOSIS — G30.9 ALZHEIMER'S DISEASE, UNSPECIFIED (CODE) (HCC): ICD-10-CM

## 2023-10-04 DIAGNOSIS — R40.20 LOC (LOSS OF CONSCIOUSNESS) (HCC): ICD-10-CM
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
## 2023-10-04 PROCEDURE — 99214 OFFICE O/P EST MOD 30 MIN: CPT | Performed by: PSYCHIATRY & NEUROLOGY

## 2023-10-04 PROCEDURE — 3078F DIAST BP <80 MM HG: CPT | Performed by: PSYCHIATRY & NEUROLOGY

## 2023-10-04 PROCEDURE — 3077F SYST BP >= 140 MM HG: CPT | Performed by: PSYCHIATRY & NEUROLOGY

## 2023-10-04 PROCEDURE — 1123F ACP DISCUSS/DSCN MKR DOCD: CPT | Performed by: PSYCHIATRY & NEUROLOGY

## 2023-10-04 PROCEDURE — 96138 PSYCL/NRPSYC TECH 1ST: CPT | Performed by: PSYCHIATRY & NEUROLOGY

## 2023-10-04 PROCEDURE — 96132 NRPSYC TST EVAL PHYS/QHP 1ST: CPT | Performed by: PSYCHIATRY & NEUROLOGY

## 2023-10-04 RX ORDER — SERTRALINE HYDROCHLORIDE 100 MG/1
100 TABLET, FILM COATED ORAL EVERY EVENING
Qty: 90 TABLET | Refills: 3 | Status: SHIPPED | OUTPATIENT
Start: 2023-10-04

## 2023-10-04 RX ORDER — DONEPEZIL HYDROCHLORIDE 10 MG/1
10 TABLET, FILM COATED ORAL NIGHTLY
Qty: 90 TABLET | Refills: 3 | Status: SHIPPED | OUTPATIENT
Start: 2023-10-04

## 2023-10-04 RX ORDER — MEMANTINE HYDROCHLORIDE 10 MG/1
10 TABLET ORAL 2 TIMES DAILY
Qty: 180 TABLET | Refills: 3 | Status: SHIPPED | OUTPATIENT
Start: 2023-10-04

## 2023-10-04 ASSESSMENT — PATIENT HEALTH QUESTIONNAIRE - PHQ9
SUM OF ALL RESPONSES TO PHQ9 QUESTIONS 1 & 2: 0
SUM OF ALL RESPONSES TO PHQ QUESTIONS 1-9: 0
SUM OF ALL RESPONSES TO PHQ QUESTIONS 1-9: 0
1. LITTLE INTEREST OR PLEASURE IN DOING THINGS: 0
SUM OF ALL RESPONSES TO PHQ QUESTIONS 1-9: 0
SUM OF ALL RESPONSES TO PHQ QUESTIONS 1-9: 0
2. FEELING DOWN, DEPRESSED OR HOPELESS: 0

## 2023-10-05 ENCOUNTER — TELEPHONE (OUTPATIENT)
Age: 73
End: 2023-10-05

## 2023-10-13 ENCOUNTER — HOSPITAL ENCOUNTER (OUTPATIENT)
Facility: HOSPITAL | Age: 73
End: 2023-10-13
Attending: PSYCHIATRY & NEUROLOGY
Payer: MEDICARE
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
## 2023-10-13 DIAGNOSIS — S09.90XA TRAUMATIC INJURY OF HEAD, INITIAL ENCOUNTER: ICD-10-CM

## 2023-10-13 DIAGNOSIS — R40.20 LOC (LOSS OF CONSCIOUSNESS) (HCC): ICD-10-CM

## 2023-10-13 PROCEDURE — 70450 CT HEAD/BRAIN W/O DYE: CPT

## 2023-11-06 DIAGNOSIS — G30.9 ALZHEIMER'S DISEASE, UNSPECIFIED (CODE) (HCC): ICD-10-CM

## 2023-11-07 RX ORDER — MEMANTINE HYDROCHLORIDE 10 MG/1
10 TABLET ORAL 2 TIMES DAILY
Qty: 180 TABLET | Refills: 3 | OUTPATIENT
Start: 2023-11-07

## 2024-01-30 DIAGNOSIS — G30.9 ALZHEIMER'S DISEASE, UNSPECIFIED (CODE) (HCC): ICD-10-CM

## 2024-01-31 RX ORDER — MEMANTINE HYDROCHLORIDE 10 MG/1
10 TABLET ORAL 2 TIMES DAILY
Qty: 180 TABLET | Refills: 3 | OUTPATIENT
Start: 2024-01-31

## 2024-04-03 ENCOUNTER — OFFICE VISIT (OUTPATIENT)
Age: 74
End: 2024-04-03
Payer: MEDICARE

## 2024-04-03 VITALS
DIASTOLIC BLOOD PRESSURE: 71 MMHG | SYSTOLIC BLOOD PRESSURE: 123 MMHG | HEIGHT: 72 IN | RESPIRATION RATE: 16 BRPM | OXYGEN SATURATION: 95 % | WEIGHT: 220 LBS | BODY MASS INDEX: 29.8 KG/M2 | HEART RATE: 73 BPM

## 2024-04-03 DIAGNOSIS — F32.A DEPRESSION, UNSPECIFIED DEPRESSION TYPE: ICD-10-CM

## 2024-04-03 DIAGNOSIS — Z86.73 HISTORY OF STROKE: ICD-10-CM

## 2024-04-03 DIAGNOSIS — F01.50 MIXED ALZHEIMER'S AND VASCULAR DEMENTIA (HCC): Primary | ICD-10-CM

## 2024-04-03 DIAGNOSIS — F02.80 MIXED ALZHEIMER'S AND VASCULAR DEMENTIA (HCC): Primary | ICD-10-CM

## 2024-04-03 DIAGNOSIS — G30.9 MIXED ALZHEIMER'S AND VASCULAR DEMENTIA (HCC): Primary | ICD-10-CM

## 2024-04-03 PROCEDURE — 1123F ACP DISCUSS/DSCN MKR DOCD: CPT | Performed by: PSYCHIATRY & NEUROLOGY

## 2024-04-03 PROCEDURE — 96138 PSYCL/NRPSYC TECH 1ST: CPT | Performed by: PSYCHIATRY & NEUROLOGY

## 2024-04-03 PROCEDURE — 3078F DIAST BP <80 MM HG: CPT | Performed by: PSYCHIATRY & NEUROLOGY

## 2024-04-03 PROCEDURE — 3074F SYST BP LT 130 MM HG: CPT | Performed by: PSYCHIATRY & NEUROLOGY

## 2024-04-03 PROCEDURE — 96132 NRPSYC TST EVAL PHYS/QHP 1ST: CPT | Performed by: PSYCHIATRY & NEUROLOGY

## 2024-04-03 PROCEDURE — 99214 OFFICE O/P EST MOD 30 MIN: CPT | Performed by: PSYCHIATRY & NEUROLOGY

## 2024-04-03 NOTE — PROGRESS NOTES
[facility] Neurology Clinics and Neurodiagn[facility] Center at [facility] Neurology Clinics at [address] [place] [address] [address] [facility] [address] [place] [address] [phone]              Chief Complaint   Patient presents with    Memory Loss     More unsteady, right leg movement and arm movement on the right. Memory has declined      Current Outpatient Medications   Medication Sig Dispense Refill    donepezil (ARICEPT) 10 MG tablet Take 1 tablet by mouth nightly 90 tablet 3    sertraline (ZOLOFT) 100 MG tablet Take 1 tablet by mouth every evening 90 tablet 3    memantine (NAMENDA) 10 MG tablet Take 1 tablet by mouth 2 times daily 180 tablet 3    aspirin 81 MG EC tablet Take 1 tablet by mouth daily      Cetirizine HCl 10 MG CAPS Take 10 mg by mouth daily      pravastatin (PRAVACHOL) 40 MG tablet Take 1 tablet by mouth      tamsulosin (FLOMAX) 0.4 MG capsule Take 1 capsule by mouth every evening      metFORMIN (GLUCOPHAGE) 500 MG tablet Take 1 tablet by mouth 2 times daily (with meals) (Patient not taking: Reported on 10/4/2023)       No current facility-administered medications for this visit.      Allergies   Allergen Reactions    Penicillins Hives    Varenicline Rash     Other reaction(s): Rash  Very itchy rash on back  Very itchy rash on back     Social History     Tobacco Use    Smoking status: Former     Current packs/day: 0.00     Types: Cigarettes     Quit date: 2017     Years since quittin.0    Smokeless tobacco: Never    Tobacco comments:     Quit smoking: cold turkey 2.5 wks ago after attempt Antoinex developed allergy   Substance Use Topics    Alcohol use: No    Drug use: No     74-year-old gentleman returns today with his wife for follow-up mixed type dementia.  He is on Aricept and Namenda.  He also has some pression and is on Zoloft.  He has a history of stroke status post tPA with post tPA hemorrhagic conversion.  He says

## 2024-04-03 NOTE — PROGRESS NOTES
Cognitive Testing Evaluation     Introduction:     Deni Crocker  1950  Male   This 74 year old Male was administered a battery of neurocognitive testing on 04/03/2024.           Tests Administered:     Trails A, Trails B, Digit Symbol Substitution, Stroop, Immediate Recognition, Delayed Recognition   The combined test administration time was 10 minutes   Test Results:   Cognitive testing was provided via a battery of cognitive assessments. The pattern of test scores indicate that results are valid.  A Clinical Report with further description of scores and results is also available.   Overall: Patient tested in the 1st* percentile (standard score of 48*).   Trails A: Patient tested in the 31st percentile (scaled standard score of 92).  Trails B: Patient tested in the --* percentile (scaled standard score of null).  Digit Symbol Substitution: Patient tested in the 31st percentile (scaled standard score of 93).  Stroop: Patient tested in the 11th percentile (scaled standard score of 82).  Immediate Recognition: Patient tested in the 1st percentile (scaled standard score of -1).  Delayed Recognition: Patient tested in the 1st percentile (scaled standard score of 20).   * These assessments were not scored because they were potentially invalid, or the patient failed to complete in the allotted time.   Interpretation of Test Scores:     Examination of individual component tests shows:   Attention - Trails A: unlikely impairment  Mental Flexibility - Trails B: possible impairment  Executive Function - Digit Symbol Substitution: unlikely impairment  Executive Function - Stroop: possible impairment  Memory - Immediate Recognition: likely impairment  Memory - Delayed Recognition: likely impairment    The patient’s overall cognitive test performance was in the 1st percentile when compared to individuals of a similar age and gender, suggesting likely presence of cognitive impairment.

## 2024-05-21 ENCOUNTER — TELEPHONE (OUTPATIENT)
Age: 74
End: 2024-05-21

## 2024-05-21 NOTE — TELEPHONE ENCOUNTER
Returned Mrs. Crocker's call.  She states pt never complains of HA however for the past 4 days has been c/o HA on left side of head from above ear down back of head.  Asked if touch makes it worse.  She was not able to answer as pt has not said wether is does or not.  Just that it is a constant pain.    She was worried as he had a stroke years ago.  Denies any other stroke like sx.  Discussed there are multiple causes of HA however, if there is any question about stroke, she will need to have pt evaluated at ED.  Otherwise, he may be seen by pcp or UC as we are not able to get pt in for a visit until after July and that is too long for an acute issue.    She expressed understanding.

## 2024-05-21 NOTE — TELEPHONE ENCOUNTER
Patient and his wife called and stated he is having pain on the left side of head and going towards the back of head.    Requesting a call.    PHI  23    Next appointment 24

## 2024-08-28 ENCOUNTER — OFFICE VISIT (OUTPATIENT)
Age: 74
End: 2024-08-28
Payer: MEDICARE

## 2024-08-28 VITALS
RESPIRATION RATE: 20 BRPM | OXYGEN SATURATION: 95 % | DIASTOLIC BLOOD PRESSURE: 86 MMHG | SYSTOLIC BLOOD PRESSURE: 144 MMHG | HEART RATE: 76 BPM

## 2024-08-28 DIAGNOSIS — G30.9 ALZHEIMER'S DISEASE, UNSPECIFIED (CODE) (HCC): ICD-10-CM

## 2024-08-28 PROCEDURE — 3079F DIAST BP 80-89 MM HG: CPT

## 2024-08-28 PROCEDURE — 1123F ACP DISCUSS/DSCN MKR DOCD: CPT

## 2024-08-28 PROCEDURE — 99214 OFFICE O/P EST MOD 30 MIN: CPT

## 2024-08-28 PROCEDURE — 3077F SYST BP >= 140 MM HG: CPT

## 2024-08-28 RX ORDER — SERTRALINE HYDROCHLORIDE 100 MG/1
100 TABLET, FILM COATED ORAL EVERY EVENING
Qty: 90 TABLET | Refills: 3 | Status: SHIPPED | OUTPATIENT
Start: 2024-08-28

## 2024-08-28 RX ORDER — MEMANTINE HYDROCHLORIDE 10 MG/1
10 TABLET ORAL 2 TIMES DAILY
Qty: 180 TABLET | Refills: 3 | Status: SHIPPED | OUTPATIENT
Start: 2024-08-28

## 2024-08-28 NOTE — PROGRESS NOTES
Deni Crocker is a 74 y.o. male who presents with the following  Chief Complaint   Patient presents with    Follow-up     Post stroke- 2017, alzheimers dementia        HPI  Patient is here today with his wife for dementia follow-up.  Patient was last seen April 3, 2024 by Dr. Miller at which time the patient was established on Aricept 10 mg daily and Namenda 10 mg twice a day.  Today the patient's wife feels like the short-term memory has declined a bit since the last visit here however, the patient feels that things are stable.  The biggest concern for the patient per his wife is that he is very weak and unstable.  He is walking with a cane for support.  He did have physical therapy about 6 to 8 months ago which they felt was beneficial but has regressed since he is pretty sedentary at home.  He did have a fall yesterday in the home but was not injured and did not seek medical care.  He is eating well and the patient's wife feels like he is sleeping too much.  She says that he naps during the day and goes to bed around 1 AM and then does not wake up until around 10 or 11 AM the next day.  Sometimes he does miss his nighttime medications because she goes to bed before he does.  They are about to move from their two-story home to a 1 level apartment not far from here.  It is new construction and the patient's wife is very excited that everything will be new and she will not have any maintenance to do on the home.  No hallucinations or delusions.  Overall he is doing well.  Allergies   Allergen Reactions    Penicillins Hives    Varenicline Rash     Other reaction(s): Rash  Very itchy rash on back  Very itchy rash on back       Current Outpatient Medications   Medication Sig Dispense Refill    memantine (NAMENDA) 10 MG tablet Take 1 tablet by mouth 2 times daily 180 tablet 3    sertraline (ZOLOFT) 100 MG tablet Take 1 tablet by mouth every evening 90 tablet 3    donepezil (ARICEPT) 10 MG tablet Take 1 tablet by mouth nightly

## 2024-08-28 NOTE — PROGRESS NOTES
He thinks he is about the same as his last visit   I believe his wife thinks he has declined some more since last visit

## 2024-10-04 ENCOUNTER — TELEPHONE (OUTPATIENT)
Age: 74
End: 2024-10-04

## 2024-10-04 NOTE — TELEPHONE ENCOUNTER
HIPAA Verified (if caller is someone other than patient): yes       Reason for Call: Requesting sooner appt for pt due to alzheimer's deterating    Reason for appointment:   alzheimer's deterating    Reason appointment not scheduled at time of call:   blocked    Requested Provider:   Angela    Additional Notes (as needed):         Message: (any additional details from patient/caller not covered above)          Level 1 Calls - attempted to reach practice? no     Reason Call Marked High Priority (if applicable):

## 2024-10-30 DIAGNOSIS — G30.9 ALZHEIMER'S DISEASE, UNSPECIFIED (CODE) (HCC): ICD-10-CM

## 2024-10-31 RX ORDER — SERTRALINE HYDROCHLORIDE 100 MG/1
100 TABLET, FILM COATED ORAL NIGHTLY
Qty: 90 TABLET | Refills: 3 | OUTPATIENT
Start: 2024-10-31

## 2024-10-31 RX ORDER — DONEPEZIL HYDROCHLORIDE 10 MG/1
10 TABLET, FILM COATED ORAL NIGHTLY
Qty: 90 TABLET | Refills: 3 | OUTPATIENT
Start: 2024-10-31

## 2024-10-31 RX ORDER — MEMANTINE HYDROCHLORIDE 10 MG/1
10 TABLET ORAL 2 TIMES DAILY
Qty: 180 TABLET | Refills: 3 | OUTPATIENT
Start: 2024-10-31

## 2024-11-11 DIAGNOSIS — G30.9 ALZHEIMER'S DISEASE, UNSPECIFIED (CODE) (HCC): Primary | ICD-10-CM

## 2024-11-11 RX ORDER — DONEPEZIL HYDROCHLORIDE 10 MG/1
10 TABLET, FILM COATED ORAL NIGHTLY
Qty: 90 TABLET | Refills: 3 | Status: SHIPPED | OUTPATIENT
Start: 2024-11-11

## 2024-11-18 ENCOUNTER — TELEPHONE (OUTPATIENT)
Age: 74
End: 2024-11-18

## 2024-11-18 NOTE — TELEPHONE ENCOUNTER
Returned call.  Pt is currently at , Mrs. Crocker says EMS did not transport pt to [facility] as she requested and insisted in transporting him to , for a \"bad fall\".    She states his dementia has significantly progressed and wanted sooner appt than his f/u in March.  Notified that there is nothing that would warrant a sooner f/u as there is no tx for advanced dementia.  Only medicating pt to try and control outbursts or family consideration of in home care or memory care.  She stated she will do the best she can and stated a nurse was coming in to pt room so she needed to hang up.

## 2024-11-18 NOTE — TELEPHONE ENCOUNTER
HIPAA Verified (if caller is someone other than patient): yes       Reason for Call:     Reason For Call:       Details from Caller:          Message: (any additional details from patient/caller not covered above)    Patient wife requesting a call to discuss her  recent medical condition and to discuss getting a sooner follow up appt.    He's currently in the hospital        Level 1 Calls - attempted to reach practice?         Reason Call Marked High Priority (if applicable):

## 2024-11-19 NOTE — TELEPHONE ENCOUNTER
Patient wife requesting a call to discuss her  had  a fall  and was on the floor for 15 hours and the EMT came and he refused to go to the hospital.    He's refusing to got to the hospital and she's requesting Dr. Miller to please call him.     She stated he respects Dr. Miller options.

## 2024-12-06 ENCOUNTER — TELEPHONE (OUTPATIENT)
Age: 74
End: 2024-12-06

## 2024-12-06 NOTE — TELEPHONE ENCOUNTER
Pt's wife, stop by the office to request a sooner appt than 3/5/24 for the pt. Pt's wife stated that he has fallen a few weeks ago and busted the top of his head, and he's stability is getting worse. He has a Mau check on 3/5/24, along with a f/u on the same day with Dr Miller. Please reach out to the pt's wife Rosa Maria on the preferred number in the chart. She's very concerned about the pt. Thank you.

## 2024-12-08 ENCOUNTER — APPOINTMENT (OUTPATIENT)
Facility: HOSPITAL | Age: 74
DRG: 516 | End: 2024-12-08
Payer: MEDICARE

## 2024-12-08 ENCOUNTER — HOSPITAL ENCOUNTER (INPATIENT)
Facility: HOSPITAL | Age: 74
LOS: 7 days | Discharge: NURSING, REHAB OR LONG-TERM CARE | DRG: 516 | End: 2024-12-19
Attending: STUDENT IN AN ORGANIZED HEALTH CARE EDUCATION/TRAINING PROGRAM | Admitting: HOSPITALIST
Payer: MEDICARE

## 2024-12-08 DIAGNOSIS — R55 SYNCOPE AND COLLAPSE: ICD-10-CM
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
## 2024-12-08 LAB
ALBUMIN SERPL-MCNC: 3.7 G/DL (ref 3.5–5)
ALBUMIN/GLOB SERPL: 0.9 (ref 1.1–2.2)
ALP SERPL-CCNC: 191 U/L (ref 45–117)
ALT SERPL-CCNC: 20 U/L (ref 12–78)
ANION GAP SERPL CALC-SCNC: 3 MMOL/L (ref 2–12)
AST SERPL-CCNC: 18 U/L (ref 15–37)
BASOPHILS # BLD: 0 K/UL (ref 0–0.1)
BASOPHILS NFR BLD: 0 % (ref 0–1)
BILIRUB SERPL-MCNC: 0.6 MG/DL (ref 0.2–1)
BUN SERPL-MCNC: 12 MG/DL (ref 6–20)
BUN/CREAT SERPL: 11 (ref 12–20)
CALCIUM SERPL-MCNC: 9.5 MG/DL (ref 8.5–10.1)
CHLORIDE SERPL-SCNC: 104 MMOL/L (ref 97–108)
CK SERPL-CCNC: 70 U/L (ref 39–308)
CO2 SERPL-SCNC: 33 MMOL/L (ref 21–32)
COMMENT:: NORMAL
CREAT SERPL-MCNC: 1.13 MG/DL (ref 0.7–1.3)
DIFFERENTIAL METHOD BLD: NORMAL
EOSINOPHIL # BLD: 0.2 K/UL (ref 0–0.4)
EOSINOPHIL NFR BLD: 3 % (ref 0–7)
ERYTHROCYTE [DISTWIDTH] IN BLOOD BY AUTOMATED COUNT: 12.7 % (ref 11.5–14.5)
FLUAV RNA SPEC QL NAA+PROBE: NOT DETECTED
FLUBV RNA SPEC QL NAA+PROBE: NOT DETECTED
GLOBULIN SER CALC-MCNC: 4.1 G/DL (ref 2–4)
GLUCOSE BLD STRIP.AUTO-MCNC: 108 MG/DL (ref 65–117)
GLUCOSE BLD STRIP.AUTO-MCNC: 112 MG/DL (ref 65–117)
GLUCOSE SERPL-MCNC: 125 MG/DL (ref 65–100)
HCT VFR BLD AUTO: 42.5 % (ref 36.6–50.3)
HGB BLD-MCNC: 14.5 G/DL (ref 12.1–17)
IMM GRANULOCYTES # BLD AUTO: 0 K/UL (ref 0–0.04)
IMM GRANULOCYTES NFR BLD AUTO: 0 % (ref 0–0.5)
LYMPHOCYTES # BLD: 1.5 K/UL (ref 0.8–3.5)
LYMPHOCYTES NFR BLD: 21 % (ref 12–49)
MCH RBC QN AUTO: 30.6 PG (ref 26–34)
MCHC RBC AUTO-ENTMCNC: 34.1 G/DL (ref 30–36.5)
MCV RBC AUTO: 89.7 FL (ref 80–99)
MONOCYTES # BLD: 0.4 K/UL (ref 0–1)
MONOCYTES NFR BLD: 6 % (ref 5–13)
NEUTS SEG # BLD: 4.9 K/UL (ref 1.8–8)
NEUTS SEG NFR BLD: 70 % (ref 32–75)
NRBC # BLD: 0 K/UL (ref 0–0.01)
NRBC BLD-RTO: 0 PER 100 WBC
PLATELET # BLD AUTO: 248 K/UL (ref 150–400)
PMV BLD AUTO: 9.3 FL (ref 8.9–12.9)
POTASSIUM SERPL-SCNC: 3.6 MMOL/L (ref 3.5–5.1)
PROT SERPL-MCNC: 7.8 G/DL (ref 6.4–8.2)
RBC # BLD AUTO: 4.74 M/UL (ref 4.1–5.7)
SARS-COV-2 RNA RESP QL NAA+PROBE: NOT DETECTED
SERVICE CMNT-IMP: NORMAL
SERVICE CMNT-IMP: NORMAL
SODIUM SERPL-SCNC: 140 MMOL/L (ref 136–145)
SOURCE: NORMAL
SPECIMEN HOLD: NORMAL
SPECIMEN HOLD: NORMAL
TROPONIN I SERPL HS-MCNC: 6 NG/L (ref 0–76)
TSH SERPL DL<=0.05 MIU/L-ACNC: 0.32 UIU/ML (ref 0.36–3.74)
WBC # BLD AUTO: 7 K/UL (ref 4.1–11.1)

## 2024-12-08 PROCEDURE — 82550 ASSAY OF CK (CPK): CPT

## 2024-12-08 PROCEDURE — 6370000000 HC RX 637 (ALT 250 FOR IP): Performed by: HOSPITALIST

## 2024-12-08 PROCEDURE — 73521 X-RAY EXAM HIPS BI 2 VIEWS: CPT

## 2024-12-08 PROCEDURE — 84443 ASSAY THYROID STIM HORMONE: CPT

## 2024-12-08 PROCEDURE — 82962 GLUCOSE BLOOD TEST: CPT

## 2024-12-08 PROCEDURE — 70450 CT HEAD/BRAIN W/O DYE: CPT

## 2024-12-08 PROCEDURE — 2580000003 HC RX 258: Performed by: HOSPITALIST

## 2024-12-08 PROCEDURE — 80053 COMPREHEN METABOLIC PANEL: CPT

## 2024-12-08 PROCEDURE — 93005 ELECTROCARDIOGRAM TRACING: CPT | Performed by: STUDENT IN AN ORGANIZED HEALTH CARE EDUCATION/TRAINING PROGRAM

## 2024-12-08 PROCEDURE — 81001 URINALYSIS AUTO W/SCOPE: CPT

## 2024-12-08 PROCEDURE — 87636 SARSCOV2 & INF A&B AMP PRB: CPT

## 2024-12-08 PROCEDURE — G0378 HOSPITAL OBSERVATION PER HR: HCPCS

## 2024-12-08 PROCEDURE — 71046 X-RAY EXAM CHEST 2 VIEWS: CPT

## 2024-12-08 PROCEDURE — 36415 COLL VENOUS BLD VENIPUNCTURE: CPT

## 2024-12-08 PROCEDURE — 84484 ASSAY OF TROPONIN QUANT: CPT

## 2024-12-08 PROCEDURE — 6360000002 HC RX W HCPCS: Performed by: NURSE PRACTITIONER

## 2024-12-08 PROCEDURE — 85025 COMPLETE CBC W/AUTO DIFF WBC: CPT

## 2024-12-08 PROCEDURE — 99285 EMERGENCY DEPT VISIT HI MDM: CPT

## 2024-12-08 RX ORDER — SODIUM CHLORIDE 9 MG/ML
INJECTION, SOLUTION INTRAVENOUS PRN
Status: DISCONTINUED | OUTPATIENT
Start: 2024-12-08 | End: 2024-12-19 | Stop reason: HOSPADM

## 2024-12-08 RX ORDER — ASPIRIN 81 MG/1
81 TABLET ORAL DAILY
Status: DISCONTINUED | OUTPATIENT
Start: 2024-12-09 | End: 2024-12-19 | Stop reason: HOSPADM

## 2024-12-08 RX ORDER — MEMANTINE HYDROCHLORIDE 10 MG/1
10 TABLET ORAL 2 TIMES DAILY
Status: DISCONTINUED | OUTPATIENT
Start: 2024-12-08 | End: 2024-12-19 | Stop reason: HOSPADM

## 2024-12-08 RX ORDER — DEXTROSE MONOHYDRATE 100 MG/ML
INJECTION, SOLUTION INTRAVENOUS CONTINUOUS PRN
Status: DISCONTINUED | OUTPATIENT
Start: 2024-12-08 | End: 2024-12-19 | Stop reason: HOSPADM

## 2024-12-08 RX ORDER — MAGNESIUM SULFATE IN WATER 40 MG/ML
2000 INJECTION, SOLUTION INTRAVENOUS PRN
Status: DISCONTINUED | OUTPATIENT
Start: 2024-12-08 | End: 2024-12-19 | Stop reason: HOSPADM

## 2024-12-08 RX ORDER — HYDRALAZINE HYDROCHLORIDE 20 MG/ML
10 INJECTION INTRAMUSCULAR; INTRAVENOUS EVERY 6 HOURS PRN
Status: DISCONTINUED | OUTPATIENT
Start: 2024-12-08 | End: 2024-12-19 | Stop reason: HOSPADM

## 2024-12-08 RX ORDER — DONEPEZIL HYDROCHLORIDE 5 MG/1
10 TABLET, FILM COATED ORAL NIGHTLY
Status: DISCONTINUED | OUTPATIENT
Start: 2024-12-08 | End: 2024-12-19 | Stop reason: HOSPADM

## 2024-12-08 RX ORDER — TAMSULOSIN HYDROCHLORIDE 0.4 MG/1
0.4 CAPSULE ORAL EVERY EVENING
Status: DISCONTINUED | OUTPATIENT
Start: 2024-12-08 | End: 2024-12-19 | Stop reason: HOSPADM

## 2024-12-08 RX ORDER — POLYETHYLENE GLYCOL 3350 17 G/17G
17 POWDER, FOR SOLUTION ORAL DAILY PRN
Status: DISCONTINUED | OUTPATIENT
Start: 2024-12-08 | End: 2024-12-19 | Stop reason: HOSPADM

## 2024-12-08 RX ORDER — ONDANSETRON 2 MG/ML
4 INJECTION INTRAMUSCULAR; INTRAVENOUS EVERY 6 HOURS PRN
Status: DISCONTINUED | OUTPATIENT
Start: 2024-12-08 | End: 2024-12-19 | Stop reason: HOSPADM

## 2024-12-08 RX ORDER — ENOXAPARIN SODIUM 100 MG/ML
40 INJECTION SUBCUTANEOUS DAILY
Status: DISCONTINUED | OUTPATIENT
Start: 2024-12-09 | End: 2024-12-19 | Stop reason: HOSPADM

## 2024-12-08 RX ORDER — PRAVASTATIN SODIUM 20 MG
40 TABLET ORAL NIGHTLY
Status: DISCONTINUED | OUTPATIENT
Start: 2024-12-08 | End: 2024-12-19 | Stop reason: HOSPADM

## 2024-12-08 RX ORDER — ONDANSETRON 4 MG/1
4 TABLET, ORALLY DISINTEGRATING ORAL EVERY 8 HOURS PRN
Status: DISCONTINUED | OUTPATIENT
Start: 2024-12-08 | End: 2024-12-19 | Stop reason: HOSPADM

## 2024-12-08 RX ORDER — POTASSIUM CHLORIDE 750 MG/1
40 TABLET, EXTENDED RELEASE ORAL PRN
Status: DISCONTINUED | OUTPATIENT
Start: 2024-12-08 | End: 2024-12-19 | Stop reason: HOSPADM

## 2024-12-08 RX ORDER — ACETAMINOPHEN 325 MG/1
650 TABLET ORAL EVERY 6 HOURS PRN
Status: DISCONTINUED | OUTPATIENT
Start: 2024-12-08 | End: 2024-12-19 | Stop reason: HOSPADM

## 2024-12-08 RX ORDER — SODIUM CHLORIDE 0.9 % (FLUSH) 0.9 %
5-40 SYRINGE (ML) INJECTION PRN
Status: DISCONTINUED | OUTPATIENT
Start: 2024-12-08 | End: 2024-12-19 | Stop reason: HOSPADM

## 2024-12-08 RX ORDER — POTASSIUM CHLORIDE 7.45 MG/ML
10 INJECTION INTRAVENOUS PRN
Status: DISCONTINUED | OUTPATIENT
Start: 2024-12-08 | End: 2024-12-19 | Stop reason: HOSPADM

## 2024-12-08 RX ORDER — ACETAMINOPHEN 650 MG/1
650 SUPPOSITORY RECTAL EVERY 6 HOURS PRN
Status: DISCONTINUED | OUTPATIENT
Start: 2024-12-08 | End: 2024-12-19 | Stop reason: HOSPADM

## 2024-12-08 RX ORDER — SODIUM CHLORIDE 0.9 % (FLUSH) 0.9 %
5-40 SYRINGE (ML) INJECTION EVERY 12 HOURS SCHEDULED
Status: DISCONTINUED | OUTPATIENT
Start: 2024-12-08 | End: 2024-12-19 | Stop reason: HOSPADM

## 2024-12-08 RX ORDER — SODIUM CHLORIDE 9 MG/ML
INJECTION, SOLUTION INTRAVENOUS CONTINUOUS
Status: DISPENSED | OUTPATIENT
Start: 2024-12-08 | End: 2024-12-09

## 2024-12-08 RX ORDER — INSULIN LISPRO 100 [IU]/ML
0-4 INJECTION, SOLUTION INTRAVENOUS; SUBCUTANEOUS
Status: DISCONTINUED | OUTPATIENT
Start: 2024-12-08 | End: 2024-12-19 | Stop reason: HOSPADM

## 2024-12-08 RX ADMIN — HYDRALAZINE HYDROCHLORIDE 10 MG: 20 INJECTION INTRAMUSCULAR; INTRAVENOUS at 21:29

## 2024-12-08 RX ADMIN — MEMANTINE 10 MG: 10 TABLET ORAL at 21:03

## 2024-12-08 RX ADMIN — TAMSULOSIN HYDROCHLORIDE 0.4 MG: 0.4 CAPSULE ORAL at 21:03

## 2024-12-08 RX ADMIN — SODIUM CHLORIDE, PRESERVATIVE FREE 5 ML: 5 INJECTION INTRAVENOUS at 21:04

## 2024-12-08 RX ADMIN — SERTRALINE HYDROCHLORIDE 100 MG: 50 TABLET ORAL at 21:03

## 2024-12-08 RX ADMIN — PRAVASTATIN SODIUM 40 MG: 20 TABLET ORAL at 21:03

## 2024-12-08 RX ADMIN — DONEPEZIL HYDROCHLORIDE 10 MG: 5 TABLET, FILM COATED ORAL at 21:03

## 2024-12-08 RX ADMIN — SODIUM CHLORIDE: 9 INJECTION, SOLUTION INTRAVENOUS at 21:18

## 2024-12-08 ASSESSMENT — PAIN - FUNCTIONAL ASSESSMENT: PAIN_FUNCTIONAL_ASSESSMENT: NONE - DENIES PAIN

## 2024-12-08 NOTE — H&P
Prior to Admission medications    Medication Sig Start Date End Date Taking? Authorizing Provider   donepezil (ARICEPT) 10 MG tablet Take 1 tablet by mouth nightly 11/11/24   Racheal Miller MD   memantine (NAMENDA) 10 MG tablet Take 1 tablet by mouth 2 times daily 8/28/24   Zoey Christiansen APRN - NP   sertraline (ZOLOFT) 100 MG tablet Take 1 tablet by mouth every evening 8/28/24   Zoey Christiansen APRN - NP   metFORMIN (GLUCOPHAGE) 500 MG tablet Take 1 tablet by mouth 2 times daily (with meals)  Patient not taking: Reported on 10/4/2023    Provider, MD Donna   aspirin 81 MG EC tablet Take 1 tablet by mouth daily    Automatic Reconciliation, Ar   Cetirizine HCl 10 MG CAPS Take 10 mg by mouth daily    Automatic Reconciliation, Ar   pravastatin (PRAVACHOL) 40 MG tablet Take 1 tablet by mouth 4/4/17   Automatic Reconciliation, Ar   tamsulosin (FLOMAX) 0.4 MG capsule Take 1 capsule by mouth every evening    Automatic Reconciliation, Ar       REVIEW OF SYSTEMS:  See HPI for details  General:  negative for fever, chills, sweats, weakness, weight loss  Eyes: negative for blurred vision, eye pain, loss of vision, diplopia  Ear Nose and Throat: negative for rhinorrhea, pharyngitis, otalgia, tinnitus, speech or swallowing difficulties  Respiratory:  negative for pleuritic pain, cough, sputum production, wheezing, SOB, ROSADO  Cardiology:  negative for chest pain, palpitations, orthopnea, PND, edema, syncope   Gastrointestinal: negative for abdominal pain, N/V, dysphagia, change in bowel habits, bleeding  Genitourinary: negative for frequency, urgency, dysuria, hematuria, incontinence  Muskuloskeletal : negative for arthralgia, myalgia  Hematology: negative for easy bruising, bleeding, lymphadenopathy  Dermatological: negative for rash, ulceration, mole change, new lesion  Endocrine: negative for hot flashes or polydipsia  Neurological: Syncopal episode positive  Psychological: negative for anxiety, depression,

## 2024-12-08 NOTE — ED NOTES
TRANSFER - OUT REPORT:    Verbal report given to PARAMJIT Gonsalves on Deni Crocker  being transferred to [facility] for routine progression of patient care       Report consisted of patient's Situation, Background, Assessment and   Recommendations(SBAR).     Information from the following report(s) ED Encounter Summary, ED SBAR, Intake/Output, MAR, and Recent Results was reviewed with the receiving nurse.    [place] Fall Assessment:    Presents to emergency department  because of falls (Syncope, seizure, or loss of consciousness): Yes  Age > 70: Yes  Altered Mental Status, Intoxication with alcohol or substance confusion (Disorientation, impaired judgment, poor safety awaremess, or inability to follow instructions): Yes  Impaired Mobility: Ambulates or transfers with assistive devices or assistance; Unable to ambulate or transer.: Yes  Nursing Judgement: Yes          Lines:       Opportunity for questions and clarification was provided.      Patient transported with:  Tech

## 2024-12-08 NOTE — ED PROVIDER NOTES
03:54:59 PM      PATIENT REFERRED TO:  No follow-up provider specified.    DISCHARGE MEDICATIONS:  New Prescriptions    No medications on file         (Please note that portions of this note were completed with a voice recognition program.  Efforts were made to edit the dictations but occasionally words are mis-transcribed.)    Tammy Alvarez MD (electronically signed)  Emergency Attending Physician               Tammy Alvarez MD  12/10/24 [id number]

## 2024-12-08 NOTE — ED TRIAGE NOTES
Patient arrives to the ED for complaints of multiple falls over the past several weeks. Patient states his \"legs give out\".     Wife states she found him on the ground 11/22, was admitted to [facility], imagining was negative.     Denies chest pain, shortness of breath, dizziness, lightheaded.     Denies LOC, hitting his head, blood thinners.     History of stroke, dementia.

## 2024-12-09 ENCOUNTER — APPOINTMENT (OUTPATIENT)
Facility: HOSPITAL | Age: 74
DRG: 516 | End: 2024-12-09
Payer: MEDICARE

## 2024-12-09 ENCOUNTER — APPOINTMENT (OUTPATIENT)
Facility: HOSPITAL | Age: 74
DRG: 516 | End: 2024-12-09
Attending: HOSPITALIST
Payer: MEDICARE
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
## 2024-12-09 LAB
ANION GAP SERPL CALC-SCNC: 6 MMOL/L (ref 2–12)
APPEARANCE UR: CLEAR
BACTERIA URNS QL MICRO: NEGATIVE /HPF
BILIRUB UR QL: NEGATIVE
BUN SERPL-MCNC: 11 MG/DL (ref 6–20)
BUN/CREAT SERPL: 13 (ref 12–20)
CALCIUM SERPL-MCNC: 9 MG/DL (ref 8.5–10.1)
CHLORIDE SERPL-SCNC: 106 MMOL/L (ref 97–108)
CK SERPL-CCNC: 60 U/L (ref 39–308)
CO2 SERPL-SCNC: 28 MMOL/L (ref 21–32)
COLOR UR: ABNORMAL
CREAT SERPL-MCNC: 0.86 MG/DL (ref 0.7–1.3)
ECHO AO ASC DIAM: 3.6 CM
ECHO AO ASCENDING AORTA INDEX: 1.7 CM/M2
ECHO AO ROOT DIAM: 4 CM
ECHO AO ROOT INDEX: 1.89 CM/M2
ECHO AV AREA PEAK VELOCITY: 3.7 CM2
ECHO AV AREA VTI: 5 CM2
ECHO AV AREA/BSA PEAK VELOCITY: 1.7 CM2/M2
ECHO AV AREA/BSA VTI: 2.4 CM2/M2
ECHO AV MEAN GRADIENT: 2 MMHG
ECHO AV MEAN VELOCITY: 0.7 M/S
ECHO AV PEAK GRADIENT: 4 MMHG
ECHO AV PEAK VELOCITY: 1 M/S
ECHO AV VELOCITY RATIO: 0.9
ECHO AV VTI: 19 CM
ECHO BSA: 2.12 M2
ECHO LA DIAMETER INDEX: 1.46 CM/M2
ECHO LA DIAMETER: 3.1 CM
ECHO LA TO AORTIC ROOT RATIO: 0.78
ECHO LA VOL A-L A2C: 45 ML (ref 18–58)
ECHO LA VOL A-L A4C: 41 ML (ref 18–58)
ECHO LA VOL BP: 41 ML (ref 18–58)
ECHO LA VOL MOD A2C: 42 ML (ref 18–58)
ECHO LA VOL MOD A4C: 40 ML (ref 18–58)
ECHO LA VOL/BSA BIPLANE: 19 ML/M2 (ref 16–34)
ECHO LA VOLUME AREA LENGTH: 43 ML
ECHO LA VOLUME INDEX A-L A2C: 21 ML/M2 (ref 16–34)
ECHO LA VOLUME INDEX A-L A4C: 19 ML/M2 (ref 16–34)
ECHO LA VOLUME INDEX AREA LENGTH: 20 ML/M2 (ref 16–34)
ECHO LA VOLUME INDEX MOD A2C: 20 ML/M2 (ref 16–34)
ECHO LA VOLUME INDEX MOD A4C: 19 ML/M2 (ref 16–34)
ECHO LV E' LATERAL VELOCITY: 9.3 CM/S
ECHO LV E' SEPTAL VELOCITY: 5.32 CM/S
ECHO LV EDV A2C: 47 ML
ECHO LV EDV A4C: 77 ML
ECHO LV EDV BP: 65 ML (ref 67–155)
ECHO LV EDV INDEX A4C: 36 ML/M2
ECHO LV EDV INDEX BP: 31 ML/M2
ECHO LV EDV NDEX A2C: 22 ML/M2
ECHO LV EJECTION FRACTION A2C: 69 %
ECHO LV EJECTION FRACTION A4C: 71 %
ECHO LV EJECTION FRACTION BIPLANE: 72 % (ref 55–100)
ECHO LV ESV A2C: 15 ML
ECHO LV ESV A4C: 22 ML
ECHO LV ESV BP: 19 ML (ref 22–58)
ECHO LV ESV INDEX A2C: 7 ML/M2
ECHO LV ESV INDEX A4C: 10 ML/M2
ECHO LV ESV INDEX BP: 9 ML/M2
ECHO LV FRACTIONAL SHORTENING: 26 % (ref 28–44)
ECHO LV INTERNAL DIMENSION DIASTOLE INDEX: 2.03 CM/M2
ECHO LV INTERNAL DIMENSION DIASTOLIC: 4.3 CM (ref 4.2–5.9)
ECHO LV INTERNAL DIMENSION SYSTOLIC INDEX: 1.51 CM/M2
ECHO LV INTERNAL DIMENSION SYSTOLIC: 3.2 CM
ECHO LV IVSD: 1.1 CM (ref 0.6–1)
ECHO LV MASS 2D: 162.9 G (ref 88–224)
ECHO LV MASS INDEX 2D: 76.9 G/M2 (ref 49–115)
ECHO LV POSTERIOR WALL DIASTOLIC: 1.1 CM (ref 0.6–1)
ECHO LV RELATIVE WALL THICKNESS RATIO: 0.51
ECHO LVOT AREA: 4.2 CM2
ECHO LVOT AV VTI INDEX: 1.23
ECHO LVOT DIAM: 2.3 CM
ECHO LVOT MEAN GRADIENT: 2 MMHG
ECHO LVOT PEAK GRADIENT: 3 MMHG
ECHO LVOT PEAK VELOCITY: 0.9 M/S
ECHO LVOT STROKE VOLUME INDEX: 45.6 ML/M2
ECHO LVOT SV: 96.8 ML
ECHO LVOT VTI: 23.3 CM
ECHO MV A VELOCITY: 0.76 M/S
ECHO MV AREA VTI: 4.5 CM2
ECHO MV E DECELERATION TIME (DT): 372.8 MS
ECHO MV E VELOCITY: 0.58 M/S
ECHO MV E/A RATIO: 0.76
ECHO MV E/E' LATERAL: 6.24
ECHO MV E/E' RATIO (AVERAGED): 8.57
ECHO MV E/E' SEPTAL: 10.9
ECHO MV LVOT VTI INDEX: 0.92
ECHO MV MAX VELOCITY: 0.9 M/S
ECHO MV MEAN GRADIENT: 1 MMHG
ECHO MV MEAN VELOCITY: 0.6 M/S
ECHO MV PEAK GRADIENT: 3 MMHG
ECHO MV VTI: 21.4 CM
ECHO PV MAX VELOCITY: 0.9 M/S
ECHO PV PEAK GRADIENT: 3 MMHG
ECHO RV FREE WALL PEAK S': 12.7 CM/S
ECHO RV INTERNAL DIMENSION: 4 CM
ECHO RV TAPSE: 2.2 CM (ref 1.7–?)
ECHO TV REGURGITANT MAX VELOCITY: 2.13 M/S
ECHO TV REGURGITANT PEAK GRADIENT: 18 MMHG
EPITH CASTS URNS QL MICRO: ABNORMAL /LPF
ERYTHROCYTE [DISTWIDTH] IN BLOOD BY AUTOMATED COUNT: 12.8 % (ref 11.5–14.5)
EST. AVERAGE GLUCOSE BLD GHB EST-MCNC: 126 MG/DL
GLUCOSE BLD STRIP.AUTO-MCNC: 104 MG/DL (ref 65–117)
GLUCOSE BLD STRIP.AUTO-MCNC: 109 MG/DL (ref 65–117)
GLUCOSE BLD STRIP.AUTO-MCNC: 161 MG/DL (ref 65–117)
GLUCOSE BLD STRIP.AUTO-MCNC: 191 MG/DL (ref 65–117)
GLUCOSE SERPL-MCNC: 112 MG/DL (ref 65–100)
GLUCOSE UR STRIP.AUTO-MCNC: NEGATIVE MG/DL
HBA1C MFR BLD: 6 % (ref 4–5.6)
HCT VFR BLD AUTO: 38.2 % (ref 36.6–50.3)
HGB BLD-MCNC: 13 G/DL (ref 12.1–17)
HGB UR QL STRIP: NEGATIVE
HYALINE CASTS URNS QL MICRO: ABNORMAL /LPF (ref 0–2)
KETONES UR QL STRIP.AUTO: NEGATIVE MG/DL
LEUKOCYTE ESTERASE UR QL STRIP.AUTO: ABNORMAL
MCH RBC QN AUTO: 30.6 PG (ref 26–34)
MCHC RBC AUTO-ENTMCNC: 34 G/DL (ref 30–36.5)
MCV RBC AUTO: 89.9 FL (ref 80–99)
NITRITE UR QL STRIP.AUTO: NEGATIVE
NRBC # BLD: 0 K/UL (ref 0–0.01)
NRBC BLD-RTO: 0 PER 100 WBC
PH UR STRIP: 6 (ref 5–8)
PLATELET # BLD AUTO: 203 K/UL (ref 150–400)
PMV BLD AUTO: 9.2 FL (ref 8.9–12.9)
POTASSIUM SERPL-SCNC: 3.4 MMOL/L (ref 3.5–5.1)
PROT UR STRIP-MCNC: NEGATIVE MG/DL
RBC # BLD AUTO: 4.25 M/UL (ref 4.1–5.7)
RBC #/AREA URNS HPF: ABNORMAL /HPF (ref 0–5)
SERVICE CMNT-IMP: ABNORMAL
SERVICE CMNT-IMP: ABNORMAL
SERVICE CMNT-IMP: NORMAL
SERVICE CMNT-IMP: NORMAL
SODIUM SERPL-SCNC: 140 MMOL/L (ref 136–145)
SP GR UR REFRACTOMETRY: 1.02 (ref 1–1.03)
TROPONIN I SERPL HS-MCNC: 5 NG/L (ref 0–76)
UROBILINOGEN UR QL STRIP.AUTO: 1 EU/DL (ref 0.2–1)
WBC # BLD AUTO: 5 K/UL (ref 4.1–11.1)
WBC URNS QL MICRO: ABNORMAL /HPF (ref 0–4)

## 2024-12-09 PROCEDURE — 85027 COMPLETE CBC AUTOMATED: CPT

## 2024-12-09 PROCEDURE — 82962 GLUCOSE BLOOD TEST: CPT

## 2024-12-09 PROCEDURE — G0378 HOSPITAL OBSERVATION PER HR: HCPCS

## 2024-12-09 PROCEDURE — 93306 TTE W/DOPPLER COMPLETE: CPT

## 2024-12-09 PROCEDURE — 99222 1ST HOSP IP/OBS MODERATE 55: CPT | Performed by: NURSE PRACTITIONER

## 2024-12-09 PROCEDURE — 2580000003 HC RX 258: Performed by: HOSPITALIST

## 2024-12-09 PROCEDURE — 93306 TTE W/DOPPLER COMPLETE: CPT | Performed by: SPECIALIST

## 2024-12-09 PROCEDURE — 72131 CT LUMBAR SPINE W/O DYE: CPT

## 2024-12-09 PROCEDURE — 82550 ASSAY OF CK (CPK): CPT

## 2024-12-09 PROCEDURE — 6370000000 HC RX 637 (ALT 250 FOR IP): Performed by: HOSPITALIST

## 2024-12-09 PROCEDURE — 95819 EEG AWAKE AND ASLEEP: CPT

## 2024-12-09 PROCEDURE — 36415 COLL VENOUS BLD VENIPUNCTURE: CPT

## 2024-12-09 PROCEDURE — 83036 HEMOGLOBIN GLYCOSYLATED A1C: CPT

## 2024-12-09 PROCEDURE — 80048 BASIC METABOLIC PNL TOTAL CA: CPT

## 2024-12-09 PROCEDURE — 95819 EEG AWAKE AND ASLEEP: CPT | Performed by: PSYCHIATRY & NEUROLOGY

## 2024-12-09 PROCEDURE — 99223 1ST HOSP IP/OBS HIGH 75: CPT | Performed by: NURSE PRACTITIONER

## 2024-12-09 PROCEDURE — 6360000002 HC RX W HCPCS: Performed by: HOSPITALIST

## 2024-12-09 PROCEDURE — 6370000000 HC RX 637 (ALT 250 FOR IP): Performed by: STUDENT IN AN ORGANIZED HEALTH CARE EDUCATION/TRAINING PROGRAM

## 2024-12-09 RX ORDER — POTASSIUM CHLORIDE 750 MG/1
40 TABLET, EXTENDED RELEASE ORAL ONCE
Status: COMPLETED | OUTPATIENT
Start: 2024-12-09 | End: 2024-12-09

## 2024-12-09 RX ADMIN — MEMANTINE 10 MG: 10 TABLET ORAL at 19:58

## 2024-12-09 RX ADMIN — ACETAMINOPHEN 650 MG: 325 TABLET ORAL at 01:29

## 2024-12-09 RX ADMIN — MEMANTINE 10 MG: 10 TABLET ORAL at 10:11

## 2024-12-09 RX ADMIN — SODIUM CHLORIDE, PRESERVATIVE FREE 10 ML: 5 INJECTION INTRAVENOUS at 19:59

## 2024-12-09 RX ADMIN — SODIUM CHLORIDE, PRESERVATIVE FREE 10 ML: 5 INJECTION INTRAVENOUS at 10:12

## 2024-12-09 RX ADMIN — DONEPEZIL HYDROCHLORIDE 10 MG: 5 TABLET, FILM COATED ORAL at 19:58

## 2024-12-09 RX ADMIN — TAMSULOSIN HYDROCHLORIDE 0.4 MG: 0.4 CAPSULE ORAL at 17:53

## 2024-12-09 RX ADMIN — SERTRALINE HYDROCHLORIDE 100 MG: 50 TABLET ORAL at 17:53

## 2024-12-09 RX ADMIN — ENOXAPARIN SODIUM 40 MG: 100 INJECTION SUBCUTANEOUS at 10:11

## 2024-12-09 RX ADMIN — INSULIN LISPRO 1 UNITS: 100 INJECTION, SOLUTION INTRAVENOUS; SUBCUTANEOUS at 11:24

## 2024-12-09 RX ADMIN — PRAVASTATIN SODIUM 40 MG: 20 TABLET ORAL at 19:58

## 2024-12-09 RX ADMIN — POTASSIUM CHLORIDE 40 MEQ: 750 TABLET, EXTENDED RELEASE ORAL at 15:36

## 2024-12-09 RX ADMIN — ASPIRIN 81 MG: 81 TABLET, COATED ORAL at 10:11

## 2024-12-09 ASSESSMENT — PAIN DESCRIPTION - LOCATION: LOCATION: BACK

## 2024-12-09 ASSESSMENT — PAIN SCALES - GENERAL: PAINLEVEL_OUTOF10: 4

## 2024-12-09 NOTE — PROCEDURES
Procedures    [facility]   Electroencephalogram  Report    Procedure ID: [id number] Procedure Date: 12/9/2024   Patient Name: Deni Crocker YOB: 1950   Procedure Type: Routine Medical Record No: [id number]       An EEG is requested in this 74-year-old man to evaluate for epileptiform abnormality.  Medication said to include Aricept, Namenda, Zoloft, Flomax, Glucophage, and Pravachol.    This tracing is obtained during the awake, drowsy, and sleeping states.    During wakefulness there are very brief intermittent runs of posteriorly dominant and symmetric low to medium amplitude 9 cps activities which attenuate with eye opening.  Lower voltage faster frequency activities are seen symmetrically over the anterior head regions.    Hyperventilation is not performed.    Intermittent photic stimulation little alters the tracing.    Stage II sleep is attained.    Interpretation  This EEG recorded during the awake, drowsy, and sleeping states is normal        Racheal Miller MD

## 2024-12-10 ENCOUNTER — HOSPITAL ENCOUNTER (OUTPATIENT)
Facility: HOSPITAL | Age: 74
Setting detail: OBSERVATION
Discharge: HOME | End: 2024-12-13
Payer: MEDICARE

## 2024-12-10 LAB
ANION GAP SERPL CALC-SCNC: 3 MMOL/L (ref 2–12)
BUN SERPL-MCNC: 13 MG/DL (ref 6–20)
BUN/CREAT SERPL: 16 (ref 12–20)
CALCIUM SERPL-MCNC: 8.3 MG/DL (ref 8.5–10.1)
CHLORIDE SERPL-SCNC: 110 MMOL/L (ref 97–108)
CO2 SERPL-SCNC: 28 MMOL/L (ref 21–32)
CREAT SERPL-MCNC: 0.8 MG/DL (ref 0.7–1.3)
ERYTHROCYTE [DISTWIDTH] IN BLOOD BY AUTOMATED COUNT: 12.5 % (ref 11.5–14.5)
GLUCOSE BLD STRIP.AUTO-MCNC: 103 MG/DL (ref 65–117)
GLUCOSE BLD STRIP.AUTO-MCNC: 105 MG/DL (ref 65–117)
GLUCOSE BLD STRIP.AUTO-MCNC: 112 MG/DL (ref 65–117)
GLUCOSE BLD STRIP.AUTO-MCNC: 128 MG/DL (ref 65–117)
GLUCOSE SERPL-MCNC: 95 MG/DL (ref 65–100)
HCT VFR BLD AUTO: 35.6 % (ref 36.6–50.3)
HGB BLD-MCNC: 12.3 G/DL (ref 12.1–17)
MCH RBC QN AUTO: 31 PG (ref 26–34)
MCHC RBC AUTO-ENTMCNC: 34.6 G/DL (ref 30–36.5)
MCV RBC AUTO: 89.7 FL (ref 80–99)
NRBC # BLD: 0 K/UL (ref 0–0.01)
NRBC BLD-RTO: 0 PER 100 WBC
PLATELET # BLD AUTO: 182 K/UL (ref 150–400)
PMV BLD AUTO: 9.4 FL (ref 8.9–12.9)
POTASSIUM SERPL-SCNC: 3.3 MMOL/L (ref 3.5–5.1)
RBC # BLD AUTO: 3.97 M/UL (ref 4.1–5.7)
SERVICE CMNT-IMP: ABNORMAL
SERVICE CMNT-IMP: NORMAL
SODIUM SERPL-SCNC: 141 MMOL/L (ref 136–145)
WBC # BLD AUTO: 6 K/UL (ref 4.1–11.1)

## 2024-12-10 PROCEDURE — 85027 COMPLETE CBC AUTOMATED: CPT

## 2024-12-10 PROCEDURE — G0378 HOSPITAL OBSERVATION PER HR: HCPCS

## 2024-12-10 PROCEDURE — 36415 COLL VENOUS BLD VENIPUNCTURE: CPT

## 2024-12-10 PROCEDURE — 94761 N-INVAS EAR/PLS OXIMETRY MLT: CPT

## 2024-12-10 PROCEDURE — 6370000000 HC RX 637 (ALT 250 FOR IP): Performed by: HOSPITALIST

## 2024-12-10 PROCEDURE — 72148 MRI LUMBAR SPINE W/O DYE: CPT

## 2024-12-10 PROCEDURE — 80048 BASIC METABOLIC PNL TOTAL CA: CPT

## 2024-12-10 PROCEDURE — 6360000002 HC RX W HCPCS: Performed by: HOSPITALIST

## 2024-12-10 PROCEDURE — 99231 SBSQ HOSP IP/OBS SF/LOW 25: CPT | Performed by: NURSE PRACTITIONER

## 2024-12-10 PROCEDURE — 2580000003 HC RX 258: Performed by: HOSPITALIST

## 2024-12-10 PROCEDURE — 82962 GLUCOSE BLOOD TEST: CPT

## 2024-12-10 RX ADMIN — SODIUM CHLORIDE, PRESERVATIVE FREE 10 ML: 5 INJECTION INTRAVENOUS at 23:09

## 2024-12-10 RX ADMIN — SERTRALINE HYDROCHLORIDE 100 MG: 50 TABLET ORAL at 17:48

## 2024-12-10 RX ADMIN — MEMANTINE 10 MG: 10 TABLET ORAL at 08:42

## 2024-12-10 RX ADMIN — DONEPEZIL HYDROCHLORIDE 10 MG: 5 TABLET, FILM COATED ORAL at 23:09

## 2024-12-10 RX ADMIN — ACETAMINOPHEN 650 MG: 325 TABLET ORAL at 23:11

## 2024-12-10 RX ADMIN — ACETAMINOPHEN 650 MG: 325 TABLET ORAL at 14:40

## 2024-12-10 RX ADMIN — PRAVASTATIN SODIUM 40 MG: 20 TABLET ORAL at 23:09

## 2024-12-10 RX ADMIN — ENOXAPARIN SODIUM 40 MG: 100 INJECTION SUBCUTANEOUS at 08:42

## 2024-12-10 RX ADMIN — MEMANTINE 10 MG: 10 TABLET ORAL at 23:09

## 2024-12-10 RX ADMIN — POTASSIUM CHLORIDE 40 MEQ: 750 TABLET, EXTENDED RELEASE ORAL at 08:48

## 2024-12-10 RX ADMIN — SODIUM CHLORIDE, PRESERVATIVE FREE 10 ML: 5 INJECTION INTRAVENOUS at 08:43

## 2024-12-10 RX ADMIN — ASPIRIN 81 MG: 81 TABLET, COATED ORAL at 08:42

## 2024-12-10 RX ADMIN — TAMSULOSIN HYDROCHLORIDE 0.4 MG: 0.4 CAPSULE ORAL at 17:48

## 2024-12-10 ASSESSMENT — PAIN DESCRIPTION - LOCATION
LOCATION: BACK

## 2024-12-10 ASSESSMENT — PAIN DESCRIPTION - ORIENTATION
ORIENTATION: LOWER
ORIENTATION: MID;LOWER

## 2024-12-10 ASSESSMENT — PAIN SCALES - GENERAL
PAINLEVEL_OUTOF10: 6
PAINLEVEL_OUTOF10: 7
PAINLEVEL_OUTOF10: 3
PAINLEVEL_OUTOF10: 7
PAINLEVEL_OUTOF10: 1

## 2024-12-10 ASSESSMENT — PAIN DESCRIPTION - DESCRIPTORS
DESCRIPTORS: ACHING;SORE
DESCRIPTORS: OTHER (COMMENT)

## 2024-12-10 ASSESSMENT — PAIN - FUNCTIONAL ASSESSMENT: PAIN_FUNCTIONAL_ASSESSMENT: ACTIVITIES ARE NOT PREVENTED

## 2024-12-10 NOTE — CONSULTS
RUExt: 5/ 5                                              LLExt: 5/ 5                RLExt: 5/ 5        Gait:   Not tested       Portions of this note were completed with Dragon, the computer voice recognition software.  Quite often unanticipated grammatical, syntax, homophones, and other interpretive errors are inadvertently transcribed by the computer software.  Please disregard these errors.  Efforts were made to edit the dictations but occasionally words are mis-transcribed.   
posterior facet arthropathy.     L5-S1: There is no spinal canal or neural foraminal stenosis. Mild bilateral  posterior facet arthropathy.     There is degenerative bridging of the bilateral sacroiliac joints.     IMPRESSION:     1. Acute burst fracture of L3 with moderate loss of height and mild retropulsion  causing mild spinal stenosis.  2. Spondylosis as above.    Labs:   Recent Results (from the past 12 hour(s))   CBC    Collection Time: 12/09/24 10:28 AM   Result Value Ref Range    WBC 5.0 4.1 - 11.1 K/uL    RBC 4.25 4.10 - 5.70 M/uL    Hemoglobin 13.0 12.1 - 17.0 g/dL    Hematocrit 38.2 36.6 - 50.3 %    MCV 89.9 80.0 - 99.0 FL    MCH 30.6 26.0 - 34.0 PG    MCHC 34.0 30.0 - 36.5 g/dL    RDW 12.8 11.5 - 14.5 %    Platelets 203 150 - 400 K/uL    MPV 9.2 8.9 - 12.9 FL    Nucleated RBCs 0.0 0  WBC    nRBC 0.00 0.00 - 0.01 K/uL   Basic Metabolic Panel    Collection Time: 12/09/24 10:28 AM   Result Value Ref Range    Sodium 140 136 - 145 mmol/L    Potassium 3.4 (L) 3.5 - 5.1 mmol/L    Chloride 106 97 - 108 mmol/L    CO2 28 21 - 32 mmol/L    Anion Gap 6 2 - 12 mmol/L    Glucose 112 (H) 65 - 100 mg/dL    BUN 11 6 - 20 MG/DL    Creatinine 0.86 0.70 - 1.30 MG/DL    BUN/Creatinine Ratio 13 12 - 20      Est, Glom Filt Rate >90 >60 ml/min/1.73m2    Calcium 9.0 8.5 - 10.1 MG/DL   CK    Collection Time: 12/09/24 10:28 AM   Result Value Ref Range    Total CK 60 39 - 308 U/L   POCT Glucose    Collection Time: 12/09/24 11:05 AM   Result Value Ref Range    POC Glucose 191 (H) 65 - 117 mg/dL    Performed by: Jovanni vergara    Echo (TTE) complete (PRN contrast/bubble/strain/3D)    Collection Time: 12/09/24  1:58 PM   Result Value Ref Range    Body Surface Area 2.12 m2    IVSd 1.1 (A) 0.6 - 1.0 cm    LVIDd 4.3 4.2 - 5.9 cm    LVIDs 3.2 cm    LVOT Diameter 2.3 cm    LVPWd 1.1 (A) 0.6 - 1.0 cm    EF BP 72 55 - 100 %    LV Ejection Fraction A2C 69 %    LV Ejection Fraction A4C 71 %    LV EDV A2C 47 mL    LV EDV A4C 77 mL    LV

## 2024-12-11 LAB
ANION GAP SERPL CALC-SCNC: 5 MMOL/L (ref 2–12)
BUN SERPL-MCNC: 10 MG/DL (ref 6–20)
BUN/CREAT SERPL: 13 (ref 12–20)
CALCIUM SERPL-MCNC: 9 MG/DL (ref 8.5–10.1)
CHLORIDE SERPL-SCNC: 106 MMOL/L (ref 97–108)
CO2 SERPL-SCNC: 28 MMOL/L (ref 21–32)
CREAT SERPL-MCNC: 0.8 MG/DL (ref 0.7–1.3)
EKG ATRIAL RATE: 74 BPM
EKG DIAGNOSIS: NORMAL
EKG P AXIS: 60 DEGREES
EKG P-R INTERVAL: 132 MS
EKG Q-T INTERVAL: 400 MS
EKG QRS DURATION: 78 MS
EKG QTC CALCULATION (BAZETT): 444 MS
EKG R AXIS: 69 DEGREES
EKG T AXIS: 38 DEGREES
EKG VENTRICULAR RATE: 74 BPM
ERYTHROCYTE [DISTWIDTH] IN BLOOD BY AUTOMATED COUNT: 12.3 % (ref 11.5–14.5)
GLUCOSE BLD STRIP.AUTO-MCNC: 100 MG/DL (ref 65–117)
GLUCOSE BLD STRIP.AUTO-MCNC: 109 MG/DL (ref 65–117)
GLUCOSE BLD STRIP.AUTO-MCNC: 154 MG/DL (ref 65–117)
GLUCOSE BLD STRIP.AUTO-MCNC: 86 MG/DL (ref 65–117)
GLUCOSE SERPL-MCNC: 93 MG/DL (ref 65–100)
HCT VFR BLD AUTO: 36.7 % (ref 36.6–50.3)
HGB BLD-MCNC: 12.8 G/DL (ref 12.1–17)
MCH RBC QN AUTO: 30.8 PG (ref 26–34)
MCHC RBC AUTO-ENTMCNC: 34.9 G/DL (ref 30–36.5)
MCV RBC AUTO: 88.4 FL (ref 80–99)
NRBC # BLD: 0 K/UL (ref 0–0.01)
NRBC BLD-RTO: 0 PER 100 WBC
PLATELET # BLD AUTO: 184 K/UL (ref 150–400)
PMV BLD AUTO: 9.4 FL (ref 8.9–12.9)
POTASSIUM SERPL-SCNC: 3.8 MMOL/L (ref 3.5–5.1)
RBC # BLD AUTO: 4.15 M/UL (ref 4.1–5.7)
SERVICE CMNT-IMP: ABNORMAL
SERVICE CMNT-IMP: NORMAL
SODIUM SERPL-SCNC: 139 MMOL/L (ref 136–145)
WBC # BLD AUTO: 5.7 K/UL (ref 4.1–11.1)

## 2024-12-11 PROCEDURE — 97530 THERAPEUTIC ACTIVITIES: CPT

## 2024-12-11 PROCEDURE — 97161 PT EVAL LOW COMPLEX 20 MIN: CPT

## 2024-12-11 PROCEDURE — 82962 GLUCOSE BLOOD TEST: CPT

## 2024-12-11 PROCEDURE — 2580000003 HC RX 258: Performed by: HOSPITALIST

## 2024-12-11 PROCEDURE — 85027 COMPLETE CBC AUTOMATED: CPT

## 2024-12-11 PROCEDURE — 93010 ELECTROCARDIOGRAM REPORT: CPT | Performed by: SPECIALIST

## 2024-12-11 PROCEDURE — APPNB15 APP NON BILLABLE TIME 0-15 MINS: Performed by: PHYSICIAN ASSISTANT

## 2024-12-11 PROCEDURE — 6370000000 HC RX 637 (ALT 250 FOR IP): Performed by: HOSPITALIST

## 2024-12-11 PROCEDURE — 94761 N-INVAS EAR/PLS OXIMETRY MLT: CPT

## 2024-12-11 PROCEDURE — 97116 GAIT TRAINING THERAPY: CPT

## 2024-12-11 PROCEDURE — 97165 OT EVAL LOW COMPLEX 30 MIN: CPT

## 2024-12-11 PROCEDURE — 80048 BASIC METABOLIC PNL TOTAL CA: CPT

## 2024-12-11 PROCEDURE — G0378 HOSPITAL OBSERVATION PER HR: HCPCS

## 2024-12-11 RX ADMIN — SERTRALINE HYDROCHLORIDE 100 MG: 50 TABLET ORAL at 17:23

## 2024-12-11 RX ADMIN — DONEPEZIL HYDROCHLORIDE 10 MG: 5 TABLET, FILM COATED ORAL at 21:08

## 2024-12-11 RX ADMIN — MEMANTINE 10 MG: 10 TABLET ORAL at 21:08

## 2024-12-11 RX ADMIN — TAMSULOSIN HYDROCHLORIDE 0.4 MG: 0.4 CAPSULE ORAL at 17:23

## 2024-12-11 RX ADMIN — MEMANTINE 10 MG: 10 TABLET ORAL at 09:14

## 2024-12-11 RX ADMIN — SODIUM CHLORIDE, PRESERVATIVE FREE 10 ML: 5 INJECTION INTRAVENOUS at 09:15

## 2024-12-11 RX ADMIN — SODIUM CHLORIDE, PRESERVATIVE FREE 5 ML: 5 INJECTION INTRAVENOUS at 21:12

## 2024-12-11 RX ADMIN — ACETAMINOPHEN 650 MG: 325 TABLET ORAL at 12:03

## 2024-12-11 RX ADMIN — PRAVASTATIN SODIUM 40 MG: 20 TABLET ORAL at 21:08

## 2024-12-11 ASSESSMENT — PAIN DESCRIPTION - LOCATION
LOCATION: BACK

## 2024-12-11 ASSESSMENT — PAIN DESCRIPTION - DESCRIPTORS
DESCRIPTORS: ACHING;DULL
DESCRIPTORS: ACHING;SORE

## 2024-12-11 ASSESSMENT — PAIN DESCRIPTION - ORIENTATION
ORIENTATION: LOWER;MID
ORIENTATION: LOWER;MID

## 2024-12-11 ASSESSMENT — PAIN SCALES - GENERAL
PAINLEVEL_OUTOF10: 6
PAINLEVEL_OUTOF10: 6
PAINLEVEL_OUTOF10: 0
PAINLEVEL_OUTOF10: 7
PAINLEVEL_OUTOF10: 6
PAINLEVEL_OUTOF10: 8
PAINLEVEL_OUTOF10: 6

## 2024-12-12 ENCOUNTER — APPOINTMENT (OUTPATIENT)
Facility: HOSPITAL | Age: 74
DRG: 516 | End: 2024-12-12
Attending: STUDENT IN AN ORGANIZED HEALTH CARE EDUCATION/TRAINING PROGRAM
Payer: MEDICARE

## 2024-12-12 PROBLEM — R29.6 FALLS FREQUENTLY: Status: ACTIVE | Noted: 2024-12-12

## 2024-12-12 LAB
ANION GAP SERPL CALC-SCNC: 5 MMOL/L (ref 2–12)
BASOPHILS # BLD: 0 K/UL (ref 0–0.1)
BASOPHILS NFR BLD: 0 % (ref 0–1)
BUN SERPL-MCNC: 10 MG/DL (ref 6–20)
BUN/CREAT SERPL: 13 (ref 12–20)
CALCIUM SERPL-MCNC: 9 MG/DL (ref 8.5–10.1)
CHLORIDE SERPL-SCNC: 102 MMOL/L (ref 97–108)
CO2 SERPL-SCNC: 28 MMOL/L (ref 21–32)
CREAT SERPL-MCNC: 0.77 MG/DL (ref 0.7–1.3)
DIFFERENTIAL METHOD BLD: NORMAL
ECHO BSA: 2.12 M2
EOSINOPHIL # BLD: 0.2 K/UL (ref 0–0.4)
EOSINOPHIL NFR BLD: 3 % (ref 0–7)
ERYTHROCYTE [DISTWIDTH] IN BLOOD BY AUTOMATED COUNT: 12.2 % (ref 11.5–14.5)
GLUCOSE BLD STRIP.AUTO-MCNC: 90 MG/DL (ref 65–117)
GLUCOSE BLD STRIP.AUTO-MCNC: 93 MG/DL (ref 65–117)
GLUCOSE BLD STRIP.AUTO-MCNC: 94 MG/DL (ref 65–117)
GLUCOSE BLD STRIP.AUTO-MCNC: 95 MG/DL (ref 65–117)
GLUCOSE BLD STRIP.AUTO-MCNC: 98 MG/DL (ref 65–117)
GLUCOSE SERPL-MCNC: 85 MG/DL (ref 65–100)
HCT VFR BLD AUTO: 39.9 % (ref 36.6–50.3)
HGB BLD-MCNC: 13.9 G/DL (ref 12.1–17)
IMM GRANULOCYTES # BLD AUTO: 0 K/UL (ref 0–0.04)
IMM GRANULOCYTES NFR BLD AUTO: 0 % (ref 0–0.5)
LYMPHOCYTES # BLD: 1.9 K/UL (ref 0.8–3.5)
LYMPHOCYTES NFR BLD: 25 % (ref 12–49)
MAGNESIUM SERPL-MCNC: 1.8 MG/DL (ref 1.6–2.4)
MCH RBC QN AUTO: 30.2 PG (ref 26–34)
MCHC RBC AUTO-ENTMCNC: 34.8 G/DL (ref 30–36.5)
MCV RBC AUTO: 86.7 FL (ref 80–99)
MONOCYTES # BLD: 0.6 K/UL (ref 0–1)
MONOCYTES NFR BLD: 7 % (ref 5–13)
NEUTS SEG # BLD: 5 K/UL (ref 1.8–8)
NEUTS SEG NFR BLD: 65 % (ref 32–75)
NRBC # BLD: 0 K/UL (ref 0–0.01)
NRBC BLD-RTO: 0 PER 100 WBC
PHOSPHATE SERPL-MCNC: 2.5 MG/DL (ref 2.6–4.7)
PLATELET # BLD AUTO: 210 K/UL (ref 150–400)
PMV BLD AUTO: 9.2 FL (ref 8.9–12.9)
POTASSIUM SERPL-SCNC: 3.6 MMOL/L (ref 3.5–5.1)
RBC # BLD AUTO: 4.6 M/UL (ref 4.1–5.7)
SERVICE CMNT-IMP: NORMAL
SODIUM SERPL-SCNC: 135 MMOL/L (ref 136–145)
WBC # BLD AUTO: 7.8 K/UL (ref 4.1–11.1)

## 2024-12-12 PROCEDURE — 6360000002 HC RX W HCPCS: Performed by: NURSE PRACTITIONER

## 2024-12-12 PROCEDURE — 80048 BASIC METABOLIC PNL TOTAL CA: CPT

## 2024-12-12 PROCEDURE — C1894 INTRO/SHEATH, NON-LASER: HCPCS

## 2024-12-12 PROCEDURE — 2580000003 HC RX 258: Performed by: HOSPITALIST

## 2024-12-12 PROCEDURE — 6370000000 HC RX 637 (ALT 250 FOR IP): Performed by: HOSPITALIST

## 2024-12-12 PROCEDURE — 84100 ASSAY OF PHOSPHORUS: CPT

## 2024-12-12 PROCEDURE — G0378 HOSPITAL OBSERVATION PER HR: HCPCS

## 2024-12-12 PROCEDURE — 6360000002 HC RX W HCPCS: Performed by: STUDENT IN AN ORGANIZED HEALTH CARE EDUCATION/TRAINING PROGRAM

## 2024-12-12 PROCEDURE — 82962 GLUCOSE BLOOD TEST: CPT

## 2024-12-12 PROCEDURE — 1100000000 HC RM PRIVATE

## 2024-12-12 PROCEDURE — 2580000003 HC RX 258: Performed by: STUDENT IN AN ORGANIZED HEALTH CARE EDUCATION/TRAINING PROGRAM

## 2024-12-12 PROCEDURE — 83735 ASSAY OF MAGNESIUM: CPT

## 2024-12-12 PROCEDURE — 2720000010 HC SURG SUPPLY STERILE

## 2024-12-12 PROCEDURE — C1713 ANCHOR/SCREW BN/BN,TIS/BN: HCPCS

## 2024-12-12 PROCEDURE — 2709999900 HC NON-CHARGEABLE SUPPLY

## 2024-12-12 PROCEDURE — 0QU03JZ SUPPLEMENT LUMBAR VERTEBRA WITH SYNTHETIC SUBSTITUTE, PERCUTANEOUS APPROACH: ICD-10-PCS | Performed by: STUDENT IN AN ORGANIZED HEALTH CARE EDUCATION/TRAINING PROGRAM

## 2024-12-12 PROCEDURE — 22514 PERQ VERTEBRAL AUGMENTATION: CPT

## 2024-12-12 PROCEDURE — 77002 NEEDLE LOCALIZATION BY XRAY: CPT

## 2024-12-12 PROCEDURE — 99152 MOD SED SAME PHYS/QHP 5/>YRS: CPT

## 2024-12-12 PROCEDURE — 94761 N-INVAS EAR/PLS OXIMETRY MLT: CPT

## 2024-12-12 PROCEDURE — 85025 COMPLETE CBC W/AUTO DIFF WBC: CPT

## 2024-12-12 PROCEDURE — 0QS03ZZ REPOSITION LUMBAR VERTEBRA, PERCUTANEOUS APPROACH: ICD-10-PCS | Performed by: STUDENT IN AN ORGANIZED HEALTH CARE EDUCATION/TRAINING PROGRAM

## 2024-12-12 PROCEDURE — 36415 COLL VENOUS BLD VENIPUNCTURE: CPT

## 2024-12-12 RX ORDER — DIPHENHYDRAMINE HYDROCHLORIDE 50 MG/ML
INJECTION INTRAMUSCULAR; INTRAVENOUS PRN
Status: COMPLETED | OUTPATIENT
Start: 2024-12-12 | End: 2024-12-12

## 2024-12-12 RX ORDER — LIDOCAINE HYDROCHLORIDE 10 MG/ML
INJECTION, SOLUTION EPIDURAL; INFILTRATION; INTRACAUDAL; PERINEURAL PRN
Status: COMPLETED | OUTPATIENT
Start: 2024-12-12 | End: 2024-12-12

## 2024-12-12 RX ORDER — BUPIVACAINE HYDROCHLORIDE 5 MG/ML
INJECTION, SOLUTION EPIDURAL; INTRACAUDAL PRN
Status: COMPLETED | OUTPATIENT
Start: 2024-12-12 | End: 2024-12-12

## 2024-12-12 RX ORDER — FENTANYL CITRATE 50 UG/ML
INJECTION, SOLUTION INTRAMUSCULAR; INTRAVENOUS PRN
Status: COMPLETED | OUTPATIENT
Start: 2024-12-12 | End: 2024-12-12

## 2024-12-12 RX ADMIN — TAMSULOSIN HYDROCHLORIDE 0.4 MG: 0.4 CAPSULE ORAL at 17:57

## 2024-12-12 RX ADMIN — CEFAZOLIN SODIUM 2000 MG: 1 POWDER, FOR SOLUTION INTRAMUSCULAR; INTRAVENOUS at 13:18

## 2024-12-12 RX ADMIN — FENTANYL CITRATE 25 MCG: 50 INJECTION, SOLUTION INTRAMUSCULAR; INTRAVENOUS at 13:09

## 2024-12-12 RX ADMIN — SODIUM CHLORIDE, PRESERVATIVE FREE 5 ML: 5 INJECTION INTRAVENOUS at 20:21

## 2024-12-12 RX ADMIN — BUPIVACAINE HYDROCHLORIDE 10 ML: 5 INJECTION, SOLUTION EPIDURAL; INTRACAUDAL; PERINEURAL at 13:22

## 2024-12-12 RX ADMIN — FENTANYL CITRATE 25 MCG: 50 INJECTION, SOLUTION INTRAMUSCULAR; INTRAVENOUS at 13:18

## 2024-12-12 RX ADMIN — MEMANTINE 10 MG: 10 TABLET ORAL at 09:38

## 2024-12-12 RX ADMIN — SERTRALINE HYDROCHLORIDE 100 MG: 50 TABLET ORAL at 17:57

## 2024-12-12 RX ADMIN — SODIUM CHLORIDE, PRESERVATIVE FREE 10 ML: 5 INJECTION INTRAVENOUS at 09:38

## 2024-12-12 RX ADMIN — DONEPEZIL HYDROCHLORIDE 10 MG: 5 TABLET, FILM COATED ORAL at 20:19

## 2024-12-12 RX ADMIN — PRAVASTATIN SODIUM 40 MG: 20 TABLET ORAL at 20:19

## 2024-12-12 RX ADMIN — FENTANYL CITRATE 25 MCG: 50 INJECTION, SOLUTION INTRAMUSCULAR; INTRAVENOUS at 13:24

## 2024-12-12 RX ADMIN — HYDRALAZINE HYDROCHLORIDE 10 MG: 20 INJECTION INTRAMUSCULAR; INTRAVENOUS at 02:34

## 2024-12-12 RX ADMIN — MEMANTINE 10 MG: 10 TABLET ORAL at 20:19

## 2024-12-12 RX ADMIN — DIPHENHYDRAMINE HYDROCHLORIDE 25 MG: 50 INJECTION, SOLUTION INTRAMUSCULAR; INTRAVENOUS at 13:10

## 2024-12-12 RX ADMIN — LIDOCAINE HYDROCHLORIDE 10 ML: 10 INJECTION, SOLUTION EPIDURAL; INFILTRATION; INTRACAUDAL; PERINEURAL at 13:22

## 2024-12-12 NOTE — PROCEDURES
PROCEDURE NOTE  Date: 12/12/2024   Name: Deni Crocker  YOB: 1950    Interventional Radiology  Procedure Note        12/12/2024 1:34 PM    Patient: Deni Crocker     Informed consent obtained    Diagnosis: L3 compression fx     Procedure(s): L3 kyphoplasty     Findings: Good cement fill. No complications.     EBL: Minimal    Specimens removed: None    Complications: None    Primary Physician: Tyron Hale MD    Recomendations: Bedrest 1 hour, then activity as tolerated.     Full dictated report to follow    Signed By: Tyron Hale MD

## 2024-12-13 PROBLEM — N40.0 BPH (BENIGN PROSTATIC HYPERPLASIA): Status: ACTIVE | Noted: 2024-12-13

## 2024-12-13 PROBLEM — S32.039A CLOSED FRACTURE OF THIRD LUMBAR VERTEBRA (HCC): Status: ACTIVE | Noted: 2024-12-13

## 2024-12-13 PROBLEM — E78.5 HYPERLIPIDEMIA: Status: ACTIVE | Noted: 2024-12-13

## 2024-12-13 PROBLEM — F41.8 ANXIETY WITH DEPRESSION: Status: ACTIVE | Noted: 2024-12-13

## 2024-12-13 LAB
GLUCOSE BLD STRIP.AUTO-MCNC: 128 MG/DL (ref 65–117)
GLUCOSE BLD STRIP.AUTO-MCNC: 133 MG/DL (ref 65–117)
GLUCOSE BLD STRIP.AUTO-MCNC: 90 MG/DL (ref 65–117)
GLUCOSE BLD STRIP.AUTO-MCNC: 92 MG/DL (ref 65–117)
SERVICE CMNT-IMP: ABNORMAL
SERVICE CMNT-IMP: ABNORMAL
SERVICE CMNT-IMP: NORMAL
SERVICE CMNT-IMP: NORMAL

## 2024-12-13 PROCEDURE — 97116 GAIT TRAINING THERAPY: CPT

## 2024-12-13 PROCEDURE — 1100000000 HC RM PRIVATE

## 2024-12-13 PROCEDURE — 6370000000 HC RX 637 (ALT 250 FOR IP): Performed by: HOSPITALIST

## 2024-12-13 PROCEDURE — 94761 N-INVAS EAR/PLS OXIMETRY MLT: CPT

## 2024-12-13 PROCEDURE — 97530 THERAPEUTIC ACTIVITIES: CPT

## 2024-12-13 PROCEDURE — 2580000003 HC RX 258: Performed by: HOSPITALIST

## 2024-12-13 PROCEDURE — 82962 GLUCOSE BLOOD TEST: CPT

## 2024-12-13 RX ADMIN — MEMANTINE 10 MG: 10 TABLET ORAL at 21:06

## 2024-12-13 RX ADMIN — SERTRALINE HYDROCHLORIDE 100 MG: 50 TABLET ORAL at 17:47

## 2024-12-13 RX ADMIN — TAMSULOSIN HYDROCHLORIDE 0.4 MG: 0.4 CAPSULE ORAL at 17:47

## 2024-12-13 RX ADMIN — MEMANTINE 10 MG: 10 TABLET ORAL at 08:02

## 2024-12-13 RX ADMIN — SODIUM CHLORIDE, PRESERVATIVE FREE 10 ML: 5 INJECTION INTRAVENOUS at 21:08

## 2024-12-13 RX ADMIN — DONEPEZIL HYDROCHLORIDE 10 MG: 5 TABLET, FILM COATED ORAL at 21:05

## 2024-12-13 RX ADMIN — SODIUM CHLORIDE, PRESERVATIVE FREE 10 ML: 5 INJECTION INTRAVENOUS at 08:04

## 2024-12-13 RX ADMIN — PRAVASTATIN SODIUM 40 MG: 20 TABLET ORAL at 21:05

## 2024-12-14 LAB
GLUCOSE BLD STRIP.AUTO-MCNC: 149 MG/DL (ref 65–117)
GLUCOSE BLD STRIP.AUTO-MCNC: 151 MG/DL (ref 65–117)
GLUCOSE BLD STRIP.AUTO-MCNC: 154 MG/DL (ref 65–117)
GLUCOSE BLD STRIP.AUTO-MCNC: 95 MG/DL (ref 65–117)
SERVICE CMNT-IMP: ABNORMAL
SERVICE CMNT-IMP: NORMAL

## 2024-12-14 PROCEDURE — 6370000000 HC RX 637 (ALT 250 FOR IP): Performed by: HOSPITALIST

## 2024-12-14 PROCEDURE — 2580000003 HC RX 258: Performed by: HOSPITALIST

## 2024-12-14 PROCEDURE — 82962 GLUCOSE BLOOD TEST: CPT

## 2024-12-14 PROCEDURE — 6360000002 HC RX W HCPCS: Performed by: STUDENT IN AN ORGANIZED HEALTH CARE EDUCATION/TRAINING PROGRAM

## 2024-12-14 PROCEDURE — 6370000000 HC RX 637 (ALT 250 FOR IP): Performed by: STUDENT IN AN ORGANIZED HEALTH CARE EDUCATION/TRAINING PROGRAM

## 2024-12-14 PROCEDURE — 94761 N-INVAS EAR/PLS OXIMETRY MLT: CPT

## 2024-12-14 PROCEDURE — 1100000000 HC RM PRIVATE

## 2024-12-14 RX ADMIN — ASPIRIN 81 MG: 81 TABLET, COATED ORAL at 08:31

## 2024-12-14 RX ADMIN — MEMANTINE 10 MG: 10 TABLET ORAL at 08:31

## 2024-12-14 RX ADMIN — SODIUM CHLORIDE, PRESERVATIVE FREE 10 ML: 5 INJECTION INTRAVENOUS at 20:53

## 2024-12-14 RX ADMIN — ENOXAPARIN SODIUM 40 MG: 100 INJECTION SUBCUTANEOUS at 08:31

## 2024-12-14 RX ADMIN — SODIUM CHLORIDE, PRESERVATIVE FREE 10 ML: 5 INJECTION INTRAVENOUS at 08:32

## 2024-12-14 RX ADMIN — MEMANTINE 10 MG: 10 TABLET ORAL at 20:53

## 2024-12-14 RX ADMIN — DONEPEZIL HYDROCHLORIDE 10 MG: 5 TABLET, FILM COATED ORAL at 20:53

## 2024-12-14 RX ADMIN — PRAVASTATIN SODIUM 40 MG: 20 TABLET ORAL at 20:53

## 2024-12-14 RX ADMIN — TAMSULOSIN HYDROCHLORIDE 0.4 MG: 0.4 CAPSULE ORAL at 17:42

## 2024-12-14 RX ADMIN — SERTRALINE HYDROCHLORIDE 100 MG: 50 TABLET ORAL at 17:42

## 2024-12-15 LAB
GLUCOSE BLD STRIP.AUTO-MCNC: 112 MG/DL (ref 65–117)
GLUCOSE BLD STRIP.AUTO-MCNC: 114 MG/DL (ref 65–117)
GLUCOSE BLD STRIP.AUTO-MCNC: 123 MG/DL (ref 65–117)
GLUCOSE BLD STRIP.AUTO-MCNC: 142 MG/DL (ref 65–117)
SERVICE CMNT-IMP: ABNORMAL
SERVICE CMNT-IMP: ABNORMAL
SERVICE CMNT-IMP: NORMAL
SERVICE CMNT-IMP: NORMAL

## 2024-12-15 PROCEDURE — 94761 N-INVAS EAR/PLS OXIMETRY MLT: CPT

## 2024-12-15 PROCEDURE — 6370000000 HC RX 637 (ALT 250 FOR IP): Performed by: STUDENT IN AN ORGANIZED HEALTH CARE EDUCATION/TRAINING PROGRAM

## 2024-12-15 PROCEDURE — 6370000000 HC RX 637 (ALT 250 FOR IP): Performed by: HOSPITALIST

## 2024-12-15 PROCEDURE — 6360000002 HC RX W HCPCS: Performed by: STUDENT IN AN ORGANIZED HEALTH CARE EDUCATION/TRAINING PROGRAM

## 2024-12-15 PROCEDURE — 1100000000 HC RM PRIVATE

## 2024-12-15 PROCEDURE — 82962 GLUCOSE BLOOD TEST: CPT

## 2024-12-15 PROCEDURE — 2580000003 HC RX 258: Performed by: HOSPITALIST

## 2024-12-15 RX ADMIN — MEMANTINE 10 MG: 10 TABLET ORAL at 20:27

## 2024-12-15 RX ADMIN — SERTRALINE HYDROCHLORIDE 100 MG: 50 TABLET ORAL at 17:53

## 2024-12-15 RX ADMIN — PRAVASTATIN SODIUM 40 MG: 20 TABLET ORAL at 20:27

## 2024-12-15 RX ADMIN — ENOXAPARIN SODIUM 40 MG: 100 INJECTION SUBCUTANEOUS at 08:36

## 2024-12-15 RX ADMIN — MEMANTINE 10 MG: 10 TABLET ORAL at 08:36

## 2024-12-15 RX ADMIN — ASPIRIN 81 MG: 81 TABLET, COATED ORAL at 08:36

## 2024-12-15 RX ADMIN — DONEPEZIL HYDROCHLORIDE 10 MG: 5 TABLET, FILM COATED ORAL at 20:27

## 2024-12-15 RX ADMIN — TAMSULOSIN HYDROCHLORIDE 0.4 MG: 0.4 CAPSULE ORAL at 17:53

## 2024-12-15 RX ADMIN — SODIUM CHLORIDE, PRESERVATIVE FREE 10 ML: 5 INJECTION INTRAVENOUS at 08:36

## 2024-12-15 RX ADMIN — SODIUM CHLORIDE, PRESERVATIVE FREE 10 ML: 5 INJECTION INTRAVENOUS at 20:27

## 2024-12-16 LAB
GLUCOSE BLD STRIP.AUTO-MCNC: 103 MG/DL (ref 65–117)
GLUCOSE BLD STRIP.AUTO-MCNC: 116 MG/DL (ref 65–117)
GLUCOSE BLD STRIP.AUTO-MCNC: 151 MG/DL (ref 65–117)
GLUCOSE BLD STRIP.AUTO-MCNC: 98 MG/DL (ref 65–117)
SERVICE CMNT-IMP: ABNORMAL
SERVICE CMNT-IMP: NORMAL

## 2024-12-16 PROCEDURE — 97530 THERAPEUTIC ACTIVITIES: CPT

## 2024-12-16 PROCEDURE — 6370000000 HC RX 637 (ALT 250 FOR IP): Performed by: STUDENT IN AN ORGANIZED HEALTH CARE EDUCATION/TRAINING PROGRAM

## 2024-12-16 PROCEDURE — 82962 GLUCOSE BLOOD TEST: CPT

## 2024-12-16 PROCEDURE — 1100000000 HC RM PRIVATE

## 2024-12-16 PROCEDURE — 97116 GAIT TRAINING THERAPY: CPT

## 2024-12-16 PROCEDURE — 2580000003 HC RX 258: Performed by: HOSPITALIST

## 2024-12-16 PROCEDURE — 6370000000 HC RX 637 (ALT 250 FOR IP): Performed by: HOSPITALIST

## 2024-12-16 PROCEDURE — 97535 SELF CARE MNGMENT TRAINING: CPT

## 2024-12-16 PROCEDURE — 6360000002 HC RX W HCPCS: Performed by: STUDENT IN AN ORGANIZED HEALTH CARE EDUCATION/TRAINING PROGRAM

## 2024-12-16 RX ORDER — ACETAMINOPHEN 325 MG/1
650 TABLET ORAL EVERY 6 HOURS PRN
Qty: 10 TABLET | Refills: 0 | Status: SHIPPED
Start: 2024-12-16

## 2024-12-16 RX ORDER — POLYETHYLENE GLYCOL 3350 17 G/17G
17 POWDER, FOR SOLUTION ORAL DAILY PRN
Qty: 10 EACH | Refills: 0 | Status: SHIPPED
Start: 2024-12-16

## 2024-12-16 RX ADMIN — ENOXAPARIN SODIUM 40 MG: 100 INJECTION SUBCUTANEOUS at 07:52

## 2024-12-16 RX ADMIN — MEMANTINE 10 MG: 10 TABLET ORAL at 07:52

## 2024-12-16 RX ADMIN — MEMANTINE 10 MG: 10 TABLET ORAL at 20:54

## 2024-12-16 RX ADMIN — DONEPEZIL HYDROCHLORIDE 10 MG: 5 TABLET, FILM COATED ORAL at 20:54

## 2024-12-16 RX ADMIN — SODIUM CHLORIDE, PRESERVATIVE FREE 10 ML: 5 INJECTION INTRAVENOUS at 20:54

## 2024-12-16 RX ADMIN — SODIUM CHLORIDE, PRESERVATIVE FREE 10 ML: 5 INJECTION INTRAVENOUS at 07:52

## 2024-12-16 RX ADMIN — SERTRALINE HYDROCHLORIDE 100 MG: 50 TABLET ORAL at 17:49

## 2024-12-16 RX ADMIN — ASPIRIN 81 MG: 81 TABLET, COATED ORAL at 07:52

## 2024-12-16 RX ADMIN — TAMSULOSIN HYDROCHLORIDE 0.4 MG: 0.4 CAPSULE ORAL at 17:50

## 2024-12-16 RX ADMIN — PRAVASTATIN SODIUM 40 MG: 20 TABLET ORAL at 20:54

## 2024-12-17 LAB
GLUCOSE BLD STRIP.AUTO-MCNC: 105 MG/DL (ref 65–117)
GLUCOSE BLD STRIP.AUTO-MCNC: 111 MG/DL (ref 65–117)
GLUCOSE BLD STRIP.AUTO-MCNC: 119 MG/DL (ref 65–117)
GLUCOSE BLD STRIP.AUTO-MCNC: 162 MG/DL (ref 65–117)
SERVICE CMNT-IMP: ABNORMAL
SERVICE CMNT-IMP: ABNORMAL
SERVICE CMNT-IMP: NORMAL
SERVICE CMNT-IMP: NORMAL

## 2024-12-17 PROCEDURE — 1100000000 HC RM PRIVATE

## 2024-12-17 PROCEDURE — 6370000000 HC RX 637 (ALT 250 FOR IP): Performed by: HOSPITALIST

## 2024-12-17 PROCEDURE — 82962 GLUCOSE BLOOD TEST: CPT

## 2024-12-17 PROCEDURE — 97530 THERAPEUTIC ACTIVITIES: CPT

## 2024-12-17 PROCEDURE — 2580000003 HC RX 258: Performed by: HOSPITALIST

## 2024-12-17 PROCEDURE — 2500000003 HC RX 250 WO HCPCS: Performed by: HOSPITALIST

## 2024-12-17 PROCEDURE — 94761 N-INVAS EAR/PLS OXIMETRY MLT: CPT

## 2024-12-17 PROCEDURE — 6360000002 HC RX W HCPCS: Performed by: STUDENT IN AN ORGANIZED HEALTH CARE EDUCATION/TRAINING PROGRAM

## 2024-12-17 PROCEDURE — 6370000000 HC RX 637 (ALT 250 FOR IP): Performed by: STUDENT IN AN ORGANIZED HEALTH CARE EDUCATION/TRAINING PROGRAM

## 2024-12-17 RX ADMIN — SODIUM CHLORIDE, PRESERVATIVE FREE 10 ML: 5 INJECTION INTRAVENOUS at 08:36

## 2024-12-17 RX ADMIN — DONEPEZIL HYDROCHLORIDE 10 MG: 5 TABLET, FILM COATED ORAL at 20:13

## 2024-12-17 RX ADMIN — SODIUM CHLORIDE, PRESERVATIVE FREE 10 ML: 5 INJECTION INTRAVENOUS at 20:13

## 2024-12-17 RX ADMIN — MEMANTINE 10 MG: 10 TABLET ORAL at 20:13

## 2024-12-17 RX ADMIN — PRAVASTATIN SODIUM 40 MG: 20 TABLET ORAL at 20:13

## 2024-12-17 RX ADMIN — ENOXAPARIN SODIUM 40 MG: 100 INJECTION SUBCUTANEOUS at 09:48

## 2024-12-17 RX ADMIN — TAMSULOSIN HYDROCHLORIDE 0.4 MG: 0.4 CAPSULE ORAL at 18:56

## 2024-12-17 RX ADMIN — SERTRALINE HYDROCHLORIDE 100 MG: 50 TABLET ORAL at 18:56

## 2024-12-17 RX ADMIN — ASPIRIN 81 MG: 81 TABLET, COATED ORAL at 08:36

## 2024-12-17 RX ADMIN — MEMANTINE 10 MG: 10 TABLET ORAL at 08:36

## 2024-12-17 NOTE — FLOWSHEET NOTE
12/17/24 0800   Handoff   Communication Given Shift Handoff  (Patient condition stable, no s/s acute distress, standard safety in place, 5P address, oncoming updated on current plan of care.)   Handoff Given To Lj YUSUF   Handoff Received From Bettie LAWRENCE RN   Handoff Communication Face to Face;At bedside   Time Handoff Given 0720

## 2024-12-18 LAB
GLUCOSE BLD STRIP.AUTO-MCNC: 103 MG/DL (ref 65–117)
GLUCOSE BLD STRIP.AUTO-MCNC: 110 MG/DL (ref 65–117)
GLUCOSE BLD STRIP.AUTO-MCNC: 121 MG/DL (ref 65–117)
GLUCOSE BLD STRIP.AUTO-MCNC: 133 MG/DL (ref 65–117)
SERVICE CMNT-IMP: ABNORMAL
SERVICE CMNT-IMP: ABNORMAL
SERVICE CMNT-IMP: NORMAL
SERVICE CMNT-IMP: NORMAL

## 2024-12-18 PROCEDURE — 2500000003 HC RX 250 WO HCPCS: Performed by: HOSPITALIST

## 2024-12-18 PROCEDURE — 97110 THERAPEUTIC EXERCISES: CPT

## 2024-12-18 PROCEDURE — 94761 N-INVAS EAR/PLS OXIMETRY MLT: CPT

## 2024-12-18 PROCEDURE — 6360000002 HC RX W HCPCS: Performed by: STUDENT IN AN ORGANIZED HEALTH CARE EDUCATION/TRAINING PROGRAM

## 2024-12-18 PROCEDURE — 97116 GAIT TRAINING THERAPY: CPT

## 2024-12-18 PROCEDURE — 6370000000 HC RX 637 (ALT 250 FOR IP): Performed by: STUDENT IN AN ORGANIZED HEALTH CARE EDUCATION/TRAINING PROGRAM

## 2024-12-18 PROCEDURE — 6370000000 HC RX 637 (ALT 250 FOR IP): Performed by: HOSPITALIST

## 2024-12-18 PROCEDURE — 82962 GLUCOSE BLOOD TEST: CPT

## 2024-12-18 PROCEDURE — 1100000000 HC RM PRIVATE

## 2024-12-18 PROCEDURE — 51798 US URINE CAPACITY MEASURE: CPT

## 2024-12-18 RX ADMIN — SODIUM CHLORIDE, PRESERVATIVE FREE 10 ML: 5 INJECTION INTRAVENOUS at 09:07

## 2024-12-18 RX ADMIN — ASPIRIN 81 MG: 81 TABLET, COATED ORAL at 09:07

## 2024-12-18 RX ADMIN — TAMSULOSIN HYDROCHLORIDE 0.4 MG: 0.4 CAPSULE ORAL at 17:23

## 2024-12-18 RX ADMIN — SERTRALINE HYDROCHLORIDE 100 MG: 50 TABLET ORAL at 17:23

## 2024-12-18 RX ADMIN — PRAVASTATIN SODIUM 40 MG: 20 TABLET ORAL at 21:28

## 2024-12-18 RX ADMIN — MEMANTINE 10 MG: 10 TABLET ORAL at 21:29

## 2024-12-18 RX ADMIN — SODIUM CHLORIDE, PRESERVATIVE FREE 10 ML: 5 INJECTION INTRAVENOUS at 21:29

## 2024-12-18 RX ADMIN — DONEPEZIL HYDROCHLORIDE 10 MG: 5 TABLET, FILM COATED ORAL at 21:29

## 2024-12-18 RX ADMIN — ENOXAPARIN SODIUM 40 MG: 100 INJECTION SUBCUTANEOUS at 09:07

## 2024-12-18 RX ADMIN — MEMANTINE 10 MG: 10 TABLET ORAL at 09:07

## 2024-12-18 NOTE — CARE COORDINATION
CM attempted to meet with patient but he is currently FAIZAN.     Shelli Celeste, MS  [phone]  
Care Management Progress Note    Reason for Admission:   Syncope and collapse [R55]  Fall, initial encounter [W19.XXXA]         Patient Admission Status: Observation  RUR: NA  Hospitalization in the last 30 days (Readmission):  No        Transition of care plan:  Medical management ongoing. Kyphoplasty performed today, ortho and cardiology following.   SNF vs home health, therapy following. Auth would ne needed for SNF.  Discharge plan communicated with patient and/or discharge caregiver: No    Date 1st IMM letter given: NA  Outpatient follow-up: NA  Transport at discharge: TBD    CM attempted to meet with patient to discuss recommendation for rehab. Patient asleep. CM left SNF list at the bedside. CM attempted phone call to patient's wife but no answer, CM left voice message. Patient was planned for Kyphoplasty. Therapy following. CM to monitor.     Shelli Celeste, MS  [phone]  
Care Management Progress Note    Reason for Admission:   Syncope and collapse [R55]  Fall, initial encounter [W19.XXXA]  Falls frequently [R29.6]         Patient Admission Status: Inpatient  RUR: 8 %  Hospitalization in the last 30 days (Readmission):  No        Transition of care plan:  Patient requested CM contact his wife to discuss more SNF choices. Attempted to reach wife by phone at numbers listed, unable to reach left VM message.  Ref sent to  as 1st choice on 12/13 still pending. Will need ins auth.      Komal Herrera RN, [facility]  [facility]    Available via [employer]    
Care Management Progress Note    Reason for Admission:   Syncope and collapse [R55]  Fall, initial encounter [W19.XXXA]  Falls frequently [R29.6]         Patient Admission Status: Inpatient  RUR: 8% Low  Hospitalization in the last 30 days (Readmission):  No        Transition of care plan:  [facility]  Discharge plan communicated with patient and/or discharge caregiver: Yes    Date 1st IMM letter given: 12/13/24  Outpatient follow-up: NA  Transport at discharge: PATRIA Celeste, [place]  [phone]  
Care Management Progress Note    Reason for Admission:   Syncope and collapse [R55]  Fall, initial encounter [W19.XXXA]  Falls frequently [R29.6]         Patient Admission Status: Inpatient  RUR: 8% Low  Hospitalization in the last 30 days (Readmission):  No        [facility] of care plan:  [facility]  Discharge plan communicated with patient and/or discharge caregiver: Yes    Date 1st IMM letter given: 12/13/24  Outpatient follow-up: NA  Transport at discharge: PATRIA Celeste, [place]  [phone]  
Care Management Progress Note    Reason for Admission:   Syncope and collapse [R55]  Fall, initial encounter [W19.XXXA]  Falls frequently [R29.6]         Patient Admission Status: Inpatient  RUR: 8% Low  Hospitalization in the last 30 days (Readmission):  No        Transition of care plan:  Medical management ongoing.  SNF-choice pending, auth needed.  Discharge plan communicated with patient and/or discharge caregiver: Yes    Date 1st IMM letter given: 12/13/24  Outpatient follow-up: NA  Transport at discharge: TBD    11:40 am Patient is still recommended for SNF. CM met with patient and his wife. Patient and wife agreeable and are reviewing the list for choice. They have selected [facility] as the first choice. Auth will be needed. CM updated attending and nurse. Anticipate DC next week.    Shelli Celeste, MS  [phone]  
Care Management Progress Note    Reason for Admission:   Syncope and collapse [R55]  Fall, initial encounter [W19.XXXA]  Falls frequently [R29.6]         Patient Admission Status: Inpatient  RUR: 8% Low  Hospitalization in the last 30 days (Readmission):  No        Transition of care plan:  Patient has been accepted to [facility]. CM requested for facility to start auth. [facility] declined referral stating that they felt patient was more LTC appropriate.  Discharge plan communicated with patient and/or discharge caregiver: Yes    Date 1st IMM letter given: 12/13/24  Outpatient follow-up: [facility]  Transport at discharge: MARLEN Celeste, [place]  [phone]  
family assistance as needed, and outpatient follow-up.    [] Home with Outpatient PT and outpatient follow-up   Pt aware of OP appt? []Yes, Provider:   []Not scheduled   Transport provider:     [] Home with outpatient services.    Specify:    [] Home with Home Health   - [place] [facility] offered? [] Yes, Preference:   [] NA    []SNF/IPR   -[][facility] of Choice offered, and preferences given:   []Listing provided and preferences requested   -Status: []Pending []Accepted:    -Auth required: []Yes []No    -Auth initiated date:   -3 midnight stay required: []Yes []No  Date satisfied:     [] LTC:     [] Home with Hospice   - [place] of Choice offered? [] Yes, Preference:   [] NA    [] [facility] information provided.     [] Other:       Shelli Celeste  Case Management Department  For questions or concerns, please [employer]

## 2024-12-19 VITALS
TEMPERATURE: 97.7 F | WEIGHT: 193 LBS | RESPIRATION RATE: 16 BRPM | DIASTOLIC BLOOD PRESSURE: 83 MMHG | OXYGEN SATURATION: 99 % | HEART RATE: 64 BPM | SYSTOLIC BLOOD PRESSURE: 132 MMHG | BODY MASS INDEX: 25.58 KG/M2 | HEIGHT: 73 IN

## 2024-12-19 PROBLEM — S32.039A CLOSED FRACTURE OF THIRD LUMBAR VERTEBRA (HCC): Status: RESOLVED | Noted: 2024-12-13 | Resolved: 2024-12-19

## 2024-12-19 PROBLEM — R55 SYNCOPE AND COLLAPSE: Status: RESOLVED | Noted: 2024-12-08 | Resolved: 2024-12-19

## 2024-12-19 LAB
ANION GAP SERPL CALC-SCNC: 2 MMOL/L (ref 2–12)
BUN SERPL-MCNC: 10 MG/DL (ref 6–20)
BUN/CREAT SERPL: 11 (ref 12–20)
CALCIUM SERPL-MCNC: 9.1 MG/DL (ref 8.5–10.1)
CHLORIDE SERPL-SCNC: 104 MMOL/L (ref 97–108)
CO2 SERPL-SCNC: 32 MMOL/L (ref 21–32)
CREAT SERPL-MCNC: 0.89 MG/DL (ref 0.7–1.3)
GLUCOSE BLD STRIP.AUTO-MCNC: 126 MG/DL (ref 65–117)
GLUCOSE BLD STRIP.AUTO-MCNC: 95 MG/DL (ref 65–117)
GLUCOSE SERPL-MCNC: 93 MG/DL (ref 65–100)
POTASSIUM SERPL-SCNC: 3.7 MMOL/L (ref 3.5–5.1)
SERVICE CMNT-IMP: ABNORMAL
SERVICE CMNT-IMP: NORMAL
SODIUM SERPL-SCNC: 138 MMOL/L (ref 136–145)

## 2024-12-19 PROCEDURE — 6370000000 HC RX 637 (ALT 250 FOR IP): Performed by: STUDENT IN AN ORGANIZED HEALTH CARE EDUCATION/TRAINING PROGRAM

## 2024-12-19 PROCEDURE — 94761 N-INVAS EAR/PLS OXIMETRY MLT: CPT

## 2024-12-19 PROCEDURE — 6360000002 HC RX W HCPCS: Performed by: STUDENT IN AN ORGANIZED HEALTH CARE EDUCATION/TRAINING PROGRAM

## 2024-12-19 PROCEDURE — 36415 COLL VENOUS BLD VENIPUNCTURE: CPT

## 2024-12-19 PROCEDURE — 2500000003 HC RX 250 WO HCPCS: Performed by: HOSPITALIST

## 2024-12-19 PROCEDURE — 80048 BASIC METABOLIC PNL TOTAL CA: CPT

## 2024-12-19 PROCEDURE — 6370000000 HC RX 637 (ALT 250 FOR IP): Performed by: HOSPITALIST

## 2024-12-19 PROCEDURE — 82962 GLUCOSE BLOOD TEST: CPT

## 2024-12-19 RX ADMIN — MEMANTINE 10 MG: 10 TABLET ORAL at 08:55

## 2024-12-19 RX ADMIN — ENOXAPARIN SODIUM 40 MG: 100 INJECTION SUBCUTANEOUS at 08:55

## 2024-12-19 RX ADMIN — SODIUM CHLORIDE, PRESERVATIVE FREE 10 ML: 5 INJECTION INTRAVENOUS at 08:56

## 2024-12-19 RX ADMIN — ASPIRIN 81 MG: 81 TABLET, COATED ORAL at 08:55

## 2024-12-19 NOTE — DISCHARGE INSTRUCTIONS
HOSPITALIST DISCHARGE INSTRUCTIONS  NAME:  Deni Crocker   :  1950   MRN:  [id number]     Date/Time:  2024 9:26 AM    ADMIT DATE: 2024     DISCHARGE DATE: 2024     DISCHARGE DIAGNOSIS:  Closed fracture of third lumbar vertebra   Recurrent falls     DISCHARGE INSTRUCTIONS:  Thank you for allowing us to participate in your care. Your discharging Hospitalist is Imtiaz Sewell MD. You were admitted for evaluation and treatment of the above.     Closed fracture of third lumbar vertebra POA: admitted with a moderate acute L3 fracture along the inferior endplate. Following a fall. Seen by IR and he is sp a L3 kyphoplasty done . Has progressed well post procedure.   plan  Acetaminophen PRN.     Recurrent falls / Syncope and collapse POA: no LOC. 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 CT scan head unremarkable. EEG showed no seizure activity.   Echocardiogram showed a normal EF 72%. Likely from physical debility. Stable.   Continue fall precautions     Alzheimer's type dementia with late onset without behavioral disturbance /  Anxiety with depression POA: at risk for delirium but he has remained overall stable.   Continue Aricept, Namenda and Zoloft.      Hyperlipidemia POA: chronic and stable. Continue Pravastatin     BPH (benign prostatic hyperplasia) POA: asymptomatic. Continue Flomax      MEDICATIONS:    It is important that you take the medication exactly as they are prescribed.   Keep your medication in the bottles provided by the pharmacist and keep a list of the medication names, dosages, and times to be taken in your wallet.   Do not take other medications without consulting your doctor.             If you experience any of the following symptoms then please call your primary care physician or return to the emergency room if you cannot get hold of your doctor:  Fever, chills, nausea, vomiting, diarrhea, change in mentation, falling, bleeding, shortness of breath    Follow

## 2024-12-19 NOTE — PLAN OF CARE
Problem: Chronic Conditions and Co-morbidities  Goal: Patient's chronic conditions and co-morbidity symptoms are monitored and maintained or improved  12/14/2024 1828 by Caroline Wilkes RN  Outcome: Progressing  12/14/2024 0618 by Ana Laura Dickens RN  Outcome: Progressing     Problem: Discharge Planning  Goal: Discharge to home or other facility with appropriate resources  12/14/2024 1828 by Caroline Wilkes RN  Outcome: Progressing  12/14/2024 0618 by Ana Laura Dickens RN  Outcome: Progressing     Problem: Skin/Tissue Integrity  Goal: Absence of new skin breakdown  Description: 1.  Monitor for areas of redness and/or skin breakdown  2.  Assess vascular access sites hourly  3.  Every 4-6 hours minimum:  Change oxygen saturation probe site  4.  Every 4-6 hours:  If on nasal continuous positive airway pressure, respiratory therapy assess nares and determine need for appliance change or resting period.  12/14/2024 1828 by Caroline Wilkes RN  Outcome: Progressing  12/14/2024 0618 by Ana Laura Dickens RN  Outcome: Progressing     Problem: ABCDS Injury Assessment  Goal: Absence of physical injury  12/14/2024 1828 by Caroline Wilkes RN  Outcome: Progressing  12/14/2024 0618 by Ana Laura Dickens RN  Outcome: Progressing     Problem: Safety - Adult  Goal: Free from fall injury  12/14/2024 1828 by Caroline Wilkes RN  Outcome: Progressing  12/14/2024 0618 by Ana Laura Dickens RN  Outcome: Progressing     Problem: Pain  Goal: Verbalizes/displays adequate comfort level or baseline comfort level  12/14/2024 1828 by Caroline Wilkes RN  Outcome: Progressing  12/14/2024 0618 by Ana Laura Dickens RN  Outcome: Progressing     
  Problem: Chronic Conditions and Co-morbidities  Goal: Patient's chronic conditions and co-morbidity symptoms are monitored and maintained or improved  12/15/2024 0629 by Ana Laura Dickens RN  Outcome: Progressing  12/14/2024 1828 by Caroline Wilkes RN  Outcome: Progressing     Problem: Discharge Planning  Goal: Discharge to home or other facility with appropriate resources  12/15/2024 0629 by Ana Laura Dickens RN  Outcome: Progressing  12/14/2024 1828 by Caroline Wilkes RN  Outcome: Progressing     Problem: Skin/Tissue Integrity  Goal: Absence of new skin breakdown  Description: 1.  Monitor for areas of redness and/or skin breakdown  2.  Assess vascular access sites hourly  3.  Every 4-6 hours minimum:  Change oxygen saturation probe site  4.  Every 4-6 hours:  If on nasal continuous positive airway pressure, respiratory therapy assess nares and determine need for appliance change or resting period.  12/15/2024 0629 by Ana Laura Dickens RN  Outcome: Progressing  12/14/2024 1828 by Caroline Wilkes RN  Outcome: Progressing     Problem: ABCDS Injury Assessment  Goal: Absence of physical injury  12/15/2024 0629 by Ana Laura Dickens RN  Outcome: Progressing  12/14/2024 1828 by Caroline Wilkes RN  Outcome: Progressing     Problem: Safety - Adult  Goal: Free from fall injury  12/15/2024 0629 by Ana Laura Dickens RN  Outcome: Progressing  12/14/2024 1828 by Caroline Wilkes RN  Outcome: Progressing     Problem: Pain  Goal: Verbalizes/displays adequate comfort level or baseline comfort level  12/15/2024 0629 by Ana Laura Dickens RN  Outcome: Progressing  12/14/2024 1828 by Caroline Wilkes RN  Outcome: Progressing     
  Problem: Chronic Conditions and Co-morbidities  Goal: Patient's chronic conditions and co-morbidity symptoms are monitored and maintained or improved  12/8/2024 2318 by Fabian Vazquez RN  Outcome: Progressing     Problem: Discharge Planning  Goal: Discharge to home or other facility with appropriate resources  12/8/2024 2318 by Fabian Vazquez RN  Outcome: Progressing     Problem: Skin/Tissue Integrity  Goal: Absence of new skin breakdown  Description: 1.  Monitor for areas of redness and/or skin breakdown  2.  Assess vascular access sites hourly  3.  Every 4-6 hours minimum:  Change oxygen saturation probe site  4.  Every 4-6 hours:  If on nasal continuous positive airway pressure, respiratory therapy assess nares and determine need for appliance change or resting period.  12/9/2024 1132 by Lorie Baig RN  Outcome: Progressing  12/8/2024 2318 by Fabian Vazquez RN  Outcome: Progressing     Problem: ABCDS Injury Assessment  Goal: Absence of physical injury  12/9/2024 1132 by Lorie Baig RN  Outcome: Progressing  12/8/2024 2318 by Fabian Vazquez RN  Outcome: Progressing     Problem: Safety - Adult  Goal: Free from fall injury  12/9/2024 1132 by Lorie Baig RN  Outcome: Progressing  12/8/2024 2318 by Fabian Vazquez RN  Outcome: Progressing     
  Problem: Chronic Conditions and Co-morbidities  Goal: Patient's chronic conditions and co-morbidity symptoms are monitored and maintained or improved  Outcome: Progressing     Problem: Discharge Planning  Goal: Discharge to home or other facility with appropriate resources  Outcome: Progressing     Problem: Skin/Tissue Integrity  Goal: Absence of new skin breakdown  Description: 1.  Monitor for areas of redness and/or skin breakdown  2.  Assess vascular access sites hourly  3.  Every 4-6 hours minimum:  Change oxygen saturation probe site  4.  Every 4-6 hours:  If on nasal continuous positive airway pressure, respiratory therapy assess nares and determine need for appliance change or resting period.  Outcome: Progressing     Problem: ABCDS Injury Assessment  Goal: Absence of physical injury  Outcome: Progressing     Problem: Safety - Adult  Goal: Free from fall injury  Outcome: Progressing     Problem: Pain  Goal: Verbalizes/displays adequate comfort level or baseline comfort level  Outcome: Progressing     
  Problem: Chronic Conditions and Co-morbidities  Goal: Patient's chronic conditions and co-morbidity symptoms are monitored and maintained or improved  Outcome: Progressing  Flowsheets (Taken 12/18/2024 1930)  Care Plan - Patient's Chronic Conditions and Co-Morbidity Symptoms are Monitored and Maintained or Improved: Monitor and assess patient's chronic conditions and comorbid symptoms for stability, deterioration, or improvement     Problem: Discharge Planning  Goal: Discharge to home or other facility with appropriate resources  Outcome: Progressing  Flowsheets (Taken 12/18/2024 1930)  Discharge to home or other facility with appropriate resources: Identify barriers to discharge with patient and caregiver     Problem: Skin/Tissue Integrity  Goal: Absence of new skin breakdown  Description: 1.  Monitor for areas of redness and/or skin breakdown  2.  Assess vascular access sites hourly  3.  Every 4-6 hours minimum:  Change oxygen saturation probe site  4.  Every 4-6 hours:  If on nasal continuous positive airway pressure, respiratory therapy assess nares and determine need for appliance change or resting period.  Outcome: Progressing     Problem: ABCDS Injury Assessment  Goal: Absence of physical injury  Outcome: Progressing  Flowsheets (Taken 12/18/2024 1930)  Absence of Physical Injury: Implement safety measures based on patient assessment     Problem: Safety - Adult  Goal: Free from fall injury  Outcome: Progressing  Flowsheets (Taken 12/18/2024 1930)  Free From Fall Injury: Instruct family/caregiver on patient safety     Problem: Pain  Goal: Verbalizes/displays adequate comfort level or baseline comfort level  Outcome: Progressing     Problem: Physical Therapy - Adult  Goal: By Discharge: Performs mobility at highest level of function for planned discharge setting.  See evaluation for individualized goals.  Description: FUNCTIONAL STATUS PRIOR TO ADMISSION: Patient was modified independent using a rolling 
  Problem: Discharge Planning  Goal: Discharge to home or other facility with appropriate resources  Outcome: Progressing     Problem: Skin/Tissue Integrity  Goal: Absence of new skin breakdown  Description: 1.  Monitor for areas of redness and/or skin breakdown  2.  Assess vascular access sites hourly  3.  Every 4-6 hours minimum:  Change oxygen saturation probe site  4.  Every 4-6 hours:  If on nasal continuous positive airway pressure, respiratory therapy assess nares and determine need for appliance change or resting period.  Outcome: Progressing     Problem: Safety - Adult  Goal: Free from fall injury  Outcome: Progressing     Problem: Pain  Goal: Verbalizes/displays adequate comfort level or baseline comfort level  Outcome: Progressing     
  Problem: Discharge Planning  Goal: Discharge to home or other facility with appropriate resources  Outcome: Progressing     Problem: Skin/Tissue Integrity  Goal: Absence of new skin breakdown  Description: 1.  Monitor for areas of redness and/or skin breakdown  2.  Assess vascular access sites hourly  3.  Every 4-6 hours minimum:  Change oxygen saturation probe site  4.  Every 4-6 hours:  If on nasal continuous positive airway pressure, respiratory therapy assess nares and determine need for appliance change or resting period.  Outcome: Progressing     Problem: Safety - Adult  Goal: Free from fall injury  Outcome: Progressing     Problem: Pain  Goal: Verbalizes/displays adequate comfort level or baseline comfort level  Outcome: Progressing     
  Problem: Occupational Therapy - Adult  Goal: By Discharge: Performs self-care activities at highest level of function for planned discharge setting.  See evaluation for individualized goals.  Description: FUNCTIONAL STATUS PRIOR TO ADMISSION:  Patient lives with his wife and was Mod I for most ADLs, though his wife reports recently she has been helping him with dressing tasks. Patient     Occupational Therapy Goals:  Initiated 12/11/2024  1.  Patient will perform grooming standing at the sink with Modified [place] within 7 day(s).  2.  Patient will perform lower body dressing with CGA and AE PRN within 7 day(s).  3.  Patient will perform bathing with CGA [place] within 7 day(s).  4.  Patient will perform toilet transfers with SBA  within 7 day(s).  5.  Patient will perform all aspects of toileting with Stand by Assist within 7 day(s).      Outcome: Progressing    OCCUPATIONAL THERAPY TREATMENT  Patient: Deni Crocker (74 y.o. male)  Date: 12/16/2024  Primary Diagnosis: Syncope and collapse [R55]  Fall, initial encounter [W19.XXXA]  Falls frequently [R29.6]       Precautions: ROM Restrictions (Spinal Precautions)         Spinal Precautions: No Bending, No Lifting, No Twisting      Chart, occupational therapy assessment, plan of care, and goals were reviewed.    ASSESSMENT  Patient continues to benefit from skilled OT services and is progressing towards goals. Patient received up in the chair and agreeable to therapy with some encouragement from his wife. Patient reports no pain this session. He required CGA with functional mobility out on unit hallway with RW. He completed standing grooming tasks with CGA. Patient demonstrates some difficulty with turns requiring verbal cues to avoid crossing legs while walking. Patient with improved affect with wife present this session. He remains at a much lower baseline that his normal and his wife is unable to physically assist him at this time.          PLAN :  Patient 
  Problem: Physical Therapy - Adult  Goal: By Discharge: Performs mobility at highest level of function for planned discharge setting.  See evaluation for individualized goals.  Description: FUNCTIONAL STATUS PRIOR TO ADMISSION: Patient was modified independent using a rolling walker for functional mobility.  Was receiving Home PT.  Multiple falls at home.    HOME SUPPORT PRIOR TO ADMISSION: The patient lived with wife. Wife unable to physically assist (had Watchman procedure on 12/9/24).  Lives in a one level apartment with elevator access.    Physical Therapy Goals  Initiated 12/11/2024  1.  Patient will move from supine to sit and sit to supine in bed with modified independence within 7 day(s).    2.  Patient will perform sit to stand with modified independence within 7 day(s).  3.  Patient will transfer from bed to chair and chair to bed with modified independence using the least restrictive device within 7 day(s).  4.  Patient will ambulate with modified independence for 250 feet with the least restrictive device within 7 day(s).     Outcome: Progressing     PHYSICAL THERAPY TREATMENT    Patient: Deni Crocker (74 y.o. male)  Date: 12/16/2024  Diagnosis: Syncope and collapse [R55]  Fall, initial encounter [W19.XXXA]  Falls frequently [R29.6] Closed fracture of third lumbar vertebra (HCC)      Precautions: ROM Restrictions (Spinal Precautions)         Spinal Precautions: No Bending, No Lifting, No Twisting            ASSESSMENT:  Patient continues to benefit from skilled PT services and is progressing towards goals. Patient tolerated session well, progressing gait distance and requiring less physical assistance overall. Patient demonstrated improved fluidity and safety of gait including when navigating around obstacles. Patient presents with good carryover of therapist's cues but does remain with impaired cognition, dynamic gait stability, posture, and functional independence. Patient would benefit from moderate 
  Problem: Physical Therapy - Adult  Goal: By Discharge: Performs mobility at highest level of function for planned discharge setting.  See evaluation for individualized goals.  Description: FUNCTIONAL STATUS PRIOR TO ADMISSION: Patient was modified independent using a rolling walker for functional mobility.  Was receiving Home PT.  Multiple falls at home.    HOME SUPPORT PRIOR TO ADMISSION: The patient lived with wife. Wife unable to physically assist (had Watchman procedure on 12/9/24).  Lives in a one level apartment with elevator access.    Physical Therapy Goals  Initiated 12/11/2024  1.  Patient will move from supine to sit and sit to supine in bed with modified independence within 7 day(s).    2.  Patient will perform sit to stand with modified independence within 7 day(s).  3.  Patient will transfer from bed to chair and chair to bed with modified independence using the least restrictive device within 7 day(s).  4.  Patient will ambulate with modified independence for 250 feet with the least restrictive device within 7 day(s).     Outcome: Progressing   PHYSICAL THERAPY EVALUATION    Patient: Deni Crocker (74 y.o. male)  Date: 12/11/2024  Primary Diagnosis: Syncope and collapse [R55]  Fall, initial encounter [W19.XXXA]       Precautions: Restrictions/Precautions: ROM Restrictions (Spinal Precautions)         Spinal Precautions: No Bending, No Lifting, No Twisting            ASSESSMENT : Patient with multiple falls at home, Imaging shows L3 burst fracture.  Kyphoplasty being considered.  Neuro consulted and signed off.  Ortho consulted.      DEFICITS/IMPAIRMENTS:   The patient is limited by decreased functional mobility, ROM, strength, activity tolerance, balance, increased pain levels.  Based on the impairments listed above patient participated in therapy with wife present.  Patient frequently repeats the phrase \"I'm the oldest son in a chinese family.\"      Reviewed back precautions and gave written handout.  
  Problem: Physical Therapy - Adult  Goal: By Discharge: Performs mobility at highest level of function for planned discharge setting.  See evaluation for individualized goals.  Description: FUNCTIONAL STATUS PRIOR TO ADMISSION: Patient was modified independent using a rolling walker for functional mobility.  Was receiving Home PT.  Multiple falls at home.    HOME SUPPORT PRIOR TO ADMISSION: The patient lived with wife. Wife unable to physically assist (had Watchman procedure on 12/9/24).  Lives in a one level apartment with elevator access.    Physical Therapy Goals  Initiated 12/11/2024; reviewed 12/17 continue as below  1.  Patient will move from supine to sit and sit to supine in bed with modified independence within 7 day(s).    2.  Patient will perform sit to stand with modified independence within 7 day(s).  3.  Patient will transfer from bed to chair and chair to bed with modified independence using the least restrictive device within 7 day(s).  4.  Patient will ambulate with modified independence for 250 feet with the least restrictive device within 7 day(s).     Outcome: Progressing   PHYSICAL THERAPY TREATMENT    Patient: Deni Crocker (74 y.o. male)  Date: 12/18/2024  Diagnosis: Syncope and collapse [R55]  Fall, initial encounter [W19.XXXA]  Falls frequently [R29.6] Closed fracture of third lumbar vertebra (HCC)      Precautions: ROM Restrictions (Spinal Precautions)         Spinal Precautions: No Bending, No Lifting, No Twisting            ASSESSMENT:  Patient continues to benefit from skilled PT services and is slowly progressing towards goals.  Reviewed back precautions- patient with recall of 2 of 3.  Gave written handout of precautions.  Patient requires mod assist to kimberley brace.  Performed transfer training for rolling, supine to sit, and sit <> stand with contact guard assist of one and cues and demonstration for best technique.  Performed gait training with the rolling walker in the [place] with contact 
  Problem: Skin/Tissue Integrity  Goal: Absence of new skin breakdown  Description: 1.  Monitor for areas of redness and/or skin breakdown  2.  Assess vascular access sites hourly  3.  Every 4-6 hours minimum:  Change oxygen saturation probe site  4.  Every 4-6 hours:  If on nasal continuous positive airway pressure, respiratory therapy assess nares and determine need for appliance change or resting period.  Outcome: Progressing     
Patient admitted for recurrent falls at home with syncopal episode- has a L3 fracture per MD note; orthopedics has been consulted. Patient has a history of dementia; fall precautions in place include bed alarm, yellow armband, call bell within reach, gripper socks, side rails x3, bed in lowest position/wheels locked and room near nurses station. Will continue to monitor and assess.  
ambulate in the hallway with slow and gradual improvement in gait quality, however also noted greater challenge with turns and negotiating the RW, weight shifting with occasional retropulsion, and scissoring of feet during turns.  Patient returned to bedside chair with alarm and call bell.  PT adjusted RW to lower height by 1 notch--continue to assess.     PLAN:  Patient continues to benefit from skilled intervention to address the above impairments.  Continue treatment per established plan of care.    Recommendations for staff mobility and toileting assistance:  Recommend that staff completes patient mobility with assist x1 using gait belt and rolling walker.      Recommendation for discharge: (in order for the patient to meet his/her long term goals):   Moderate intensity short-term skilled physical therapy up to 5x/week    Other factors to consider for discharge: patient's current support system is unable to meet their requirements for physical assistance, impaired cognition, high risk for falls, not safe to be alone, concern for safely navigating or managing the home environment, and recent kyphoplasty 12/12/24    IF patient discharges home will need the following DME: rolling walker       SUBJECTIVE:   Patient stated, \"I don't want to brush my teeth.\"    OBJECTIVE DATA SUMMARY:   Critical Behavior:     Cognition  Arousal/Alertness: Appears intact  Following Commands: Appears intact  Attention Span: Attends with cues to redirect  Memory: Decreased short term memory;Decreased recall of recent events;Impaired    Functional Mobility Training:  Bed Mobility:  Bed Mobility Training  Rolling: Minimum assistance (log rolling R)  Supine to Sit: Moderate assistance;Assist X1;Additional time  Scooting: Contact-guard assistance  Transfers:  Transfer Training  Transfer Training: Yes  Sit to Stand: Additional time;Minimum assistance;Assist X2  Stand to Sit: Minimum assistance;Additional time;Assist 
assistance;Additional time;Assist X1           Balance:     Balance  Sitting: Intact      ADL Intervention:      Grooming: Contact guard assistance   Grooming Skilled Clinical Factors: washing face standing at sink         LE Dressing: Stand by assistance  LE Dressing Skilled Clinical Factors: tailor sit to adjust socks sitting in chair          Product Used : Bath wipes        Pain Ratin/10   Pain Intervention(s):   nursing notified and repositioning      Activity Tolerance:   Fair   Please refer to the flowsheet for vital signs taken during this treatment.    After treatment:   Patient left in no apparent distress sitting up in chair, Call bell within reach, and Bed/ chair alarm activated    COMMUNICATION/EDUCATION:   The patient's plan of care was discussed with: physical therapist and registered nurse    Patient Education  Education Given To: Patient  Education Provided: Role of Therapy;Plan of Care;Mobility Training;Transfer Training;Fall Prevention Strategies  Education Method: Verbal;Demonstration  Barriers to Learning: Cognition  Education Outcome: Verbalized understanding;Continued education needed    Thank you for this referral.  Alannah Chanel OTR/L  Minutes: 15    
X1  Supine to Sit: Contact-guard assistance;Assist X1;Additional time;Adaptive equipment  Scooting: Stand-by assistance    Transfers:      Transfer Training  Transfer Training: Yes  Sit to Stand: Assist X1;Additional time;Minimum assistance  Stand to Sit: Contact-guard assistance;Assist X1;Additional time;Adaptive equipment                     Balance:      Balance  Sitting: Intact  Standing: Intact;With support      ADL Assessment:          Feeding: Independent       Grooming: Contact guard assistance       UE Bathing: Supervision       Product Used : N/A    LE Bathing: Minimal assistance       UE Dressing: Supervision       LE Dressing: Minimal assistance       Toileting: Minimal assistance                         ADL Intervention and task modifications:      [facility] \"6 Clicks\"                                                       Daily Activity Inpatient Short Form  AM-PAC Daily Activity - Inpatient   How much help is needed for putting on and taking off regular lower body clothing?: A Little  How much help is needed for bathing (which includes washing, rinsing, drying)?: A Little  How much help is needed for toileting (which includes using toilet, bedpan, or urinal)?: A Little  How much help is needed for putting on and taking off regular upper body clothing?: A Little  How much help is needed for taking care of personal grooming?: A Little  How much help for eating meals?: A Little  [facility] Inpatient Daily Activity Raw Score: 18  AM-PAC Inpatient ADL T-Scale Score : 38.66  ADL Inpatient CMS 0-100% Score: 46.65  ADL Inpatient CMS G-Code Modifier : CK     Interpretation of Tool:  Represents clinically-significant functional categories (i.e. Activities of daily living).  Cutoff score 39.4 (19) correlates to a good likelihood of discharging home versus a facility  Melissa Mcdonald, Iris Marroquin, Gio Perkins, Emily Jeronimo, John Ruiz, Kashmir Mcdonald, AM-PAC “6-Clicks” Functional 
[place] SERNA Activity Measure for Post-Acute Care \"6-Clicks\" Basic Mobility Scores Predict Discharge Destination After Acute Care Hospitalization in Select Patient Groups: A Retrospective, Observational Study. Arch Rehabil Res Clin Transl. 2022 Jul 16;4(3):704747. doi: [email or web address]/[email or web address]. PMID: [id number]; PMCID: [id number].  4. Tamara REDMOND, Becca S, Bety W, Fina P. AM-PAC Short Forms Manual 4.0. Revised 2/2020.                                                                                                                                                                                                                                  Pain Rating:  Patient without reports of pain during therapy    Pain Intervention(s):       Activity Tolerance:   Good and tolerates ADLS without rest breaks    After treatment:   Patient left in no apparent distress sitting up in chair, Call bell within reach, Bed/ chair alarm activated, and Caregiver / family present      COMMUNICATION/EDUCATION:   The patient's plan of care was discussed with: occupational therapist and registered nurse    Patient Education  Education Given To: Patient;Family  Education Provided: Role of Therapy;Plan of Care;Precautions;Equipment;Transfer Training;Mobility Training;Family Education;Orientation  Education Method: Verbal  Barriers to Learning: Cognition  Education Outcome: Continued education needed      Reese Mulligan PT, DPT  Minutes: 13

## 2024-12-19 NOTE — PROGRESS NOTES
RICHARD LEVY [facility]  [address] [place] [address]  [phone]      Hospitalist  Progress Note      NAME:       Deni Crocker   :        1950  MRM:        [id number]    Date of service: 2024      Subjective: Patient seen and examined by me. Patient admitted with an acute L3 fracture. He has no new symptoms. Awaiting SNF placement.        Objective:    Vital Signs:    BP (!) 147/83   Pulse 62   Temp 98.4 °F (36.9 °C) (Oral)   Resp 16   Ht 1.854 m (6' 1\")   Wt 87.5 kg (193 lb)   SpO2 97%   BMI 25.46 kg/m²        Intake/Output Summary (Last 24 hours) at 2024 1441  Last data filed at 2024 1049  Gross per 24 hour   Intake --   Output 250 ml   Net -250 ml        Current inpatient medications reviewed:  Current Facility-Administered Medications   Medication Dose Route Frequency    aspirin EC tablet 81 mg  81 mg Oral Daily    donepezil (ARICEPT) tablet 10 mg  10 mg Oral Nightly    memantine (NAMENDA) tablet 10 mg  10 mg Oral BID    pravastatin (PRAVACHOL) tablet 40 mg  40 mg Oral Nightly    sertraline (ZOLOFT) tablet 100 mg  100 mg Oral QPM    tamsulosin (FLOMAX) capsule 0.4 mg  0.4 mg Oral QPM    glucose chewable tablet 16 g  4 tablet Oral PRN    dextrose bolus 10% 125 mL  125 mL IntraVENous PRN    Or    dextrose bolus 10% 250 mL  250 mL IntraVENous PRN    glucagon injection 1 mg  1 mg SubCUTAneous PRN    dextrose 10 % infusion   IntraVENous Continuous PRN    sodium chloride flush 0.9 % injection 5-40 mL  5-40 mL IntraVENous 2 times per day    sodium chloride flush 0.9 % injection 5-40 mL  5-40 mL IntraVENous PRN    0.9 % sodium chloride infusion   IntraVENous PRN    potassium chloride (KLOR-CON) extended release tablet 40 mEq  40 mEq Oral PRN    Or    potassium bicarb-citric acid (EFFER-K) effervescent tablet 40 mEq  40 mEq Oral PRN    Or    potassium chloride 10 mEq/100 mL IVPB (Peripheral Line)  
      [facility]                                                                               Hospitalist Progress Note  HILLARY SARKAR MD          Date of Service:  2024  NAME:  Deni Crocker  :  1950  MRN:  [id number]    Please note that this dictation was completed with [email or web address], the computer voice recognition software.  Quite often unanticipated grammatical, syntax, homophones, and other interpretive errors are inadvertently transcribed by the computer software.  Please disregard these errors.  Please excuse any errors that have escaped final proofreading.    Admission Summary:   74-year-old male with a history of dementia, CVA, hyperlipidemia, BPH comes to the hospital with multiple syncopal episode at home. Patient is admitted for further workup.           Interval history / Subjective:    Pain is better controlled.      Assessment & Plan:     Anticipated discharge date :     Anticipated disposition : SNF   Barriers to discharge : placement      Acute L3 fracture  With recurrent falls and leg weakness, CT L-spine was positive for L3 fracture  S/p IR Kyphoplasty on .     -Pain control  -Needs placement   -PT OT      Recurrent falls  Without loss of consciousness. Patient has reported since previous stroke half years ago has been noticing weakness in his left side, and his left side is weaker than his right sideHas been having recurrent episodes of falls recently, however unchanged from previous.Labs are unremarkable, no signs of infection, UA negative. EEG was negative for seizure activities. CT head on admission was negative. TTE: Left Ventricle: Normal left ventricular systolic function. EF 72%. Left ventricle size is normal. Normal wall thickness. Normal wall motion. Normal diastolic function. CT lumbar spine was positive for acute L3 fracture    -No further workup needed, will continue with PT evaluation after MRI          Dementia  -Continue 
      [facility]                                                                               Hospitalist Progress Note  HILLARY SARKAR MD          Date of Service:  2024  NAME:  Deni Crocker  :  1950  MRN:  [id number]    Please note that this dictation was completed with [employer], the computer voice recognition software.  Quite often unanticipated grammatical, syntax, homophones, and other interpretive errors are inadvertently transcribed by the computer software.  Please disregard these errors.  Please excuse any errors that have escaped final proofreading.    Admission Summary:   74-year-old male with a history of dementia, CVA, hyperlipidemia, BPH comes to the hospital with multiple syncopal episode at home. Patient is admitted for further workup.           Interval history / Subjective:   Wife at the bedside. Patient is somewhat confused states that no one has told him anything. Has pain on movement     Assessment & Plan:     Anticipated discharge date :  TBD   Anticipated disposition : SNF   Barriers to discharge : Medical stability      Acute L3 fracture  -With recurrent falls and leg weakness, CT L-spine was positive for L3 fracture    -MRI Lumbar Spine pending  -Ir consult for kyphoplasty. Patient has significant pain limiting ambulation   -Pain control  -PT OT      Recurrent falls  Without loss of consciousness. Patient has reported since previous stroke half years ago has been noticing weakness in his left side, and his left side is weaker than his right sideHas been having recurrent episodes of falls recently, however unchanged from previous.Labs are unremarkable, no signs of infection, UA negative. EEG was negative for seizure activities. CT head on admission was negative. TTE: Left Ventricle: Normal left ventricular systolic function. EF 72%. Left ventricle size is normal. Normal wall thickness. Normal wall motion. Normal diastolic function. CT 
      [facility]ist [facility]                                                                               Hospitalist Progress Note  HILLARY SARKAR MD          Date of Service:  2024  NAME:  Deni Crocker  :  1950  MRN:  [id number]    Please note that this dictation was completed with [employer], the computer voice recognition software.  Quite often unanticipated grammatical, syntax, homophones, and other interpretive errors are inadvertently transcribed by the computer software.  Please disregard these errors.  Please excuse any errors that have escaped final proofreading.    Admission Summary:   74-year-old male with a history of dementia, CVA, hyperlipidemia, BPH comes to the hospital with multiple syncopal episode at home. Patient is admitted for further workup.           Interval history / Subjective:   Wife at the bedside.  Patient n.p.o. for kyphoplasty.  Continues to have pain on ambulation.     Assessment & Plan:     Anticipated discharge date :  TBD   Anticipated disposition : SNF   Barriers to discharge : Medical stability      Acute L3 fracture  -With recurrent falls and leg weakness, CT L-spine was positive for L3 fracture    -MRI Lumbar Spine read pending  -Ir consult for kyphoplasty.  N.p.o. in anticipation of procedure today  -Pain control  -PT OT      Recurrent falls  Without loss of consciousness. Patient has reported since previous stroke half years ago has been noticing weakness in his left side, and his left side is weaker than his right sideHas been having recurrent episodes of falls recently, however unchanged from previous.Labs are unremarkable, no signs of infection, UA negative. EEG was negative for seizure activities. CT head on admission was negative. TTE: Left Ventricle: Normal left ventricular systolic function. EF 72%. Left ventricle size is normal. Normal wall thickness. Normal wall motion. Normal diastolic function. CT lumbar spine was positive 
    Hospitalist Progress Note      NAME:  Deni Crocker   :  1950  MRM:  [id number]    Date/Time: 12/10/2024  11:42 AM           Assessment / Plan:     74-year-old male with a history of dementia, CVA, hyperlipidemia, BPH comes to the hospital with multiple syncopal episode at home. Patient is admitted for further workup.     # Recurrent falls  Without loss of consciousness  -Patient has reported since previous stroke half years ago has been noticing weakness in his left side, and his left side is weaker than his right side  -Has been having recurrent episodes of falls recently, however unchanged from previous.  As he reported  -Labs are unremarkable, no signs of infection, UA negative  -EEG was negative for seizure activities  -CT head on admission was negative  -TTE: Left Ventricle: Normal left ventricular systolic function. EF by 2D Simpsons Biplane is 72%. Left ventricle size is normal. Normal wall thickness. Normal wall motion. Normal diastolic function.   -With leg weakness reported, I ordered CT lumbar spine today that was positive for acute L3 fracture  Plan  -No further workup needed, will continue with PT evaluation after MRI    #Acute L3 fracture  -With recurrent falls and leg weakness, CT L-spine was positive for L3 fracture  Plan  -Consult Ortho, pending MRI to see if kyphoplasty is required  -Pain control  -PT OT    #Dementia  Continue Aricept and Namenda.     #Hyperlipidemia  Patient is taking pravastatin 40 mg nightly.     #BPH  Patient is on Flomax.     #Anxiety and depression  Patient is taking Zoloft at home.      #BMI (Calculated): 25.47    I have personally reviewed the radiographs, laboratory data in Epic and decisions and statements above are based partially on this personal interpretation.                 Care Plan discussed with: Patient    Discussed:  Care Plan    Prophylaxis:  Lovenox    Disposition:  SNF/LTC           ___________________________________________________    Attending 
1:43 PM  TRANSFER - IN REPORT:    Verbal report received from Jolene on Deni Crocker  being received from [facility] for routine post-op      Report consisted of patient's Situation, Background, Assessment and   Recommendations(SBAR).     Information from the following report(s) Nurse Handoff Report was reviewed with the receiving nurse.    Opportunity for questions and clarification was provided.      Assessment completed upon patient's arrival to unit and care assumed.      2:00 PM  TRANSFER - OUT REPORT:    Verbal report given to Floor Nurse via phone on Deni Crocker  being transferred to [facility] for routine progression of patient care       Report consisted of patient's Situation, Background, Assessment and   Recommendations(SBAR).     Information from the following report(s) Nurse Handoff Report was reviewed with the receiving nurse.           Lines:   Peripheral IV 12/08/24 Left Antecubital (Active)   Site Assessment Clean, dry & intact 12/12/24 0241   Line Status Capped;Normal saline locked;Flushed 12/12/24 0241   Line Care Connections checked and tightened 12/12/24 0241   Phlebitis Assessment No symptoms 12/12/24 0241   Infiltration Assessment 0 12/12/24 0241   Alcohol Cap Used Yes 12/12/24 0241   Dressing Status New dressing applied;Clean, dry & intact 12/12/24 0241   Dressing Type Transparent 12/12/24 0241   Dressing Intervention Dressing changed 12/12/24 0241        Opportunity for questions and clarification was provided.      Patient transported with:  Transport       
Assisted pt back to bed-noted blanchable redness to sacrum,foam applied pt educated on importance of skin care and immobility-stated he understood.  Pt turned on left side with pillow support  
Attempted patient echo. Patient not in room. Will try later.    
Attempted to work with the patient for Physical Therapy, chart reviewed and discussed with nurse patient with kyphoplasty this afternoon and currently unavailable for therapy. We will continue to follow once cleared to resume therapy.          
Attempted to work with the patient for Physical Therapy, chart reviewed and discussed with nurse per CT showing L3 burst fracture. We will hold and wait for ortho consult prior to therapy eval for mobility.     
Comprehensive Nutrition Assessment    Type and Reason for Visit:  Initial, LOS    Nutrition Recommendations/Plan:   Continue regular diet order  Provide Ensure Plus High Protein once daily (350 kcal, 41 g carbs, 20 g protein) to aid in meeting kcal/protein needs - vanilla  Obtain standing scale weight as able  No BM x 3 days - consider providing PRN miralax      Malnutrition Assessment:  Malnutrition Status:  Insufficient data (NFPE not completed) (12/16/24 1436)      Nutrition Assessment:     Patient is a 74 year old male admitted with Syncope and collapse [R55]  Fall, initial encounter [W19.XXXA]  Falls frequently [R29.6]. He  has a past medical history of Hemorrhagic stroke (HCC), Hypertension, Ill-defined condition, Stroke (HCC), and Type II diabetes mellitus (HCC).  RD screen for LOS. Majority of weights appear stated but also most >1 year old. If accurate, patient has lost 27# (12.2%) x 8 months. Patient not cooperative with NFPE today, unable to determine if weight loss has occurred. Unable to obtain bedscale weight today. NKFA. Denies chewing/swallowing problems. No noted nausea/vomiting/diarrhea. Per nursing, eating well with no issues. Single meal intake documented throughout 8 day stay. Will order standing weight and ONS. Last labs 4 days ago.    Wt Readings from Last 10 Encounters:   12/09/24 87.5 kg (193 lb)   04/03/24 99.8 kg (220 lb)   10/04/23 99.3 kg (219 lb)   04/10/23 95.3 kg (210 lb)   11/10/22 99.3 kg (219 lb)   05/16/22 89.8 kg (198 lb)   09/01/21 89.8 kg (198 lb)   08/05/21 92.5 kg (204 lb)   08/05/21 92.5 kg (204 lb)   07/29/21 92.5 kg (204 lb)     Meal Intake:   Patient Vitals for the past 168 hrs:   PO Meals Eaten (%)   12/11/24 1910 51 - 75%     Supplement Intake:  Patient Vitals for the past 168 hrs:   PO Supplement (%)   12/11/24 1900 26 - 50%       Nutrition Related Findings:      Wound Type: None     Last BM: 12/13/24  Edema: None                    Nutr. Labs:    Lab Results 
During RN rounds, pt expressed that he wants to \"give up\". He told RN that he has already lived his life and \"doesn't have much to live for anymore\". Pt did not state any SI plans. RN reached out to on-call Hospitalist - Netta Bethea, NP - @1082 12/9/24 and told her what the pt stated. No new orders placed. Pt quickly fell back asleep after conversation.   
EEG completed  
OT services attempted- patient in bed eating lunch. Noted patient does not have teeth and had regular diet, and had fod in his mouth that he was \"chewing\" on for some time but did not swallow in spite of cues. Spouse reported that he entered the ED with upper dentures and they have been missing since. Discussed report of missing denture and need for altered diet  with RN   Patient declined activity to continue to focus on his meal. OT POC unchanged at this time.   Kari Glez, OTR/L  
Occupational Therapy    Acknowledge OT order and completed chart review.  CT with L3 burst fracture.  Will hold and await ortho consult prior to OT eval with mobility.       Thank you,    Maritza Wilder, OT    
Occupational Therapy Note  12/10/2024    OT eval order received and acknowledged. Per chart review and ortho note, patient with L3 burst fracture awaiting MRI and ortho POC thus will defer OT eval until POC established.     Thank you,  Niki Hale OTR/FRANKLIN    
Ortho update:    Pt resting in bed with moderate complaints of pain in low back. Pt appears to be exhausted of being in the hospital and anxious to go home. MRI done, final read by radiology is pending. Dr. Damon reviewed MRI. Acute burst fx of L3, would be amenable to kyphoplasty if patient's pain is limiting him with mobility, ADL's and pain is intolerable. Further discussion with family and eval from PT to determine if patient is limited by pain which would help determine necessity of kyphoplasty. It is hard to ascertain with his level of dementia and his complaints of only mild symptoms. NPO in case medicine decides to proceed with kyphoplasty.   Ortho to follow from a distance. Please reach out for further concerns via perfect serve.    Alba Gonzales, NP  Ortho Trauma     
Physical Therapy orders acknowledged, chart reviewed and discussed with nurse patient with burst fx L3 and with pending MRI we will hold for now and continue to follow up once cleared for therapy.    
Pt cleared for D/C.  Transfer report called to [facility] [place].  PIV removed.  Pt picked up by [facility]. All belongings accounted for and taken with Pt.   
See progress note from Alba Gonzales, NP form 12/10.  Imaging reviewed.  If he has worsening pain please order Kyphoplasty with IR.  Patient can followup with IR in their outpatient clinic post kyphoplasty if this occurs.  No surgical needs from an orthopedic spine standpoint.  Please reconsult as needed.        REGGIE Villa-JAMILA  Orthopaedic Surgery PA  [facility] [place]  [facility]      
Spiritual Health History and Assessment/Progress Note  [facility]    Initial Encounter,  ,  ,      Name: Deni Crocker MRN: [id number]    Age: 74 y.o.     Sex: male   Language: English   Episcopalian: Presbyterian   Syncope and collapse     Date: 12/12/2024            Total Time Calculated: 10 min              Spiritual Assessment began in [facility] A3 MULTI-SPECIALTY TELEMETRY        Referral/Consult From: Rounding   Encounter Overview/Reason: Initial Encounter       Rachna, Belief, Meaning:   Patient unable to assess at this time  Family/Friends Other: unable to assess      Importance and Influence:  Patient unable to assess at this time  Family/Friends Other: unable to assess    Community:  Patient Other: unable to assess  Family/Friends Other: unable to assess    Assessment and Plan of Care:     Patient Interventions include: Other: unable to assess  Family/Friends Interventions include: Other: unable to assess    Patient Plan of Care: Spiritual Care available upon further referral  Family/Friends Plan of Care: Spiritual Care available upon further referral     Intern Leighann Frye Mdiv  Contact [employer] [phone]  on 12/12/2024 at 11:45 AM   
Spiritual Health History and Assessment/Progress Note  [facility]    Initial Encounter,  ,  ,      Name: Deni Crocker MRN: [id number]    Age: 74 y.o.     Sex: male   Language: English   Gnosticism: Presbyterian   Closed fracture of third lumbar vertebra (HCC)     Date: 12/19/2024            Total Time Calculated: 15 min              Spiritual Assessment began in [facility] A3 MULTI-SPECIALTY TELEMETRY        Referral/Consult From: Rounding   Encounter Overview/Reason: Initial Encounter  Service Provided For: Patient    Rachna, Belief, Meaning:   Patient unable to assess at this time  Family/Friends No family/friends present      Importance and Influence:  Patient has spiritual/personal beliefs that influence decisions regarding their health  Family/Friends No family/friends present    Community:  Patient feels well-supported. Support system includes: Spouse/Partner  Family/Friends No family/friends present    Assessment and Plan of Care:     Patient Interventions include: Facilitated expression of thoughts and feelings  Family/Friends Interventions include: Facilitated expression of thoughts and feelings    Patient Plan of Care: Spiritual Care available upon further referral  Family/Friends Plan of Care: Spiritual Care available upon further referral     Intern Leighann Frye Mdiv  Contact Spiritual Health [phone] on 12/19/2024 at 10:18 AM   
Spoke to wife Rosa Maria to give verbal consent for MRI. Wife Rosa Maria gave verbal consent over telephone for patient to receive MRI ordered. Sylvia YUSUF  and Charge nurse Marily YUSUF signed consent form.  
Therapy note  12/12/24    Chart reviewed. Patient with kyphoplasty this afternoon and currently unavailable for Therapy. Will follow up at available/appropriate.     Thank you,  Alannah Chanel, OTR/L    
Transition of Care Plan to SNF/Rehab    Communication to Patient/Family:   Met with patient and family and they are agreeable to the transition plan. The Plan for Transition of Care is related to the following treatment goals: Syncope and collapse [R55]  Fall, initial encounter [W19.XXXA]  Falls frequently [R29.6]      The Patient and/or patient representative was provided with a choice of provider and agrees  with the discharge plan.      Yes [x] No []    A Freedom of choice list was provided with basic dialogue that supports the patient's individualized plan of care/goals and shares the quality data associated with the providers.       Yes [x] No []    SNF/Rehab Transition:  Patient has been accepted to [facility] SNF/Rehab and meets criteria for admission.     SNF reports auth has been received? [x]    Patient will be transported by [facility] and expected to leave at 1300. PCS completed, and packet in chart.     Communication to SNF/Rehab:  Bedside RN has been notified to update the transition plan to the facility and call report [phone].  Discharge information has been updated on the AVS. And communicated to facility via [employer]/All Scripts, or CC link.     Discharge instructions to be fax'd to facility via [x] Careport [] CCLink      Nursing Please include all hard scripts for controlled substances, med rec and dc summary, and AVS in packet.       Nursing, please discuss the following applicable information with the facility's nursing during report:     Filemon with (X) only those applicable:  Medication:  [x]Medications are available at the facility  []IV Antibiotics    []Controlled Substance - hard copies available sent.  []Weekly Labs    Equipment:  []CPAP/BiPAP  []Wound Vacuum  [][place] or Urinary Device  []PICC/Central Line  []Nebulizer  []Ventilator    Treatment:  []Isolation (for MRSA, VRE, etc.)  []Surgical Drain Management  []Tracheostomy Care  []Dressing Changes  []Dialysis with 
Line)  10 mEq IntraVENous PRN    magnesium sulfate 2000 mg in 50 mL IVPB premix  2,000 mg IntraVENous PRN    enoxaparin (LOVENOX) injection 40 mg  40 mg SubCUTAneous Daily    ondansetron (ZOFRAN-ODT) disintegrating tablet 4 mg  4 mg Oral Q8H PRN    Or    ondansetron (ZOFRAN) injection 4 mg  4 mg IntraVENous Q6H PRN    polyethylene glycol (GLYCOLAX) packet 17 g  17 g Oral Daily PRN    acetaminophen (TYLENOL) tablet 650 mg  650 mg Oral Q6H PRN    Or    acetaminophen (TYLENOL) suppository 650 mg  650 mg Rectal Q6H PRN    insulin lispro (HUMALOG,ADMELOG) injection vial 0-4 Units  0-4 Units SubCUTAneous 4x Daily AC & HS    hydrALAZINE (APRESOLINE) injection 10 mg  10 mg IntraVENous Q6H PRN       Physical Examination:    General:   Well but weak looking, not in any distress    Eyes:   pink conjunctivae, PERRLA with no discharge.  ENT:   no ottorrhea or rhinorrhea with dry mucous membranes  Pulm:  decreased but clear breath sounds without crackles or wheezes  Card:  no JVD or murmurs, has regular and normal S1, S2 without thrills, bruits.   Abd:  Soft, non-tender, non-distended, normoactive bowel sounds   Musc:  No cyanosis, clubbing, atrophy or deformities.  Neuro: Awake and alert. Generally a non focal exam.   Psych:  Has little insight to his illness     Diagnostic testing:    Laboratory data reviewed and independently interpreted:    No results for input(s): \"WBC\", \"HGB\", \"HCT\", \"RBC\", \"MCV\", \"MCH\", \"PLT\" in the last 72 hours.    Lab Results   Component Value Date/Time    LACTA 0.6 10/22/2020 06:30 PM     No results for input(s): \"NA\", \"K\", \"CL\", \"CO2\", \"GLUCOSE\", \"BUN\", \"CREATININE\", \"CALCIUM\", \"MG\", \"PHOS\", \"BILITOT\", \"ALKPHOS\", \"AST\", \"ALT\", \"INR\" in the last 72 hours.    Invalid input(s): \"PROT\", \"ALB\"    No components found for: \"GLUCOSEPOC\"  No results found for: \"CHOL\", \"TRIG\", \"HDL\"    Imaging data reviewed:    MRI LUMBAR SPINE WO CONTRAST    Result Date: 12/12/2024  1. Moderate acute L3 fracture along the 
events reported overnight for the patient patient continues to be stable with stable vital signs  Vitals:          Last 24hrs VS reviewed since prior progress note. Most recent are:    Vitals:    12/09/24 0948   BP: 131/76   Pulse: 72   Resp: 17   Temp: 97.9 °F (36.6 °C)   SpO2: 96%     SpO2 Readings from Last 6 Encounters:   12/09/24 96%   08/28/24 95%   04/03/24 95%   10/04/23 96%          Intake/Output Summary (Last 24 hours) at 12/9/2024 1116  Last data filed at 12/8/2024 2330  Gross per 24 hour   Intake 5 ml   Output 425 ml   Net -420 ml          Exam:     Physical Exam:    Gen: Elderly chronically ill male in no acute distress  HEENT:  Pink conjunctivae, PERRL, hearing intact to voice, moist mucous membranes  Neck:  Supple, without masses, thyroid non-tender  Resp:  No accessory muscle use, clear breath sounds without wheezes rales or rhonchi  Card:  No murmurs, normal S1, S2 without thrills, bruits or peripheral edema  Abd:  Soft, non-tender, non-distended, normoactive bowel sounds are present  Musc:  No cyanosis or clubbing  Skin:  No rashes or ulcers, skin turgor is good  Neuro:  Cranial nerves 3-12 are grossly intact,  strength is 5/5 bilaterally and dorsi / plantarflexion is 5/5 bilaterally, follows commands appropriately  Psych: Fair insight, oriented to person, place and time, alert       Telemetry reviewed:   normal sinus rhythm    Medications Reviewed: (see below)    Lab Data Reviewed: (see below)    ______________________________________________________________________    Medications:     Current Facility-Administered Medications   Medication Dose Route Frequency    aspirin EC tablet 81 mg  81 mg Oral Daily    donepezil (ARICEPT) tablet 10 mg  10 mg Oral Nightly    memantine (NAMENDA) tablet 10 mg  10 mg Oral BID    pravastatin (PRAVACHOL) tablet 40 mg  40 mg Oral Nightly    sertraline (ZOLOFT) tablet 100 mg  100 mg Oral QPM    tamsulosin (FLOMAX) capsule 0.4 mg  0.4 mg Oral QPM    glucose 
potassium chloride 10 mEq/100 mL IVPB (Peripheral Line)  10 mEq IntraVENous PRN    magnesium sulfate 2000 mg in 50 mL IVPB premix  2,000 mg IntraVENous PRN    enoxaparin (LOVENOX) injection 40 mg  40 mg SubCUTAneous Daily    ondansetron (ZOFRAN-ODT) disintegrating tablet 4 mg  4 mg Oral Q8H PRN    Or    ondansetron (ZOFRAN) injection 4 mg  4 mg IntraVENous Q6H PRN    polyethylene glycol (GLYCOLAX) packet 17 g  17 g Oral Daily PRN    acetaminophen (TYLENOL) tablet 650 mg  650 mg Oral Q6H PRN    Or    acetaminophen (TYLENOL) suppository 650 mg  650 mg Rectal Q6H PRN    insulin lispro (HUMALOG,ADMELOG) injection vial 0-4 Units  0-4 Units SubCUTAneous 4x Daily AC & HS    hydrALAZINE (APRESOLINE) injection 10 mg  10 mg IntraVENous Q6H PRN       Physical Examination:    General:   Well but weak looking, not in any distress    Eyes:   pink conjunctivae, PERRLA with no discharge.  ENT:   no ottorrhea or rhinorrhea with dry mucous membranes  Pulm:  no accessory muscle use, clear breath sounds without crackles or wheezes  Card:  no JVD or murmurs, has regular and normal S1, S2 without thrills, bruits.   Abd:  Soft, non-tender, non-distended, normoactive bowel sounds   Musc:  No cyanosis, clubbing, atrophy or deformities.  Skin:  No rashes, bruising or ulcers.   Neuro: Awake and alert. Generally a non focal exam. Follows commands appropriately  Psych:  Has little insight to his illness     Diagnostic testing:    Laboratory data reviewed and independently interpreted:    No results for input(s): \"WBC\", \"HGB\", \"HCT\", \"RBC\", \"MCV\", \"MCH\", \"PLT\" in the last 72 hours.    Lab Results   Component Value Date/Time    LACTA 0.6 10/22/2020 06:30 PM     No results for input(s): \"NA\", \"K\", \"CL\", \"CO2\", \"GLUCOSE\", \"BUN\", \"CREATININE\", \"CALCIUM\", \"MG\", \"PHOS\", \"BILITOT\", \"ALKPHOS\", \"AST\", \"ALT\", \"INR\" in the last 72 hours.    Invalid input(s): \"PROT\", \"ALB\"    No components found for: \"GLUCOSEPOC\"  No results found for: \"CHOL\", \"TRIG\", 
endplate. 2. Mild to moderate degenerative changes most pronounced at L3-4 and L4-5. 23X Electronically signed by Tracy Moreno    IR KYPHOPLASTY LUMBAR 1 VERTEBRAL BODY    Result Date: 12/12/2024  1.  Successful L3 kyphoplasty as above. 2.  Recommend initiation of medical management for osteoporosis if not already performed. Electronically signed by KLAUS BERRIOS    CT LUMBAR SPINE WO CONTRAST    Result Date: 12/9/2024  1. Acute burst fracture of L3 with moderate loss of height and mild retropulsion causing mild spinal stenosis. 2. Spondylosis as above. Electronically signed by LOAN VALDEZ    XR HIP BILATERAL W AP PELVIS (2 VIEWS)    Result Date: 12/8/2024  No acute process seen. Electronically signed by Kari Pat    XR CHEST (2 VW)    Result Date: 12/8/2024  No acute process identified Electronically signed by Kari Pat    CT HEAD WO CONTRAST    Result Date: 12/8/2024  No evidence of acute intracranial abnormality. No skull fracture or significant scalp lesion. Electronically signed by LEI MARTIN    Imaging studies reviewed and reports noted, Telemetry reviewed and independently interpreted by me and available notes from other care providers - all reviewed by me on: 12/18/2024    Assessment and Plan:    Closed fracture of third lumbar vertebra POA: admitted with a moderate acute L3 fracture along the inferior endplate. Following a fall. Seen by IR and he is sp a L3 kyphoplasty done 12/12. Has progressed well post procedure.   plan  Continue PT, OT as tolerated as we await SNF placement. Acetaminophen PRN.   Currently patient is pending Auth    Recurrent falls / Syncope and collapse POA: no LOC. 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 CT scan head unremarkable. EEG showed no seizure activity.   Echocardiogram showed a normal EF 72%. Likely from physical debility. Stable.   Continue fall precautions    Alzheimer's type dementia with late onset without behavioral disturbance /  Anxiety with 
without behavioral disturbance /  Anxiety with depression POA: at risk for delirium. Currently stable. Continue Aricept, Namenda and Zoloft. Supportive care    Hyperlipidemia POA: chronic and stable. Continue Pravastatin    BPH (benign prostatic hyperplasia) POA: continue Flomax    Care Plan discussed with: Patient, Nursing, CM     Prophylaxis:  Lovenox                Expected Disposition:  SNF/LTC    PCP:      Manolo Bedolla MD     I have personally examined and treated the patient at bedside during this period. To assist coordination of care and communication with nursing and staff, this note may be preliminary early in the day, but finalized by end of the day.         ___________________________________________________    Attending Physician:   Patel Escoto MD

## 2024-12-19 NOTE — DISCHARGE SUMMARY
Hospitalist Discharge Summary     Patient ID:  Deni Crocker  [id number]  74 y.o.  1950    Admit date: 12/8/2024    Discharge date and time: 12/19/2024    Admission Diagnoses: Syncope and collapse [R55]  Fall, initial encounter [W19.XXXA]  Falls frequently [R29.6]    Discharge Diagnoses:    Principal Problem (Resolved):    Closed fracture of third lumbar vertebra (HCC)  Active Problems:    Alzheimer's type dementia with late onset without behavioral disturbance (HCC)    Recurrent falls    Hyperlipidemia    Anxiety with depression    BPH (benign prostatic hyperplasia)  Resolved Problems:    Syncope and collapse         Hospital Course:     Closed fracture of third lumbar vertebra POA: admitted with a moderate acute L3 fracture along the inferior endplate. Following a fall. Seen by IR and he is sp a L3 kyphoplasty done 12/12. Has progressed well post procedure.   plan  Acetaminophen PRN.     Recurrent falls / Syncope and collapse POA: no LOC. 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 CT scan head unremarkable. EEG showed no seizure activity.   Echocardiogram showed a normal EF 72%. Likely from physical debility. Stable.   Continue fall precautions     Alzheimer's type dementia with late onset without behavioral disturbance /  Anxiety with depression POA: at risk for delirium but he has remained overall stable.   Continue Aricept, Namenda and Zoloft.      Hyperlipidemia POA: chronic and stable. Continue Pravastatin     BPH (benign prostatic hyperplasia) POA: asymptomatic. Continue Flomax    Imaging  IR KYPHOPLASTY LUMBAR 1 VERTEBRAL BODY    Result Date: 12/12/2024  1.  Successful L3 kyphoplasty as above. 2.  Recommend initiation of medical management for osteoporosis if not already performed. Electronically signed by KLAUS BERRIOS       PCP: Manolo Bedolla MD     Consults: orthopedic surgery    Condition of patient at discharge: Stable    Discharge Exam:    Physical Exam:    General:   Well but weak looking,
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
## 2025-03-05 ENCOUNTER — OFFICE VISIT (OUTPATIENT)
Age: 75
End: 2025-03-05
Payer: MEDICARE

## 2025-03-05 VITALS
HEART RATE: 77 BPM | SYSTOLIC BLOOD PRESSURE: 154 MMHG | DIASTOLIC BLOOD PRESSURE: 78 MMHG | OXYGEN SATURATION: 97 % | RESPIRATION RATE: 16 BRPM

## 2025-03-05 DIAGNOSIS — F32.A DEPRESSION, UNSPECIFIED DEPRESSION TYPE: ICD-10-CM

## 2025-03-05 DIAGNOSIS — F02.80 MIXED ALZHEIMER'S AND VASCULAR DEMENTIA (HCC): Primary | ICD-10-CM

## 2025-03-05 DIAGNOSIS — F01.50 MIXED ALZHEIMER'S AND VASCULAR DEMENTIA (HCC): Primary | ICD-10-CM

## 2025-03-05 DIAGNOSIS — Z86.73 HISTORY OF STROKE: ICD-10-CM

## 2025-03-05 DIAGNOSIS — G30.9 MIXED ALZHEIMER'S AND VASCULAR DEMENTIA (HCC): Primary | ICD-10-CM

## 2025-03-05 PROCEDURE — 1126F AMNT PAIN NOTED NONE PRSNT: CPT | Performed by: PSYCHIATRY & NEUROLOGY

## 2025-03-05 PROCEDURE — 3078F DIAST BP <80 MM HG: CPT | Performed by: PSYCHIATRY & NEUROLOGY

## 2025-03-05 PROCEDURE — 3077F SYST BP >= 140 MM HG: CPT | Performed by: PSYCHIATRY & NEUROLOGY

## 2025-03-05 PROCEDURE — 99214 OFFICE O/P EST MOD 30 MIN: CPT | Performed by: PSYCHIATRY & NEUROLOGY

## 2025-03-05 PROCEDURE — 1159F MED LIST DOCD IN RCRD: CPT | Performed by: PSYCHIATRY & NEUROLOGY

## 2025-03-05 PROCEDURE — 1123F ACP DISCUSS/DSCN MKR DOCD: CPT | Performed by: PSYCHIATRY & NEUROLOGY

## 2025-03-05 RX ORDER — MEMANTINE HYDROCHLORIDE 10 MG/1
10 TABLET ORAL 2 TIMES DAILY
Qty: 180 TABLET | Refills: 3 | Status: ON HOLD | OUTPATIENT
Start: 2025-03-05

## 2025-03-05 RX ORDER — DONEPEZIL HYDROCHLORIDE 10 MG/1
10 TABLET, FILM COATED ORAL NIGHTLY
Qty: 90 TABLET | Refills: 3 | Status: ON HOLD | OUTPATIENT
Start: 2025-03-05

## 2025-03-05 RX ORDER — SERTRALINE HYDROCHLORIDE 100 MG/1
100 TABLET, FILM COATED ORAL EVERY EVENING
Qty: 90 TABLET | Refills: 3 | Status: ON HOLD | OUTPATIENT
Start: 2025-03-05

## 2025-03-07 ENCOUNTER — HOSPITAL ENCOUNTER (INPATIENT)
Facility: HOSPITAL | Age: 75
LOS: 2 days | Discharge: ANOTHER HOSPITAL | DRG: 100 | End: 2025-03-09
Attending: EMERGENCY MEDICINE | Admitting: HOSPITALIST
Payer: MEDICARE

## 2025-03-07 ENCOUNTER — APPOINTMENT (OUTPATIENT)
Facility: HOSPITAL | Age: 75
DRG: 100 | End: 2025-03-07
Payer: MEDICARE

## 2025-03-07 DIAGNOSIS — R56.9 SEIZURE (HCC): ICD-10-CM

## 2025-03-07 DIAGNOSIS — I63.9 CEREBROVASCULAR ACCIDENT (CVA), UNSPECIFIED MECHANISM (HCC): Primary | ICD-10-CM

## 2025-03-07 LAB
ALBUMIN SERPL-MCNC: 3.2 G/DL (ref 3.5–5)
ALBUMIN/GLOB SERPL: 1 (ref 1.1–2.2)
ALP SERPL-CCNC: 90 U/L (ref 45–117)
ALT SERPL-CCNC: 18 U/L (ref 12–78)
ANION GAP SERPL CALC-SCNC: 3 MMOL/L (ref 2–12)
ANION GAP SERPL CALC-SCNC: 6 MMOL/L (ref 2–12)
AST SERPL-CCNC: 26 U/L (ref 15–37)
BASOPHILS # BLD: 0.03 K/UL (ref 0–0.1)
BASOPHILS NFR BLD: 0.5 % (ref 0–1)
BILIRUB SERPL-MCNC: 0.4 MG/DL (ref 0.2–1)
BUN SERPL-MCNC: 9 MG/DL (ref 6–20)
BUN SERPL-MCNC: 9 MG/DL (ref 6–20)
BUN/CREAT SERPL: 10 (ref 12–20)
BUN/CREAT SERPL: 9 (ref 12–20)
CALCIUM SERPL-MCNC: 8.3 MG/DL (ref 8.5–10.1)
CALCIUM SERPL-MCNC: 8.6 MG/DL (ref 8.5–10.1)
CHLORIDE SERPL-SCNC: 101 MMOL/L (ref 97–108)
CHLORIDE SERPL-SCNC: 103 MMOL/L (ref 97–108)
CO2 SERPL-SCNC: 30 MMOL/L (ref 21–32)
CO2 SERPL-SCNC: 31 MMOL/L (ref 21–32)
CREAT SERPL-MCNC: 0.89 MG/DL (ref 0.7–1.3)
CREAT SERPL-MCNC: 0.96 MG/DL (ref 0.7–1.3)
DIFFERENTIAL METHOD BLD: ABNORMAL
EOSINOPHIL # BLD: 0.16 K/UL (ref 0–0.4)
EOSINOPHIL NFR BLD: 2.7 % (ref 0–7)
ERYTHROCYTE [DISTWIDTH] IN BLOOD BY AUTOMATED COUNT: 13 % (ref 11.5–14.5)
GLOBULIN SER CALC-MCNC: 3.3 G/DL (ref 2–4)
GLUCOSE BLD STRIP.AUTO-MCNC: 120 MG/DL (ref 65–117)
GLUCOSE BLD STRIP.AUTO-MCNC: 97 MG/DL (ref 65–117)
GLUCOSE SERPL-MCNC: 111 MG/DL (ref 65–100)
GLUCOSE SERPL-MCNC: 126 MG/DL (ref 65–100)
HCT VFR BLD AUTO: 37.5 % (ref 36.6–50.3)
HGB BLD-MCNC: 12.3 G/DL (ref 12.1–17)
IMM GRANULOCYTES # BLD AUTO: 0.03 K/UL (ref 0–0.04)
IMM GRANULOCYTES NFR BLD AUTO: 0.5 % (ref 0–0.5)
INR PPP: 1 (ref 0.9–1.1)
LYMPHOCYTES # BLD: 1.67 K/UL (ref 0.8–3.5)
LYMPHOCYTES NFR BLD: 28.4 % (ref 12–49)
MCH RBC QN AUTO: 30.4 PG (ref 26–34)
MCHC RBC AUTO-ENTMCNC: 32.8 G/DL (ref 30–36.5)
MCV RBC AUTO: 92.8 FL (ref 80–99)
MONOCYTES # BLD: 0.33 K/UL (ref 0–1)
MONOCYTES NFR BLD: 5.6 % (ref 5–13)
NEUTS SEG # BLD: 3.66 K/UL (ref 1.8–8)
NEUTS SEG NFR BLD: 62.3 % (ref 32–75)
NRBC # BLD: 0 K/UL (ref 0–0.01)
NRBC BLD-RTO: 0 PER 100 WBC
NT PRO BNP: 120 PG/ML
PLATELET # BLD AUTO: 154 K/UL (ref 150–400)
PMV BLD AUTO: 9 FL (ref 8.9–12.9)
POTASSIUM SERPL-SCNC: 4 MMOL/L (ref 3.5–5.1)
POTASSIUM SERPL-SCNC: ABNORMAL MMOL/L (ref 3.5–5.1)
PROT SERPL-MCNC: 6.5 G/DL (ref 6.4–8.2)
PROTHROMBIN TIME: 10.9 SEC (ref 9.2–11.2)
RBC # BLD AUTO: 4.04 M/UL (ref 4.1–5.7)
SERVICE CMNT-IMP: ABNORMAL
SERVICE CMNT-IMP: NORMAL
SODIUM SERPL-SCNC: 135 MMOL/L (ref 136–145)
SODIUM SERPL-SCNC: 139 MMOL/L (ref 136–145)
TROPONIN I SERPL HS-MCNC: 15 NG/L (ref 0–76)
WBC # BLD AUTO: 5.9 K/UL (ref 4.1–11.1)

## 2025-03-07 PROCEDURE — 3E03317 INTRODUCTION OF OTHER THROMBOLYTIC INTO PERIPHERAL VEIN, PERCUTANEOUS APPROACH: ICD-10-PCS | Performed by: PSYCHIATRY & NEUROLOGY

## 2025-03-07 PROCEDURE — 85025 COMPLETE CBC W/AUTO DIFF WBC: CPT

## 2025-03-07 PROCEDURE — 80053 COMPREHEN METABOLIC PANEL: CPT

## 2025-03-07 PROCEDURE — 6360000002 HC RX W HCPCS: Performed by: EMERGENCY MEDICINE

## 2025-03-07 PROCEDURE — 71045 X-RAY EXAM CHEST 1 VIEW: CPT

## 2025-03-07 PROCEDURE — 83880 ASSAY OF NATRIURETIC PEPTIDE: CPT

## 2025-03-07 PROCEDURE — 84484 ASSAY OF TROPONIN QUANT: CPT

## 2025-03-07 PROCEDURE — 2000000000 HC ICU R&B

## 2025-03-07 PROCEDURE — 70496 CT ANGIOGRAPHY HEAD: CPT

## 2025-03-07 PROCEDURE — 0042T CT BRAIN PERFUSION: CPT

## 2025-03-07 PROCEDURE — 96374 THER/PROPH/DIAG INJ IV PUSH: CPT

## 2025-03-07 PROCEDURE — 93005 ELECTROCARDIOGRAM TRACING: CPT | Performed by: EMERGENCY MEDICINE

## 2025-03-07 PROCEDURE — 70450 CT HEAD/BRAIN W/O DYE: CPT

## 2025-03-07 PROCEDURE — 85610 PROTHROMBIN TIME: CPT

## 2025-03-07 PROCEDURE — 82962 GLUCOSE BLOOD TEST: CPT

## 2025-03-07 PROCEDURE — 2500000003 HC RX 250 WO HCPCS: Performed by: EMERGENCY MEDICINE

## 2025-03-07 PROCEDURE — 36415 COLL VENOUS BLD VENIPUNCTURE: CPT

## 2025-03-07 PROCEDURE — 6360000002 HC RX W HCPCS

## 2025-03-07 PROCEDURE — 6360000004 HC RX CONTRAST MEDICATION: Performed by: EMERGENCY MEDICINE

## 2025-03-07 PROCEDURE — 4A03X5D MEASUREMENT OF ARTERIAL FLOW, INTRACRANIAL, EXTERNAL APPROACH: ICD-10-PCS | Performed by: PSYCHIATRY & NEUROLOGY

## 2025-03-07 PROCEDURE — 99285 EMERGENCY DEPT VISIT HI MDM: CPT

## 2025-03-07 RX ORDER — ONDANSETRON 2 MG/ML
4 INJECTION INTRAMUSCULAR; INTRAVENOUS EVERY 6 HOURS PRN
Status: DISCONTINUED | OUTPATIENT
Start: 2025-03-07 | End: 2025-03-09 | Stop reason: HOSPADM

## 2025-03-07 RX ORDER — SODIUM CHLORIDE 0.9 % (FLUSH) 0.9 %
10 SYRINGE (ML) INJECTION ONCE
Status: COMPLETED | OUTPATIENT
Start: 2025-03-07 | End: 2025-03-07

## 2025-03-07 RX ORDER — LABETALOL HYDROCHLORIDE 5 MG/ML
10 INJECTION, SOLUTION INTRAVENOUS
Status: DISCONTINUED | OUTPATIENT
Start: 2025-03-07 | End: 2025-03-09

## 2025-03-07 RX ORDER — ATORVASTATIN CALCIUM 20 MG/1
80 TABLET, FILM COATED ORAL NIGHTLY
Status: DISCONTINUED | OUTPATIENT
Start: 2025-03-08 | End: 2025-03-08

## 2025-03-07 RX ORDER — LEVETIRACETAM 500 MG/5ML
2000 INJECTION, SOLUTION, CONCENTRATE INTRAVENOUS ONCE
Status: COMPLETED | OUTPATIENT
Start: 2025-03-07 | End: 2025-03-07

## 2025-03-07 RX ORDER — ONDANSETRON 4 MG/1
4 TABLET, ORALLY DISINTEGRATING ORAL EVERY 8 HOURS PRN
Status: DISCONTINUED | OUTPATIENT
Start: 2025-03-07 | End: 2025-03-09 | Stop reason: HOSPADM

## 2025-03-07 RX ORDER — POLYETHYLENE GLYCOL 3350 17 G/17G
17 POWDER, FOR SOLUTION ORAL DAILY PRN
Status: DISCONTINUED | OUTPATIENT
Start: 2025-03-07 | End: 2025-03-09 | Stop reason: HOSPADM

## 2025-03-07 RX ORDER — SODIUM CHLORIDE 9 MG/ML
INJECTION, SOLUTION INTRAVENOUS PRN
Status: DISCONTINUED | OUTPATIENT
Start: 2025-03-07 | End: 2025-03-09 | Stop reason: HOSPADM

## 2025-03-07 RX ORDER — IOPAMIDOL 755 MG/ML
140 INJECTION, SOLUTION INTRAVASCULAR
Status: COMPLETED | OUTPATIENT
Start: 2025-03-07 | End: 2025-03-07

## 2025-03-07 RX ORDER — ASPIRIN 81 MG/1
81 TABLET, CHEWABLE ORAL DAILY
Status: DISCONTINUED | OUTPATIENT
Start: 2025-03-08 | End: 2025-03-09 | Stop reason: HOSPADM

## 2025-03-07 RX ORDER — SODIUM CHLORIDE 0.9 % (FLUSH) 0.9 %
5-40 SYRINGE (ML) INJECTION PRN
Status: DISCONTINUED | OUTPATIENT
Start: 2025-03-07 | End: 2025-03-09 | Stop reason: HOSPADM

## 2025-03-07 RX ORDER — ASPIRIN 300 MG/1
300 SUPPOSITORY RECTAL DAILY
Status: DISCONTINUED | OUTPATIENT
Start: 2025-03-08 | End: 2025-03-09 | Stop reason: HOSPADM

## 2025-03-07 RX ORDER — LORAZEPAM 2 MG/ML
INJECTION INTRAMUSCULAR
Status: COMPLETED
Start: 2025-03-07 | End: 2025-03-07

## 2025-03-07 RX ORDER — INSULIN LISPRO 100 [IU]/ML
0-4 INJECTION, SOLUTION INTRAVENOUS; SUBCUTANEOUS EVERY 6 HOURS SCHEDULED
Status: DISCONTINUED | OUTPATIENT
Start: 2025-03-08 | End: 2025-03-09 | Stop reason: HOSPADM

## 2025-03-07 RX ORDER — ENOXAPARIN SODIUM 100 MG/ML
40 INJECTION SUBCUTANEOUS
Status: DISCONTINUED | OUTPATIENT
Start: 2025-03-08 | End: 2025-03-09 | Stop reason: HOSPADM

## 2025-03-07 RX ORDER — DIAZEPAM 10 MG/2ML
5 INJECTION, SOLUTION INTRAMUSCULAR; INTRAVENOUS EVERY 4 HOURS PRN
Status: DISCONTINUED | OUTPATIENT
Start: 2025-03-07 | End: 2025-03-07

## 2025-03-07 RX ORDER — HYDRALAZINE HYDROCHLORIDE 20 MG/ML
10 INJECTION INTRAMUSCULAR; INTRAVENOUS EVERY 4 HOURS PRN
Status: DISCONTINUED | OUTPATIENT
Start: 2025-03-07 | End: 2025-03-09

## 2025-03-07 RX ORDER — SODIUM CHLORIDE 0.9 % (FLUSH) 0.9 %
5-40 SYRINGE (ML) INJECTION EVERY 12 HOURS SCHEDULED
Status: DISCONTINUED | OUTPATIENT
Start: 2025-03-07 | End: 2025-03-09 | Stop reason: HOSPADM

## 2025-03-07 RX ORDER — LEVETIRACETAM 500 MG/5ML
1000 INJECTION, SOLUTION, CONCENTRATE INTRAVENOUS EVERY 12 HOURS
Status: DISCONTINUED | OUTPATIENT
Start: 2025-03-08 | End: 2025-03-08

## 2025-03-07 RX ORDER — DEXTROSE MONOHYDRATE 100 MG/ML
INJECTION, SOLUTION INTRAVENOUS CONTINUOUS PRN
Status: DISCONTINUED | OUTPATIENT
Start: 2025-03-07 | End: 2025-03-09 | Stop reason: HOSPADM

## 2025-03-07 RX ADMIN — IOPAMIDOL 140 ML: 755 INJECTION, SOLUTION INTRAVENOUS at 16:29

## 2025-03-07 RX ADMIN — LEVETIRACETAM 2000 MG: 100 INJECTION INTRAVENOUS at 16:45

## 2025-03-07 RX ADMIN — Medication 21 MG: at 16:40

## 2025-03-07 RX ADMIN — LORAZEPAM 2 MG: 2 INJECTION INTRAMUSCULAR; INTRAVENOUS at 16:00

## 2025-03-07 RX ADMIN — SODIUM CHLORIDE, PRESERVATIVE FREE 10 ML: 5 INJECTION INTRAVENOUS at 16:40

## 2025-03-07 RX ADMIN — SODIUM CHLORIDE, PRESERVATIVE FREE 10 ML: 5 INJECTION INTRAVENOUS at 16:39

## 2025-03-07 NOTE — ED PROVIDER NOTES
[facility] EMERGENCY DEPARTMENT  EMERGENCY DEPARTMENT ENCOUNTER      Pt Name: Deni Crocker  MRN: [id number]  Birthdate 1950  Date of evaluation: 3/7/2025  Provider: Shelbie Barragan DO    CHIEF COMPLAINT       Chief Complaint   Patient presents with    Altered Mental Status         HISTORY OF PRESENT ILLNESS   (Location/Symptom, Timing/Onset, Context/Setting, Quality, Duration, Modifying Factors, Severity)  Note limiting factors.   HPI      Review of External Medical Records:     Nursing Notes were reviewed.    REVIEW OF SYSTEMS    (2-9 systems for level 4, 10 or more for level 5)     Review of Systems    Except as noted above the remainder of the review of systems was reviewed and negative.       PAST MEDICAL HISTORY     Past Medical History:   Diagnosis Date    Hemorrhagic stroke (HCC) 3/28/2017    S/p TPA    Hypertension     Ill-defined condition     Eczema    Stroke (HCC)     Type II diabetes mellitus (HCC) 3/29/2017         SURGICAL HISTORY       Past Surgical History:   Procedure Laterality Date    IR KYPHOPLASTY LUMBAR 1 VERTEBRAL BODY  12/12/2024    IR KYPHOPLASTY LUMBAR FIRST LEVEL 12/12/2024 [facility] CATH/EP/IR LAB    ORTHOPEDIC SURGERY  03/2017    left wrist surgery         CURRENT MEDICATIONS       Previous Medications    ACETAMINOPHEN (TYLENOL) 325 MG TABLET    Take 2 tablets by mouth every 6 hours as needed for Pain or Fever    ASPIRIN 81 MG EC TABLET    Take 1 tablet by mouth daily    CETIRIZINE HCL 10 MG CAPS    Take 10 mg by mouth daily    DONEPEZIL (ARICEPT) 10 MG TABLET    Take 1 tablet by mouth nightly    MEMANTINE (NAMENDA) 10 MG TABLET    Take 1 tablet by mouth 2 times daily    POLYETHYLENE GLYCOL (GLYCOLAX) 17 G PACKET    Take 1 packet by mouth daily as needed for Constipation    PRAVASTATIN (PRAVACHOL) 40 MG TABLET    Take 1 tablet by mouth    SERTRALINE (ZOLOFT) 100 MG TABLET    Take 1 tablet by mouth every evening    TAMSULOSIN (FLOMAX) 0.4 MG CAPSULE    Take 1  ordered.    Risk  Prescription drug management.  Decision regarding hospitalization.            REASSESSMENT            CONSULTS:  IP CONSULT TO TELE-NEUROLOGY    PROCEDURES:  Unless otherwise noted below, none     Procedures    5:38 PM  Giving TNK, continued nystagmus to left      5:38 PM  Note reported hypoxia but thi is largely positional and patient is mouth breathing      5:38 PM  No significant improvement, still continued left gaze, no nystgmus at this time      75-year-old male presents emergency department with chief complaint of sudden onset of altered mental status.  This occurred at approximately 3:15 PM.  He has a history of ischemic stroke approximately 9 years ago for which he received TNK.  He is on baby aspirin regimen as well as other medications but no known blood thinners.  EMS was called as he was sitting at a hair salon when he had a sudden onset of change in mental status and started leaning to the right.  Spouse then noted seizure activity.  Patient was incontinent of urine.  When EMS arrived he was not seizing but did appear to be extremely weak on the left side.  He was able to squeeze hands and move right lower extremity.  Shortly prior to arrival he developed generalized seizure and foaming at the mouth.  This was witnessed when he arrived he was given 2 mg of Ativan.  Occasionally would dip into the low 80s but this was largely due to positioning and mouth breathing.  Noted to have deficit to the entire left side of the body with nystagmus fast to left.  Stroke protocol was initiated.  Patient was slightly hypertensive.  His blood sugar was 109 and route.  Teleneurology elected to give TNK.  This was done per the request but no significant change in patient's status thus far.  Reviewed chest x-ray but awaiting official read.  Some pulmonary edema versus fibrosis noted so added BNP.  No significant shortness of breath at this time.  Will admit to ICU secondary to TNK being given. Still  waiting for temp      Perfect Serve Consult for Admission  5:45 PM    ED Room Number: ER19/19  Patient Name and age:  Deni Crocker 75 y.o.  male  Working Diagnosis:   1. Cerebrovascular accident (CVA), unspecified mechanism (HCC)    2. Seizure (HCC)        COVID-19 Suspicion: No  Sepsis present:  No  Reassessment needed: No  Code Status:  Full Code  Readmission: No  Isolation Requirements: no  Recommended Level of Care: ICU  Department: [facility] - [phone]  Consulting Provider:     Other:       Total critical care time spent exclusive of procedures:  75 minutes.     FINAL IMPRESSION      1. Cerebrovascular accident (CVA), unspecified mechanism (HCC)    2. Seizure (HCC)          DISPOSITION/PLAN   DISPOSITION Decision To Admit 03/07/2025 05:38:36 PM      PATIENT REFERRED TO:  No follow-up provider specified.    DISCHARGE MEDICATIONS:  New Prescriptions    No medications on file         (Please note that portions of this note were completed with a voice recognition program.  Efforts were made to edit the dictations but occasionally words are mis-transcribed.)    Shelbie Barragan DO (electronically signed)  Emergency Attending Physician / Physician Assistant / Nurse Practitioner             Shelbie Barragan DO  03/07/25 1745       Shelbie Barragan DO  03/07/25 1748       Shelbie Barragan DO  03/07/25 1955

## 2025-03-07 NOTE — ED NOTES
1603 Pt FAIZAN to CT  1620 Pt desaturated to 79% RA, HOB elevated and patient placed on 15L NRB.  1630 Pt back from CT, teleneuro up at bedside.

## 2025-03-07 NOTE — ED TRIAGE NOTES
Pt to ED via EMS with stroke like symptoms. Per EMS patient was getting a haircut and became unresponsive leaning to the right side, pt then began seizing, no hx of seizures. Per EMS pt had another seizure in route to ED. BG for .     On arrival patient actively seizing, incontinent of urine, with L upward gaze deviation. Per Dr. Barragan take patient straight to CT.         Code Stroke Level: 1  Signs and symptoms: L sided weakness, unresponsive, AMS  Code Stroke activation time: 1548  Provider at bedside time:  1559  VAN score: Positive  Last Known Well (Time): 1515  Blood Glucose Result/Time: 120 1658   Blood Pressure: 184/100 1602  Anticoagulants (List medications): n/a

## 2025-03-07 NOTE — H&P
CRITICAL CARE ADMISSION NOTE    Name: Deni Crocker   : 1950   MRN: [id number]   Date: 3/7/2025        ICU PROBLEM LIST   Status Epilepticus  Concern for CVA  Post TNK Protocol    HISTORY OF PRESENT ILLNESS:   Deni Crocker is a 75 y.o. with a PMH of severe dementia, HTN, T2DM and previous hemorrhagic stroke/PE tPA (3/2017) who presents to [facility] ED on 3/7 with altered mental status.  History obtained by wife at bedside who reports that they were at the hair salon when she noticed that he began to have jerking of all of his extremities and gaze deviation.  She reports a brief period where he \"came to\" but reports another episode of full extremity jerking and foaming at the mouth a short while later.  Upon EMS arrival patient was postictal lethargic but following some commands.  While en route an additional seizure was noted and aborted with 2 mg of Ativan.  Upon arrival to ER patient remained altered noted to have dense left-sided deficits with nystagmus.  Stroke protocol was initiated.  CTh/CTA/CTP without acute intracranial abnormality.  Deficits remained and patient received TNK @ 1640.  ICU consulted for admission.     NEUROLOGICAL:    Status Epilepticus  Concern for CVA s/p TNK  Hx Severe Dementia/Alzheimer's & CVA w/ hemorrhagic conversion following tPA (3/2017)  -Patient with multiple episodes of seizure-like activity  -Ceribell in place -0% burden thus far  -Keppra loaded with 2G, continue 1G BID  -CTh/CTA/CTP all without LVO or intracranial abnormality  -Received TNK 3/7@1640  -Neurochecks per TNK protocol  -Neuroscience BP goal <180/105  -Hold DVT PPx & ASA for 24 hours  -Statin when able to take p.o.  -Repeat CT/MRI tomorrow at 1600 or with neurological changes  -Lipid Panel/HP/A1c pending  -Seizure precautions  -Neurology consulted, appreciate expertise  -Resume home Aricept, Namenda & Zoloft when able to take p.o.    PULMONOLOGY:   No active issues  -CXR pulmonary edema versus bilateral    Cardiovascular:      Rate and Rhythm: Regular rhythm. Tachycardia present.      Pulses: Normal pulses.      Heart sounds: Normal heart sounds.   Pulmonary:      Effort: Pulmonary effort is normal.      Comments: Lung sounds clear, diminished at bases bilaterally  Abdominal:      General: Abdomen is flat. Bowel sounds are normal.      Palpations: Abdomen is soft.   Musculoskeletal:      Right lower leg: No edema.      Left lower leg: No edema.   Skin:     General: Skin is warm and dry.      Capillary Refill: Capillary refill takes 2 to 3 seconds.   Neurological:      Comments: Withdrawal to pain in all 4 extremities, squinting eyes to noxious stimuli and moaning but not following commands at this time.  No focal deficits noted          BP (!) 154/100   Pulse (!) 106   Temp 97.8 °F (36.6 °C) (Oral)   Resp 21   Ht 1.854 m (6' 1\")   Wt 84.1 kg (185 lb 8 oz)   SpO2 100%   BMI 24.47 kg/m²      Temp (24hrs), Av.8 °F (36.6 °C), Min:97.8 °F (36.6 °C), Max:97.8 °F (36.6 °C)           Intake/Output:   No intake or output data in the 24 hours ending 25 1835    Imaging    No valid procedures specified.         CRITICAL CARE DOCUMENTATION  I had a face to face encounter with the patient, reviewed and interpreted patient data including clinical events, labs, images, vital signs, I/O's, and examined patient.  I have discussed the case and the plan and management of the patient's care with the consulting services, the bedside nurses and the respiratory therapist.      NOTE OF PERSONAL INVOLVEMENT IN CARE   This patient has a high probability of imminent, clinically significant deterioration, which requires the highest level of preparedness to intervene urgently. I participated in the decision-making and personally managed or directed the management of the following life and organ supporting interventions that required my frequent assessment to treat or prevent imminent deterioration.    I personally spent 65  minutes of critical care time.  This is time spent at this critically ill patient's bedside actively involved in patient care as well as the coordination of care.  This does not include any procedural time which has been billed separately.    YVROSE Navarrete - [facility] Critical Care  3/7/2025

## 2025-03-08 ENCOUNTER — APPOINTMENT (OUTPATIENT)
Facility: HOSPITAL | Age: 75
DRG: 100 | End: 2025-03-08
Payer: MEDICARE

## 2025-03-08 PROBLEM — R56.9 SEIZURE (HCC): Status: ACTIVE | Noted: 2025-03-08

## 2025-03-08 PROBLEM — I63.9 CEREBROVASCULAR ACCIDENT (CVA) (HCC): Status: ACTIVE | Noted: 2025-03-08

## 2025-03-08 LAB
ALBUMIN SERPL-MCNC: 3.1 G/DL (ref 3.5–5)
ALBUMIN/GLOB SERPL: 0.9 (ref 1.1–2.2)
ALP SERPL-CCNC: 84 U/L (ref 45–117)
ALT SERPL-CCNC: 19 U/L (ref 12–78)
AMMONIA PLAS-SCNC: 16 UMOL/L
ANION GAP SERPL CALC-SCNC: 3 MMOL/L (ref 2–12)
AST SERPL-CCNC: 26 U/L (ref 15–37)
BILIRUB SERPL-MCNC: 0.4 MG/DL (ref 0.2–1)
BUN SERPL-MCNC: 10 MG/DL (ref 6–20)
BUN/CREAT SERPL: 12 (ref 12–20)
CALCIUM SERPL-MCNC: 8.7 MG/DL (ref 8.5–10.1)
CHLORIDE SERPL-SCNC: 103 MMOL/L (ref 97–108)
CHOLEST SERPL-MCNC: 145 MG/DL
CO2 SERPL-SCNC: 32 MMOL/L (ref 21–32)
CREAT SERPL-MCNC: 0.84 MG/DL (ref 0.7–1.3)
ECHO AO ASC DIAM: 3.9 CM
ECHO AO ASCENDING AORTA INDEX: 1.88 CM/M2
ECHO AO ROOT DIAM: 3.5 CM
ECHO AO ROOT INDEX: 1.68 CM/M2
ECHO AV AREA PEAK VELOCITY: 3.1 CM2
ECHO AV AREA VTI: 3.1 CM2
ECHO AV AREA/BSA PEAK VELOCITY: 1.5 CM2/M2
ECHO AV AREA/BSA VTI: 1.5 CM2/M2
ECHO AV MEAN GRADIENT: 3 MMHG
ECHO AV MEAN VELOCITY: 0.8 M/S
ECHO AV PEAK GRADIENT: 5 MMHG
ECHO AV PEAK VELOCITY: 1.1 M/S
ECHO AV VELOCITY RATIO: 0.73
ECHO AV VTI: 24.7 CM
ECHO BSA: 2.08 M2
ECHO LA DIAMETER INDEX: 1.83 CM/M2
ECHO LA DIAMETER: 3.8 CM
ECHO LA TO AORTIC ROOT RATIO: 1.09
ECHO LA VOL A-L A2C: 38 ML (ref 18–58)
ECHO LA VOL A-L A4C: 30 ML (ref 18–58)
ECHO LA VOL BP: 31 ML (ref 18–58)
ECHO LA VOL MOD A2C: 37 ML (ref 18–58)
ECHO LA VOL MOD A4C: 26 ML (ref 18–58)
ECHO LA VOL/BSA BIPLANE: 15 ML/M2 (ref 16–34)
ECHO LA VOLUME AREA LENGTH: 34 ML
ECHO LA VOLUME INDEX A-L A2C: 18 ML/M2 (ref 16–34)
ECHO LA VOLUME INDEX A-L A4C: 14 ML/M2 (ref 16–34)
ECHO LA VOLUME INDEX AREA LENGTH: 16 ML/M2 (ref 16–34)
ECHO LA VOLUME INDEX MOD A2C: 18 ML/M2 (ref 16–34)
ECHO LA VOLUME INDEX MOD A4C: 13 ML/M2 (ref 16–34)
ECHO LV E' LATERAL VELOCITY: 8.03 CM/S
ECHO LV E' SEPTAL VELOCITY: 4.93 CM/S
ECHO LV EDV A2C: 87 ML
ECHO LV EDV A4C: 70 ML
ECHO LV EDV BP: 84 ML (ref 67–155)
ECHO LV EDV INDEX A4C: 34 ML/M2
ECHO LV EDV INDEX BP: 40 ML/M2
ECHO LV EDV NDEX A2C: 42 ML/M2
ECHO LV EF PHYSICIAN: 55 %
ECHO LV EJECTION FRACTION A2C: 50 %
ECHO LV EJECTION FRACTION A4C: 56 %
ECHO LV EJECTION FRACTION BIPLANE: 53 % (ref 55–100)
ECHO LV ESV A2C: 43 ML
ECHO LV ESV A4C: 30 ML
ECHO LV ESV BP: 40 ML (ref 22–58)
ECHO LV ESV INDEX A2C: 21 ML/M2
ECHO LV ESV INDEX A4C: 14 ML/M2
ECHO LV ESV INDEX BP: 19 ML/M2
ECHO LV FRACTIONAL SHORTENING: 34 % (ref 28–44)
ECHO LV INTERNAL DIMENSION DIASTOLE INDEX: 2.26 CM/M2
ECHO LV INTERNAL DIMENSION DIASTOLIC: 4.7 CM (ref 4.2–5.9)
ECHO LV INTERNAL DIMENSION SYSTOLIC INDEX: 1.49 CM/M2
ECHO LV INTERNAL DIMENSION SYSTOLIC: 3.1 CM
ECHO LV IVSD: 1.2 CM (ref 0.6–1)
ECHO LV MASS 2D: 237.9 G (ref 88–224)
ECHO LV MASS INDEX 2D: 114.4 G/M2 (ref 49–115)
ECHO LV POSTERIOR WALL DIASTOLIC: 1.4 CM (ref 0.6–1)
ECHO LV RELATIVE WALL THICKNESS RATIO: 0.6
ECHO LVOT AREA: 4.2 CM2
ECHO LVOT AV VTI INDEX: 0.79
ECHO LVOT DIAM: 2.3 CM
ECHO LVOT MEAN GRADIENT: 2 MMHG
ECHO LVOT PEAK GRADIENT: 3 MMHG
ECHO LVOT PEAK VELOCITY: 0.8 M/S
ECHO LVOT STROKE VOLUME INDEX: 38.9 ML/M2
ECHO LVOT SV: 81 ML
ECHO LVOT VTI: 19.5 CM
ECHO MV A VELOCITY: 0.62 M/S
ECHO MV E DECELERATION TIME (DT): 178 MS
ECHO MV E VELOCITY: 0.67 M/S
ECHO MV E/A RATIO: 1.08
ECHO MV E/E' LATERAL: 8.34
ECHO MV E/E' RATIO (AVERAGED): 10.97
ECHO MV E/E' SEPTAL: 13.59
ECHO PV MAX VELOCITY: 0.8 M/S
ECHO PV PEAK GRADIENT: 2 MMHG
ECHO RV FREE WALL PEAK S': 10.3 CM/S
ECHO RV INTERNAL DIMENSION: 3.4 CM
ECHO RV TAPSE: 1.5 CM (ref 1.7–?)
ECHO TV REGURGITANT MAX VELOCITY: 2.84 M/S
ECHO TV REGURGITANT PEAK GRADIENT: 32 MMHG
ERYTHROCYTE [DISTWIDTH] IN BLOOD BY AUTOMATED COUNT: 13 % (ref 11.5–14.5)
EST. AVERAGE GLUCOSE BLD GHB EST-MCNC: 105 MG/DL
FOLATE SERPL-MCNC: 4.9 NG/ML (ref 5–21)
GLOBULIN SER CALC-MCNC: 3.3 G/DL (ref 2–4)
GLUCOSE BLD STRIP.AUTO-MCNC: 104 MG/DL (ref 65–117)
GLUCOSE BLD STRIP.AUTO-MCNC: 106 MG/DL (ref 65–117)
GLUCOSE BLD STRIP.AUTO-MCNC: 70 MG/DL (ref 65–117)
GLUCOSE BLD STRIP.AUTO-MCNC: 82 MG/DL (ref 65–117)
GLUCOSE BLD STRIP.AUTO-MCNC: 89 MG/DL (ref 65–117)
GLUCOSE BLD STRIP.AUTO-MCNC: 90 MG/DL (ref 65–117)
GLUCOSE SERPL-MCNC: 97 MG/DL (ref 65–100)
HBA1C MFR BLD: 5.3 % (ref 4–5.6)
HCT VFR BLD AUTO: 36.5 % (ref 36.6–50.3)
HDLC SERPL-MCNC: 61 MG/DL
HDLC SERPL: 2.4 (ref 0–5)
HGB BLD-MCNC: 12.3 G/DL (ref 12.1–17)
LACTATE SERPL-SCNC: 0.9 MMOL/L (ref 0.4–2)
LDLC SERPL CALC-MCNC: 65 MG/DL (ref 0–100)
MAGNESIUM SERPL-MCNC: 1.9 MG/DL (ref 1.6–2.4)
MCH RBC QN AUTO: 30.6 PG (ref 26–34)
MCHC RBC AUTO-ENTMCNC: 33.7 G/DL (ref 30–36.5)
MCV RBC AUTO: 90.8 FL (ref 80–99)
NRBC # BLD: 0 K/UL (ref 0–0.01)
NRBC BLD-RTO: 0 PER 100 WBC
PHOSPHATE SERPL-MCNC: 3 MG/DL (ref 2.6–4.7)
PLATELET # BLD AUTO: 171 K/UL (ref 150–400)
PMV BLD AUTO: 9 FL (ref 8.9–12.9)
POTASSIUM SERPL-SCNC: 3.8 MMOL/L (ref 3.5–5.1)
PROT SERPL-MCNC: 6.4 G/DL (ref 6.4–8.2)
RBC # BLD AUTO: 4.02 M/UL (ref 4.1–5.7)
SERVICE CMNT-IMP: NORMAL
SODIUM SERPL-SCNC: 138 MMOL/L (ref 136–145)
TRIGL SERPL-MCNC: 95 MG/DL
TSH SERPL DL<=0.05 MIU/L-ACNC: 0.32 UIU/ML (ref 0.36–3.74)
VIT B12 SERPL-MCNC: 192 PG/ML (ref 193–986)
VLDLC SERPL CALC-MCNC: 19 MG/DL
WBC # BLD AUTO: 8.6 K/UL (ref 4.1–11.1)

## 2025-03-08 PROCEDURE — 82962 GLUCOSE BLOOD TEST: CPT

## 2025-03-08 PROCEDURE — 83036 HEMOGLOBIN GLYCOSYLATED A1C: CPT

## 2025-03-08 PROCEDURE — 84100 ASSAY OF PHOSPHORUS: CPT

## 2025-03-08 PROCEDURE — 83735 ASSAY OF MAGNESIUM: CPT

## 2025-03-08 PROCEDURE — 6370000000 HC RX 637 (ALT 250 FOR IP): Performed by: NURSE PRACTITIONER

## 2025-03-08 PROCEDURE — 95717 EEG PHYS/QHP 2-12 HR W/O VID: CPT | Performed by: PSYCHIATRY & NEUROLOGY

## 2025-03-08 PROCEDURE — 99223 1ST HOSP IP/OBS HIGH 75: CPT | Performed by: NURSE PRACTITIONER

## 2025-03-08 PROCEDURE — 95816 EEG AWAKE AND DROWSY: CPT

## 2025-03-08 PROCEDURE — 93306 TTE W/DOPPLER COMPLETE: CPT

## 2025-03-08 PROCEDURE — 2500000003 HC RX 250 WO HCPCS: Performed by: EMERGENCY MEDICINE

## 2025-03-08 PROCEDURE — 51798 US URINE CAPACITY MEASURE: CPT

## 2025-03-08 PROCEDURE — 2000000000 HC ICU R&B

## 2025-03-08 PROCEDURE — 80053 COMPREHEN METABOLIC PANEL: CPT

## 2025-03-08 PROCEDURE — 82607 VITAMIN B-12: CPT

## 2025-03-08 PROCEDURE — 6360000002 HC RX W HCPCS: Performed by: NURSE PRACTITIONER

## 2025-03-08 PROCEDURE — 85027 COMPLETE CBC AUTOMATED: CPT

## 2025-03-08 PROCEDURE — 36415 COLL VENOUS BLD VENIPUNCTURE: CPT

## 2025-03-08 PROCEDURE — 80061 LIPID PANEL: CPT

## 2025-03-08 PROCEDURE — 95706 EEG WO VID 2-12HR INTMT MNTR: CPT

## 2025-03-08 PROCEDURE — 82746 ASSAY OF FOLIC ACID SERUM: CPT

## 2025-03-08 PROCEDURE — 83605 ASSAY OF LACTIC ACID: CPT

## 2025-03-08 PROCEDURE — 70551 MRI BRAIN STEM W/O DYE: CPT

## 2025-03-08 PROCEDURE — 94761 N-INVAS EAR/PLS OXIMETRY MLT: CPT

## 2025-03-08 PROCEDURE — 93306 TTE W/DOPPLER COMPLETE: CPT | Performed by: STUDENT IN AN ORGANIZED HEALTH CARE EDUCATION/TRAINING PROGRAM

## 2025-03-08 PROCEDURE — 2500000003 HC RX 250 WO HCPCS: Performed by: NURSE PRACTITIONER

## 2025-03-08 PROCEDURE — 51701 INSERT BLADDER CATHETER: CPT

## 2025-03-08 PROCEDURE — 2580000003 HC RX 258: Performed by: STUDENT IN AN ORGANIZED HEALTH CARE EDUCATION/TRAINING PROGRAM

## 2025-03-08 PROCEDURE — 82140 ASSAY OF AMMONIA: CPT

## 2025-03-08 PROCEDURE — XX20X89 MONITORING OF BRAIN ELECTRICAL ACTIVITY, COMPUTER-AIDED DETECTION AND NOTIFICATION, NEW TECHNOLOGY GROUP 9: ICD-10-PCS | Performed by: PSYCHIATRY & NEUROLOGY

## 2025-03-08 PROCEDURE — 84443 ASSAY THYROID STIM HORMONE: CPT

## 2025-03-08 RX ORDER — PRAVASTATIN SODIUM 20 MG
40 TABLET ORAL NIGHTLY
Status: DISCONTINUED | OUTPATIENT
Start: 2025-03-08 | End: 2025-03-09 | Stop reason: HOSPADM

## 2025-03-08 RX ORDER — DONEPEZIL HYDROCHLORIDE 5 MG/1
10 TABLET, FILM COATED ORAL NIGHTLY
Status: DISCONTINUED | OUTPATIENT
Start: 2025-03-08 | End: 2025-03-09 | Stop reason: HOSPADM

## 2025-03-08 RX ORDER — TAMSULOSIN HYDROCHLORIDE 0.4 MG/1
0.4 CAPSULE ORAL EVERY EVENING
Status: DISCONTINUED | OUTPATIENT
Start: 2025-03-08 | End: 2025-03-09 | Stop reason: HOSPADM

## 2025-03-08 RX ORDER — SODIUM CHLORIDE, SODIUM LACTATE, POTASSIUM CHLORIDE, CALCIUM CHLORIDE 600; 310; 30; 20 MG/100ML; MG/100ML; MG/100ML; MG/100ML
INJECTION, SOLUTION INTRAVENOUS CONTINUOUS
Status: DISCONTINUED | OUTPATIENT
Start: 2025-03-08 | End: 2025-03-09 | Stop reason: HOSPADM

## 2025-03-08 RX ORDER — MEMANTINE HYDROCHLORIDE 10 MG/1
10 TABLET ORAL 2 TIMES DAILY
Status: DISCONTINUED | OUTPATIENT
Start: 2025-03-08 | End: 2025-03-09 | Stop reason: HOSPADM

## 2025-03-08 RX ADMIN — ASPIRIN 300 MG: 300 SUPPOSITORY RECTAL at 21:30

## 2025-03-08 RX ADMIN — ENOXAPARIN SODIUM 40 MG: 100 INJECTION SUBCUTANEOUS at 21:30

## 2025-03-08 RX ADMIN — SODIUM CHLORIDE, PRESERVATIVE FREE 10 ML: 5 INJECTION INTRAVENOUS at 09:54

## 2025-03-08 RX ADMIN — SODIUM CHLORIDE, POTASSIUM CHLORIDE, SODIUM LACTATE AND CALCIUM CHLORIDE: 600; 310; 30; 20 INJECTION, SOLUTION INTRAVENOUS at 16:18

## 2025-03-08 RX ADMIN — SODIUM CHLORIDE, PRESERVATIVE FREE 10 ML: 5 INJECTION INTRAVENOUS at 09:55

## 2025-03-08 RX ADMIN — LEVETIRACETAM 1000 MG: 100 INJECTION INTRAVENOUS at 01:36

## 2025-03-08 RX ADMIN — SODIUM CHLORIDE, PRESERVATIVE FREE 10 ML: 5 INJECTION INTRAVENOUS at 21:30

## 2025-03-08 NOTE — PROCEDURES
Procedures      [facility]   Electroencephalogram  Report    Procedure ID: [id number] Procedure Date: 3/8/2025   Patient Name: Deni Crocker YOB: 1950   Procedure Type: Routine Medical Record No: [id number]       An EEG is requested in this 75-year-old man to evaluate for epileptiform abnormalities.  His medic occasions are listed as Aricept, Namenda, Zoloft, Pravachol, Flomax, aspirin and Tylenol.    This tracing is obtained while the patient is noted to be awake and confused.    During this state, the background consists of diffuse low voltage fast frequency beta wave activity.    Hyperventilation is not performed.    Intermittent photic stimulation little alters the tracing.    Sleep architecture is not seen.    Interpretation  This EEG recorded while the patient is noted to be awake and confused is essentially normal.  The overriding low voltage fast frequency beta wave activity may be secondary to medication effect but is also considered a normal variant.  No epileptiform abnormalities are seen.        Racheal Miller MD

## 2025-03-08 NOTE — CARE COORDINATION
Care Management Initial Assessment  3/8/2025 3:55 PM  If patient is discharged prior to next notation, then this note serves as note for discharge by case management.    Reason for Admission:   Seizure (HCC) [R56.9]  Cerebrovascular accident (CVA), unspecified mechanism (HCC) [I63.9]  Acute stroke due to ischemia (HCC) [I63.9]         Patient Admission Status: Inpatient  Date Admitted to INP: 3/7  RUR: Readmission Risk Score: 16.7    Hospitalization in the last 30 days (Readmission):  No        Advance Care Planning:  Code Status: Full Code  Primary Healthcare Decision Maker:    Primary Decision Maker: Rosa Maria Crocker - Spouse - [phone]   Advance Directive: wife/ NOK     __________________________________________________________________________  Assessment:      03/08/25 1548   Service Assessment   Patient Orientation Unresponsive  (not responsive, information obtained from medical record and wife)   Cognition Other (see comment)  (per wife, he can be forgetful, had a stroke 9 years ago, and recent falls, but usually alert)   History Provided By Spouse   Primary Caregiver Spouse  (has a next door neighbor that is a nurse, who sometimes assists)   Support Systems Spouse/Significant Other   PCP Verified by CM Yes   Can patient return to prior living arrangement Unknown at present   Ability to make needs known: Unable   Family able to assist with home care needs: Other (comment)  (Mrs Crocker said that at this time, this is more than she can manage, and anticipates he will be in the hospital longer than when he had the kyphoplasty before [place])   Financial Resources Medicare  (Managed Medicare)   Community Resources Other (Comment)  (has been followed by Pedro [facility] since his discharge from [facility].  Was getting PT OT and SLP, but prior to the stroke it was mostly the cognition therapy coming to the home)   Social/Functional History   Lives With Spouse   Type of Home Apartment   Home Equipment Walker -

## 2025-03-08 NOTE — CONSULTS
[facility]: [facility]    Kati Montilla, ALEXA, CNRN, ACNP-BC  [facility] Neurology  [address]  [phone]        Name:   Deni Crocker   Medical record #: [id number]  Admission Date: 3/7/2025       Consult requested by: Angelo Henley MD     Reason for Consult:  Stroke      HISTORY OF PRESENT ILLNESS:     Deni Crocker is a male who presented to the ED on 3/7/2025 after an episode in which he was at a hair salon and had an episode of altered mental status, leaning to the right, followed by generalized tonic-clonic activity and urinary incontinence.  By the time EMS arrived the episode had stopped at but he was weak on the left side.  While en route to the hospital he had another generalized seizure with foaming at the mouth for which he was given 2 mg of Ativan.  Upon arrival to the ED he was noted to have left sided hemiparesis with fast nystatin to the left and hypertension with a presenting blood pressure of 184/100.  A code stroke was called and he was given TNK at 1640 for a presenting NIH of 31.  Review of admission labs shows a normal CMP, glucose of 126, normal LFTs, and CBC.  The Neurology Service is asked to evaluate for stroke.    Patient was last seen by the [facility] Neurology team on 3/5/2025 by Dr. Miller for progressive dementia.  At that time Dr. Miller noted that his memory had continued to worsen, that he was awake and oriented to time with an otherwise nonfocal exam.  Dr. Miller continued his Aricept, Namenda, and aspirin.  His wife denies previous seizure activity      Neuro-imaging:     CT Head: No acute process    CTA Head and Neck: No evidence of LVO or carotid stenosis    EKG: normal sinus rhythm.      Plan of care discussed with:  Patient not participation due to AMS, Intensivist, and RN      Impression/ Plan:      1.  75-year-old male who presents with active seizure, left-sided weakness, now status post TNK, differential diagnosis includes acute ischemic stroke

## 2025-03-08 NOTE — ED NOTES
Unable to perform stroke education with patient at this time, patient only responding to painful stimuli

## 2025-03-08 NOTE — H&P
Hospitalist Admission Note    NAME:  Deni Crocker   :  1950   MRN:  [id number]     Date/Time:  3/8/2025 3:13 PM    Patient PCP: Odalys Sommer PA-C  ________________________________________________________________________    Given the patient's current clinical presentation, I have a high level of concern for decompensation if discharged from the emergency department.  Complex decision making was performed, which includes reviewing the patient's available past medical records, laboratory results, and x-ray films.       My assessment of this patient's clinical condition and my plan of care is as follows.    Assessment / Plan:    Deni Crocker is a 75 y.o. with a PMH of severe dementia, HTN, T2DM and previous hemorrhagic stroke/PE tPA (3/2017) who presented to [facility] ED on 3/7 with altered mental status. Admitted to ICU for status epilepticus and concern for CVA s/p TNK.     Status Epilepticus  Concern for CVA s/p TNK  Hx Severe Dementia/Alzheimer's & CVA w/ hemorrhagic conversion following tPA (3/2017)  - Presented with multiple episodes of seizure-like activity. Aborted with 2 mg of ativan in route. Post-ictal upon arrival to ER with L sided deficits. Stroke protocol initiated. S/p TNK 3/7/25.   - CT head, CTA head/neck, CT brain perfusion negative, no LVO.   - MRI brain pending  - Neurology following   - Resume asa after imaging this afternoon if able to take PO  - S/p keppra load, now on 1 G BID. Held for EEG today  - LDL 65. Resume pravastatin when able to take PO   - Repeat imaging today   - Resume home Aricept, Namenda & Zoloft when able to take p.o.   - PT/OT/SLP  - NPO, will give gentle mIVF    Pulmonary edema vs multifocal PNA   - CXR on admission showed increased interstitial opacity c/w pulmonary edema versus multifocal pneumonia.   - On RA. Afebrile. Normal WBC. . Echo with EF 55%, normal.   - Monitor, no indication for abx at this time    Prediabetes   - History of preDM. A1c 5.3%   - Glucose  monitoring and SSI     BPH   - Continue flomax when able to take PO    Recent L3 fracture s/p kyphoplasty (12/12/24)   - Supportive care        #BMI (Calculated): 24.48          I have personally reviewed the radiographs, laboratory data in Epic and decisions and statements above are based partially on this personal interpretation.    Code Status: Full Code  DVT Prophylaxis: Lovenox  GI Prophylaxis: not indicated       Subjective:   CHIEF COMPLAINT: \"AMS, seizure-like activity\"    HISTORY OF PRESENT ILLNESS:     Deni is a 75 y.o. male with PMHx dementia, CVA with history of hemorrhagic conversion in 2017, prediabetes, BPH, L3 fracture status post kyphoplasty who presented to [facility] ED on 3/7 with altered mental status. Admitted to ICU for status epilepticus and concern for CVA s/p TNK.     Patient remains altered and minimally communicative.  Wife at bedside.  History obtained from wife.  Wife states that they were at the hair salon yesterday when he started having seizure-like activity.  They called EMS.  He got medication from the EMS providers and the seizure stopped.  When he was in the emergency room the doctors were concerned for stroke and he got medication for that as well.    Wife states that yesterday he had virtually no movement and was not making any sound.  He was just staring off into the distance.  She feels like he is looking better today.  He is moving.  He squeezed her hand.  He is having some garbled speech which is an improvement from yesterday as well.  He has been sleeping most of the day.  She is concerned that he has not had anything to eat or drink.      Available records were reviewed at the time of H&P.        Past Medical History:   Diagnosis Date    Hemorrhagic stroke (HCC) 3/28/2017    S/p TPA    Hypertension     Ill-defined condition     Eczema    Stroke (HCC)     Type II diabetes mellitus (HCC) 3/29/2017      Past Surgical History:   Procedure Laterality Date    IR KYPHOPLASTY LUMBAR 1

## 2025-03-08 NOTE — PROGRESS NOTES
Stroke Education provided to patient and spouse/SO and the following topics were discussed    1. Patients personal risk factors for stroke are diabetes mellitus and hypertension    2. Warning signs of Stroke:        * Sudden numbness or weakness of the face, arm or leg, especially on one side of          The body            * Sudden confusion, trouble speaking or understanding        * Sudden trouble seeing in one or both eyes        * Sudden trouble walking, dizziness, loss of balance or coordination        * Sudden severe headache with no known cause      3. Importance of activation Emergency Medical Services ( 9-1-1 ) immediately if experience any warning signs of stroke.    4. Be sure and schedule a follow-up appointment with your primary care doctor or any specialists as instructed.     5. You must take medicine every day to treat your risk factors for stroke.  Be sure to take your medicines exactly as your doctor tells you: no more, no less.  Know what your medicines are for , what they do.  Anti-thrombotics /anticoagulants can help prevent strokes.  You are taking the following medicine(s)  aspirin     6.  Smoking and second-hand smoke greatly increase your risk of stroke, cardiovascular disease and death. Smoking history cigarettes, several per day    7. Information provided was [facility] Stroke Education Binder or Stroke Handouts    8. Documentation of teaching completed in Patient Education Activity and on Care Plan with teaching response noted?  yes

## 2025-03-08 NOTE — PROCEDURES
Procedures    RAPID ELECTROENCEPHALOGRAM REPORT  [facility]    Procedure ID: [id number] Procedure Date: 3/7/2025   Patient Name: Deni Crocker YOB: 1950   Procedure Type: Leroy Rapid EEG Medical Record No: [id number]     Study Duration: 2 hours, 6 minutes  Recording Start Time: March 7, 2025 5:02 PM  Recording End Time: March 7, 2025 7:09 PM    History: Previous seizure    Medications: Not listed    Description of procedure: This EEG was obtained using a 10 lead, 8 channel system positioned circumferentially without any parasagittal coverage (rapid EEG). Computer selected EEG is reviewed as well as background features and all clinically significant events. Clarity with NTAP algorithm was utilized and implemented to provide analysis of underlying activity and seizure/status detection used to facilitate reading.    Description of recording: The tracing demonstrates diffuse low voltage fast frequency beta wave activity.  No sleep architecture is seen.    Impression: Normal rapid EEG.  No epileptiform abnormalities were seen.      Comment: A normal EEG does not rule out the diagnosis of a seizure disorder; clinical  correlation is advised.  If there is still persistent suspicion for continued seizure-like  activity, would advise obtaining an electroencephalogram with the 10-20 international  system for improved spatial resolution and parasagittal coverage.    Racheal Miller MD

## 2025-03-08 NOTE — PROGRESS NOTES
[facility] PROGRESS NOTE.      Name: Deni Crocker   : 1950   MRN: [id number]   Date: 3/8/2025      Chief Complaint   Patient presents with    Altered Mental Status       Reason for ICU admission:   -Status Epilepticus  -Concern for CVA. Post TNK    Hospital Course:     Deni Crocker is a 75 y.o. with a PMH of severe dementia, HTN, T2DM and previous hemorrhagic stroke/PE tPA (3/2017) who presents to [facility] ED on 3/7 with altered mental status.  History obtained by wife at bedside who reports that they were at the hair salon when she noticed that he began to have jerking of all of his extremities and gaze deviation.  She reports a brief period where he \"came to\" but reports another episode of full extremity jerking and foaming at the mouth a short while later.  Upon EMS arrival patient was postictal lethargic but following some commands.  While en route an additional seizure was noted and aborted with 2 mg of Ativan.  Upon arrival to ER patient remained altered noted to have dense left-sided deficits with nystagmus.  Stroke protocol was initiated.  CTh/CTA/CTP without acute intracranial abnormality.  Deficits remained and patient received TNK @ 1640.    Patient was admitted in ICU, post TNK, and on Kepra as well     3/8/25 - No issues post TNK. This AM, he is sleeping. Sleeping this AM, woke up eyes briefly and felt back asleep. Unclear baseline mentation. No more seizures reported, EEG is off, on kepra. BP at goal, on no drips. In no resp distress and on room air. No peripheral edema       Assessment & Plan     # Status Epilepticus  # Concern for CVA s/p TNK  # Hx Severe Dementia/Alzheimer's & CVA w/ hemorrhagic conversion following tPA (3/2017)  -Patient with multiple episodes of seizure-like activity  -Ceribell in place -0% burden thus far  -Keppra loaded with 2G, continue 1G BID  -CTh/CTA/CTP all without LVO or intracranial abnormality  -Received TNK 3/7@1640  Plan  -Neurochecks per TNK

## 2025-03-08 NOTE — ED NOTES
TRANSFER - OUT REPORT:  Verbal report given to PARAMJIT Nevarez on Deni Crocker  being transferred to ICU for routine progression of patient care     Report consisted of patient's Situation, Background, Assessment and Recommendations(SBAR).   Information from the following report(s) Nurse Handoff Report, ED Encounter Summary, ED SBAR, MAR, Recent Results, Cardiac Rhythm sinus tachycardia, and Neuro Assessment was reviewed with the receiving nurse.  Opportunity for questions and clarification was provided.      Patient transported with:  Monitor, O2 @ 2lpm, and Registered Nurse

## 2025-03-08 NOTE — PROGRESS NOTES
Speech-Language Pathology Contact Note     Patient being taken FAIZAN for MRI upon SLP arrival to patient's room. Additionally, spoke with patient's RN who states patient has just began speaking, but is confused and not following commands, not appropriate to participate in swallow evaluation at bedside at this time. SLP will hold and follow-up as able/appropriate.      Thank you,  Benita Escalante M.Ed, CCC-SLP (pronouns: she/her/hers)  Speech-Language Pathologist

## 2025-03-09 ENCOUNTER — HOSPITAL ENCOUNTER (INPATIENT)
Facility: HOSPITAL | Age: 75
LOS: 3 days | Discharge: HOSPICE | DRG: 064 | End: 2025-03-12
Attending: ANESTHESIOLOGY | Admitting: ANESTHESIOLOGY
Payer: MEDICARE

## 2025-03-09 ENCOUNTER — APPOINTMENT (OUTPATIENT)
Facility: HOSPITAL | Age: 75
DRG: 064 | End: 2025-03-09
Attending: ANESTHESIOLOGY
Payer: MEDICARE

## 2025-03-09 ENCOUNTER — APPOINTMENT (OUTPATIENT)
Facility: HOSPITAL | Age: 75
DRG: 100 | End: 2025-03-09
Payer: MEDICARE

## 2025-03-09 VITALS
TEMPERATURE: 97.6 F | HEIGHT: 73 IN | RESPIRATION RATE: 17 BRPM | OXYGEN SATURATION: 98 % | HEART RATE: 76 BPM | DIASTOLIC BLOOD PRESSURE: 88 MMHG | SYSTOLIC BLOOD PRESSURE: 110 MMHG | BODY MASS INDEX: 24.52 KG/M2 | WEIGHT: 185 LBS

## 2025-03-09 PROBLEM — I62.9 INTRACRANIAL HEMORRHAGE (HCC): Status: ACTIVE | Noted: 2025-03-09

## 2025-03-09 LAB
ANION GAP SERPL CALC-SCNC: 7 MMOL/L (ref 2–12)
BUN SERPL-MCNC: 10 MG/DL (ref 6–20)
BUN/CREAT SERPL: 12 (ref 12–20)
CALCIUM SERPL-MCNC: 9.1 MG/DL (ref 8.5–10.1)
CHLORIDE SERPL-SCNC: 99 MMOL/L (ref 97–108)
CO2 SERPL-SCNC: 26 MMOL/L (ref 21–32)
CREAT SERPL-MCNC: 0.85 MG/DL (ref 0.7–1.3)
EKG ATRIAL RATE: 121 BPM
EKG DIAGNOSIS: NORMAL
EKG P AXIS: 55 DEGREES
EKG P-R INTERVAL: 148 MS
EKG Q-T INTERVAL: 320 MS
EKG QRS DURATION: 72 MS
EKG QTC CALCULATION (BAZETT): 454 MS
EKG R AXIS: 41 DEGREES
EKG T AXIS: 23 DEGREES
EKG VENTRICULAR RATE: 121 BPM
ERYTHROCYTE [DISTWIDTH] IN BLOOD BY AUTOMATED COUNT: 12.8 % (ref 11.5–14.5)
GLUCOSE SERPL-MCNC: 88 MG/DL (ref 65–100)
HCT VFR BLD AUTO: 44 % (ref 36.6–50.3)
HGB BLD-MCNC: 14.7 G/DL (ref 12.1–17)
MCH RBC QN AUTO: 30.2 PG (ref 26–34)
MCHC RBC AUTO-ENTMCNC: 33.4 G/DL (ref 30–36.5)
MCV RBC AUTO: 90.3 FL (ref 80–99)
NRBC # BLD: 0 K/UL (ref 0–0.01)
NRBC BLD-RTO: 0 PER 100 WBC
PLATELET # BLD AUTO: 168 K/UL (ref 150–400)
PMV BLD AUTO: 9.2 FL (ref 8.9–12.9)
POTASSIUM SERPL-SCNC: 3.5 MMOL/L (ref 3.5–5.1)
RBC # BLD AUTO: 4.87 M/UL (ref 4.1–5.7)
SODIUM SERPL-SCNC: 132 MMOL/L (ref 136–145)
WBC # BLD AUTO: 8.4 K/UL (ref 4.1–11.1)

## 2025-03-09 PROCEDURE — 80048 BASIC METABOLIC PNL TOTAL CA: CPT

## 2025-03-09 PROCEDURE — 51702 INSERT TEMP BLADDER CATH: CPT

## 2025-03-09 PROCEDURE — 93005 ELECTROCARDIOGRAM TRACING: CPT | Performed by: INTERNAL MEDICINE

## 2025-03-09 PROCEDURE — 51798 US URINE CAPACITY MEASURE: CPT

## 2025-03-09 PROCEDURE — 6360000002 HC RX W HCPCS

## 2025-03-09 PROCEDURE — 6360000002 HC RX W HCPCS: Performed by: PHYSICIAN ASSISTANT

## 2025-03-09 PROCEDURE — 6360000002 HC RX W HCPCS: Performed by: NURSE PRACTITIONER

## 2025-03-09 PROCEDURE — 70450 CT HEAD/BRAIN W/O DYE: CPT

## 2025-03-09 PROCEDURE — 2500000003 HC RX 250 WO HCPCS: Performed by: INTERNAL MEDICINE

## 2025-03-09 PROCEDURE — 6360000002 HC RX W HCPCS: Performed by: INTERNAL MEDICINE

## 2025-03-09 PROCEDURE — 2500000003 HC RX 250 WO HCPCS: Performed by: PHYSICIAN ASSISTANT

## 2025-03-09 PROCEDURE — 51701 INSERT BLADDER CATHETER: CPT

## 2025-03-09 PROCEDURE — 2500000003 HC RX 250 WO HCPCS

## 2025-03-09 PROCEDURE — 93010 ELECTROCARDIOGRAM REPORT: CPT | Performed by: STUDENT IN AN ORGANIZED HEALTH CARE EDUCATION/TRAINING PROGRAM

## 2025-03-09 PROCEDURE — 2580000003 HC RX 258: Performed by: NURSE PRACTITIONER

## 2025-03-09 PROCEDURE — APPNB45 APP NON BILLABLE 31-45 MINUTES

## 2025-03-09 PROCEDURE — 2580000003 HC RX 258: Performed by: INTERNAL MEDICINE

## 2025-03-09 PROCEDURE — 85027 COMPLETE CBC AUTOMATED: CPT

## 2025-03-09 PROCEDURE — 2000000000 HC ICU R&B

## 2025-03-09 RX ORDER — ONDANSETRON 2 MG/ML
4 INJECTION INTRAMUSCULAR; INTRAVENOUS EVERY 6 HOURS PRN
Status: DISCONTINUED | OUTPATIENT
Start: 2025-03-09 | End: 2025-03-12 | Stop reason: HOSPADM

## 2025-03-09 RX ORDER — DIAZEPAM 10 MG/2ML
5 INJECTION, SOLUTION INTRAMUSCULAR; INTRAVENOUS ONCE
Status: DISCONTINUED | OUTPATIENT
Start: 2025-03-09 | End: 2025-03-09 | Stop reason: HOSPADM

## 2025-03-09 RX ORDER — FENTANYL CITRATE 50 UG/ML
50 INJECTION, SOLUTION INTRAMUSCULAR; INTRAVENOUS ONCE
Refills: 0 | Status: COMPLETED | OUTPATIENT
Start: 2025-03-09 | End: 2025-03-09

## 2025-03-09 RX ORDER — SODIUM CHLORIDE 9 MG/ML
INJECTION, SOLUTION INTRAVENOUS CONTINUOUS
Status: DISCONTINUED | OUTPATIENT
Start: 2025-03-09 | End: 2025-03-11

## 2025-03-09 RX ORDER — DONEPEZIL HYDROCHLORIDE 5 MG/1
10 TABLET, FILM COATED ORAL NIGHTLY
Status: DISCONTINUED | OUTPATIENT
Start: 2025-03-09 | End: 2025-03-11

## 2025-03-09 RX ORDER — LEVETIRACETAM 500 MG/5ML
500 INJECTION, SOLUTION, CONCENTRATE INTRAVENOUS EVERY 12 HOURS
Status: DISCONTINUED | OUTPATIENT
Start: 2025-03-09 | End: 2025-03-09

## 2025-03-09 RX ORDER — HYDRALAZINE HYDROCHLORIDE 20 MG/ML
10 INJECTION INTRAMUSCULAR; INTRAVENOUS EVERY 6 HOURS PRN
Status: DISCONTINUED | OUTPATIENT
Start: 2025-03-09 | End: 2025-03-11

## 2025-03-09 RX ORDER — FENTANYL CITRATE 50 UG/ML
50 INJECTION, SOLUTION INTRAMUSCULAR; INTRAVENOUS ONCE
Refills: 0 | Status: DISCONTINUED | OUTPATIENT
Start: 2025-03-09 | End: 2025-03-09 | Stop reason: HOSPADM

## 2025-03-09 RX ORDER — MEMANTINE HYDROCHLORIDE 10 MG/1
10 TABLET ORAL DAILY
Status: DISCONTINUED | OUTPATIENT
Start: 2025-03-09 | End: 2025-03-11

## 2025-03-09 RX ORDER — LABETALOL HYDROCHLORIDE 5 MG/ML
10 INJECTION, SOLUTION INTRAVENOUS
Status: DISCONTINUED | OUTPATIENT
Start: 2025-03-09 | End: 2025-03-09 | Stop reason: HOSPADM

## 2025-03-09 RX ORDER — SODIUM CHLORIDE 0.9 % (FLUSH) 0.9 %
5-40 SYRINGE (ML) INJECTION PRN
Status: DISCONTINUED | OUTPATIENT
Start: 2025-03-09 | End: 2025-03-12 | Stop reason: HOSPADM

## 2025-03-09 RX ORDER — SODIUM CHLORIDE 0.9 % (FLUSH) 0.9 %
5-40 SYRINGE (ML) INJECTION EVERY 12 HOURS SCHEDULED
Status: DISCONTINUED | OUTPATIENT
Start: 2025-03-09 | End: 2025-03-12 | Stop reason: HOSPADM

## 2025-03-09 RX ORDER — SODIUM CHLORIDE 9 MG/ML
INJECTION, SOLUTION INTRAVENOUS PRN
Status: DISCONTINUED | OUTPATIENT
Start: 2025-03-09 | End: 2025-03-12 | Stop reason: HOSPADM

## 2025-03-09 RX ORDER — LEVETIRACETAM 500 MG/5ML
500 INJECTION, SOLUTION, CONCENTRATE INTRAVENOUS EVERY 12 HOURS
Status: DISCONTINUED | OUTPATIENT
Start: 2025-03-09 | End: 2025-03-09 | Stop reason: HOSPADM

## 2025-03-09 RX ORDER — LABETALOL HYDROCHLORIDE 5 MG/ML
10 INJECTION, SOLUTION INTRAVENOUS EVERY 6 HOURS PRN
Status: DISCONTINUED | OUTPATIENT
Start: 2025-03-09 | End: 2025-03-11

## 2025-03-09 RX ORDER — HYDRALAZINE HYDROCHLORIDE 20 MG/ML
10 INJECTION INTRAMUSCULAR; INTRAVENOUS EVERY 4 HOURS PRN
Status: DISCONTINUED | OUTPATIENT
Start: 2025-03-09 | End: 2025-03-09 | Stop reason: HOSPADM

## 2025-03-09 RX ORDER — ACETAMINOPHEN 650 MG/1
650 SUPPOSITORY RECTAL EVERY 6 HOURS PRN
Status: DISCONTINUED | OUTPATIENT
Start: 2025-03-09 | End: 2025-03-12 | Stop reason: HOSPADM

## 2025-03-09 RX ORDER — HALOPERIDOL 5 MG/ML
2 INJECTION INTRAMUSCULAR EVERY 6 HOURS PRN
Status: DISCONTINUED | OUTPATIENT
Start: 2025-03-09 | End: 2025-03-11

## 2025-03-09 RX ORDER — ONDANSETRON 4 MG/1
4 TABLET, ORALLY DISINTEGRATING ORAL EVERY 8 HOURS PRN
Status: DISCONTINUED | OUTPATIENT
Start: 2025-03-09 | End: 2025-03-12 | Stop reason: HOSPADM

## 2025-03-09 RX ORDER — ACETAMINOPHEN 325 MG/1
650 TABLET ORAL EVERY 6 HOURS PRN
Status: DISCONTINUED | OUTPATIENT
Start: 2025-03-09 | End: 2025-03-12 | Stop reason: HOSPADM

## 2025-03-09 RX ADMIN — WATER 5 MG: 1 INJECTION INTRAMUSCULAR; INTRAVENOUS; SUBCUTANEOUS at 00:27

## 2025-03-09 RX ADMIN — LEVETIRACETAM 500 MG: 100 INJECTION INTRAVENOUS at 03:03

## 2025-03-09 RX ADMIN — SODIUM CHLORIDE 5 MG/HR: 9 INJECTION, SOLUTION INTRAVENOUS at 04:05

## 2025-03-09 RX ADMIN — SODIUM CHLORIDE 210 MG: 9 INJECTION, SOLUTION INTRAVENOUS at 17:34

## 2025-03-09 RX ADMIN — FENTANYL CITRATE 50 MCG: 0.05 INJECTION, SOLUTION INTRAMUSCULAR; INTRAVENOUS at 03:27

## 2025-03-09 RX ADMIN — HALOPERIDOL LACTATE 2 MG: 5 INJECTION, SOLUTION INTRAMUSCULAR at 10:07

## 2025-03-09 RX ADMIN — NICARDIPINE HYDROCHLORIDE 5 MG/HR: 0.1 INJECTION, SOLUTION INTRAVENOUS at 06:03

## 2025-03-09 RX ADMIN — NICARDIPINE HYDROCHLORIDE 7.5 MG/HR: 0.1 INJECTION, SOLUTION INTRAVENOUS at 09:11

## 2025-03-09 RX ADMIN — HYDRALAZINE HYDROCHLORIDE 10 MG: 20 INJECTION INTRAMUSCULAR; INTRAVENOUS at 18:21

## 2025-03-09 RX ADMIN — SODIUM CHLORIDE, PRESERVATIVE FREE 10 ML: 5 INJECTION INTRAVENOUS at 21:55

## 2025-03-09 RX ADMIN — SODIUM CHLORIDE 210 MG: 9 INJECTION, SOLUTION INTRAVENOUS at 11:50

## 2025-03-09 RX ADMIN — SODIUM CHLORIDE 210 MG: 9 INJECTION, SOLUTION INTRAVENOUS at 21:55

## 2025-03-09 RX ADMIN — Medication 10 MG: at 12:28

## 2025-03-09 RX ADMIN — SODIUM CHLORIDE, PRESERVATIVE FREE 10 ML: 5 INJECTION INTRAVENOUS at 08:08

## 2025-03-09 RX ADMIN — SODIUM CHLORIDE: 0.9 INJECTION, SOLUTION INTRAVENOUS at 19:05

## 2025-03-09 ASSESSMENT — PAIN SCALES - GENERAL: PAINLEVEL_OUTOF10: 0

## 2025-03-09 NOTE — FLOWSHEET NOTE
MRI w/ new acute parenchymal hematoma in the right temporal lobe measuring 4.7 x 3.4 cm. Patient is s/p TNK 3/7. ASA and Lovenox held. ICU reconsulted for eval/ admission. Patient may need neurosurgery eval/ intervention.    Update provided to neurology. Neuro checks q 1 and repeat CT head in AM. Consult placed for neurosurgery d/t size of bleed as transfer may be warranted.     Dr. Cordoba did not accept patient to neurosurgical service. Per neurology, as long as patient's exam remains stable, patient can remain at [place].     Patient w/ increased agitation and confusion. BP rising. Concern for hematoma expansion. Repeat CT head now. Transfer initiated by intensivist. Patient accepted to ICU/ neurology at [facility].      Critical care time 45 mins, excluding procedures.

## 2025-03-09 NOTE — DISCHARGE SUMMARY
Hospitalist Discharge Summary     Patient ID:  Deni Crocker  [id number]  75 y.o.  1950    Admit date: 3/7/2025    Discharge date and time: 3/9/2025    Admission Diagnoses: Seizure (HCC) [R56.9]  Cerebrovascular accident (CVA), unspecified mechanism (HCC) [I63.9]  Acute stroke due to ischemia (HCC) [I63.9]    Discharge Diagnoses:    Principal Problem:    Acute stroke due to ischemia (HCC)  Active Problems:    Seizure (HCC)    Cerebrovascular accident (CVA) (HCC)  Resolved Problems:    * No resolved hospital problems. *         Hospital Course:     Deni Crocker is a 75 y.o. with a PMH of severe dementia, HTN, T2DM and previous hemorrhagic stroke/PE tPA (3/2017) who presented to [facility] ED on 3/7 with altered mental status. Admitted to ICU for status epilepticus and concern for CVA - left sided deficits w/ nystagmus. Patient received TNK. Initial imaging on 3/7 revealed no evidence of acute intracranial abnormality. Tonight, MRI brain demonstrates new acute parenchymal hematoma in the right temporal lobe measuring 4.7 x 3.4 cm. Neurology updated and recommenced neurosurgical eval for possible intervention. ICU reconsulted. Concern for hematoma expansion due to worsening AMS and rising BP. Patient accepted by ICU/ neurology at [place]. Transfer arranged and EMTALA completed by intensivist. Mutiple attempts made to contact patient's wife and provide update. Help greatly appreciated.     Imaging:    XR CHEST PORTABLE  Result Date: 3/7/2025  Increased interstitial opacity pulmonary edema versus multifocal pneumonia.. Electronically signed by MESHA PAK    CTA HEAD NECK W CONTRAST  Result Date: 3/7/2025  CT Brain Perfusion demonstrates no blood flow or volume deficits to suggest acute ischemia or tissue at ischemic risk. CTA Head demonstrates no large vessel occlusion or significant flow-limiting stenosis. CTA Neck demonstrates no large vessel occlusion or significant flow-limiting stenosis. Atherosclerotic vasculopathy as  tablet  Commonly known as: PRAVACHOL     sertraline 100 MG tablet  Commonly known as: ZOLOFT  Take 1 tablet by mouth every evening     tamsulosin 0.4 MG capsule  Commonly known as: FLOMAX              Signed:    YVROSE Salcedo NP  3/9/2025  4:59 AM

## 2025-03-09 NOTE — TRANSFER CENTER NOTE
Patient just left the unit, been transported to [facility] Neuro ICU. Report given to Elmer YUSUF at [id number].

## 2025-03-09 NOTE — CONSULTS
CRITICAL CARE  CONSULT NOTE      Name: Deni Crocker   : 1950   MRN: [id number]   Date: 3/8/2025      REASON FOR CONSULT: IPH following tPA     ASSESSMENT & PLAN   Acute Right Temporal IPH following tPA (3/7/2025)  Status Epilepticus  Hx Severe Dementia/Alzheimer's & CVA w/ hemorrhagic conversion following tPA (3/2017)  -Received TNK 3/7@1640  -Initial imaging neg for LVO  -24 f/u imaging now demonstrating right temporal IPH (4.7 x 3.4 cm)  -Hold DVT PPx & ASA   -Maintain  or less   -Follow up CT in 6 hours   -Neuro checks per protocol   -Ceribell & formal EEG neg  -Keppra held by neurology   -Statin when able to take p.o.  -Seizure Precautions  -Neurology consulted, appreciate expertise  -Recommend Neurosurgery consult   -Resume home Aricept, Namenda & Zoloft when able to take p.o.    Interstitial Pulmonary Edema vs, Multifocal PNA  -Afebrile, continues on RA, echo w/ pEF     Hx T2DM  -POCT BG q 6 w/ SSI while npo   -Maintain Euglycemia     HPI:   Deni Crocker is a 75 y.o. with a PMH of severe dementia, HTN, T2DM and previous hemorrhagic stroke/PE tPA (3/2017) who presents to [facility] ED on 3/7 with altered mental status.  History obtained by wife at bedside who reports that they were at the hair salon when she noticed that he began to have jerking of all of his extremities and gaze deviation.  She reports a brief period where he \"came to\" but reports another episode of full extremity jerking and foaming at the mouth a short while later.  Upon EMS arrival patient was postictal lethargic but following some commands.  While en route an additional seizure was noted and aborted with 2 mg of Ativan.  Upon arrival to ER patient remained altered noted to have dense left-sided deficits with nystagmus.  Stroke protocol was initiated.  CTh/CTA/CTP without acute intracranial abnormality.  Deficits remained and patient received TNK @ 1640.  ICU consulted for admission.     3/8: Patient downgraded to SD and primary care  deep cerebral venous system are patent.    Neck:  NASCET method was utilized for calculating stenosis.  The aortic arch is unremarkable. The common carotid arteries demonstrate no  significant stenosis. There is no evidence of significant stenosis in the right  and left cervical internal carotid arteries. Bilateral carotid bulbs mixed  calcified and noncalcified plaques with associated at least mild to moderate  ostial stenosis of the left ICA.  There is less than 10% stenosis of the right carotid artery.  There is at least 20-30% stenosis of the left carotid artery.    There is a left vertebral artery dominant vertebrobasilar arterial system. The  cervical vertebral arteries are normal in course, size and contour without  significant stenosis.    OTHERS:  No evidence of intracranial mass or abnormal enhancement on delayed phase  images. Patchy periventricular and deep white matter ill-defined hypodensities  of the brain, nonspecific and likely microangiopathic white matter disease.  Visualized paranasal sinuses and mastoids are patent. Edentulous maxilla and  mandible. Visualized soft tissues of the skull base and neck are unremarkable.  Multinodular thyromegaly. Visualized lung apices are clear. No acute fracture or  aggressive osseous lesion. Multilevel degenerative changes of the cervical spine  with bulky flowing anterior osteophytosis extending from C2-T1 which may suggest  DISH.    Impression  CT Brain Perfusion demonstrates no blood flow or volume deficits to suggest  acute ischemia or tissue at ischemic risk.    CTA Head demonstrates no large vessel occlusion or significant flow-limiting  stenosis.  CTA Neck demonstrates no large vessel occlusion or significant flow-limiting  stenosis. Atherosclerotic vasculopathy as detailed.    No intracranial mass or neck mass/enhancing lesion.            Electronically signed by LEI MARTIN    Most Recent MRI  MRI BRAIN WO CONTRAST 03/08/2025 (Preliminary)  This  result has not been signed. Information might be incomplete.    Narrative  **PRELIMINARY REPORT**    New acute parenchymal hematoma in the right temporal lobe measuring 4.7 x 3.4  cm.    The findings were discussed with the ICU on 3/8/2025 at 2315 hours by Dr. Penaloza.    789    Final report to follow.    **END PRELIMINARY REPORT**    Most Recent Echo  03/07/25    ECHO (TTE) COMPLETE (PRN CONTRAST/BUBBLE/STRAIN/3D) 03/08/2025 12:27 PM (Final)    Interpretation Summary    Left Ventricle: Normal left ventricular systolic function with a visually estimated EF of 55 - 60%. Left ventricle size is normal. Increased wall thickness. Normal wall motion. Normal diastolic function.    Image quality is fair.    Image quality is good. Procedure performed with the patient in a sitting position.    Signed by: Manolo Umaña on 3/8/2025 12:27 PM        CRITICAL CARE DOCUMENTATION  I had a face to face encounter with the patient, reviewed and interpreted patient data including clinical events, labs, images, vital signs, I/O's, and examined patient.  I have discussed the case and the plan and management of the patient's care with the consulting services, the bedside nurses and the respiratory therapist.      NOTE OF PERSONAL INVOLVEMENT IN CARE   This patient has a high probability of imminent, clinically significant deterioration, which requires the highest level of preparedness to intervene urgently. I participated in the decision-making and personally managed or directed the management of the following life and organ supporting interventions that required my frequent assessment to treat or prevent imminent deterioration.    I personally spent 45 minutes of critical care time.  This is time spent at this critically ill patient's bedside actively involved in patient care as well as the coordination of care.  This does not include any procedural time which has been billed separately.    Jody Jefferson, APRN - CNP   Critical Care

## 2025-03-09 NOTE — H&P
CRITICAL CARE ADMISSION NOTE      Name: Deni Crocker   : 1950   MRN: [id number]   Date: 3/9/2025      Reason for ICU Admission: Intracranial Hemorrhage    HISTORY OF PRESENT ILLNESS:     Deni Crocker is a 75yoM with Alzheimer's, prior CVA with TPA administration and subsequent hemorrhagic conversion (), HTN, DM, transferred to [facility] ICU from  for IPH s/p TNK for possible stroke. Per chart review, patient presented to [facility] ED 3/7 with seizure and AMS.  He reportedly had witnessed seizure as well as L sided weakness with left gaze.  Stroke workup performed, imaging negative, TNK given.  He was loaded with Keppra and admitted to ICU.  EEG negative.  Post TNK MRI obtained 3/9, new R temporal IPH found.  Neurosurgery consulted, no intervention.  Patient transferred to [facility] ICU for further monitoring. At present, patient agitated, minimally participatory in interview, unable to answer ROS questions.    ASSESSMENT & PLAN:     IPH  Post TNK administration 3/7  - Brain MRI (3/8) prelim w/ new R temporal IPH   - Repeat HCT (3/9) redemonstrates IPH though with IVH   - Consider repeat head CT this afternoon   - Consult neurology  - Rediscuss case with NSGY given IVH   - Continue keppra maintenance   - Q1h neuro checks   - SBP goal <140    Hypertensive Emergency   In setting of IPH   - Cardene   - SBP goal <140     Acute Encephalopathy   Multifactorial, in setting of seizures/postictal state +/- CVA now with IPH; also with known dementia  - Follow simple commands, intermittently answers questions, moving all extremities  - Neurology consulted, stroke w/u completed at   - Continue Keppra  - Restart home donepezil, memantine once able to take PO    DM  - Euglycemic   - Hypoglycemia protocol      ICU DAILY CHECKLIST     Code Status: Full  DVT Prophylaxis: Held  T/L/D: None  SUP: N/A  Diet: NPO  Activity Level: PT/OT  ABCDEF Bundle/Checklist Completed:Yes  Disposition: Admit to ICU  Multidisciplinary Rounds Completed:

## 2025-03-09 NOTE — PROGRESS NOTES
ICU Follow up Note      Patient now hypertensive and agitated, not following commands as he was on previous assessment. Concern for IPH expansion    -STAT CTH now  -Cardene infusion for SBP goal <140  -Patient accepted by ICU/Neurology at [facility]   -Maintenance Keppra resumed   -Continue q 1 hour neuro checks until transfer   -Hospitalist LASHAE updated   -GARETH complete              Multiple attempts made to contact wife Rosa Maria with out answer. Given clinical condition will proceed with transfer out of emergent necessity.         CRITICAL CARE DOCUMENTATION  I had a face to face encounter with the patient, reviewed and interpreted patient data including clinical events, labs, images, vital signs, I/O's, and examined patient.  I have discussed the case and the plan and management of the patient's care with the consulting services, the bedside nurses and the respiratory therapist.       NOTE OF PERSONAL INVOLVEMENT IN CARE   This patient has a high probability of imminent, clinically significant deterioration, which requires the highest level of preparedness to intervene urgently. I participated in the decision-making and personally managed or directed the management of the following life and organ supporting interventions that required my frequent assessment to treat or prevent imminent deterioration.     I personally spent 35 minutes of critical care time.  This is time spent at this critically ill patient's bedside actively involved in patient care as well as the coordination of care.  This does not include any procedural time which has been billed separately.     Jody Jefferson, YVROSE - AYDEE            Critical Care Medicine  [facility]

## 2025-03-09 NOTE — PLAN OF CARE
Problem: Chronic Conditions and Co-morbidities  Goal: Patient's chronic conditions and co-morbidity symptoms are monitored and maintained or improved  Outcome: Progressing     Problem: Safety - Medical Restraint  Goal: Remains free of injury from restraints (Restraint for Interference with Medical Device)  Description: INTERVENTIONS:  1. Determine that other, less restrictive measures have been tried or would not be effective before applying the restraint  2. Evaluate the patient's condition at the time of restraint application  3. Inform patient/family regarding the reason for restraint  4. Q2H: Monitor safety, psychosocial status, comfort, nutrition and hydration  Outcome: Progressing     Problem: Skin/Tissue Integrity  Goal: Skin integrity remains intact  Description: 1.  Monitor for areas of redness and/or skin breakdown  2.  Assess vascular access sites hourly  3.  Every 4-6 hours minimum:  Change oxygen saturation probe site  4.  Every 4-6 hours:  If on nasal continuous positive airway pressure, respiratory therapy assess nares and determine need for appliance change or resting period  Outcome: Progressing  Flowsheets (Taken 3/9/2025 6599)  Skin Integrity Remains Intact: Monitor for areas of redness and/or skin breakdown

## 2025-03-10 PROBLEM — Z51.5 PALLIATIVE CARE ENCOUNTER: Status: ACTIVE | Noted: 2025-03-10

## 2025-03-10 PROBLEM — Z71.89 GOALS OF CARE, COUNSELING/DISCUSSION: Status: ACTIVE | Noted: 2025-03-10

## 2025-03-10 LAB
ANION GAP SERPL CALC-SCNC: 5 MMOL/L (ref 2–12)
BUN SERPL-MCNC: 14 MG/DL (ref 6–20)
BUN/CREAT SERPL: 15 (ref 12–20)
CALCIUM SERPL-MCNC: 9.3 MG/DL (ref 8.5–10.1)
CHLORIDE SERPL-SCNC: 98 MMOL/L (ref 97–108)
CO2 SERPL-SCNC: 28 MMOL/L (ref 21–32)
CREAT SERPL-MCNC: 0.92 MG/DL (ref 0.7–1.3)
ERYTHROCYTE [DISTWIDTH] IN BLOOD BY AUTOMATED COUNT: 12.6 % (ref 11.5–14.5)
GLUCOSE SERPL-MCNC: 107 MG/DL (ref 65–100)
HCT VFR BLD AUTO: 41.7 % (ref 36.6–50.3)
HGB BLD-MCNC: 14.4 G/DL (ref 12.1–17)
MCH RBC QN AUTO: 30.6 PG (ref 26–34)
MCHC RBC AUTO-ENTMCNC: 34.5 G/DL (ref 30–36.5)
MCV RBC AUTO: 88.5 FL (ref 80–99)
NRBC # BLD: 0 K/UL (ref 0–0.01)
NRBC BLD-RTO: 0 PER 100 WBC
PLATELET # BLD AUTO: 183 K/UL (ref 150–400)
PMV BLD AUTO: 9.7 FL (ref 8.9–12.9)
POTASSIUM SERPL-SCNC: 3.8 MMOL/L (ref 3.5–5.1)
RBC # BLD AUTO: 4.71 M/UL (ref 4.1–5.7)
SODIUM SERPL-SCNC: 131 MMOL/L (ref 136–145)
WBC # BLD AUTO: 9.1 K/UL (ref 4.1–11.1)

## 2025-03-10 PROCEDURE — 2580000003 HC RX 258: Performed by: INTERNAL MEDICINE

## 2025-03-10 PROCEDURE — 2500000003 HC RX 250 WO HCPCS: Performed by: INTERNAL MEDICINE

## 2025-03-10 PROCEDURE — 2500000003 HC RX 250 WO HCPCS: Performed by: PHYSICIAN ASSISTANT

## 2025-03-10 PROCEDURE — 99232 SBSQ HOSP IP/OBS MODERATE 35: CPT

## 2025-03-10 PROCEDURE — 97165 OT EVAL LOW COMPLEX 30 MIN: CPT | Performed by: OCCUPATIONAL THERAPIST

## 2025-03-10 PROCEDURE — 97161 PT EVAL LOW COMPLEX 20 MIN: CPT

## 2025-03-10 PROCEDURE — 99223 1ST HOSP IP/OBS HIGH 75: CPT | Performed by: INTERNAL MEDICINE

## 2025-03-10 PROCEDURE — 2000000000 HC ICU R&B

## 2025-03-10 PROCEDURE — 97530 THERAPEUTIC ACTIVITIES: CPT

## 2025-03-10 PROCEDURE — 6360000002 HC RX W HCPCS: Performed by: INTERNAL MEDICINE

## 2025-03-10 PROCEDURE — 80048 BASIC METABOLIC PNL TOTAL CA: CPT

## 2025-03-10 PROCEDURE — 85027 COMPLETE CBC AUTOMATED: CPT

## 2025-03-10 RX ADMIN — HYDRALAZINE HYDROCHLORIDE 10 MG: 20 INJECTION INTRAMUSCULAR; INTRAVENOUS at 22:25

## 2025-03-10 RX ADMIN — SODIUM CHLORIDE 210 MG: 9 INJECTION, SOLUTION INTRAVENOUS at 16:10

## 2025-03-10 RX ADMIN — SODIUM CHLORIDE, PRESERVATIVE FREE 10 ML: 5 INJECTION INTRAVENOUS at 08:54

## 2025-03-10 RX ADMIN — SODIUM CHLORIDE: 0.9 INJECTION, SOLUTION INTRAVENOUS at 08:58

## 2025-03-10 RX ADMIN — SODIUM CHLORIDE 210 MG: 9 INJECTION, SOLUTION INTRAVENOUS at 04:37

## 2025-03-10 RX ADMIN — SODIUM CHLORIDE 210 MG: 9 INJECTION, SOLUTION INTRAVENOUS at 21:46

## 2025-03-10 RX ADMIN — FAMOTIDINE 20 MG: 10 INJECTION, SOLUTION INTRAVENOUS at 13:29

## 2025-03-10 RX ADMIN — Medication 10 MG: at 23:08

## 2025-03-10 RX ADMIN — HYDRALAZINE HYDROCHLORIDE 10 MG: 20 INJECTION INTRAMUSCULAR; INTRAVENOUS at 07:54

## 2025-03-10 RX ADMIN — SODIUM CHLORIDE: 0.9 INJECTION, SOLUTION INTRAVENOUS at 23:04

## 2025-03-10 RX ADMIN — SODIUM CHLORIDE, PRESERVATIVE FREE 10 ML: 5 INJECTION INTRAVENOUS at 21:50

## 2025-03-10 RX ADMIN — SODIUM CHLORIDE 210 MG: 9 INJECTION, SOLUTION INTRAVENOUS at 10:36

## 2025-03-10 RX ADMIN — FAMOTIDINE 20 MG: 10 INJECTION, SOLUTION INTRAVENOUS at 21:42

## 2025-03-10 ASSESSMENT — PAIN SCALES - GENERAL
PAINLEVEL_OUTOF10: 0

## 2025-03-10 NOTE — PLAN OF CARE
Problem: Occupational Therapy - Adult  Goal: By Discharge: Performs self-care activities at highest level of function for planned discharge setting.  See evaluation for individualized goals.  Description: FUNCTIONAL STATUS PRIOR TO ADMISSION:  Per chart pt lives with wife, gets assist with ADLs and has been falling some at home.  Pt is cognitively impaired at baseline with previous CVA and advanced Alzheimer's dementia. Was receiving HH OT and PT since discharge from SNF after kyphoplasty in Dec. 2024.       HOME SUPPORT: Patient lived wife and has a neighbor who is a nurse that assists per chart.     Occupational Therapy Goals  Initiated 3/10/2025   1.  Patient will perform face washing with Moderate Assist within 7 day(s).  2.  Patient will perform toilet transfers with Maximal Assist and Assist x2 within 7 day(s).  3.  Patient will participate in upper extremity therapeutic exercise/activities with Moderate Assist within 7 day(s).    4.  Patient will perform their UE Fugl Badillo in prep for ADLs within 7 days.   Outcome: Not Progressing     OCCUPATIONAL THERAPY EVALUATION    Patient: Deni Crocker (75 y.o. male)  Date: 3/10/2025  Primary Diagnosis: Intracranial hemorrhage (HCC) [I62.9]         Precautions: Fall Risk                  ASSESSMENT :  The patient is limited by decreased functional mobility, independence in ADLs, strength, endurance, safety awareness, cognition, command following, attention/concentration, coordination, balance, proprioception, vision/visual deficit following admission for CVA and s/p TNK.  Following TNK pt with R IPH involving the R temporal lobe and basal ganglia.  Goals of care being discussed with family.    Based on the impairments listed above pt requires total A for all ADLs an functional mobility.  Pt only able to follow 3 commands, squeeze hand on R, wiggle toes on R and open eyes in which he can raise eyebrows only.  No command following noted on L or for any other tasks.  Pt

## 2025-03-10 NOTE — CONSULTS
NEUROLOGY CONSULT NOTE    Name Deni Crocker Age 75 y.o.   MRN [id number]  1950     Consulting Physician: Koby Gary MD      Chief Complaint:  Hemorrhagic stroke     Assessment:   74 yo male with pmhx of severe Alzheimer's Dementia, prior CVA with TPA administration and subsequent hemorrhagic conversion (), HTN and type 2 DM admitted as transfer from [facility] for IPH s/p TNK administration for stroke. Presented to sending on 3/7 for seizure and AMS. On arrival to ED pt had additional witnessed seizure activity with L sided weakness, L gaze with nystagmus. CTH negative, CTA H/N no LVO or flow limiting stenosis. TNK given. Loaded with Keppra and admitted to ICU for further stroke workup. EEG obtained- no epileptiform abnormalities seen. Post-TNK MRI obtained on 3/9 and pt with new right temporal IPH. Neurosurgery consulted and no surgical plan. Initiated on Nicardipine for SBP goal <140. Repeat CTH completed prior to transfer demonstrates R temporal IPH with IVH, no acute hydrocephalus. Transferred to [facility] for further ICU monitoring and availability of Neurosurgery services if needed.      From sending: LDL 65, A1c 5.3, ECHO with EF 55-60%, MRI as noted above  Recommendations:   - Q1h neuro checks  - SBP goal <140- Nicardipine, Labetalol PRN  - Keppra 500mg BID  - Hold AC/APT  - Interval CTH this afternoon  - PT/OT/ST as tolerated    History of Present Illness:      This is a 75 y.o. male with pmhx of prior CVA with hemorrhagic conversion after TPA (), HTN. DMII, severe Alzheimer's Dementia (followed by Dr Miller Neurology) who is severely debilitated at baseline. On 3/7 he was at hair salon with wife when he was witnessed to slump to right followed by tonic-clonic activity and urinary incontinence. Post-ictal for EMS he was noted to be weak on left and had second generalized seizure which aborted with Ativan 2mg. On arrival to sending pt was weak on the left with left gaze and 
  03/01/21 87.1 kg (192 lb)        Current Diet: Diet NPO       PSYCHOSOCIAL/SPIRITUAL SCREENING:   Palliative IDT has assessed this patient for cultural preferences / practices and a referral made as appropriate to needs (Cultural Services, Patient Advocacy, Ethics, etc.)    Spiritual Affiliation: Presbyterian    Any spiritual / Taoism concerns:  [] Yes /  [x] No   If \"Yes\" to discuss with pastoral care during IDT     Does caregiver feel burdened by caring for their loved one:   [] Yes /  [x] No /  [] No Caregiver Present/Available [] No Caregiver [] Pt Lives at Facility  If \"Yes\" to discuss with social work during IDT    Anticipatory grief assessment:   [x] Normal  / [] Maladaptive     If \"Maladaptive\" to discuss with social work during IDT    ESAS Anxiety:      ESAS Depression:          LAB AND IMAGING FINDINGS:   Objective data reviewed:  labs, images, records, medication use, vitals, and chart     FINAL COMMENTS   Thank you for allowing Palliative Medicine to participate in the care of Deni Crocker.    Only check if applicable and billing time based rather than MDM  [] The total encounter time on this service date was __80__ minutes which was spent performing a face-to-face encounter and personally completing the provider-level activities documented in the note. This includes time spent prior to the visit and after the visit in direct care of the patient. This time does not include time spent in any separately reportable services.    Electronically signed by   Harini Obrien MD  Palliative Care Team  on 3/10/2025 at 4:22 PM

## 2025-03-10 NOTE — PLAN OF CARE
Problem: Physical Therapy - Adult  Goal: By Discharge: Performs mobility at highest level of function for planned discharge setting.  See evaluation for individualized goals.  Description: FUNCTIONAL STATUS PRIOR TO ADMISSION: Unable to provide PLOF info    HOME SUPPORT PRIOR TO ADMISSION: The patient lived with his spouse.    Physical Therapy Goals  Initiated 3/10/2025  1.  Patient will move from supine to sit and sit to supine, scoot up and down, and roll side to side in bed with maximal assistance within 7 day(s).    2.  Patient will perform sit to stand with maximal assistance within 7 day(s).  3.  Patient will transfer from bed to chair and chair to bed with maximal assistance using the least restrictive device within 7 day(s).  4.  Patient will demo fair sitting balance at EOB x30 sec in prep for mobility progression within 7 days.   5.  Patient will improve Murguia Balance score by 7 points within 7 days.    Outcome: Progressing   PHYSICAL THERAPY EVALUATION    Patient: Deni Crocker (75 y.o. male)  Date: 3/10/2025  Primary Diagnosis: Intracranial hemorrhage (HCC) [I62.9]       Precautions: Restrictions/Precautions  Restrictions/Precautions: Fall Risk            ASSESSMENT :   DEFICITS/IMPAIRMENTS:   The patient presents with impaired functional mobility as compared to baseline level 2* decr command following, decr alertness, no verbalizations, global weakness and L hemiparesis, and impaired coordination following admission from OSH for AMS, seizures, and L weakness. Pt received TNK and subsequently developed R temporal IPH with IVH. At baseline, pt lived at home with his spouse.    Received pt supine in bed, eyes closed and no verbalizations. Limited to no command following noted on R only; L UE/LE hemiparesis with incr tone. Facilitated supine<>sit EOB with total  A; no improvement in alertness with vestibular input. Total A to maintain sitting balance with no protective righting reactions noted to LOBs when

## 2025-03-10 NOTE — CARE COORDINATION
Care Management Initial Assessment       RUR: 16%   Readmission? Yes -   1st IM letter given? No     03/10/25 1513   Service Assessment   Patient Orientation Unresponsive   History Provided By Medical Record   Primary Caregiver Family  (Wife Rosa Maria Crocker)   Support Systems Spouse/Significant Other   Patient's Healthcare Decision Maker is: Legal Next of Kin  (Wife)   PCP Verified by CM Yes  (Odalys Bass)   Ability to make needs known: Unable   Family able to assist with home care needs: Other (comment)  (unknown)   Would you like for me to discuss the discharge plan with any other family members/significant others, and if so, who? Yes  (Wife)   Financial Resources Medicare  ([facility] [employer])   Community Resources None   Social/Functional History   Lives With Spouse   Home Equipment Cane - Quad;Walker - Standard   Receives Help From Family     Patient transferred from [facility] for higher level of care. Patient presented to [facility] ED with AMS, seizures and left sided deficits.  Patient received TNK for possible stroke. Per notes patient had witnessed seizure as well as L sided weakness with left gaze.  Stroke workup performed, imaging negative, TNK given.  Post TNK MRI obtained 3/9, new R temporal IPH found.  Neurosurgery consulted, no intervention. Started on a Cardene gtt.   Palliative care team consulted to discuss GOC with family. Plan is to follow up with family again tomorrow.  Per notes patient uses a walker and a cane. He has uses [facility] for HH in the past and has bee to the [facility] for rehab.   Care management will continue to follow.  Arabella Drew RN,Care Management

## 2025-03-10 NOTE — PLAN OF CARE
Problem: Chronic Conditions and Co-morbidities  Goal: Patient's chronic conditions and co-morbidity symptoms are monitored and maintained or improved  Outcome: Progressing  Flowsheets (Taken 3/9/2025 2000)  Care Plan - Patient's Chronic Conditions and Co-Morbidity Symptoms are Monitored and Maintained or Improved:   Monitor and assess patient's chronic conditions and comorbid symptoms for stability, deterioration, or improvement   Collaborate with multidisciplinary team to address chronic and comorbid conditions and prevent exacerbation or deterioration   Update acute care plan with appropriate goals if chronic or comorbid symptoms are exacerbated and prevent overall improvement and discharge     Problem: Discharge Planning  Goal: Discharge to home or other facility with appropriate resources  Outcome: Progressing  Flowsheets (Taken 3/9/2025 2000)  Discharge to home or other facility with appropriate resources:   Identify barriers to discharge with patient and caregiver   Arrange for needed discharge resources and transportation as appropriate   Identify discharge learning needs (meds, wound care, etc)   Arrange for interpreters to assist at discharge as needed   Refer to discharge planning if patient needs post-hospital services based on physician order or complex needs related to functional status, cognitive ability or social support system     Problem: Skin/Tissue Integrity  Goal: Skin integrity remains intact  Description: 1.  Monitor for areas of redness and/or skin breakdown  2.  Assess vascular access sites hourly  3.  Every 4-6 hours minimum:  Change oxygen saturation probe site  4.  Every 4-6 hours:  If on nasal continuous positive airway pressure, respiratory therapy assess nares and determine need for appliance change or resting period  Outcome: Progressing  Flowsheets (Taken 3/9/2025 2000)  Skin Integrity Remains Intact:   Monitor for areas of redness and/or skin breakdown   Every 4-6 hours minimum:

## 2025-03-10 NOTE — CARE COORDINATION
03/10/25 1528   Readmission Assessment   Number of Days since last admission? 8-30 days   Previous Disposition SNF  ([facility])   Who is being Interviewed Unable to Complete   What was the patient's/caregiver's perception as to why they think they needed to return back to the hospital? Other (Comment)  (Patient admitted with a IPH)   Did you visit your Primary Care Physician after you left the hospital, before you returned this time? Yes  (Unknown)   Did you see a specialist, such as Cardiac, Pulmonary, Orthopedic Physician, etc. after you left the hospital? Yes  (Neurologist)   Who advised the patient to return to the hospital? Self-referral   Does the patient report anything that got in the way of taking their medications? No   In our efforts to provide the best possible care to you and others like you, can you think of anything that we could have done to help you after you left the hospital the first time, so that you might not have needed to return so soon? Other (Comment)  (Patient admitted with a IP)

## 2025-03-11 LAB
ANION GAP SERPL CALC-SCNC: 9 MMOL/L (ref 2–12)
BUN SERPL-MCNC: 18 MG/DL (ref 6–20)
BUN/CREAT SERPL: 22 (ref 12–20)
CALCIUM SERPL-MCNC: 9 MG/DL (ref 8.5–10.1)
CHLORIDE SERPL-SCNC: 102 MMOL/L (ref 97–108)
CO2 SERPL-SCNC: 23 MMOL/L (ref 21–32)
CREAT SERPL-MCNC: 0.83 MG/DL (ref 0.7–1.3)
EKG ATRIAL RATE: 64 BPM
EKG DIAGNOSIS: NORMAL
EKG P AXIS: 57 DEGREES
EKG P-R INTERVAL: 136 MS
EKG Q-T INTERVAL: 484 MS
EKG QRS DURATION: 74 MS
EKG QTC CALCULATION (BAZETT): 499 MS
EKG R AXIS: 61 DEGREES
EKG T AXIS: 140 DEGREES
EKG VENTRICULAR RATE: 64 BPM
ERYTHROCYTE [DISTWIDTH] IN BLOOD BY AUTOMATED COUNT: 12.9 % (ref 11.5–14.5)
GLUCOSE SERPL-MCNC: 118 MG/DL (ref 65–100)
HCT VFR BLD AUTO: 40.2 % (ref 36.6–50.3)
HGB BLD-MCNC: 13.8 G/DL (ref 12.1–17)
MCH RBC QN AUTO: 30.3 PG (ref 26–34)
MCHC RBC AUTO-ENTMCNC: 34.3 G/DL (ref 30–36.5)
MCV RBC AUTO: 88.4 FL (ref 80–99)
NRBC # BLD: 0 K/UL (ref 0–0.01)
NRBC BLD-RTO: 0 PER 100 WBC
PLATELET # BLD AUTO: 195 K/UL (ref 150–400)
PMV BLD AUTO: 9.7 FL (ref 8.9–12.9)
POTASSIUM SERPL-SCNC: 3.7 MMOL/L (ref 3.5–5.1)
RBC # BLD AUTO: 4.55 M/UL (ref 4.1–5.7)
SODIUM SERPL-SCNC: 134 MMOL/L (ref 136–145)
WBC # BLD AUTO: 10.5 K/UL (ref 4.1–11.1)

## 2025-03-11 PROCEDURE — 6360000002 HC RX W HCPCS: Performed by: INTERNAL MEDICINE

## 2025-03-11 PROCEDURE — 80048 BASIC METABOLIC PNL TOTAL CA: CPT

## 2025-03-11 PROCEDURE — 93010 ELECTROCARDIOGRAM REPORT: CPT | Performed by: SPECIALIST

## 2025-03-11 PROCEDURE — 2580000003 HC RX 258: Performed by: INTERNAL MEDICINE

## 2025-03-11 PROCEDURE — 2500000003 HC RX 250 WO HCPCS: Performed by: PHYSICIAN ASSISTANT

## 2025-03-11 PROCEDURE — 99233 SBSQ HOSP IP/OBS HIGH 50: CPT | Performed by: INTERNAL MEDICINE

## 2025-03-11 PROCEDURE — 85027 COMPLETE CBC AUTOMATED: CPT

## 2025-03-11 PROCEDURE — 1100000000 HC RM PRIVATE

## 2025-03-11 PROCEDURE — 2500000003 HC RX 250 WO HCPCS: Performed by: INTERNAL MEDICINE

## 2025-03-11 PROCEDURE — APPNB45 APP NON BILLABLE 31-45 MINUTES

## 2025-03-11 RX ORDER — LORAZEPAM 2 MG/ML
1 INJECTION INTRAMUSCULAR
Status: DISCONTINUED | OUTPATIENT
Start: 2025-03-11 | End: 2025-03-12

## 2025-03-11 RX ORDER — GLYCOPYRROLATE 0.2 MG/ML
0.2 INJECTION INTRAMUSCULAR; INTRAVENOUS EVERY 6 HOURS PRN
Status: DISCONTINUED | OUTPATIENT
Start: 2025-03-11 | End: 2025-03-12 | Stop reason: HOSPADM

## 2025-03-11 RX ORDER — LORAZEPAM 2 MG/ML
1 INJECTION INTRAMUSCULAR EVERY 5 MIN PRN
Status: DISCONTINUED | OUTPATIENT
Start: 2025-03-11 | End: 2025-03-12 | Stop reason: HOSPADM

## 2025-03-11 RX ORDER — HYDROMORPHONE HYDROCHLORIDE 1 MG/ML
1 INJECTION, SOLUTION INTRAMUSCULAR; INTRAVENOUS; SUBCUTANEOUS
Status: DISCONTINUED | OUTPATIENT
Start: 2025-03-11 | End: 2025-03-12 | Stop reason: HOSPADM

## 2025-03-11 RX ADMIN — SODIUM CHLORIDE, PRESERVATIVE FREE 10 ML: 5 INJECTION INTRAVENOUS at 09:03

## 2025-03-11 RX ADMIN — LORAZEPAM 1 MG: 2 INJECTION INTRAMUSCULAR; INTRAVENOUS at 16:25

## 2025-03-11 RX ADMIN — SODIUM CHLORIDE 210 MG: 9 INJECTION, SOLUTION INTRAVENOUS at 23:20

## 2025-03-11 RX ADMIN — FAMOTIDINE 20 MG: 10 INJECTION, SOLUTION INTRAVENOUS at 09:03

## 2025-03-11 RX ADMIN — SODIUM CHLORIDE 210 MG: 9 INJECTION, SOLUTION INTRAVENOUS at 10:26

## 2025-03-11 RX ADMIN — HYDRALAZINE HYDROCHLORIDE 10 MG: 20 INJECTION INTRAMUSCULAR; INTRAVENOUS at 07:28

## 2025-03-11 RX ADMIN — SODIUM CHLORIDE 210 MG: 9 INJECTION, SOLUTION INTRAVENOUS at 16:21

## 2025-03-11 RX ADMIN — SODIUM CHLORIDE, PRESERVATIVE FREE 10 ML: 5 INJECTION INTRAVENOUS at 20:13

## 2025-03-11 RX ADMIN — SODIUM CHLORIDE 210 MG: 9 INJECTION, SOLUTION INTRAVENOUS at 04:41

## 2025-03-11 ASSESSMENT — PAIN SCALES - GENERAL
PAINLEVEL_OUTOF10: 0

## 2025-03-11 NOTE — CARE COORDINATION
Transition of Care Plan:    RUR:  16%  Prior Level of Functioning: Needed assistance   Disposition: Hospice   MICHAEL:   Transportation at discharge: BLS  IM/IMM Medicare/ letter given: NA  Is patient a [place] and connected with VA? No  Caregiver Contact: Wife Ed Crocker   Barriers to discharge:    Received consult for Hospice referral. Referral made to [facility] through [facility].   Arabella Drew RN,Care Management

## 2025-03-11 NOTE — PLAN OF CARE
Problem: Occupational Therapy - Adult  Goal: By Discharge: Performs self-care activities at highest level of function for planned discharge setting.  See evaluation for individualized goals.  Description: FUNCTIONAL STATUS PRIOR TO ADMISSION:  Per chart pt lives with wife, gets assist with ADLs and has been falling some at home.  Pt is cognitively impaired at baseline with previous CVA and advanced Alzheimer's dementia. Was receiving  OT and PT since discharge from SNF after kyphoplasty in Dec. 2024.       HOME SUPPORT: Patient lived wife and has a neighbor who is a nurse that assists per chart.     Occupational Therapy Goals  Initiated 3/10/2025   1.  Patient will perform face washing with Moderate Assist within 7 day(s).  2.  Patient will perform toilet transfers with Maximal Assist and Assist x2 within 7 day(s).  3.  Patient will participate in upper extremity therapeutic exercise/activities with Moderate Assist within 7 day(s).    4.  Patient will perform their UE Fugl Badillo in prep for ADLs within 7 days.   3/10/2025 1235 by Sylvia Reyes OT  Outcome: Not Progressing     Problem: Occupational Therapy - Adult  Goal: By Discharge: Performs self-care activities at highest level of function for planned discharge setting.  See evaluation for individualized goals.  Description: FUNCTIONAL STATUS PRIOR TO ADMISSION:  Per chart pt lives with wife, gets assist with ADLs and has been falling some at home.  Pt is cognitively impaired at baseline with previous CVA and advanced Alzheimer's dementia. Was receiving  OT and PT since discharge from SNF after kyphoplasty in Dec. 2024.       HOME SUPPORT: Patient lived wife and has a neighbor who is a nurse that assists per chart.     Occupational Therapy Goals  Initiated 3/10/2025   1.  Patient will perform face washing with Moderate Assist within 7 day(s).  2.  Patient will perform toilet transfers with Maximal Assist and Assist x2 within 7 day(s).  3.  Patient

## 2025-03-11 NOTE — H&P
History and Physical    Date of Service:  3/11/2025  Primary Care Provider: Odalys Sommer PA-C  Source of information: electronic medical record    Chief Complaint: No chief complaint on file.      History of Presenting Illness:   Deni Crocker is a 75 y.o. male with apmhx severe dementia, HTN, DM II, and past ischemic stroke with TPA and subsequent hemorrhagic conversion who was transferred to  ICU for stroke work up. He received TNK with subsequent hemorrhagic conversion and was transferred to [place] ICU for neurosurgery consultation.  Neurosurgery concluded that there is no plan for surgical intervention.       REVIEW OF SYSTEMS:  Review of systems not obtained due to patient factors.     Past Medical History:   Diagnosis Date    Hemorrhagic stroke (HCC) 3/28/2017    S/p TPA    Hypertension     Ill-defined condition     Eczema    Stroke (HCC)     Type II diabetes mellitus (HCC) 3/29/2017      Past Surgical History:   Procedure Laterality Date    IR KYPHOPLASTY LUMBAR 1 VERTEBRAL BODY  12/12/2024    IR KYPHOPLASTY LUMBAR FIRST LEVEL 12/12/2024 [facility] CARDIAC CATH/EP/IR LAB    ORTHOPEDIC SURGERY  03/2017    left wrist surgery     Prior to Admission medications    Medication Sig Start Date End Date Taking? Authorizing Provider   donepezil (ARICEPT) 10 MG tablet Take 1 tablet by mouth nightly 3/5/25   Racheal Miller MD   memantine (NAMENDA) 10 MG tablet Take 1 tablet by mouth 2 times daily 3/5/25   Racheal Miller MD   sertraline (ZOLOFT) 100 MG tablet Take 1 tablet by mouth every evening 3/5/25   Racheal Miller MD   acetaminophen (TYLENOL) 325 MG tablet Take 2 tablets by mouth every 6 hours as needed for Pain or Fever 12/16/24   Patel Escoto MD   polyethylene glycol (GLYCOLAX) 17 g packet Take 1 packet by mouth daily as needed for Constipation  Patient not taking: Reported on 3/5/2025 12/16/24   Patel Escoto MD   aspirin 81 MG EC tablet Take 1 tablet by mouth daily    Automatic Reconciliation,

## 2025-03-12 ENCOUNTER — HOSPITAL ENCOUNTER (INPATIENT)
Facility: HOSPITAL | Age: 75
LOS: 1 days | Discharge: HOSPICE | DRG: 951 | End: 2025-03-13
Attending: INTERNAL MEDICINE | Admitting: INTERNAL MEDICINE
Payer: MEDICARE

## 2025-03-12 PROBLEM — I63.9 ACUTE CEREBROVASCULAR ACCIDENT (CVA) (HCC): Status: ACTIVE | Noted: 2025-03-12

## 2025-03-12 PROCEDURE — 2580000003 HC RX 258: Performed by: INTERNAL MEDICINE

## 2025-03-12 PROCEDURE — 6360000002 HC RX W HCPCS: Performed by: INTERNAL MEDICINE

## 2025-03-12 PROCEDURE — 2500000003 HC RX 250 WO HCPCS: Performed by: INTERNAL MEDICINE

## 2025-03-12 PROCEDURE — 6560000002 HC HOSPICE GENERAL INPATIENT

## 2025-03-12 PROCEDURE — 99223 1ST HOSP IP/OBS HIGH 75: CPT | Performed by: INTERNAL MEDICINE

## 2025-03-12 RX ORDER — GLYCOPYRROLATE 0.2 MG/ML
0.2 INJECTION INTRAMUSCULAR; INTRAVENOUS EVERY 6 HOURS
Status: DISCONTINUED | OUTPATIENT
Start: 2025-03-12 | End: 2025-03-13 | Stop reason: HOSPADM

## 2025-03-12 RX ORDER — DEXTROSE MONOHYDRATE 100 MG/ML
INJECTION, SOLUTION INTRAVENOUS CONTINUOUS PRN
Status: DISCONTINUED | OUTPATIENT
Start: 2025-03-12 | End: 2025-03-12

## 2025-03-12 RX ORDER — ONDANSETRON 2 MG/ML
4 INJECTION INTRAMUSCULAR; INTRAVENOUS EVERY 6 HOURS PRN
Status: DISCONTINUED | OUTPATIENT
Start: 2025-03-12 | End: 2025-03-13 | Stop reason: HOSPADM

## 2025-03-12 RX ORDER — LORAZEPAM 2 MG/ML
1 INJECTION INTRAMUSCULAR EVERY 6 HOURS
Status: DISCONTINUED | OUTPATIENT
Start: 2025-03-12 | End: 2025-03-12 | Stop reason: HOSPADM

## 2025-03-12 RX ORDER — INSULIN LISPRO 100 [IU]/ML
0-4 INJECTION, SOLUTION INTRAVENOUS; SUBCUTANEOUS
Status: DISCONTINUED | OUTPATIENT
Start: 2025-03-12 | End: 2025-03-12

## 2025-03-12 RX ORDER — KETOROLAC TROMETHAMINE 30 MG/ML
30 INJECTION, SOLUTION INTRAMUSCULAR; INTRAVENOUS EVERY 6 HOURS PRN
Status: DISCONTINUED | OUTPATIENT
Start: 2025-03-12 | End: 2025-03-13

## 2025-03-12 RX ORDER — SODIUM CHLORIDE 0.9 % (FLUSH) 0.9 %
5-40 SYRINGE (ML) INJECTION EVERY 12 HOURS SCHEDULED
Status: DISCONTINUED | OUTPATIENT
Start: 2025-03-12 | End: 2025-03-13 | Stop reason: HOSPADM

## 2025-03-12 RX ORDER — ATROPINE SULFATE 10 MG/ML
1 SOLUTION/ DROPS OPHTHALMIC
Status: DISCONTINUED | OUTPATIENT
Start: 2025-03-12 | End: 2025-03-13 | Stop reason: HOSPADM

## 2025-03-12 RX ORDER — LORAZEPAM 2 MG/ML
1 INJECTION INTRAMUSCULAR
Status: DISCONTINUED | OUTPATIENT
Start: 2025-03-12 | End: 2025-03-13 | Stop reason: HOSPADM

## 2025-03-12 RX ORDER — LORAZEPAM 2 MG/ML
2 INJECTION INTRAMUSCULAR EVERY 6 HOURS
Status: DISCONTINUED | OUTPATIENT
Start: 2025-03-12 | End: 2025-03-13 | Stop reason: HOSPADM

## 2025-03-12 RX ORDER — LORAZEPAM 2 MG/ML
2 INJECTION INTRAMUSCULAR EVERY 5 MIN PRN
Status: DISCONTINUED | OUTPATIENT
Start: 2025-03-12 | End: 2025-03-13 | Stop reason: HOSPADM

## 2025-03-12 RX ORDER — BISACODYL 10 MG
10 SUPPOSITORY, RECTAL RECTAL DAILY PRN
Status: DISCONTINUED | OUTPATIENT
Start: 2025-03-12 | End: 2025-03-13 | Stop reason: HOSPADM

## 2025-03-12 RX ADMIN — SODIUM CHLORIDE 210 MG: 9 INJECTION, SOLUTION INTRAVENOUS at 15:18

## 2025-03-12 RX ADMIN — GLYCOPYRROLATE 0.2 MG: 0.2 INJECTION INTRAMUSCULAR; INTRAVENOUS at 23:15

## 2025-03-12 RX ADMIN — VALPROATE SODIUM 210 MG: 100 INJECTION, SOLUTION INTRAVENOUS at 21:13

## 2025-03-12 RX ADMIN — HYDROMORPHONE HYDROCHLORIDE 0.5 MG: 1 INJECTION, SOLUTION INTRAMUSCULAR; INTRAVENOUS; SUBCUTANEOUS at 23:15

## 2025-03-12 RX ADMIN — SODIUM CHLORIDE 210 MG: 9 INJECTION, SOLUTION INTRAVENOUS at 09:31

## 2025-03-12 RX ADMIN — HYDROMORPHONE HYDROCHLORIDE 1 MG: 1 INJECTION, SOLUTION INTRAMUSCULAR; INTRAVENOUS; SUBCUTANEOUS at 11:00

## 2025-03-12 RX ADMIN — SODIUM CHLORIDE 210 MG: 9 INJECTION, SOLUTION INTRAVENOUS at 04:28

## 2025-03-12 RX ADMIN — LORAZEPAM 1 MG: 2 INJECTION INTRAMUSCULAR; INTRAVENOUS at 15:17

## 2025-03-12 RX ADMIN — LORAZEPAM 2 MG: 2 INJECTION INTRAMUSCULAR; INTRAVENOUS at 23:15

## 2025-03-12 RX ADMIN — GLYCOPYRROLATE 0.2 MG: 0.2 INJECTION INTRAMUSCULAR; INTRAVENOUS at 19:14

## 2025-03-12 RX ADMIN — LORAZEPAM 2 MG: 2 INJECTION INTRAMUSCULAR; INTRAVENOUS at 19:14

## 2025-03-12 RX ADMIN — HYDROMORPHONE HYDROCHLORIDE 0.5 MG: 1 INJECTION, SOLUTION INTRAMUSCULAR; INTRAVENOUS; SUBCUTANEOUS at 19:13

## 2025-03-12 ASSESSMENT — PAIN SCALES - GENERAL: PAINLEVEL_OUTOF10: 1

## 2025-03-12 ASSESSMENT — PAIN DESCRIPTION - LOCATION: LOCATION: GENERALIZED

## 2025-03-12 NOTE — PROGRESS NOTES
[facility]     ICU TEAM Progress Note           Name: Deni Crocker   : 1950   MRN: [id number]   Date: 3/11/2025          CU PROBLEM LIST   -IPH  -Acute metabolic encephalopathy-     HISTORY OF PRESENT ILLNESS:     75-year-old.  Past medical history of dementia, suspect vascular dementia, some differential of Alzheimer's, presents following 2 seizure episode and new left-sided weakness.  Patient received TNK on 3/7, brain MRI on 3/8 revealed some concern for new right temporal IPH.  Follow-up CT head revealed intraventricular hemorrhage/extension without hydrocephalus.     Patient is transferred to [facility] ICU for neurosurgery evaluation.     Clinically patient continues to be altered, not following exam Limited neuroassessment, moving all extremities.     Follow-up CT head stable ventricular size.  Minimal leftward midline shift measuring 3 mm, and new right temporal/basal ganglia intraparenchymal hemorrhage with intraventricular extension.  On review of brain MRI, some concern for CNS amyloidosis.    SUBJECTIVE:     3/10-more somnolent today. Discussed with family, made DNR/DNI, with plans for hospice. Palliative care team is consulted to provide support and comfort to family.     3/11 -palliative care team consultation and discussion.  Patient's care transition to comfort/hospice care.  Patient less responsive today, with nonspecific movement in the right lower extremity    Acute metabolic encephalopathy, multifactorial   Intraparenchymal hemorrhage,  Concern for amyloidosis, and bleeding related to TNK.  Hypertensive Emergency   DM     POD:  * No surgery found *    S/P:       Active Problem List:     [unfilled]    Past Medical History:      has a past medical history of Hemorrhagic stroke (HCC), Hypertension, Ill-defined condition, Stroke (HCC), and Type II diabetes mellitus (HCC).    Past Surgical History:      has a past surgical history that includes orthopedic surgery 
  ICH Documentation Note     Brief HPI: 74 yo male admitted to [facility] with seizure and AMS. Stroke W/O initiated and TNK given.  MRI brain as part of stroke w/o and R temporal IPH noted. Tx to [facility]     Vitals      BP Readings from Last 3 Encounters:   03/09/25 110/88   03/05/25 (!) 154/78   12/19/24 132/83        Medical hx Alzehimer's dementia, CVA with TPA and hemmorhagic conversion, DMII, HTN    OAC/APT?   - Aspirin     Reversal agent: Not applicable     ICH Score ON   ARRIVAL:    Bolton Garibay/Modified Aguilar on arrival (if indicated):      2    Imaging:   CT Result (most recent):  CT HEAD WO CONTRAST 03/09/2025    Narrative  EXAM:  CT HEAD WO CONTRAST    INDICATION: Right temporal hemorrhage    COMPARISON: MRI 3/8/2025. CT 3/7/2025.    TECHNIQUE: Noncontrast head CT. Coronal and sagittal reformats. CT dose  reduction was achieved through use of a standardized protocol tailored for this  examination and automatic exposure control for dose modulation.    FINDINGS:  There is an acute parenchymal hemorrhage in the right temporal lobe measuring  4.1 x 3.4 x 2.1 cm (volume 14.9 mL) with intraventricular extension.    There is no acute hydrocephalus or midline shift. The basal cisterns are patent.  There is stable chronic microvascular ischemic change in the periventricular  white matter.    The osseous structures are intact. The visualized paranasal sinuses and mastoid  air cells are clear.    Impression  Acute parenchymal hemorrhage in the right temporal lobe with intraventricular  extension. No acute hydrocephalus.        Electronically signed by Arnold Penaloza       Plan:  Neurology consult    NSGY consult   SBP goal <140     Time spent: 10 minutes.     YVROSE Delacruz - CNP  Neurovascular Nurse Practitioner  [phone]   
  Palliative Medicine   [place] [phone] (COPE)        [facility] [phone] (COPE)        Full Palliative Medicine SW note to follow -       Plan for family meeting w/ patient's wife Rosa Maria Crocker and their son Henrry at 10:00 a.m. Tuesday.         Thank you for including Palliative Medicine in the care of Deni Crocker         Chrystal Preciado LCSW     
  Palliative Medicine   [place] [phone] (COPE)        [facility] [phone] (COPE)       Family meeting held w/ our team along with spouse Jessica and son Guillermo -       Plan to transition to comfort focused care, hospice consultation to evaluate for IP/GIP admission.       Full family meeting note to follow.       Thank you for including Palliative Medicine in the care of Deni Preciado, IONA     
1000: Transported pt to CT w/ this RN and PCT. Pt VSS.    1026: Pt back in room. VSS    1000: straight cath 850ml    1600: straight cath 925 ml. Provider notified. [place] insertion ordered.    1945: During shift change performed dual neuro assessment w/ oncoming RN. New L sided weakness and R forced gazed noted. NIS NP and intensivist notified. STAT CTH ordered.  
2152: Radiologist MD called to report increase in IPH size - now 5.4cm. TRINITY Martinez notified.   
Neurology Progress Note     NAME: Deni Crocker   :  1950   MRN:  [id number]   DATE:  3/10/2025    Assessment:   76 yo male with pmhx of severe Alzheimer's Dementia, prior CVA with TPA administration and subsequent hemorrhagic conversion (), HTN and type 2 DM admitted 3/9 as transfer from [facility] for IPH s/p TNK administration for stroke. Presented to sending on 3/7 for seizure and AMS.  CTH negative, CTA H/N no LVO or flow limiting stenosis. TNK given. Loaded with Keppra and admitted to ICU for further stroke workup. EEG obtained- no epileptiform abnormalities seen. Post-TNK MRI obtained on 3/9 and pt with new right temporal IPH. Neurosurgery consulted and no surgical plan. Initiated on Nicardipine for SBP goal <140. Interval CTH with increased size of IPH with IVH. No surgical intervention per NSGY.   MRI also with micro-hemorrhage consistent with CAA.   3/10: Palliative consulted. Son neymar from [place]. Planned family meeting for [facility] 3/11.     Plan:   - Q4h neuro checks  - SBP goal, 140. Nicardipine, Labetalol PRN  - HOB elevated   - Depakote 210mg QID  - Palliative consulted  - Family meeting 3/11 for [facility]    Objective:   Chart reviewed since last seen    Current Facility-Administered Medications   Medication Dose Route Frequency    famotidine (PEPCID) 20 MG/2ML 20 mg in sodium chloride (PF) 0.9 % 10 mL injection  20 mg IntraVENous BID    sodium chloride flush 0.9 % injection 5-40 mL  5-40 mL IntraVENous 2 times per day    sodium chloride flush 0.9 % injection 5-40 mL  5-40 mL IntraVENous PRN    0.9 % sodium chloride infusion   IntraVENous PRN    acetaminophen (TYLENOL) tablet 650 mg  650 mg Oral Q6H PRN    Or    acetaminophen (TYLENOL) suppository 650 mg  650 mg Rectal Q6H PRN    ondansetron (ZOFRAN-ODT) disintegrating tablet 4 mg  4 mg Oral Q8H PRN    Or    ondansetron (ZOFRAN) injection 4 mg  4 mg IntraVENous Q6H PRN    [Held by provider] donepezil (ARICEPT) tablet 10 mg  10 mg Oral Nightly    [Held by 
Neurology Progress Note    Admit Date: 3/9/2025   LOS: 1 day      Daily Progress Note: 3/10/2025    Assessment:      - Marked micro hemorrhages in addition to macro hemorrhage on the Hemosiderin sequence consistent with CAA post TNK administration on 3/8/25 at OSH ED for NIHSS of 31 upon initial presentation    Plan:     - Q1h neuro checks  - Discussion with family with ICU intensivist primary team in later today about goals of care  - SBP goal <140- Nicardipine, Labetalol PRN  - Continue Valproate 210 mg IV every 6 hrs  - Hold AC/APT  - Interval CTH yesterday, 03/09/25 evening, reveals increase in Rt temporal lobe / basal ganglia parenchymal hemorrhage. Stable IVH extension with mild ventricular dilation and mild leftward midline shift.  - Keep head of bed elevated greater than 30 degrees.    - Primary team could consider elevating sodium to help manage cerebral edema / ICP  - NSGY has not recommended surgical intervention and family, according to discussion with intensivist, is hesitant to purse invasive surgical interventions.  - MRI performed 3/8/25 pending formal radiology read  - PT/OT/ST as tolerated    Plan d/w Dr. Anderson, primary nurse, ICU intensivist NP      HPI: his is a 75 y.o. male with complicating comorbidities of prior CVA with hemorrhagic conversion after TPA (2016), HTN. DMII, severe Alzheimer's Dementia (followed by Dr Miller Neurology) who is severely debilitated at baseline. On 3/7/25 he was at hair salon with wife when he was witnessed to slump to right followed by tonic-clonic activity and urinary incontinence. Post-ictal for EMS he was noted to be weak on left and had second generalized seizure which aborted with Ativan 2mg. On arrival to sending pt was weak on the left with left gaze and nystagmus. /100. NIHSS 31.CT scans obtained and he was given TNK. Admission labs otherwise unremarkable. Stroke and seizure workups initiated. Lipid panel reveals LDL of 65 and at goal of < 70, 
Occupational Therapy   03/09/2025    Chart reviewed in prep for OT eval; spoke with RN who advises to hold. Pt currently off unit for repeat head CT, is agitated with limited command following, and awaiting neurosurgery consult. Will defer per request and continue to follow.    Thank you,  Dorota TRUJILLO, OTR/L  
[facility]  Good Help to Those in Need  [phone]     Patient Name: Deni Crocker  YOB: 1950  Age: 75 y.o.    [facility] RN Note:  Hospice consult received, reviewing chart. Will follow up with Unit Nurse and Care Manager to discuss plan of care, patient status and discharge disposition.   In to assess patient, he is resting comfortably with no distress or agitation.  Reviewed with Dr Wheatley and Amanda.  He is not GIP today.  Will reassess tomorrow.       Thank you for the opportunity to be of service to this patient.     Madina Kolb RN  Hospice Liaison  [phone] c  [phone] o    
[facility]  Good Help to Those in Need  [phone]     Patient Name: Deni Crocker  YOB: 1950  Age: 75 y.o.    [facility] RN Note:  Plan is for hospice GIP admission today at [facility] and transfer to [facility] in AM.  CTI and orders from Dr Wheatley for CVA  Discharge order from Dr Peterson.  Hospice will set transport in AM    Thank you for the opportunity to be of service to this patient.     Whit Crespo RN  Clinical Nurse Liaison  [facility]   [phone] Mobile  [phone] Office   Available on Perfect Serve     
Palliative Medicine      Code Status: DNR    Advance Care Planning:    No AMD on file, the patient's wife is LNOK and medical decision maker     Patient / Family Encounter Documentation    Participants (names): Dr Obrien, spouse Rosa Maria Crocker, family  [place] present, also spoke w/ ICU MD     Narrative: The Palliative Medicine SW and Dr. Obrien spoke with ICU MD, noted family already decided DNR/I.     We met with the patient's wife Rosa Maria Crocker at bedside in order to introduce the role of Palliative Medicine and provide added layer of support. The patient's family  was also present offering support.     Rosa Maria reflects on all that patient has been through since 2018 - she reflects that he initially worked hard and \"came back\" from original stroke, however, she notes that over the last few months it has been increasingly difficult - the patient has had multiple falls, fractured his spine, etc. And limited his mobility. She shares w/ us while looking at her  that \"this isn't him\" and speaks of his love of activity, independence, and his \"stubborn\" nature. Psychosocial information obtained, see below - this is so much for family to process. Emotional support provided. SW explored where Rosa Maria finds her strength and she shares \"the Big Man upstairs\" and says that she \"can't fall apart\" and \"has to keep going,\" she shares she has strong support system in friends and her Yazdan[employer] family.     The patient's son Guillermo is on the way to hospital - traveling, he lives in [place]. Plan made to have family meeting tomorrow morning at 10:00 a.m. to further discuss care goals and where to from here.     Family has our contact information - plan for family meeting tomorrow at 10:00 a.m.     Psychosocial Issues Identified/ Resilience Factors: The patient lives at home with his wife Rosa Maria Crocker, they have been  nearly 47 years. Rosa Maria shares that they have one son, Henrry (blended family), who lives in [place], [place]. 
Palliative Medicine  Patient Name: Deni Crocker  YOB: 1950  MRN: [id number]  Age: 75 y.o.  Gender: male    Date of Initial Consult: 3/10/2025  Date of Service: 3/12/2025  Time: 1:36 PM  Provider: Harini Obrien MD  Hospital Day: 4  Admit Date: 3/9/2025  Referring Provider: Geovanna Lilly MD       Reasons for Consultation:  Goals of Care    HISTORY OF PRESENT ILLNESS (HPI):   Deni Crocker is a 75 y.o. male with a past medical history of prior CVA with hemorrhagic conversion post TPA (2016), HTN, DM, Dementia, who was admitted on 3/9/2025 from home with a diagnosis of possible seizure activity.    Upon arrival to the ED, he was given ativan. Code stroke was called and he was given TNK. MRI post TNK notes right temporal IPH. Repeat CT head notes increase in size of IPH. He was reviewed by NSGY who recommends no surgical intervention    Psychosocial: pt lives with his wife. He has one son. 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 He enjoyed sailing and had his own plane.       PALLIATIVE DIAGNOSES:    Palliative care encounter  Goals of care  CVA  Debility  End of life care    ASSESSMENT AND PLAN:   Chart reviewed including labs, notes, imaging. Along with Chrystal Preciado LCSW met pt at bedside. He is minimally responsive and does not have capacity to participate in MDM  We meet with pt wife Rosa Maria and son Guillermo to discuss goals of care  They share pt is an independent person and very stubborn. He values his independence and would not want to live on machines or in a facility. We discuss findings of CT scan and discuss the support he would need in terms of life prolonging measures. We also discuss the chances for meaningful recovery.  Discuss comfort measures medications used, also discuss no further vitals, labs, procedures imaging, artificial nutrition.   Goals of care  Wife describes pt as very independent and used to being in charge  Now on comfort measures only  Hospice consult 
Palliative Medicine  Patient Name: Deni Crocker  YOB: 1950  MRN: [id number]  Age: 75 y.o.  Gender: male    Date of Initial Consult: 3/10/2025  Date of Service: 3/11/2025  Time: 10:55 AM  Provider: Harini Obrien MD  Hospital Day: 3  Admit Date: 3/9/2025  Referring Provider: Geovanna Lilly MD       Reasons for Consultation:  Goals of Care    HISTORY OF PRESENT ILLNESS (HPI):   Deni Crocker is a 75 y.o. male with a past medical history of prior CVA with hemorrhagic conversion post TPA (2016), HTN, DM, Dementia, who was admitted on 3/9/2025 from home with a diagnosis of possible seizure activity.    Upon arrival to the ED, he was given ativan. Code stroke was called and he was given TNK. MRI post TNK notes right temporal IPH. Repeat CT head notes increase in size of IPH. He was reviewed by NSGY who recommends no surgical intervention    Psychosocial: pt lives with his wife. He has one son. 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 He enjoyed sailing and had his own plane.       PALLIATIVE DIAGNOSES:    Palliative care encounter  Goals of care  CVA  Debility  End of life care    ASSESSMENT AND PLAN:   Chart reviewed including labs, notes, imaging. Along with Chrystal Preciado LCSW met pt at bedside. He is minimally responsive and does not have capacity to participate in MDM  We meet with pt wife Rosa Maria and son Guillermo to discuss goals of care  They share pt is an independent person and very stubborn. He values his independence and would not want to live on machines or in a facility. We discuss findings of CT scan and discuss the support he would need in terms of life prolonging measures. We also discuss the chances for meaningful recovery.  Discuss comfort measures medications used, also discuss no further vitals, labs, procedures imaging, artificial nutrition.   Goals of care  Wife describes pt as very independent and used to being in charge  Plan for transition to comfort measures only.   Hospice 
Physical Therapy Note  03/09/2025    Chart reviewed in prep for PT eval; spoke with RN who advises to hold. Pt currently off unit for repeat head CT, is agitated with limited command following, and awaiting neurosurgery consult. Will defer per request and continue to follow.    Thank you,  Regina Mckee, PT, DPT  
SLP Contact Note    Attempted to see patient for swallow evaluation. Pt recently with Haldol upon attempt and not appropriate for SLP. Will follow up as patient and SLP schedule permits.       Kari Pacheco M.Ed, PhD(c), CCC-SLP (pronouns: she/her/hers)  Speech-Language Pathologist    
SLP Contact Note    Discussed pt with PARAMJIT Brown, appreciate her expertise. Patient not appropriate for SLP at this time and thus will hold for now.      Kari Pacheco M.Ed, PhD(c), CCC-SLP (pronouns: she/her/hers)  Speech-Language Pathologist    
Spoke to icu attending this am.  Post tnk hemorrhage of right external capsule/ temporal lobe with non-obstructing ivh.  No surgical intervention warranted  
UPDATE NOTE    75-year-old.  Past medical history of dementia, suspect vascular dementia, some differential of Alzheimer's, presents following 2 seizure episode and new left-sided weakness.  Patient received TNK on 3/7, brain MRI on 3/8 revealed some concern for new right temporal IPH.  Follow-up CT head revealed intraventricular hemorrhage/extension without hydrocephalus.    Patient is transferred to [facility] ICU for neurosurgery evaluation.    Clinically patient continues to be altered, not following exam Limited neuroassessment, moving all extremities.    Follow-up CT head stable ventricular size.  Minimal leftward midline shift measuring 3 mm, and new right temporal/basal ganglia intraparenchymal hemorrhage with intraventricular extension.  On review of brain MRI, some concern for CNS amyloidosis.    Had extensive discussion with patient's wife, we discussed patient's values, goals.  Per patient's wife, he would not want any invasive intervention, particularly interventions that would limit his independence.  We discussed DNR/DNI, and discussed neurosurgical intervention if necessary, she and the family would like to hear from neurosurgery but would otherwise prefer to avoid neurosurgical interventions, mechanical ventilation, and chest compressions, she would like to discuss this further with the rest of family before making a final decision.    Given altered mentation, will hold off on Keppra and begin Depakote.       
also discuss comfort focused care - no more labs, procedures, imaging, etc. And focus on treating pain, anxiety, shortness of breath - we discuss mouth care in-detail, and if he were to wake up could offer a comfort diet, however, his neuro status has precluded that at this point. Discuss that we would not do artificial nutrition - but keep him comfortable.     Family appropriately grieving and tearful - so much for them to process. The patient's wife is confident in what he would find to be quality of life, although it does not make the decision any less grueling.     Family is in agreement with transition to comfort focused care.     We discuss hospice involvement - family cannot care for patient in his current state, he also cannot swallow and needs IV seizure medications - we discuss together that although hospice makes final decision - likely will need IP hospice for symptom control.     Emotional support provided to family.     Our team will follow along closely - hospice consult, plan to transition to comfort focused care.       Goals of Care / Plan: DNR, transition to comfort focused care     Thank you for including Palliative Medicine in the care of Deni Obi Preciado LCSW    
  GLOB 3.3 3.3     Recent Labs     03/07/25  1650   INR 1.0      Invalid input(s): \"PHI\", \"PCO2I\", \"PO2I\", \"FIO2I\"  No results for input(s): \"CPK\", \"CKMB\" in the last 72 hours.    Invalid input(s): \"TROIQ\", \"BNPP\"    Ventilator Settings:  Mode Rate Tidal Volume Pressure FiO2 PEEP                    Peak airway pressure:      Minute ventilation:          MEDS: Reviewed    Chest X-Ray:  Reviewed      CRITICAL CARE CONSULTANT NOTE  I had a face to face encounter with the patient, reviewed and interpreted patient data including clinical events, labs, images, vital signs, I/O's, and examined patient.  I have discussed the case and the plan and management of the patient's care with the consulting services, the bedside nurses and the respiratory therapist.      NOTE OF PERSONAL INVOLVEMENT IN CARE   This patient has a high probability of imminent, clinically significant deterioration, which requires the highest level of preparedness to intervene urgently. I participated in the decision-making and personally managed or directed the management of the following life and organ supporting interventions that required my frequent assessment to treat or prevent imminent deterioration.    I personally spent 75 minutes of critical care time.  This is time spent at this critically ill patient's bedside actively involved in patient care as well as the coordination of care.  This does not include any procedural time which has been billed separately.    Geovanna Lilly   Staff Intensivist/Infectious Disease  [facility]  3/10/2025

## 2025-03-12 NOTE — PLAN OF CARE
Problem: Chronic Conditions and Co-morbidities  Goal: Patient's chronic conditions and co-morbidity symptoms are monitored and maintained or improved  Outcome: Not Progressing  Flowsheets (Taken 3/11/2025 2215)  Care Plan - Patient's Chronic Conditions and Co-Morbidity Symptoms are Monitored and Maintained or Improved: Monitor and assess patient's chronic conditions and comorbid symptoms for stability, deterioration, or improvement

## 2025-03-12 NOTE — H&P
[facility]   [facility] Help to Those in Need  [phone]     Patient Name:  Deni Crocker  YOB: 1950         Date of Provider Hospice Visit: 03/12/25     Level of Care:   [x] General Inpatient (GIP)              [] Routine                   [] Respite     Current Location of Care:  [x] [facility]           [] [facility]         [] [facility]        [] [facility]           [] [facility] House ([facility])     IF [facility], patient referred from:  [] [facility]           [] [facility]         [] [facility]        [] [facility]           [] Home         [] Other:      Date of Original Hospice Admission:  3/12/2025  5:19 PM   Hospice Medical Director at time of admission: Dr. Dylan Wheatley     Principle Hospice Diagnosis:   CVA   Diagnoses RELATED to the terminal prognosis: Seizure, acute encephalopathy, dementia  Other Diagnoses:      HOSPICE SUMMARY   Deni Crocker  75-year-old male with past medical history significant for dementia, hypertension, diabetes admitted to hospice due to CVA.  Patient admitted to hospital with CVA status post TNK had hemorrhagic conversion.  Notably with worsening mental status and seizure-like activity.  Patient placed on AED.  Evaluated by neurosurgery no intervention.  Patient despite AED with breakthrough seizure.  Poor prognosis.  Decision for comfort measures.  PPS 10%.  Patient admitted [facility] for management of symptoms seizure dyspnea air hunger.     Objective information that support this patients limited prognosis includes: See note above.       The patient/family chose comfort measures with the support of Hospice.      HOSPICE DIAGNOSES   Active Symptoms:  1.  Seizure  2.  Dyspnea and air hunger      PLAN   Start lorazepam 2 mg IV every 6 hours  Continue Depacon to 10 mg IV every 6 hours  Start Dilaudid 0.5 mg IV every 6 hours  Start Robinul 0.2 mg IV every 6 hours  As needed medication regimen (lorazepam/Dilaudid/Zofran/bisacodyl/atropine/Toradol)   6.   and SW to support family needs  7. Disposition: EOL  Hospice Plan of

## 2025-03-13 VITALS
DIASTOLIC BLOOD PRESSURE: 73 MMHG | HEART RATE: 75 BPM | WEIGHT: 185.63 LBS | OXYGEN SATURATION: 97 % | RESPIRATION RATE: 16 BRPM | TEMPERATURE: 97.9 F | BODY MASS INDEX: 24.49 KG/M2 | SYSTOLIC BLOOD PRESSURE: 142 MMHG

## 2025-03-13 VITALS
SYSTOLIC BLOOD PRESSURE: 128 MMHG | RESPIRATION RATE: 18 BRPM | DIASTOLIC BLOOD PRESSURE: 70 MMHG | HEART RATE: 114 BPM | TEMPERATURE: 99.9 F | OXYGEN SATURATION: 94 %

## 2025-03-13 PROCEDURE — 2500000003 HC RX 250 WO HCPCS: Performed by: INTERNAL MEDICINE

## 2025-03-13 PROCEDURE — 6360000002 HC RX W HCPCS: Performed by: INTERNAL MEDICINE

## 2025-03-13 PROCEDURE — 2580000003 HC RX 258: Performed by: INTERNAL MEDICINE

## 2025-03-13 RX ORDER — KETOROLAC TROMETHAMINE 30 MG/ML
15 INJECTION, SOLUTION INTRAMUSCULAR; INTRAVENOUS EVERY 6 HOURS PRN
Status: DISCONTINUED | OUTPATIENT
Start: 2025-03-13 | End: 2025-03-13 | Stop reason: HOSPADM

## 2025-03-13 RX ADMIN — HYDROMORPHONE HYDROCHLORIDE 0.5 MG: 1 INJECTION, SOLUTION INTRAMUSCULAR; INTRAVENOUS; SUBCUTANEOUS at 12:03

## 2025-03-13 RX ADMIN — VALPROATE SODIUM 210 MG: 100 INJECTION, SOLUTION INTRAVENOUS at 02:50

## 2025-03-13 RX ADMIN — VALPROATE SODIUM 210 MG: 100 INJECTION, SOLUTION INTRAVENOUS at 08:45

## 2025-03-13 RX ADMIN — HYDROMORPHONE HYDROCHLORIDE 0.5 MG: 1 INJECTION, SOLUTION INTRAMUSCULAR; INTRAVENOUS; SUBCUTANEOUS at 05:19

## 2025-03-13 RX ADMIN — GLYCOPYRROLATE 0.2 MG: 0.2 INJECTION INTRAMUSCULAR; INTRAVENOUS at 12:03

## 2025-03-13 RX ADMIN — VALPROATE SODIUM 210 MG: 100 INJECTION, SOLUTION INTRAVENOUS at 15:09

## 2025-03-13 RX ADMIN — GLYCOPYRROLATE 0.2 MG: 0.2 INJECTION INTRAMUSCULAR; INTRAVENOUS at 05:20

## 2025-03-13 RX ADMIN — LORAZEPAM 2 MG: 2 INJECTION INTRAMUSCULAR; INTRAVENOUS at 05:19

## 2025-03-13 RX ADMIN — SODIUM CHLORIDE, PRESERVATIVE FREE 10 ML: 5 INJECTION INTRAVENOUS at 10:29

## 2025-03-13 RX ADMIN — LORAZEPAM 2 MG: 2 INJECTION INTRAMUSCULAR; INTRAVENOUS at 12:03

## 2025-03-13 ASSESSMENT — PAIN SCALES - GENERAL: PAINLEVEL_OUTOF10: 1

## 2025-03-13 NOTE — PROGRESS NOTES
Renal Dosing/Monitoring  Medication: Ketorolac   Current regimen:  30 mg IV every 6 hr PRN Fever  Recent Labs     03/11/25  0437   CREATININE 0.83   BUN 18     Estimated CrCl:  86 ml/min    Plan: Change to 15 mg every 6 hr PRN Fever  with respect to Age (>66yo) per [facility]/[employer]-approved Renal Dosing Adjustment protocol.  Pharmacy will continue to monitor this patient daily for changes in clinical status and renal function.      Imtiaz Vasquez, PharmD  Clinical Pharmacist, Orthopedics and Med/Surg  [facility]

## 2025-03-13 NOTE — DISCHARGE SUMMARY
Discharge Summary       PATIENT ID: Deni Crocker  MRN: [id number]   YOB: 1950    DATE OF ADMISSION: 3/12/2025  5:19 PM    DATE OF DISCHARGE: 3/12/25  PRIMARY CARE PROVIDER: Odalys Sommer PA-C     ATTENDING PHYSICIAN: Kristen   DISCHARGING PROVIDER: YVROSE Ulloa NP    To contact this individual call [phone] and ask the  to page.  If unavailable ask to be transferred the Adult Hospitalist Department.    CONSULTATIONS: None    PROCEDURES/SURGERIES: * No surgery found *     ADMITTING DIAGNOSES & HOSPITAL COURSE:     Deni Crocker is a 75 y.o. male with apmhx severe dementia, HTN, DM II, and past ischemic stroke with TPA and subsequent hemorrhagic conversion who was transferred to  ICU for stroke work up. He received TNK with subsequent hemorrhagic conversion and was transferred to [facility] for neurosurgery consultation.  Neurosurgery concluded that there is no plan for surgical intervention.Notably with worsening mental status and seizure like activity. Patient placed on AED. Decision for comfort measures. Plan is for discharge into inpatient hospice.         DISCHARGE DIAGNOSES / PLAN:      Intraparenchymal hemorrhage  -s/p TNK on 3/7 given after he presented with seizure like activity with concern for stroke  -comfort care due to progression of IPH and worsening of mental status  -DNR     Acute encephalopathy on chronic dementia  -seizure, and intraparenchymal hemorrahge acutely, chronic dementia  -continue valproic acid  -comfort care       PENDING TEST RESULTS:   At the time of discharge the following test results are still pending: none     FOLLOW UP APPOINTMENTS:    Follow-up Information    None           ADDITIONAL CARE RECOMMENDATIONS: per hospice       DISCHARGE MEDICATIONS:     Medication List        ASK your doctor about these medications      acetaminophen 325 MG tablet  Commonly known as: TYLENOL  Take 2 tablets by mouth every 6 hours as needed for Pain or Fever

## 2025-03-13 NOTE — PROGRESS NOTES
[facility]  [facility] Help to Those in Need  [phone]     Patient Name: Deni Crocker  YOB: 1950  Age: 75 y.o.    [facility] RN Note:  Hospice liaison bedside visit. Wife, sister, niece and friend at bedside. Patient is unresponsive, breathing is even and unlabored. He is warm to touch, pale.  this AM.  No medication changes needed at this time.  We discussed his transfer to [facility] today. We are awaiting medications to arrive to [facility] prior to transport.  All were given [facility] brochure.  Once transport time has been determined I will let his wife know.    Thank you for the opportunity to be of service to this patient.     Whit Crespo RN  Clinical Nurse Liaison  [facility]   [phone] Mobile  [phone] Office   Available on Perfect Serve

## 2025-03-13 NOTE — PROGRESS NOTES
Admit Date: 3/12/2025       Subjective:     Patient is unresponsive    Scheduled Meds:   sodium chloride flush  5-40 mL IntraVENous 2 times per day    LORazepam  2 mg IntraVENous Q6H    HYDROmorphone  0.5 mg IntraVENous Q6H    valproate  210 mg IntraVENous Q6H    glycopyrrolate  0.2 mg IntraVENous Q6H     Continuous Infusions:  PRN Meds:ketorolac, HYDROmorphone, LORazepam, LORazepam, atropine, bisacodyl, ondansetron    Review of Systems  Unable to obtain    Objective:     Patient Vitals for the past 8 hrs:   BP Temp Temp src Pulse Resp SpO2   03/13/25 0821 128/70 99.9 °F (37.7 °C) Oral (!) 114 18 94 %     I/O last 3 completed shifts:  In: -   Out: 450 [Urine:450]  No intake/output data recorded.    PPS 10%  Cardiovascular: Regular rhythm  Respiratory: Rhonchi  Gastrointestinal: Soft hypoactive bowel sounds  Skin: Dry  Neurologic: Unresponsive  Psychiatric: Unable to evaluate      Assessment:     Principal Problem:    Acute cerebrovascular accident (CVA) (HCC)  Resolved Problems:    * No resolved hospital problems. *      Plan:     75-year-old male with past medical history significant for dementia, hypertension, diabetes admitted to hospice due to CVA. Patient admitted to hospital with CVA status post TNK had hemorrhagic conversion. Notably with worsening mental status and seizure-like activity. Patient placed on AED. Evaluated by neurosurgery no intervention. Patient despite AED with breakthrough seizure. Poor prognosis. Decision for comfort measures.   Patient continues to meet GIP criteria for management of symptoms seizure dyspnea air hunger.  Continue lorazepam 2 mg IV every 6 hours, Depacon 210 mg IV every 6 hours, Dilaudid 0.5 mg IV every 6 hours and glycopyrrolate 0.2 mg IV every 6 hours.    Wife updated.  All questions answered.

## 2025-03-13 NOTE — PROGRESS NOTES
[facility]  [facility] to Those in Need  [phone]     Patient Name: Deni Crocker  YOB: 1950  Age: 75 y.o.    [facility] RN Note:  Hospice liaison set up transport with Hospital to Home to [facility] at 4pm.   Report called to [facility]  Bedside nurse: leave IV and mckeon  in place  And call report to [facility]: [phone]      Thank you for the opportunity to be of service to this patient.     Whit Crespo RN  Clinical Nurse Liaison  [facility]   [phone] Mobile  [phone] Office   Available on Perfect Serve

## 2025-05-09 ENCOUNTER — FOLLOWUP TELEPHONE ENCOUNTER (OUTPATIENT)
Facility: HOSPITAL | Age: 75
End: 2025-05-09

## 2025-05-09 NOTE — NURSE NAVIGATOR
Neuroscience Patient Post Discharge Follow Up Phone Call - 30-day Follow-up  The Nurse Navigator attempted to contact the patient by telephone to perform a post-hospital discharge follow-up call. The patient's name and date of birth were verified as identifiers.     Discharge Assessment   Nurse Navigator unable to review discharge information due to not being able to reach the patient.     Follow-up and Outcomes   Nurse Navigator was unable to review post-discharge experience as the patient could not be reached.    30-day mRS Score: 5 - Severe disability: bedridden, incontinent, and requiring constant nursing care and attention.  There is no current documentation available regarding the patient’s clinical status. At discharge, the patient had experienced a severe stroke and was transitioned to comfort care, requiring complete assistance with all activities of daily living. There is no indication of functional improvement or recovery since discharge.   No emergency department visits or hospital readmissions have been documented.   No further concerns noted at this time.    Patient Feedback   Nurse Navigator was unable to review questions or concerns as the patient could not be reached.     Plan   Nurse Navigator left a voicemail asking the patient to call back at their earliest convenience.  The Nurse Navigator will continue to provide ongoing support and follow-up as needed.

## (undated) DEVICE — SPLINT CAST W4XL15IN GRN STRENGTH PLSTR OF PARIS FAST SET

## (undated) DEVICE — BNDG ELAS COBAN 3INX5YD NS --

## (undated) DEVICE — SUTURE MCRYL SZ 4-0 L27IN ABSRB UD L19MM PS-2 1/2 CIR PRIM Y426H

## (undated) DEVICE — DISPOSABLE TOURNIQUET CUFF SINGLE BLADDER, DUAL PORT AND QUICK CONNECT CONNECTOR: Brand: COLOR CUFF

## (undated) DEVICE — MEDI-VAC NON-CONDUCTIVE SUCTION TUBING: Brand: CARDINAL HEALTH

## (undated) DEVICE — Device

## (undated) DEVICE — BIT DRL SLD SD CUT 2.5X40MM -- DISP

## (undated) DEVICE — GOWN,SIRUS,NONRNF,SETINSLV,2XL,18/CS: Brand: MEDLINE

## (undated) DEVICE — DEVON™ KNEE AND BODY STRAP 60" X 3" (1.5 M X 7.6 CM): Brand: DEVON

## (undated) DEVICE — ARGYLE FRAZIER SURGICAL SUCTION INSTRUMENT 10 FR/CH (3.3 MM): Brand: ARGYLE

## (undated) DEVICE — HAND I-LF: Brand: MEDLINE INDUSTRIES, INC.

## (undated) DEVICE — 1010 S-DRAPE TOWEL DRAPE 10/BX: Brand: STERI-DRAPE™

## (undated) DEVICE — CRADLE BOOT: Brand: DEROYAL

## (undated) DEVICE — SPONGE LAP 18X18IN STRL -- 5/PK

## (undated) DEVICE — SUTURE VCRL SZ 3-0 L27IN ABSRB UD L26MM SH 1/2 CIR J416H

## (undated) DEVICE — DRAPE C ARM W54XL84IN MINI FOR OEC 6800

## (undated) DEVICE — (D)STRIP SKN CLSR 0.5X4IN WHT --

## (undated) DEVICE — DRIVER UNIV QC T10 --

## (undated) DEVICE — KIT INFECTION CTRL ST FRAN --

## (undated) DEVICE — DRIVER SURG UNIV QUIK CONN T10 FOR VOLAR DST RAD PLATING

## (undated) DEVICE — STERILE POLYISOPRENE POWDER-FREE SURGICAL GLOVES WITH EMOLLIENT COATING: Brand: PROTEXIS

## (undated) DEVICE — STERILE POLYISOPRENE POWDER-FREE SURGICAL GLOVES: Brand: PROTEXIS

## (undated) DEVICE — SUTURE VCRL SZ 0 L27IN ABSRB UD SH L26MM 1/2 CIR TAPERPOINT J418H

## (undated) DEVICE — IMPLANTABLE DEVICE
Type: IMPLANTABLE DEVICE | Site: WRIST | Status: NON-FUNCTIONAL
Removed: 2017-03-17

## (undated) DEVICE — SKIN PREP TRAY W/CHG: Brand: MEDLINE INDUSTRIES, INC.

## (undated) DEVICE — GUIDE AIM 1.5MM --

## (undated) DEVICE — DRAPE,U/ SHT,SPLIT,PLAS,STERIL: Brand: MEDLINE

## (undated) DEVICE — BIT DRL SLD SD CUT 2X40MM DISP --

## (undated) DEVICE — BIPOLAR FORCEPS CORD,BANANA LEADS: Brand: VALLEYLAB

## (undated) DEVICE — KENDALL SCD EXPRESS SLEEVES, KNEE LENGTH, MEDIUM: Brand: KENDALL SCD

## (undated) DEVICE — LIGHT HANDLE: Brand: DEVON

## (undated) DEVICE — DRAPE,REIN 53X77,STERILE: Brand: MEDLINE